# Patient Record
Sex: FEMALE | Race: BLACK OR AFRICAN AMERICAN | Employment: OTHER | URBAN - NONMETROPOLITAN AREA
[De-identification: names, ages, dates, MRNs, and addresses within clinical notes are randomized per-mention and may not be internally consistent; named-entity substitution may affect disease eponyms.]

---

## 2017-03-07 RX ORDER — MELOXICAM 15 MG/1
15 TABLET ORAL DAILY
Qty: 90 TABLET | Refills: 1 | Status: SHIPPED | OUTPATIENT
Start: 2017-03-07 | End: 2017-08-17 | Stop reason: SDUPTHER

## 2017-04-19 ENCOUNTER — TELEPHONE (OUTPATIENT)
Dept: OBGYN | Age: 67
End: 2017-04-19

## 2017-04-25 ENCOUNTER — OFFICE VISIT (OUTPATIENT)
Dept: OBGYN | Age: 67
End: 2017-04-25
Payer: MEDICARE

## 2017-04-25 VITALS
BODY MASS INDEX: 36.53 KG/M2 | WEIGHT: 241 LBS | HEART RATE: 80 BPM | SYSTOLIC BLOOD PRESSURE: 132 MMHG | HEIGHT: 68 IN | DIASTOLIC BLOOD PRESSURE: 84 MMHG

## 2017-04-25 DIAGNOSIS — B37.31 CANDIDIASIS OF FEMALE GENITALIA: ICD-10-CM

## 2017-04-25 DIAGNOSIS — N95.1 SYMPTOMATIC MENOPAUSAL OR FEMALE CLIMACTERIC STATES: Primary | ICD-10-CM

## 2017-04-25 PROCEDURE — 3014F SCREEN MAMMO DOC REV: CPT | Performed by: OBSTETRICS & GYNECOLOGY

## 2017-04-25 PROCEDURE — G8419 CALC BMI OUT NRM PARAM NOF/U: HCPCS | Performed by: OBSTETRICS & GYNECOLOGY

## 2017-04-25 PROCEDURE — 1090F PRES/ABSN URINE INCON ASSESS: CPT | Performed by: OBSTETRICS & GYNECOLOGY

## 2017-04-25 PROCEDURE — 4040F PNEUMOC VAC/ADMIN/RCVD: CPT | Performed by: OBSTETRICS & GYNECOLOGY

## 2017-04-25 PROCEDURE — 1036F TOBACCO NON-USER: CPT | Performed by: OBSTETRICS & GYNECOLOGY

## 2017-04-25 PROCEDURE — 1123F ACP DISCUSS/DSCN MKR DOCD: CPT | Performed by: OBSTETRICS & GYNECOLOGY

## 2017-04-25 PROCEDURE — G8427 DOCREV CUR MEDS BY ELIG CLIN: HCPCS | Performed by: OBSTETRICS & GYNECOLOGY

## 2017-04-25 PROCEDURE — 99213 OFFICE O/P EST LOW 20 MIN: CPT | Performed by: OBSTETRICS & GYNECOLOGY

## 2017-04-25 PROCEDURE — 3017F COLORECTAL CA SCREEN DOC REV: CPT | Performed by: OBSTETRICS & GYNECOLOGY

## 2017-04-25 PROCEDURE — G8400 PT W/DXA NO RESULTS DOC: HCPCS | Performed by: OBSTETRICS & GYNECOLOGY

## 2017-04-25 RX ORDER — ESTRADIOL 0.04 MG/D
1 PATCH TRANSDERMAL WEEKLY
Qty: 4 PATCH | Refills: 3 | Status: SHIPPED | OUTPATIENT
Start: 2017-04-25 | End: 2017-07-25 | Stop reason: ALTCHOICE

## 2017-04-25 RX ORDER — FLUCONAZOLE 150 MG/1
150 TABLET ORAL ONCE
Qty: 1 TABLET | Refills: 0 | Status: SHIPPED | OUTPATIENT
Start: 2017-04-25 | End: 2017-04-25

## 2017-04-26 RX ORDER — ESTRADIOL 0.5 MG/1
0.5 TABLET ORAL DAILY
Qty: 30 TABLET | Refills: 3 | Status: SHIPPED | OUTPATIENT
Start: 2017-04-26 | End: 2017-06-28 | Stop reason: SDUPTHER

## 2017-06-30 RX ORDER — ESTRADIOL 0.5 MG/1
0.5 TABLET ORAL DAILY
Qty: 30 TABLET | Refills: 3 | Status: SHIPPED | OUTPATIENT
Start: 2017-06-30 | End: 2018-09-24 | Stop reason: ALTCHOICE

## 2017-07-25 ENCOUNTER — HOSPITAL ENCOUNTER (OUTPATIENT)
Age: 67
Setting detail: SPECIMEN
Discharge: HOME | End: 2017-07-25
Payer: MEDICARE

## 2017-07-25 ENCOUNTER — OFFICE VISIT (OUTPATIENT)
Dept: OBGYN | Age: 67
End: 2017-07-25
Payer: MEDICARE

## 2017-07-25 VITALS
SYSTOLIC BLOOD PRESSURE: 124 MMHG | DIASTOLIC BLOOD PRESSURE: 84 MMHG | HEIGHT: 68 IN | HEART RATE: 60 BPM | BODY MASS INDEX: 36.37 KG/M2 | WEIGHT: 240 LBS

## 2017-07-25 DIAGNOSIS — Z12.31 ENCOUNTER FOR SCREENING MAMMOGRAM FOR MALIGNANT NEOPLASM OF BREAST: ICD-10-CM

## 2017-07-25 DIAGNOSIS — Z01.419 WELL WOMAN EXAM WITH ROUTINE GYNECOLOGICAL EXAM: Primary | ICD-10-CM

## 2017-07-25 PROCEDURE — 4040F PNEUMOC VAC/ADMIN/RCVD: CPT | Performed by: OBSTETRICS & GYNECOLOGY

## 2017-07-25 PROCEDURE — 3014F SCREEN MAMMO DOC REV: CPT | Performed by: OBSTETRICS & GYNECOLOGY

## 2017-07-25 PROCEDURE — G0101 CA SCREEN;PELVIC/BREAST EXAM: HCPCS | Performed by: OBSTETRICS & GYNECOLOGY

## 2017-07-25 PROCEDURE — G8400 PT W/DXA NO RESULTS DOC: HCPCS | Performed by: OBSTETRICS & GYNECOLOGY

## 2017-07-25 PROCEDURE — 1123F ACP DISCUSS/DSCN MKR DOCD: CPT | Performed by: OBSTETRICS & GYNECOLOGY

## 2017-07-25 PROCEDURE — G8419 CALC BMI OUT NRM PARAM NOF/U: HCPCS | Performed by: OBSTETRICS & GYNECOLOGY

## 2017-07-25 PROCEDURE — G8427 DOCREV CUR MEDS BY ELIG CLIN: HCPCS | Performed by: OBSTETRICS & GYNECOLOGY

## 2017-07-25 PROCEDURE — 1090F PRES/ABSN URINE INCON ASSESS: CPT | Performed by: OBSTETRICS & GYNECOLOGY

## 2017-07-25 PROCEDURE — 3017F COLORECTAL CA SCREEN DOC REV: CPT | Performed by: OBSTETRICS & GYNECOLOGY

## 2017-07-25 PROCEDURE — 1036F TOBACCO NON-USER: CPT | Performed by: OBSTETRICS & GYNECOLOGY

## 2017-07-25 RX ORDER — ESTRADIOL 1 MG/1
1 TABLET ORAL DAILY
Qty: 90 TABLET | Refills: 3 | Status: SHIPPED | OUTPATIENT
Start: 2017-07-25 | End: 2017-09-28 | Stop reason: DRUGHIGH

## 2017-07-25 ASSESSMENT — ENCOUNTER SYMPTOMS
EYES NEGATIVE: 1
RESPIRATORY NEGATIVE: 1

## 2017-07-26 LAB — CYTOLOGY REPORT: NORMAL

## 2017-08-04 RX ORDER — HYDROCHLOROTHIAZIDE 25 MG/1
TABLET ORAL
Qty: 90 TABLET | Refills: 2 | Status: SHIPPED | OUTPATIENT
Start: 2017-08-04 | End: 2018-03-23 | Stop reason: SDUPTHER

## 2017-08-17 RX ORDER — MELOXICAM 15 MG/1
TABLET ORAL
Qty: 90 TABLET | Refills: 0 | Status: SHIPPED | OUTPATIENT
Start: 2017-08-17 | End: 2017-11-16 | Stop reason: SDUPTHER

## 2017-09-28 ENCOUNTER — OFFICE VISIT (OUTPATIENT)
Dept: FAMILY MEDICINE CLINIC | Age: 67
End: 2017-09-28
Payer: MEDICARE

## 2017-09-28 VITALS
HEIGHT: 68 IN | DIASTOLIC BLOOD PRESSURE: 76 MMHG | WEIGHT: 246 LBS | BODY MASS INDEX: 37.28 KG/M2 | HEART RATE: 72 BPM | SYSTOLIC BLOOD PRESSURE: 128 MMHG

## 2017-09-28 DIAGNOSIS — J45.20 MILD INTERMITTENT ASTHMA WITHOUT COMPLICATION: ICD-10-CM

## 2017-09-28 DIAGNOSIS — Z79.890 POSTMENOPAUSAL HRT (HORMONE REPLACEMENT THERAPY): ICD-10-CM

## 2017-09-28 DIAGNOSIS — E66.09 NON MORBID OBESITY DUE TO EXCESS CALORIES: ICD-10-CM

## 2017-09-28 DIAGNOSIS — G47.9 SLEEP DISTURBANCES: ICD-10-CM

## 2017-09-28 DIAGNOSIS — Z23 NEED FOR PNEUMOCOCCAL VACCINE: Primary | ICD-10-CM

## 2017-09-28 DIAGNOSIS — D12.6 ADENOMATOUS POLYP OF COLON, UNSPECIFIED PART OF COLON: ICD-10-CM

## 2017-09-28 DIAGNOSIS — E78.00 PURE HYPERCHOLESTEROLEMIA: ICD-10-CM

## 2017-09-28 DIAGNOSIS — K21.9 GASTROESOPHAGEAL REFLUX DISEASE WITHOUT ESOPHAGITIS: ICD-10-CM

## 2017-09-28 DIAGNOSIS — M17.11 PRIMARY OSTEOARTHRITIS OF RIGHT KNEE: ICD-10-CM

## 2017-09-28 DIAGNOSIS — I10 ESSENTIAL HYPERTENSION: ICD-10-CM

## 2017-09-28 DIAGNOSIS — M81.0 OSTEOPOROSIS, UNSPECIFIED OSTEOPOROSIS TYPE, UNSPECIFIED PATHOLOGICAL FRACTURE PRESENCE: ICD-10-CM

## 2017-09-28 PROCEDURE — 1090F PRES/ABSN URINE INCON ASSESS: CPT | Performed by: FAMILY MEDICINE

## 2017-09-28 PROCEDURE — G8419 CALC BMI OUT NRM PARAM NOF/U: HCPCS | Performed by: FAMILY MEDICINE

## 2017-09-28 PROCEDURE — 3014F SCREEN MAMMO DOC REV: CPT | Performed by: FAMILY MEDICINE

## 2017-09-28 PROCEDURE — G8400 PT W/DXA NO RESULTS DOC: HCPCS | Performed by: FAMILY MEDICINE

## 2017-09-28 PROCEDURE — 1123F ACP DISCUSS/DSCN MKR DOCD: CPT | Performed by: FAMILY MEDICINE

## 2017-09-28 PROCEDURE — 4005F PHARM THX FOR OP RXD: CPT | Performed by: FAMILY MEDICINE

## 2017-09-28 PROCEDURE — 3017F COLORECTAL CA SCREEN DOC REV: CPT | Performed by: FAMILY MEDICINE

## 2017-09-28 PROCEDURE — 4040F PNEUMOC VAC/ADMIN/RCVD: CPT | Performed by: FAMILY MEDICINE

## 2017-09-28 PROCEDURE — 99214 OFFICE O/P EST MOD 30 MIN: CPT | Performed by: FAMILY MEDICINE

## 2017-09-28 PROCEDURE — G8427 DOCREV CUR MEDS BY ELIG CLIN: HCPCS | Performed by: FAMILY MEDICINE

## 2017-09-28 PROCEDURE — 1036F TOBACCO NON-USER: CPT | Performed by: FAMILY MEDICINE

## 2017-09-28 ASSESSMENT — PATIENT HEALTH QUESTIONNAIRE - PHQ9
SUM OF ALL RESPONSES TO PHQ QUESTIONS 1-9: 0
SUM OF ALL RESPONSES TO PHQ9 QUESTIONS 1 & 2: 0
2. FEELING DOWN, DEPRESSED OR HOPELESS: 0
1. LITTLE INTEREST OR PLEASURE IN DOING THINGS: 0

## 2017-09-28 ASSESSMENT — ENCOUNTER SYMPTOMS
GASTROINTESTINAL NEGATIVE: 1
RESPIRATORY NEGATIVE: 1
EYES NEGATIVE: 1
ALLERGIC/IMMUNOLOGIC NEGATIVE: 1

## 2017-10-12 ENCOUNTER — HOSPITAL ENCOUNTER (OUTPATIENT)
Dept: LAB | Age: 67
Setting detail: SPECIMEN
Discharge: HOME | End: 2017-10-12
Payer: MEDICARE

## 2017-10-12 ENCOUNTER — HOSPITAL ENCOUNTER (OUTPATIENT)
Dept: BONE DENSITY | Age: 67
Discharge: HOME | End: 2017-10-12
Payer: MEDICARE

## 2017-10-12 DIAGNOSIS — M81.0 OSTEOPOROSIS, UNSPECIFIED OSTEOPOROSIS TYPE, UNSPECIFIED PATHOLOGICAL FRACTURE PRESENCE: ICD-10-CM

## 2017-10-12 DIAGNOSIS — I10 ESSENTIAL HYPERTENSION: ICD-10-CM

## 2017-10-12 DIAGNOSIS — E78.00 PURE HYPERCHOLESTEROLEMIA: ICD-10-CM

## 2017-10-12 LAB
ABSOLUTE EOS #: 0.3 K/UL (ref 0–0.4)
ABSOLUTE LYMPH #: 2.5 K/UL (ref 1–4.8)
ABSOLUTE MONO #: 0.5 K/UL (ref 0.1–1.2)
ANION GAP SERPL CALCULATED.3IONS-SCNC: 10 MMOL/L (ref 9–17)
BASOPHILS # BLD: 1 % (ref 0–1)
BASOPHILS ABSOLUTE: 0 K/UL (ref 0–0.2)
BUN BLDV-MCNC: 12 MG/DL (ref 8–23)
BUN/CREAT BLD: 14 (ref 9–20)
CALCIUM SERPL-MCNC: 8.9 MG/DL (ref 8.6–10.4)
CHLORIDE BLD-SCNC: 102 MMOL/L (ref 98–107)
CHOLESTEROL/HDL RATIO: 3.1
CHOLESTEROL: 192 MG/DL
CO2: 28 MMOL/L (ref 20–31)
CREAT SERPL-MCNC: 0.83 MG/DL (ref 0.5–0.9)
DIFFERENTIAL TYPE: ABNORMAL
EOSINOPHILS RELATIVE PERCENT: 4 % (ref 1–7)
GFR AFRICAN AMERICAN: >60 ML/MIN
GFR NON-AFRICAN AMERICAN: >60 ML/MIN
GFR SERPL CREATININE-BSD FRML MDRD: ABNORMAL ML/MIN/{1.73_M2}
GFR SERPL CREATININE-BSD FRML MDRD: ABNORMAL ML/MIN/{1.73_M2}
GLUCOSE BLD-MCNC: 103 MG/DL (ref 70–99)
HCT VFR BLD CALC: 42 % (ref 36–46)
HDLC SERPL-MCNC: 61 MG/DL
HEMOGLOBIN: 13.3 G/DL (ref 12–16)
LDL CHOLESTEROL: 103 MG/DL (ref 0–130)
LYMPHOCYTES # BLD: 38 % (ref 16–46)
MCH RBC QN AUTO: 25.8 PG (ref 26–34)
MCHC RBC AUTO-ENTMCNC: 31.6 G/DL (ref 31–37)
MCV RBC AUTO: 81.7 FL (ref 80–100)
MONOCYTES # BLD: 8 % (ref 4–11)
PDW BLD-RTO: 14.1 % (ref 11–14.5)
PLATELET # BLD: 322 K/UL (ref 140–450)
PLATELET ESTIMATE: ABNORMAL
PMV BLD AUTO: 7.5 FL (ref 6–12)
POTASSIUM SERPL-SCNC: 3.8 MMOL/L (ref 3.7–5.3)
RBC # BLD: 5.14 M/UL (ref 4–5.2)
RBC # BLD: ABNORMAL 10*6/UL
SEG NEUTROPHILS: 49 % (ref 43–77)
SEGMENTED NEUTROPHILS ABSOLUTE COUNT: 3.2 K/UL (ref 1.8–7.7)
SODIUM BLD-SCNC: 140 MMOL/L (ref 135–144)
TRIGL SERPL-MCNC: 142 MG/DL
VLDLC SERPL CALC-MCNC: NORMAL MG/DL (ref 1–30)
WBC # BLD: 6.5 K/UL (ref 3.5–11)
WBC # BLD: ABNORMAL 10*3/UL

## 2017-10-12 PROCEDURE — 80048 BASIC METABOLIC PNL TOTAL CA: CPT

## 2017-10-12 PROCEDURE — 85025 COMPLETE CBC W/AUTO DIFF WBC: CPT

## 2017-10-12 PROCEDURE — 77080 DXA BONE DENSITY AXIAL: CPT

## 2017-10-12 PROCEDURE — 80061 LIPID PANEL: CPT

## 2017-10-12 PROCEDURE — 36415 COLL VENOUS BLD VENIPUNCTURE: CPT

## 2017-10-25 ENCOUNTER — NURSE ONLY (OUTPATIENT)
Dept: LAB | Age: 67
End: 2017-10-25
Payer: MEDICARE

## 2017-10-25 DIAGNOSIS — Z23 NEED FOR VACCINATION: Primary | ICD-10-CM

## 2017-10-25 PROCEDURE — 90662 IIV NO PRSV INCREASED AG IM: CPT | Performed by: FAMILY MEDICINE

## 2017-10-25 PROCEDURE — G0008 ADMIN INFLUENZA VIRUS VAC: HCPCS | Performed by: FAMILY MEDICINE

## 2017-10-25 PROCEDURE — 90670 PCV13 VACCINE IM: CPT | Performed by: FAMILY MEDICINE

## 2017-10-25 PROCEDURE — G0009 ADMIN PNEUMOCOCCAL VACCINE: HCPCS | Performed by: FAMILY MEDICINE

## 2017-11-16 RX ORDER — MELOXICAM 15 MG/1
TABLET ORAL
Qty: 90 TABLET | Refills: 0 | Status: SHIPPED | OUTPATIENT
Start: 2017-11-16 | End: 2018-02-14 | Stop reason: SDUPTHER

## 2018-02-14 RX ORDER — MELOXICAM 15 MG/1
TABLET ORAL
Qty: 90 TABLET | Refills: 0 | Status: SHIPPED | OUTPATIENT
Start: 2018-02-14 | End: 2018-03-23 | Stop reason: SDUPTHER

## 2018-03-22 ENCOUNTER — HOSPITAL ENCOUNTER (OUTPATIENT)
Dept: LAB | Age: 68
Setting detail: SPECIMEN
Discharge: HOME | End: 2018-03-22
Payer: MEDICARE

## 2018-03-22 DIAGNOSIS — I10 ESSENTIAL HYPERTENSION: ICD-10-CM

## 2018-03-22 LAB
ANION GAP SERPL CALCULATED.3IONS-SCNC: 11 MMOL/L (ref 9–17)
BUN BLDV-MCNC: 12 MG/DL (ref 8–23)
BUN/CREAT BLD: 16 (ref 9–20)
CALCIUM SERPL-MCNC: 9.2 MG/DL (ref 8.6–10.4)
CHLORIDE BLD-SCNC: 100 MMOL/L (ref 98–107)
CO2: 30 MMOL/L (ref 20–31)
CREAT SERPL-MCNC: 0.75 MG/DL (ref 0.5–0.9)
GFR AFRICAN AMERICAN: >60 ML/MIN
GFR NON-AFRICAN AMERICAN: >60 ML/MIN
GFR SERPL CREATININE-BSD FRML MDRD: ABNORMAL ML/MIN/{1.73_M2}
GFR SERPL CREATININE-BSD FRML MDRD: ABNORMAL ML/MIN/{1.73_M2}
GLUCOSE BLD-MCNC: 104 MG/DL (ref 70–99)
POTASSIUM SERPL-SCNC: 3.8 MMOL/L (ref 3.7–5.3)
SODIUM BLD-SCNC: 141 MMOL/L (ref 135–144)

## 2018-03-22 PROCEDURE — 36415 COLL VENOUS BLD VENIPUNCTURE: CPT

## 2018-03-22 PROCEDURE — 80048 BASIC METABOLIC PNL TOTAL CA: CPT

## 2018-03-23 ENCOUNTER — OFFICE VISIT (OUTPATIENT)
Dept: FAMILY MEDICINE CLINIC | Age: 68
End: 2018-03-23
Payer: MEDICARE

## 2018-03-23 VITALS
BODY MASS INDEX: 36.53 KG/M2 | HEART RATE: 72 BPM | DIASTOLIC BLOOD PRESSURE: 76 MMHG | WEIGHT: 241 LBS | HEIGHT: 68 IN | SYSTOLIC BLOOD PRESSURE: 126 MMHG

## 2018-03-23 DIAGNOSIS — I10 ESSENTIAL HYPERTENSION: ICD-10-CM

## 2018-03-23 DIAGNOSIS — D12.4 ADENOMATOUS POLYP OF DESCENDING COLON: Primary | ICD-10-CM

## 2018-03-23 DIAGNOSIS — G47.9 SLEEP DISTURBANCES: ICD-10-CM

## 2018-03-23 DIAGNOSIS — E78.00 PURE HYPERCHOLESTEROLEMIA: ICD-10-CM

## 2018-03-23 DIAGNOSIS — Z79.890 POSTMENOPAUSAL HRT (HORMONE REPLACEMENT THERAPY): ICD-10-CM

## 2018-03-23 DIAGNOSIS — J45.20 MILD INTERMITTENT ASTHMA WITHOUT COMPLICATION: ICD-10-CM

## 2018-03-23 DIAGNOSIS — E66.09 NON MORBID OBESITY DUE TO EXCESS CALORIES: ICD-10-CM

## 2018-03-23 DIAGNOSIS — M17.11 PRIMARY OSTEOARTHRITIS OF RIGHT KNEE: ICD-10-CM

## 2018-03-23 DIAGNOSIS — K21.9 GASTROESOPHAGEAL REFLUX DISEASE WITHOUT ESOPHAGITIS: ICD-10-CM

## 2018-03-23 PROCEDURE — G8399 PT W/DXA RESULTS DOCUMENT: HCPCS | Performed by: FAMILY MEDICINE

## 2018-03-23 PROCEDURE — G8427 DOCREV CUR MEDS BY ELIG CLIN: HCPCS | Performed by: FAMILY MEDICINE

## 2018-03-23 PROCEDURE — 1036F TOBACCO NON-USER: CPT | Performed by: FAMILY MEDICINE

## 2018-03-23 PROCEDURE — G8482 FLU IMMUNIZE ORDER/ADMIN: HCPCS | Performed by: FAMILY MEDICINE

## 2018-03-23 PROCEDURE — 1123F ACP DISCUSS/DSCN MKR DOCD: CPT | Performed by: FAMILY MEDICINE

## 2018-03-23 PROCEDURE — 1090F PRES/ABSN URINE INCON ASSESS: CPT | Performed by: FAMILY MEDICINE

## 2018-03-23 PROCEDURE — G8417 CALC BMI ABV UP PARAM F/U: HCPCS | Performed by: FAMILY MEDICINE

## 2018-03-23 PROCEDURE — 3014F SCREEN MAMMO DOC REV: CPT | Performed by: FAMILY MEDICINE

## 2018-03-23 PROCEDURE — 3017F COLORECTAL CA SCREEN DOC REV: CPT | Performed by: FAMILY MEDICINE

## 2018-03-23 PROCEDURE — 99214 OFFICE O/P EST MOD 30 MIN: CPT | Performed by: FAMILY MEDICINE

## 2018-03-23 PROCEDURE — 4040F PNEUMOC VAC/ADMIN/RCVD: CPT | Performed by: FAMILY MEDICINE

## 2018-03-23 RX ORDER — MELOXICAM 15 MG/1
TABLET ORAL
Qty: 90 TABLET | Refills: 3 | Status: SHIPPED | OUTPATIENT
Start: 2018-03-23 | End: 2019-03-25 | Stop reason: SDUPTHER

## 2018-03-23 RX ORDER — HYDROCHLOROTHIAZIDE 25 MG/1
TABLET ORAL
Qty: 90 TABLET | Refills: 3 | Status: SHIPPED | OUTPATIENT
Start: 2018-03-23 | End: 2019-03-25 | Stop reason: SDUPTHER

## 2018-03-23 ASSESSMENT — ENCOUNTER SYMPTOMS
GASTROINTESTINAL NEGATIVE: 1
EYES NEGATIVE: 1
ALLERGIC/IMMUNOLOGIC NEGATIVE: 1
RESPIRATORY NEGATIVE: 1

## 2018-03-23 NOTE — PATIENT INSTRUCTIONS
Hospital Outpatient Visit on 03/22/2018   Component Date Value Ref Range Status    Glucose 03/22/2018 104* 70 - 99 mg/dL Final    BUN 03/22/2018 12  8 - 23 mg/dL Final    CREATININE 03/22/2018 0.75  0.50 - 0.90 mg/dL Final    Bun/Cre Ratio 03/22/2018 16  9 - 20 Final    Calcium 03/22/2018 9.2  8.6 - 10.4 mg/dL Final    Sodium 03/22/2018 141  135 - 144 mmol/L Final    Potassium 03/22/2018 3.8  3.7 - 5.3 mmol/L Final    Chloride 03/22/2018 100  98 - 107 mmol/L Final    CO2 03/22/2018 30  20 - 31 mmol/L Final    Anion Gap 03/22/2018 11  9 - 17 mmol/L Final    GFR Non- 03/22/2018 >60  >60 mL/min Final    GFR  03/22/2018 >60  >60 mL/min Final    GFR Comment 03/22/2018        Final    Comment: Average GFR for 61-76 years old:   80 mL/min/1.73sq m  Chronic Kidney Disease:   <60 mL/min/1.73sq m  Kidney failure:   <15 mL/min/1.73sq m              GFR is a calculated value that has proven clinically to  be a more effective   measure of kidney function when reported with serum creatinine. Performed at [facility] [address] [address] [phone] [facility] GFR Staging 03/22/2018 NOT REPORTED   Final

## 2018-08-31 ENCOUNTER — OFFICE VISIT (OUTPATIENT)
Dept: PRIMARY CARE CLINIC | Age: 68
End: 2018-08-31
Payer: MEDICARE

## 2018-08-31 ENCOUNTER — HOSPITAL ENCOUNTER (OUTPATIENT)
Age: 68
Setting detail: SPECIMEN
Discharge: HOME | End: 2018-08-31
Payer: MEDICARE

## 2018-08-31 VITALS
BODY MASS INDEX: 37.28 KG/M2 | HEIGHT: 68 IN | SYSTOLIC BLOOD PRESSURE: 140 MMHG | OXYGEN SATURATION: 98 % | HEART RATE: 57 BPM | WEIGHT: 246 LBS | TEMPERATURE: 97.7 F | DIASTOLIC BLOOD PRESSURE: 102 MMHG

## 2018-08-31 DIAGNOSIS — M54.5 ACUTE LEFT-SIDED LOW BACK PAIN, WITH SCIATICA PRESENCE UNSPECIFIED: ICD-10-CM

## 2018-08-31 DIAGNOSIS — R35.0 URINARY FREQUENCY: ICD-10-CM

## 2018-08-31 DIAGNOSIS — R10.9 LEFT FLANK PAIN: Primary | ICD-10-CM

## 2018-08-31 LAB
-: ABNORMAL
AMORPHOUS: ABNORMAL
BACTERIA: ABNORMAL
BILIRUBIN URINE: NEGATIVE
CASTS UA: ABNORMAL /LPF (ref 0–2)
COLOR: ABNORMAL
COMMENT UA: ABNORMAL
CRYSTALS, UA: ABNORMAL /HPF
EPITHELIAL CELLS UA: ABNORMAL /HPF (ref 0–5)
GLUCOSE URINE: NEGATIVE
KETONES, URINE: NEGATIVE
LEUKOCYTE ESTERASE, URINE: NEGATIVE
MUCUS: ABNORMAL
NITRITE, URINE: NEGATIVE
OTHER OBSERVATIONS UA: ABNORMAL
PH UA: 6 (ref 5–6)
PROTEIN UA: NEGATIVE
RBC UA: ABNORMAL /HPF (ref 0–4)
RENAL EPITHELIAL, UA: ABNORMAL /HPF
SPECIFIC GRAVITY UA: 1.02 (ref 1.01–1.02)
TRICHOMONAS: ABNORMAL
TURBIDITY: ABNORMAL
URINE HGB: ABNORMAL
UROBILINOGEN, URINE: NORMAL
WBC UA: ABNORMAL /HPF (ref 0–4)
YEAST: ABNORMAL

## 2018-08-31 PROCEDURE — G8427 DOCREV CUR MEDS BY ELIG CLIN: HCPCS | Performed by: FAMILY MEDICINE

## 2018-08-31 PROCEDURE — 1101F PT FALLS ASSESS-DOCD LE1/YR: CPT | Performed by: FAMILY MEDICINE

## 2018-08-31 PROCEDURE — G8399 PT W/DXA RESULTS DOCUMENT: HCPCS | Performed by: FAMILY MEDICINE

## 2018-08-31 PROCEDURE — 81001 URINALYSIS AUTO W/SCOPE: CPT

## 2018-08-31 PROCEDURE — 1036F TOBACCO NON-USER: CPT | Performed by: FAMILY MEDICINE

## 2018-08-31 PROCEDURE — G8417 CALC BMI ABV UP PARAM F/U: HCPCS | Performed by: FAMILY MEDICINE

## 2018-08-31 PROCEDURE — 4040F PNEUMOC VAC/ADMIN/RCVD: CPT | Performed by: FAMILY MEDICINE

## 2018-08-31 PROCEDURE — 1123F ACP DISCUSS/DSCN MKR DOCD: CPT | Performed by: FAMILY MEDICINE

## 2018-08-31 PROCEDURE — 99214 OFFICE O/P EST MOD 30 MIN: CPT | Performed by: FAMILY MEDICINE

## 2018-08-31 PROCEDURE — 1090F PRES/ABSN URINE INCON ASSESS: CPT | Performed by: FAMILY MEDICINE

## 2018-08-31 PROCEDURE — 3017F COLORECTAL CA SCREEN DOC REV: CPT | Performed by: FAMILY MEDICINE

## 2018-08-31 RX ORDER — PREDNISONE 20 MG/1
TABLET ORAL
Qty: 10 TABLET | Refills: 0 | Status: SHIPPED | OUTPATIENT
Start: 2018-08-31 | End: 2018-09-24 | Stop reason: ALTCHOICE

## 2018-08-31 ASSESSMENT — ENCOUNTER SYMPTOMS: BACK PAIN: 1

## 2018-08-31 NOTE — PROGRESS NOTES
2018     Ivelisse Elena (:  1950) is a 80 y.o. female, here for evaluation of the following medical concerns:    Back Pain   This is a new problem. The current episode started in the past 7 days. The problem occurs constantly. The problem is unchanged. The pain is present in the lumbar spine (left flank region). The quality of the pain is described as aching. The pain does not radiate. The pain is moderate. Exacerbated by: nothing seems to aggravate the pain. Pertinent negatives include no abdominal pain, chest pain, dysuria, fever, headaches, leg pain, numbness, tingling or weakness. (Has had some urinary frequency. Did have a green bowel movement the other day, but otherwise no stool changes) Treatments tried: tylenol and motrin. The treatment provided mild relief. Did review patient's med list, allergies, social history,pmhx and pshx today as noted in the record. Review of Systems   Constitutional: Negative for chills, fatigue and fever. HENT: Negative for congestion, ear pain, postnasal drip, rhinorrhea, sinus pressure, sore throat and trouble swallowing. Eyes: Negative for discharge and redness. Respiratory: Negative for cough, shortness of breath and wheezing. Cardiovascular: Negative for chest pain. Gastrointestinal: Negative for abdominal pain, constipation, diarrhea, nausea and vomiting. Genitourinary: Positive for flank pain. Negative for dysuria. Musculoskeletal: Positive for back pain and myalgias. Negative for arthralgias and neck pain. Skin: Negative for rash and wound. Allergic/Immunologic: Negative for environmental allergies. Neurological: Negative for dizziness, tingling, weakness, light-headedness, numbness and headaches. Hematological: Negative for adenopathy. Psychiatric/Behavioral: Negative. Prior to Visit Medications    Medication Sig Taking?  Authorizing Provider   BIOTIN FORTE PO Take by mouth Yes Historical Provider, MD hydrochlorothiazide (HYDRODIURIL) 25 MG tablet TAKE 1 TABLET DAILY Yes Saint Merritt, MD   meloxicam (MOBIC) 15 MG tablet TAKE 1 TABLET DAILY Yes Saint Merritt, MD   estradiol (ESTRACE) 0.5 MG tablet Take 1 tablet by mouth daily Yes Camille Paul MD   albuterol (PROVENTIL HFA;VENTOLIN HFA) 108 (90 BASE) MCG/ACT inhaler Inhale 2 puffs into the lungs every 6 hours as needed for Wheezing or Shortness of Breath Yes Saint Merritt, MD   omeprazole (PRILOSEC) 20 MG capsule Take 20 mg by mouth daily as needed  Yes Saint Merritt, MD        Social History   Substance Use Topics    Smoking status: Former Smoker     Packs/day: 0.25     Years: 1.00     Types: Cigarettes     Start date: 12/21/1997     Quit date: 12/31/1998    Smokeless tobacco: Never Used      Comment: Smoked for about 1 year    Alcohol use 0.0 oz/week      Comment: Occasionally, once or twice a year        Vitals:    08/31/18 1510   BP: (!) 140/102   Pulse: 57   Temp: 97.7 °F (36.5 °C)   TempSrc: Tympanic   SpO2: 98%   Weight: 246 lb (111.6 kg)   Height: 5' 8\" (1.727 m)     Estimated body mass index is 37.4 kg/m² as calculated from the following:    Height as of this encounter: 5' 8\" (1.727 m). Weight as of this encounter: 246 lb (111.6 kg). Physical Exam   Constitutional: She is oriented to person, place, and time. She appears well-developed and well-nourished. No distress. HENT:   Head: Normocephalic and atraumatic. Eyes: Conjunctivae are normal.   Neck: Normal range of motion. Pulmonary/Chest: Effort normal.   Abdominal: Soft. Bowel sounds are normal. She exhibits no distension and no mass. There is no tenderness. There is no rebound and no guarding. Musculoskeletal: Normal range of motion. She exhibits tenderness. She exhibits no edema or deformity. Reproducible pain with palpation to the left flank region, mostly noted with palpation on right 12. No pain with movement.   Negative [employer]oy[employer] sign   Neurological: She is alert and oriented

## 2018-09-03 ASSESSMENT — ENCOUNTER SYMPTOMS
ABDOMINAL PAIN: 0
DIARRHEA: 0
CONSTIPATION: 0
NAUSEA: 0
SHORTNESS OF BREATH: 0
RHINORRHEA: 0
SINUS PRESSURE: 0
SORE THROAT: 0
VOMITING: 0
TROUBLE SWALLOWING: 0
EYE DISCHARGE: 0
WHEEZING: 0
COUGH: 0
EYE REDNESS: 0

## 2018-09-06 ENCOUNTER — HOSPITAL ENCOUNTER (OUTPATIENT)
Age: 68
Setting detail: SPECIMEN
Discharge: HOME | End: 2018-09-06
Payer: MEDICARE

## 2018-09-06 ENCOUNTER — HOSPITAL ENCOUNTER (OUTPATIENT)
Dept: CT IMAGING | Age: 68
Discharge: HOME | End: 2018-09-08
Payer: MEDICARE

## 2018-09-06 ENCOUNTER — OFFICE VISIT (OUTPATIENT)
Dept: PRIMARY CARE CLINIC | Age: 68
End: 2018-09-06
Payer: MEDICARE

## 2018-09-06 VITALS
TEMPERATURE: 98.5 F | BODY MASS INDEX: 37.71 KG/M2 | WEIGHT: 248 LBS | DIASTOLIC BLOOD PRESSURE: 80 MMHG | HEART RATE: 72 BPM | SYSTOLIC BLOOD PRESSURE: 144 MMHG

## 2018-09-06 DIAGNOSIS — R10.9 LEFT FLANK PAIN: ICD-10-CM

## 2018-09-06 DIAGNOSIS — R10.9 LEFT FLANK PAIN: Primary | ICD-10-CM

## 2018-09-06 LAB
-: ABNORMAL
AMORPHOUS: ABNORMAL
BACTERIA: ABNORMAL
BILIRUBIN URINE: NEGATIVE
CASTS UA: ABNORMAL /LPF (ref 0–2)
COLOR: ABNORMAL
COMMENT UA: ABNORMAL
CRYSTALS, UA: ABNORMAL /HPF
EPITHELIAL CELLS UA: ABNORMAL /HPF (ref 0–5)
GLUCOSE URINE: NEGATIVE
KETONES, URINE: NEGATIVE
LEUKOCYTE ESTERASE, URINE: NEGATIVE
MUCUS: ABNORMAL
NITRITE, URINE: NEGATIVE
OTHER OBSERVATIONS UA: ABNORMAL
PH UA: 7.5 (ref 5–6)
PROTEIN UA: NEGATIVE
RBC UA: ABNORMAL /HPF (ref 0–4)
RENAL EPITHELIAL, UA: ABNORMAL /HPF
SPECIFIC GRAVITY UA: 1.01 (ref 1.01–1.02)
TRICHOMONAS: ABNORMAL
TURBIDITY: ABNORMAL
URINE HGB: ABNORMAL
UROBILINOGEN, URINE: NORMAL
WBC UA: ABNORMAL /HPF (ref 0–4)
YEAST: ABNORMAL

## 2018-09-06 PROCEDURE — G8399 PT W/DXA RESULTS DOCUMENT: HCPCS | Performed by: FAMILY MEDICINE

## 2018-09-06 PROCEDURE — 1101F PT FALLS ASSESS-DOCD LE1/YR: CPT | Performed by: FAMILY MEDICINE

## 2018-09-06 PROCEDURE — 1123F ACP DISCUSS/DSCN MKR DOCD: CPT | Performed by: FAMILY MEDICINE

## 2018-09-06 PROCEDURE — 74176 CT ABD & PELVIS W/O CONTRAST: CPT

## 2018-09-06 PROCEDURE — 4040F PNEUMOC VAC/ADMIN/RCVD: CPT | Performed by: FAMILY MEDICINE

## 2018-09-06 PROCEDURE — 81001 URINALYSIS AUTO W/SCOPE: CPT

## 2018-09-06 PROCEDURE — 1090F PRES/ABSN URINE INCON ASSESS: CPT | Performed by: FAMILY MEDICINE

## 2018-09-06 PROCEDURE — G8427 DOCREV CUR MEDS BY ELIG CLIN: HCPCS | Performed by: FAMILY MEDICINE

## 2018-09-06 PROCEDURE — G8417 CALC BMI ABV UP PARAM F/U: HCPCS | Performed by: FAMILY MEDICINE

## 2018-09-06 PROCEDURE — 1036F TOBACCO NON-USER: CPT | Performed by: FAMILY MEDICINE

## 2018-09-06 PROCEDURE — 3017F COLORECTAL CA SCREEN DOC REV: CPT | Performed by: FAMILY MEDICINE

## 2018-09-06 PROCEDURE — 99214 OFFICE O/P EST MOD 30 MIN: CPT | Performed by: FAMILY MEDICINE

## 2018-09-06 RX ORDER — TRAMADOL HYDROCHLORIDE 50 MG/1
50 TABLET ORAL EVERY 6 HOURS PRN
Qty: 28 TABLET | Refills: 0 | Status: SHIPPED | OUTPATIENT
Start: 2018-09-06 | End: 2018-09-13

## 2018-09-06 RX ORDER — CYCLOBENZAPRINE HCL 5 MG
5 TABLET ORAL 3 TIMES DAILY PRN
Qty: 30 TABLET | Refills: 0 | Status: SHIPPED | OUTPATIENT
Start: 2018-09-06 | End: 2018-09-16

## 2018-09-06 ASSESSMENT — ENCOUNTER SYMPTOMS
CHEST TIGHTNESS: 0
COUGH: 0
SHORTNESS OF BREATH: 0
VOMITING: 0
NAUSEA: 1
RESPIRATORY NEGATIVE: 1
EYES NEGATIVE: 1
CONSTIPATION: 0
RECTAL PAIN: 0
ALLERGIC/IMMUNOLOGIC NEGATIVE: 1
ABDOMINAL PAIN: 0
DIARRHEA: 0

## 2018-09-06 ASSESSMENT — PATIENT HEALTH QUESTIONNAIRE - PHQ9
SUM OF ALL RESPONSES TO PHQ9 QUESTIONS 1 & 2: 0
SUM OF ALL RESPONSES TO PHQ QUESTIONS 1-9: 0
SUM OF ALL RESPONSES TO PHQ QUESTIONS 1-9: 0
2. FEELING DOWN, DEPRESSED OR HOPELESS: 0
1. LITTLE INTEREST OR PLEASURE IN DOING THINGS: 0

## 2018-09-06 NOTE — PROGRESS NOTES
polyps.  COLONOSCOPY  09/18/2007    Multiple polyps of the colon, rectal polyps, sigmoid polyps, and splenic flexure polyps.  COLONOSCOPY  07/31/2006    Pedunculated thin polyp near the transverse colon, small polyp at the dentate line versus a hemorrhoid.  COLONOSCOPY  2013    COLONOSCOPY  11/2014    Dr. Garibay Sessions. adenom. x 2, 5yr follow up   [address], TOTAL ABDOMINAL      KNEE ARTHROSCOPY Left 05/21/1999    Torn degenerate posterior third medial meniscus.  MOUTH SURGERY Right 5/1/15    OTHER SURGICAL HISTORY Left 09/11/2012    Left knee injection for left knee pain.  OTHER SURGICAL HISTORY Right 09/12/2006    Bursa injection for greater trochanteric bursitis.  SALPINGO-OOPHORECTOMY Left     TONSILLECTOMY AND ADENOIDECTOMY      TOTAL KNEE ARTHROPLASTY Left 2013       Review of Systems   Constitutional: Negative. Negative for fever. HENT: Negative. Eyes: Negative. Respiratory: Negative. Negative for cough, chest tightness and shortness of breath. Cardiovascular: Negative. Gastrointestinal: Positive for nausea. Negative for abdominal pain, constipation, diarrhea, rectal pain and vomiting. Endocrine: Negative. Genitourinary: Positive for flank pain and urgency. Negative for frequency and hematuria. Skin: Negative. Allergic/Immunologic: Negative. Neurological: Negative. Negative for headaches. Hematological: Negative. Psychiatric/Behavioral: Negative. Prior to Visit Medications    Medication Sig Taking?  Authorizing Provider   predniSONE (DELTASONE) 20 MG tablet 1 bid for 3 days, 1 qd for 4 days Yes Brandee Baez DO   BIOTIN FORTE PO Take by mouth Yes Historical Provider, MD   hydrochlorothiazide (HYDRODIURIL) 25 MG tablet TAKE 1 TABLET DAILY Yes America Wright MD   meloxicam (MOBIC) 15 MG tablet TAKE 1 TABLET DAILY Yes America Wright MD   estradiol (ESTRACE) 0.5 MG tablet Take 1 tablet by mouth daily Yes Weston Drake MD   albuterol (PROVENTIL HFA;VENTOLIN HFA) 108 (90 BASE) MCG/ACT inhaler Inhale 2 puffs into the lungs every 6 hours as needed for Wheezing or Shortness of Breath Yes Delrae Bamberger, MD   omeprazole (PRILOSEC) 20 MG capsule Take 20 mg by mouth daily as needed  Yes Delrae Bamberger, MD        Social History   Substance Use Topics    Smoking status: Former Smoker     Packs/day: 0.25     Years: 1.00     Types: Cigarettes     Start date: 12/21/1997     Quit date: 12/31/1998    Smokeless tobacco: Never Used      Comment: Smoked for about 1 year    Alcohol use 0.0 oz/week      Comment: Occasionally, once or twice a year        Vitals:    09/06/18 1322 09/06/18 1326   BP: (!) 142/80 (!) 144/80   Site: Left Arm Left Arm   Position: Sitting Sitting   Cuff Size: Large Adult Large Adult   Pulse: 72 72   Temp: 98.5 °F (36.9 °C)    TempSrc: Tympanic    Weight: 248 lb (112.5 kg)      Estimated body mass index is 37.71 kg/m² as calculated from the following:    Height as of 8/31/18: 5' 8\" (1.727 m). Weight as of this encounter: 248 lb (112.5 kg). Physical Exam   Constitutional: She is oriented to person, place, and time. She appears well-developed and well-nourished. No distress. HENT:   Head: Normocephalic and atraumatic. Right Ear: External ear normal.   Left Ear: External ear normal.   Mouth/Throat: Oropharynx is clear and moist. No oropharyngeal exudate. Eyes: Conjunctivae and EOM are normal. No scleral icterus. Neck: Neck supple. No thyromegaly present. Cardiovascular: Normal rate, regular rhythm, normal heart sounds and intact distal pulses. No murmur heard. Pulmonary/Chest: Effort normal and breath sounds normal. No respiratory distress. She has no wheezes. Abdominal: Soft. Bowel sounds are normal. She exhibits no distension. There is no tenderness. There is no rebound. Musculoskeletal: She exhibits no edema. Lumbar back: She exhibits decreased range of motion and tenderness. Amorphous, UA 09/06/2018 NOT REPORTED  NONE Final    Other Observations UA 09/06/2018 NOT REPORTED  NREQ Final    Yeast, UA 09/06/2018 NOT REPORTED  NONE Final     Impression   1. No acute abnormality seen in the abdomen or pelvis   2. Normal appearing gallbladder   3. No obstructive uropathy   4. Colonic diverticulosis without evidence for diverticulitis   5. No evidence for appendicitis   6. Small fat containing umbilical hernia      At this time, we are back to a more musculoskeletal source. Fairly focal in lower flank on the left. Pain more severe in the last 2 days. Cont. Ibuprofen   Flexeril 5mg tid prn   Ultram q 6 hrs prn moderate to severe pain. Consider PT evaluate and treat. An electronic signature was used to authenticate this note.     --Tushar Bearden MD on 9/6/2018 at 1:42 PM

## 2018-09-20 ENCOUNTER — HOSPITAL ENCOUNTER (OUTPATIENT)
Dept: LAB | Age: 68
Setting detail: SPECIMEN
Discharge: HOME | End: 2018-09-20
Payer: MEDICARE

## 2018-09-20 DIAGNOSIS — E78.00 PURE HYPERCHOLESTEROLEMIA: ICD-10-CM

## 2018-09-20 DIAGNOSIS — I10 ESSENTIAL HYPERTENSION: ICD-10-CM

## 2018-09-20 LAB
ABSOLUTE EOS #: 0.3 K/UL (ref 0–0.4)
ABSOLUTE IMMATURE GRANULOCYTE: NORMAL K/UL (ref 0–0.3)
ABSOLUTE LYMPH #: 2.2 K/UL (ref 1–4.8)
ABSOLUTE MONO #: 0.6 K/UL (ref 0.1–1.2)
ANION GAP SERPL CALCULATED.3IONS-SCNC: 12 MMOL/L (ref 9–17)
BASOPHILS # BLD: 1 % (ref 0–1)
BASOPHILS ABSOLUTE: 0 K/UL (ref 0–0.2)
BUN BLDV-MCNC: 11 MG/DL (ref 8–23)
BUN/CREAT BLD: 13 (ref 9–20)
CALCIUM SERPL-MCNC: 9 MG/DL (ref 8.6–10.4)
CHLORIDE BLD-SCNC: 101 MMOL/L (ref 98–107)
CHOLESTEROL/HDL RATIO: 4.9
CHOLESTEROL: 226 MG/DL
CO2: 26 MMOL/L (ref 20–31)
CREAT SERPL-MCNC: 0.85 MG/DL (ref 0.5–0.9)
DIFFERENTIAL TYPE: NORMAL
EOSINOPHILS RELATIVE PERCENT: 6 % (ref 1–7)
GFR AFRICAN AMERICAN: >60 ML/MIN
GFR NON-AFRICAN AMERICAN: >60 ML/MIN
GFR SERPL CREATININE-BSD FRML MDRD: ABNORMAL ML/MIN/{1.73_M2}
GFR SERPL CREATININE-BSD FRML MDRD: ABNORMAL ML/MIN/{1.73_M2}
GLUCOSE BLD-MCNC: 112 MG/DL (ref 70–99)
HCT VFR BLD CALC: 40.9 % (ref 36–46)
HDLC SERPL-MCNC: 46 MG/DL
HEMOGLOBIN: 13.6 G/DL (ref 12–16)
IMMATURE GRANULOCYTES: NORMAL %
LDL CHOLESTEROL: 147 MG/DL (ref 0–130)
LYMPHOCYTES # BLD: 37 % (ref 16–46)
MCH RBC QN AUTO: 27.6 PG (ref 26–34)
MCHC RBC AUTO-ENTMCNC: 33.4 G/DL (ref 31–37)
MCV RBC AUTO: 82.8 FL (ref 80–100)
MONOCYTES # BLD: 10 % (ref 4–11)
NRBC AUTOMATED: NORMAL PER 100 WBC
PDW BLD-RTO: 13.2 % (ref 11–14.5)
PLATELET # BLD: 332 K/UL (ref 140–450)
PLATELET ESTIMATE: NORMAL
PMV BLD AUTO: 7.6 FL (ref 6–12)
POTASSIUM SERPL-SCNC: 3.9 MMOL/L (ref 3.7–5.3)
RBC # BLD: 4.94 M/UL (ref 4–5.2)
RBC # BLD: NORMAL 10*6/UL
SEG NEUTROPHILS: 46 % (ref 43–77)
SEGMENTED NEUTROPHILS ABSOLUTE COUNT: 2.8 K/UL (ref 1.8–7.7)
SODIUM BLD-SCNC: 139 MMOL/L (ref 135–144)
TRIGL SERPL-MCNC: 163 MG/DL
VLDLC SERPL CALC-MCNC: ABNORMAL MG/DL (ref 1–30)
WBC # BLD: 5.9 K/UL (ref 3.5–11)
WBC # BLD: NORMAL 10*3/UL

## 2018-09-20 PROCEDURE — 80048 BASIC METABOLIC PNL TOTAL CA: CPT

## 2018-09-20 PROCEDURE — 80061 LIPID PANEL: CPT

## 2018-09-20 PROCEDURE — 36415 COLL VENOUS BLD VENIPUNCTURE: CPT

## 2018-09-20 PROCEDURE — 85025 COMPLETE CBC W/AUTO DIFF WBC: CPT

## 2018-09-24 ENCOUNTER — OFFICE VISIT (OUTPATIENT)
Dept: FAMILY MEDICINE CLINIC | Age: 68
End: 2018-09-24
Payer: MEDICARE

## 2018-09-24 VITALS
DIASTOLIC BLOOD PRESSURE: 72 MMHG | HEIGHT: 68 IN | WEIGHT: 247 LBS | BODY MASS INDEX: 37.44 KG/M2 | HEART RATE: 68 BPM | SYSTOLIC BLOOD PRESSURE: 130 MMHG

## 2018-09-24 DIAGNOSIS — M17.11 PRIMARY OSTEOARTHRITIS OF RIGHT KNEE: ICD-10-CM

## 2018-09-24 DIAGNOSIS — Z12.31 BREAST CANCER SCREENING BY MAMMOGRAM: ICD-10-CM

## 2018-09-24 DIAGNOSIS — G47.9 SLEEP DISTURBANCES: ICD-10-CM

## 2018-09-24 DIAGNOSIS — I10 ESSENTIAL HYPERTENSION: ICD-10-CM

## 2018-09-24 DIAGNOSIS — J45.20 MILD INTERMITTENT ASTHMA WITHOUT COMPLICATION: ICD-10-CM

## 2018-09-24 DIAGNOSIS — D12.4 ADENOMATOUS POLYP OF DESCENDING COLON: Primary | ICD-10-CM

## 2018-09-24 DIAGNOSIS — Z79.890 POSTMENOPAUSAL HRT (HORMONE REPLACEMENT THERAPY): ICD-10-CM

## 2018-09-24 DIAGNOSIS — E66.09 NON MORBID OBESITY DUE TO EXCESS CALORIES: ICD-10-CM

## 2018-09-24 DIAGNOSIS — E78.00 PURE HYPERCHOLESTEROLEMIA: ICD-10-CM

## 2018-09-24 DIAGNOSIS — K21.9 GASTROESOPHAGEAL REFLUX DISEASE WITHOUT ESOPHAGITIS: ICD-10-CM

## 2018-09-24 PROCEDURE — 99214 OFFICE O/P EST MOD 30 MIN: CPT | Performed by: FAMILY MEDICINE

## 2018-09-24 PROCEDURE — 4040F PNEUMOC VAC/ADMIN/RCVD: CPT | Performed by: FAMILY MEDICINE

## 2018-09-24 PROCEDURE — 1090F PRES/ABSN URINE INCON ASSESS: CPT | Performed by: FAMILY MEDICINE

## 2018-09-24 PROCEDURE — G8417 CALC BMI ABV UP PARAM F/U: HCPCS | Performed by: FAMILY MEDICINE

## 2018-09-24 PROCEDURE — 1123F ACP DISCUSS/DSCN MKR DOCD: CPT | Performed by: FAMILY MEDICINE

## 2018-09-24 PROCEDURE — G8427 DOCREV CUR MEDS BY ELIG CLIN: HCPCS | Performed by: FAMILY MEDICINE

## 2018-09-24 PROCEDURE — G8399 PT W/DXA RESULTS DOCUMENT: HCPCS | Performed by: FAMILY MEDICINE

## 2018-09-24 PROCEDURE — 1036F TOBACCO NON-USER: CPT | Performed by: FAMILY MEDICINE

## 2018-09-24 PROCEDURE — 3017F COLORECTAL CA SCREEN DOC REV: CPT | Performed by: FAMILY MEDICINE

## 2018-09-24 PROCEDURE — 1101F PT FALLS ASSESS-DOCD LE1/YR: CPT | Performed by: FAMILY MEDICINE

## 2018-09-24 RX ORDER — ESTRADIOL 1 MG/1
1 TABLET ORAL DAILY
Qty: 90 TABLET | Refills: 3 | Status: SHIPPED | OUTPATIENT
Start: 2018-09-24 | End: 2019-09-25 | Stop reason: ALTCHOICE

## 2018-09-24 ASSESSMENT — ENCOUNTER SYMPTOMS
RESPIRATORY NEGATIVE: 1
EYES NEGATIVE: 1
ALLERGIC/IMMUNOLOGIC NEGATIVE: 1
GASTROINTESTINAL NEGATIVE: 1

## 2018-09-24 ASSESSMENT — PATIENT HEALTH QUESTIONNAIRE - PHQ9
2. FEELING DOWN, DEPRESSED OR HOPELESS: 0
SUM OF ALL RESPONSES TO PHQ9 QUESTIONS 1 & 2: 0
SUM OF ALL RESPONSES TO PHQ QUESTIONS 1-9: 0
SUM OF ALL RESPONSES TO PHQ QUESTIONS 1-9: 0
1. LITTLE INTEREST OR PLEASURE IN DOING THINGS: 0

## 2018-09-24 NOTE — PROGRESS NOTES
normal.   Mouth/Throat: Oropharynx is clear and moist. No oropharyngeal exudate. Eyes: Conjunctivae and EOM are normal. No scleral icterus. Neck: Neck supple. No thyromegaly present. Cardiovascular: Normal rate, regular rhythm, normal heart sounds and intact distal pulses. No murmur heard. Pulmonary/Chest: Effort normal and breath sounds normal. No respiratory distress. She has no wheezes. Abdominal: Soft. Bowel sounds are normal. She exhibits no distension. There is no tenderness. There is no rebound. Musculoskeletal: Normal range of motion. She exhibits no edema or tenderness. Neurological: She is alert and oriented to person, place, and time. Skin: Skin is warm and dry. No rash noted. No erythema. Psychiatric: She has a normal mood and affect.  Her behavior is normal. Judgment and thought content normal.     /72 (Site: Right Upper Arm, Position: Sitting, Cuff Size: Large Adult)   Pulse 68   Ht 5' 8\" (1.727 m)   Wt 247 lb (112 kg)   BMI 37.56 kg/m²      Results for orders placed or performed during the hospital encounter of 09/20/18   Lipid Panel   Result Value Ref Range    Cholesterol 226 (H) <200 mg/dL    HDL 46 >40 mg/dL    LDL Cholesterol 147 (H) 0 - 130 mg/dL    Chol/HDL Ratio 4.9 <5    Triglycerides 163 (H) <150 mg/dL    VLDL NOT REPORTED 1 - 30 mg/dL   Basic Metabolic Panel   Result Value Ref Range    Glucose 112 (H) 70 - 99 mg/dL    BUN 11 8 - 23 mg/dL    CREATININE 0.85 0.50 - 0.90 mg/dL    Bun/Cre Ratio 13 9 - 20    Calcium 9.0 8.6 - 10.4 mg/dL    Sodium 139 135 - 144 mmol/L    Potassium 3.9 3.7 - 5.3 mmol/L    Chloride 101 98 - 107 mmol/L    CO2 26 20 - 31 mmol/L    Anion Gap 12 9 - 17 mmol/L    GFR Non-African American >60 >60 mL/min    GFR African American >60 >60 mL/min    GFR Comment          GFR Staging NOT REPORTED    CBC Auto Differential   Result Value Ref Range    WBC 5.9 3.5 - 11.0 k/uL    RBC 4.94 4.0 - 5.2 m/uL    Hemoglobin 13.6 12.0 - 16.0 g/dL    Hematocrit

## 2018-09-24 NOTE — PATIENT INSTRUCTIONS
[email or web address]            GFR Staging 09/20/2018 NOT REPORTED   Final    WBC 09/20/2018 5.9  3.5 - 11.0 k/uL Final    RBC 09/20/2018 4.94  4.0 - 5.2 m/uL Final    Hemoglobin 09/20/2018 13.6  12.0 - 16.0 g/dL Final    Hematocrit 09/20/2018 40.9  36 - 46 % Final    MCV 09/20/2018 82.8  80 - 100 fL Final    MCH 09/20/2018 27.6  26 - 34 pg Final    MCHC 09/20/2018 33.4  31 - 37 g/dL Final    RDW 09/20/2018 13.2  11.0 - 14.5 % Final    Platelets 56/13/7462 332  140 - 450 k/uL Final    MPV 09/20/2018 7.6  6.0 - 12.0 fL Final    NRBC Automated 09/20/2018 NOT REPORTED  per 100 WBC Final    Differential Type 09/20/2018 NOT REPORTED   Final    Immature Granulocytes 09/20/2018 NOT REPORTED  0 % Final    Absolute Immature Granulocyte 09/20/2018 NOT REPORTED  0.00 - 0.30 k/uL Final    WBC Morphology 09/20/2018 NOT REPORTED   Final    RBC Morphology 09/20/2018 NOT REPORTED   Final    Platelet Estimate 16/94/9908 NOT REPORTED   Final    Seg Neutrophils 09/20/2018 46  43 - 77 % Final    Lymphocytes 09/20/2018 37  16 - 46 % Final    Monocytes 09/20/2018 10  4 - 11 % Final    Eosinophils % 09/20/2018 6  1 - 7 % Final    Basophils 09/20/2018 1  0 - 1 % Final    Segs Absolute 09/20/2018 2.80  1.8 - 7.7 k/uL Final    Absolute Lymph # 09/20/2018 2.20  1.0 - 4.8 k/uL Final    Absolute Mono # 09/20/2018 0.60  0.1 - 1.2 k/uL Final    Absolute Eos # 09/20/2018 0.30  0.0 - 0.4 k/uL Final    Basophils # 09/20/2018 0.00  0.0 - 0.2 k/uL Final

## 2018-10-18 ENCOUNTER — HOSPITAL ENCOUNTER (OUTPATIENT)
Dept: MAMMOGRAPHY | Age: 68
Discharge: HOME | End: 2018-10-20
Payer: MEDICARE

## 2018-10-18 DIAGNOSIS — Z12.31 BREAST CANCER SCREENING BY MAMMOGRAM: ICD-10-CM

## 2018-10-18 PROCEDURE — 77063 BREAST TOMOSYNTHESIS BI: CPT

## 2018-10-29 ENCOUNTER — NURSE ONLY (OUTPATIENT)
Dept: LAB | Age: 68
End: 2018-10-29
Payer: MEDICARE

## 2018-10-29 DIAGNOSIS — Z23 NEED FOR VACCINATION: Primary | ICD-10-CM

## 2018-10-29 PROCEDURE — G0009 ADMIN PNEUMOCOCCAL VACCINE: HCPCS | Performed by: FAMILY MEDICINE

## 2018-10-29 PROCEDURE — G0008 ADMIN INFLUENZA VIRUS VAC: HCPCS | Performed by: FAMILY MEDICINE

## 2018-10-29 PROCEDURE — 90662 IIV NO PRSV INCREASED AG IM: CPT | Performed by: FAMILY MEDICINE

## 2018-10-29 PROCEDURE — 90732 PPSV23 VACC 2 YRS+ SUBQ/IM: CPT | Performed by: FAMILY MEDICINE

## 2019-02-19 ENCOUNTER — OFFICE VISIT (OUTPATIENT)
Dept: PRIMARY CARE CLINIC | Age: 69
End: 2019-02-19
Payer: MEDICARE

## 2019-02-19 VITALS
DIASTOLIC BLOOD PRESSURE: 84 MMHG | WEIGHT: 255 LBS | BODY MASS INDEX: 38.65 KG/M2 | HEART RATE: 68 BPM | OXYGEN SATURATION: 97 % | HEIGHT: 68 IN | SYSTOLIC BLOOD PRESSURE: 136 MMHG | TEMPERATURE: 98.1 F

## 2019-02-19 DIAGNOSIS — B34.9 VIRAL ILLNESS: ICD-10-CM

## 2019-02-19 DIAGNOSIS — J45.21 MILD INTERMITTENT ASTHMA WITH EXACERBATION: Primary | ICD-10-CM

## 2019-02-19 DIAGNOSIS — J02.9 SORE THROAT: ICD-10-CM

## 2019-02-19 LAB — S PYO AG THROAT QL: NORMAL

## 2019-02-19 PROCEDURE — G8399 PT W/DXA RESULTS DOCUMENT: HCPCS | Performed by: NURSE PRACTITIONER

## 2019-02-19 PROCEDURE — 94640 AIRWAY INHALATION TREATMENT: CPT | Performed by: NURSE PRACTITIONER

## 2019-02-19 PROCEDURE — 87880 STREP A ASSAY W/OPTIC: CPT | Performed by: NURSE PRACTITIONER

## 2019-02-19 PROCEDURE — 3017F COLORECTAL CA SCREEN DOC REV: CPT | Performed by: NURSE PRACTITIONER

## 2019-02-19 PROCEDURE — 1123F ACP DISCUSS/DSCN MKR DOCD: CPT | Performed by: NURSE PRACTITIONER

## 2019-02-19 PROCEDURE — 1036F TOBACCO NON-USER: CPT | Performed by: NURSE PRACTITIONER

## 2019-02-19 PROCEDURE — 99213 OFFICE O/P EST LOW 20 MIN: CPT | Performed by: NURSE PRACTITIONER

## 2019-02-19 PROCEDURE — 1090F PRES/ABSN URINE INCON ASSESS: CPT | Performed by: NURSE PRACTITIONER

## 2019-02-19 PROCEDURE — G8417 CALC BMI ABV UP PARAM F/U: HCPCS | Performed by: NURSE PRACTITIONER

## 2019-02-19 PROCEDURE — 1101F PT FALLS ASSESS-DOCD LE1/YR: CPT | Performed by: NURSE PRACTITIONER

## 2019-02-19 PROCEDURE — 4040F PNEUMOC VAC/ADMIN/RCVD: CPT | Performed by: NURSE PRACTITIONER

## 2019-02-19 PROCEDURE — G8427 DOCREV CUR MEDS BY ELIG CLIN: HCPCS | Performed by: NURSE PRACTITIONER

## 2019-02-19 PROCEDURE — G8482 FLU IMMUNIZE ORDER/ADMIN: HCPCS | Performed by: NURSE PRACTITIONER

## 2019-02-19 RX ORDER — ALBUTEROL SULFATE 90 UG/1
2 AEROSOL, METERED RESPIRATORY (INHALATION) EVERY 6 HOURS PRN
Qty: 1 INHALER | Refills: 0 | Status: SHIPPED | OUTPATIENT
Start: 2019-02-19 | End: 2021-10-08

## 2019-02-19 RX ORDER — IPRATROPIUM BROMIDE AND ALBUTEROL SULFATE 2.5; .5 MG/3ML; MG/3ML
1 SOLUTION RESPIRATORY (INHALATION) ONCE
Status: COMPLETED | OUTPATIENT
Start: 2019-02-19 | End: 2019-02-19

## 2019-02-19 RX ORDER — PREDNISONE 20 MG/1
20 TABLET ORAL 2 TIMES DAILY
Qty: 10 TABLET | Refills: 0 | Status: SHIPPED | OUTPATIENT
Start: 2019-02-19 | End: 2019-02-24

## 2019-02-19 RX ADMIN — IPRATROPIUM BROMIDE AND ALBUTEROL SULFATE 1 AMPULE: 2.5; .5 SOLUTION RESPIRATORY (INHALATION) at 17:23

## 2019-02-19 ASSESSMENT — ENCOUNTER SYMPTOMS
SORE THROAT: 1
CHEST TIGHTNESS: 1
COUGH: 1
SHORTNESS OF BREATH: 0
ABDOMINAL PAIN: 0
WHEEZING: 0
NAUSEA: 0
VOMITING: 0

## 2019-02-19 ASSESSMENT — PATIENT HEALTH QUESTIONNAIRE - PHQ9
SUM OF ALL RESPONSES TO PHQ9 QUESTIONS 1 & 2: 0
2. FEELING DOWN, DEPRESSED OR HOPELESS: 0
1. LITTLE INTEREST OR PLEASURE IN DOING THINGS: 0
SUM OF ALL RESPONSES TO PHQ QUESTIONS 1-9: 0
SUM OF ALL RESPONSES TO PHQ QUESTIONS 1-9: 0

## 2019-02-27 ENCOUNTER — HOSPITAL ENCOUNTER (EMERGENCY)
Age: 69
Discharge: HOME | End: 2019-02-27
Attending: SPECIALIST
Payer: MEDICARE

## 2019-02-27 ENCOUNTER — APPOINTMENT (OUTPATIENT)
Dept: GENERAL RADIOLOGY | Age: 69
End: 2019-02-27
Payer: MEDICARE

## 2019-02-27 VITALS
DIASTOLIC BLOOD PRESSURE: 95 MMHG | HEIGHT: 68 IN | OXYGEN SATURATION: 95 % | HEART RATE: 55 BPM | SYSTOLIC BLOOD PRESSURE: 183 MMHG | TEMPERATURE: 98 F | WEIGHT: 245 LBS | BODY MASS INDEX: 37.13 KG/M2 | RESPIRATION RATE: 16 BRPM

## 2019-02-27 DIAGNOSIS — R10.13 ABDOMINAL PAIN, EPIGASTRIC: Primary | ICD-10-CM

## 2019-02-27 DIAGNOSIS — J40 BRONCHITIS: ICD-10-CM

## 2019-02-27 DIAGNOSIS — R19.7 DIARRHEA, UNSPECIFIED TYPE: ICD-10-CM

## 2019-02-27 DIAGNOSIS — R11.2 NON-INTRACTABLE VOMITING WITH NAUSEA, UNSPECIFIED VOMITING TYPE: ICD-10-CM

## 2019-02-27 LAB
ABSOLUTE EOS #: 0.3 K/UL (ref 0–0.4)
ABSOLUTE IMMATURE GRANULOCYTE: ABNORMAL K/UL (ref 0–0.3)
ABSOLUTE LYMPH #: 2.8 K/UL (ref 1–4.8)
ABSOLUTE MONO #: 0.8 K/UL (ref 0.1–1.2)
ALBUMIN SERPL-MCNC: 3.5 G/DL (ref 3.5–5.2)
ALBUMIN/GLOBULIN RATIO: 0.9 (ref 1–2.5)
ALP BLD-CCNC: 87 U/L (ref 35–104)
ALT SERPL-CCNC: 10 U/L (ref 5–33)
ANION GAP SERPL CALCULATED.3IONS-SCNC: 13 MMOL/L (ref 9–17)
AST SERPL-CCNC: 12 U/L
BASOPHILS # BLD: 1 % (ref 0–1)
BASOPHILS ABSOLUTE: 0.1 K/UL (ref 0–0.2)
BILIRUB SERPL-MCNC: 0.33 MG/DL (ref 0.3–1.2)
BILIRUBIN DIRECT: 0.08 MG/DL
BILIRUBIN, INDIRECT: 0.25 MG/DL (ref 0–1)
BUN BLDV-MCNC: 16 MG/DL (ref 8–23)
BUN/CREAT BLD: 20 (ref 9–20)
CALCIUM SERPL-MCNC: 8.8 MG/DL (ref 8.6–10.4)
CHLORIDE BLD-SCNC: 99 MMOL/L (ref 98–107)
CO2: 28 MMOL/L (ref 20–31)
CREAT SERPL-MCNC: 0.79 MG/DL (ref 0.5–0.9)
DIFFERENTIAL TYPE: ABNORMAL
DIRECT EXAM: NORMAL
EKG ATRIAL RATE: 63 BPM
EKG P AXIS: 2 DEGREES
EKG P-R INTERVAL: 138 MS
EKG Q-T INTERVAL: 428 MS
EKG QRS DURATION: 88 MS
EKG QTC CALCULATION (BAZETT): 437 MS
EKG R AXIS: 6 DEGREES
EKG T AXIS: 18 DEGREES
EKG VENTRICULAR RATE: 63 BPM
EOSINOPHILS RELATIVE PERCENT: 4 % (ref 1–7)
GFR AFRICAN AMERICAN: >60 ML/MIN
GFR NON-AFRICAN AMERICAN: >60 ML/MIN
GFR SERPL CREATININE-BSD FRML MDRD: ABNORMAL ML/MIN/{1.73_M2}
GFR SERPL CREATININE-BSD FRML MDRD: ABNORMAL ML/MIN/{1.73_M2}
GLOBULIN: 4.1 G/DL (ref 1.5–3.8)
GLUCOSE BLD-MCNC: 106 MG/DL (ref 70–99)
HCT VFR BLD CALC: 45.2 % (ref 36–46)
HEMOGLOBIN: 14.5 G/DL (ref 12–16)
IMMATURE GRANULOCYTES: ABNORMAL %
LIPASE: 16 U/L (ref 13–60)
LYMPHOCYTES # BLD: 34 % (ref 16–46)
Lab: NORMAL
MCH RBC QN AUTO: 26.2 PG (ref 26–34)
MCHC RBC AUTO-ENTMCNC: 32 G/DL (ref 31–37)
MCV RBC AUTO: 81.9 FL (ref 80–100)
MONOCYTES # BLD: 10 % (ref 4–11)
NRBC AUTOMATED: ABNORMAL PER 100 WBC
PDW BLD-RTO: 13.6 % (ref 11–14.5)
PLATELET # BLD: 359 K/UL (ref 140–450)
PLATELET ESTIMATE: ABNORMAL
PMV BLD AUTO: 7.4 FL (ref 6–12)
POTASSIUM SERPL-SCNC: 4.4 MMOL/L (ref 3.7–5.3)
RBC # BLD: 5.52 M/UL (ref 4–5.2)
RBC # BLD: ABNORMAL 10*6/UL
SEG NEUTROPHILS: 51 % (ref 43–77)
SEGMENTED NEUTROPHILS ABSOLUTE COUNT: 4.1 K/UL (ref 1.8–7.7)
SODIUM BLD-SCNC: 140 MMOL/L (ref 135–144)
SPECIMEN DESCRIPTION: NORMAL
TOTAL PROTEIN: 7.6 G/DL (ref 6.4–8.3)
TROPONIN INTERP: NORMAL
TROPONIN T: NORMAL NG/ML
TROPONIN, HIGH SENSITIVITY: 6 NG/L (ref 0–14)
WBC # BLD: 8.1 K/UL (ref 3.5–11)
WBC # BLD: ABNORMAL 10*3/UL

## 2019-02-27 PROCEDURE — 80076 HEPATIC FUNCTION PANEL: CPT

## 2019-02-27 PROCEDURE — 84484 ASSAY OF TROPONIN QUANT: CPT

## 2019-02-27 PROCEDURE — 71046 X-RAY EXAM CHEST 2 VIEWS: CPT

## 2019-02-27 PROCEDURE — 6360000002 HC RX W HCPCS: Performed by: SPECIALIST

## 2019-02-27 PROCEDURE — 83690 ASSAY OF LIPASE: CPT

## 2019-02-27 PROCEDURE — 96374 THER/PROPH/DIAG INJ IV PUSH: CPT

## 2019-02-27 PROCEDURE — 85025 COMPLETE CBC W/AUTO DIFF WBC: CPT

## 2019-02-27 PROCEDURE — 87804 INFLUENZA ASSAY W/OPTIC: CPT

## 2019-02-27 PROCEDURE — 80048 BASIC METABOLIC PNL TOTAL CA: CPT

## 2019-02-27 PROCEDURE — 99284 EMERGENCY DEPT VISIT MOD MDM: CPT

## 2019-02-27 PROCEDURE — 93005 ELECTROCARDIOGRAM TRACING: CPT

## 2019-02-27 RX ORDER — ONDANSETRON 2 MG/ML
4 INJECTION INTRAMUSCULAR; INTRAVENOUS ONCE
Status: COMPLETED | OUTPATIENT
Start: 2019-02-27 | End: 2019-02-27

## 2019-02-27 RX ORDER — ONDANSETRON 4 MG/1
4 TABLET, ORALLY DISINTEGRATING ORAL EVERY 8 HOURS PRN
Qty: 12 TABLET | Refills: 0 | Status: SHIPPED | OUTPATIENT
Start: 2019-02-27 | End: 2021-03-10

## 2019-02-27 RX ORDER — AZITHROMYCIN 250 MG/1
TABLET, FILM COATED ORAL
Qty: 1 PACKET | Refills: 0 | Status: SHIPPED | OUTPATIENT
Start: 2019-02-27 | End: 2019-03-03

## 2019-02-27 RX ADMIN — ONDANSETRON 4 MG: 2 INJECTION INTRAMUSCULAR; INTRAVENOUS at 09:26

## 2019-02-27 ASSESSMENT — PAIN DESCRIPTION - PAIN TYPE: TYPE: ACUTE PAIN

## 2019-02-27 ASSESSMENT — ENCOUNTER SYMPTOMS
COUGH: 1
BLOOD IN STOOL: 0
ABDOMINAL PAIN: 1
VOMITING: 1
DIARRHEA: 1
SORE THROAT: 1
NAUSEA: 1

## 2019-02-27 ASSESSMENT — PAIN DESCRIPTION - DESCRIPTORS: DESCRIPTORS: BURNING

## 2019-02-27 ASSESSMENT — PAIN DESCRIPTION - LOCATION: LOCATION: ABDOMEN

## 2019-02-27 ASSESSMENT — PAIN DESCRIPTION - ORIENTATION: ORIENTATION: UPPER

## 2019-02-27 ASSESSMENT — PAIN SCALES - GENERAL
PAINLEVEL_OUTOF10: 4
PAINLEVEL_OUTOF10: 6

## 2019-03-18 ENCOUNTER — HOSPITAL ENCOUNTER (OUTPATIENT)
Dept: LAB | Age: 69
Discharge: HOME | End: 2019-03-18
Payer: MEDICARE

## 2019-03-18 DIAGNOSIS — E78.00 PURE HYPERCHOLESTEROLEMIA: ICD-10-CM

## 2019-03-18 DIAGNOSIS — I10 ESSENTIAL HYPERTENSION: ICD-10-CM

## 2019-03-18 LAB
ABSOLUTE EOS #: 0.3 K/UL (ref 0–0.4)
ABSOLUTE IMMATURE GRANULOCYTE: NORMAL K/UL (ref 0–0.3)
ABSOLUTE LYMPH #: 2.4 K/UL (ref 1–4.8)
ABSOLUTE MONO #: 0.6 K/UL (ref 0.1–1.2)
ANION GAP SERPL CALCULATED.3IONS-SCNC: 9 MMOL/L (ref 9–17)
BASOPHILS # BLD: 1 % (ref 0–1)
BASOPHILS ABSOLUTE: 0 K/UL (ref 0–0.2)
BUN BLDV-MCNC: 14 MG/DL (ref 8–23)
BUN/CREAT BLD: 16 (ref 9–20)
CALCIUM SERPL-MCNC: 9 MG/DL (ref 8.6–10.4)
CHLORIDE BLD-SCNC: 104 MMOL/L (ref 98–107)
CHOLESTEROL/HDL RATIO: 3.5
CHOLESTEROL: 201 MG/DL
CO2: 30 MMOL/L (ref 20–31)
CREAT SERPL-MCNC: 0.9 MG/DL (ref 0.5–0.9)
DIFFERENTIAL TYPE: NORMAL
EOSINOPHILS RELATIVE PERCENT: 4 % (ref 1–7)
GFR AFRICAN AMERICAN: >60 ML/MIN
GFR NON-AFRICAN AMERICAN: >60 ML/MIN
GFR SERPL CREATININE-BSD FRML MDRD: ABNORMAL ML/MIN/{1.73_M2}
GFR SERPL CREATININE-BSD FRML MDRD: ABNORMAL ML/MIN/{1.73_M2}
GLUCOSE BLD-MCNC: 109 MG/DL (ref 70–99)
HCT VFR BLD CALC: 40.3 % (ref 36–46)
HDLC SERPL-MCNC: 57 MG/DL
HEMOGLOBIN: 13 G/DL (ref 12–16)
IMMATURE GRANULOCYTES: NORMAL %
LDL CHOLESTEROL: 119 MG/DL (ref 0–130)
LYMPHOCYTES # BLD: 38 % (ref 16–46)
MCH RBC QN AUTO: 26.7 PG (ref 26–34)
MCHC RBC AUTO-ENTMCNC: 32.2 G/DL (ref 31–37)
MCV RBC AUTO: 83.1 FL (ref 80–100)
MONOCYTES # BLD: 9 % (ref 4–11)
NRBC AUTOMATED: NORMAL PER 100 WBC
PDW BLD-RTO: 13.6 % (ref 11–14.5)
PLATELET # BLD: 400 K/UL (ref 140–450)
PLATELET ESTIMATE: NORMAL
PMV BLD AUTO: 7.3 FL (ref 6–12)
POTASSIUM SERPL-SCNC: 3.9 MMOL/L (ref 3.7–5.3)
RBC # BLD: 4.85 M/UL (ref 4–5.2)
RBC # BLD: NORMAL 10*6/UL
SEG NEUTROPHILS: 48 % (ref 43–77)
SEGMENTED NEUTROPHILS ABSOLUTE COUNT: 3.1 K/UL (ref 1.8–7.7)
SODIUM BLD-SCNC: 143 MMOL/L (ref 135–144)
TRIGL SERPL-MCNC: 124 MG/DL
VLDLC SERPL CALC-MCNC: ABNORMAL MG/DL (ref 1–30)
WBC # BLD: 6.4 K/UL (ref 3.5–11)
WBC # BLD: NORMAL 10*3/UL

## 2019-03-18 PROCEDURE — 85025 COMPLETE CBC W/AUTO DIFF WBC: CPT

## 2019-03-18 PROCEDURE — 80061 LIPID PANEL: CPT

## 2019-03-18 PROCEDURE — 36415 COLL VENOUS BLD VENIPUNCTURE: CPT

## 2019-03-18 PROCEDURE — 80048 BASIC METABOLIC PNL TOTAL CA: CPT

## 2019-03-25 ENCOUNTER — OFFICE VISIT (OUTPATIENT)
Dept: FAMILY MEDICINE CLINIC | Age: 69
End: 2019-03-25
Payer: MEDICARE

## 2019-03-25 ENCOUNTER — HOSPITAL ENCOUNTER (OUTPATIENT)
Dept: GENERAL RADIOLOGY | Age: 69
Discharge: HOME | End: 2019-03-27
Payer: MEDICARE

## 2019-03-25 VITALS
DIASTOLIC BLOOD PRESSURE: 72 MMHG | BODY MASS INDEX: 38.19 KG/M2 | WEIGHT: 252 LBS | HEIGHT: 68 IN | SYSTOLIC BLOOD PRESSURE: 128 MMHG | HEART RATE: 72 BPM

## 2019-03-25 DIAGNOSIS — I10 ESSENTIAL HYPERTENSION: ICD-10-CM

## 2019-03-25 DIAGNOSIS — K21.9 GASTROESOPHAGEAL REFLUX DISEASE WITHOUT ESOPHAGITIS: ICD-10-CM

## 2019-03-25 DIAGNOSIS — M25.541 ARTHRALGIA OF METACARPOPHALANGEAL JOINT, RIGHT: ICD-10-CM

## 2019-03-25 DIAGNOSIS — E66.09 NON MORBID OBESITY DUE TO EXCESS CALORIES: ICD-10-CM

## 2019-03-25 DIAGNOSIS — J45.20 MILD INTERMITTENT ASTHMA WITHOUT COMPLICATION: ICD-10-CM

## 2019-03-25 DIAGNOSIS — M17.11 PRIMARY OSTEOARTHRITIS OF RIGHT KNEE: ICD-10-CM

## 2019-03-25 DIAGNOSIS — M16.12 PRIMARY OSTEOARTHRITIS OF LEFT HIP: ICD-10-CM

## 2019-03-25 DIAGNOSIS — E78.00 PURE HYPERCHOLESTEROLEMIA: ICD-10-CM

## 2019-03-25 DIAGNOSIS — D12.4 ADENOMATOUS POLYP OF DESCENDING COLON: Primary | ICD-10-CM

## 2019-03-25 DIAGNOSIS — G47.9 SLEEP DISTURBANCES: ICD-10-CM

## 2019-03-25 DIAGNOSIS — Z79.890 POSTMENOPAUSAL HRT (HORMONE REPLACEMENT THERAPY): ICD-10-CM

## 2019-03-25 DIAGNOSIS — M70.62 TROCHANTERIC BURSITIS, LEFT HIP: ICD-10-CM

## 2019-03-25 PROCEDURE — 1036F TOBACCO NON-USER: CPT | Performed by: FAMILY MEDICINE

## 2019-03-25 PROCEDURE — 1123F ACP DISCUSS/DSCN MKR DOCD: CPT | Performed by: FAMILY MEDICINE

## 2019-03-25 PROCEDURE — G8417 CALC BMI ABV UP PARAM F/U: HCPCS | Performed by: FAMILY MEDICINE

## 2019-03-25 PROCEDURE — 3017F COLORECTAL CA SCREEN DOC REV: CPT | Performed by: FAMILY MEDICINE

## 2019-03-25 PROCEDURE — G8399 PT W/DXA RESULTS DOCUMENT: HCPCS | Performed by: FAMILY MEDICINE

## 2019-03-25 PROCEDURE — 73502 X-RAY EXAM HIP UNI 2-3 VIEWS: CPT

## 2019-03-25 PROCEDURE — 99214 OFFICE O/P EST MOD 30 MIN: CPT | Performed by: FAMILY MEDICINE

## 2019-03-25 PROCEDURE — 4040F PNEUMOC VAC/ADMIN/RCVD: CPT | Performed by: FAMILY MEDICINE

## 2019-03-25 PROCEDURE — G8427 DOCREV CUR MEDS BY ELIG CLIN: HCPCS | Performed by: FAMILY MEDICINE

## 2019-03-25 PROCEDURE — 1101F PT FALLS ASSESS-DOCD LE1/YR: CPT | Performed by: FAMILY MEDICINE

## 2019-03-25 PROCEDURE — G8482 FLU IMMUNIZE ORDER/ADMIN: HCPCS | Performed by: FAMILY MEDICINE

## 2019-03-25 PROCEDURE — 1090F PRES/ABSN URINE INCON ASSESS: CPT | Performed by: FAMILY MEDICINE

## 2019-03-25 RX ORDER — HYDROCHLOROTHIAZIDE 25 MG/1
TABLET ORAL
Qty: 90 TABLET | Refills: 3 | Status: SHIPPED | OUTPATIENT
Start: 2019-03-25 | End: 2020-03-19

## 2019-03-25 RX ORDER — MELOXICAM 15 MG/1
TABLET ORAL
Qty: 90 TABLET | Refills: 3 | Status: SHIPPED | OUTPATIENT
Start: 2019-03-25 | End: 2020-03-19

## 2019-03-25 ASSESSMENT — ENCOUNTER SYMPTOMS
EYES NEGATIVE: 1
RESPIRATORY NEGATIVE: 1
ALLERGIC/IMMUNOLOGIC NEGATIVE: 1
GASTROINTESTINAL NEGATIVE: 1

## 2019-03-25 ASSESSMENT — PATIENT HEALTH QUESTIONNAIRE - PHQ9
2. FEELING DOWN, DEPRESSED OR HOPELESS: 0
SUM OF ALL RESPONSES TO PHQ QUESTIONS 1-9: 0
SUM OF ALL RESPONSES TO PHQ QUESTIONS 1-9: 0
SUM OF ALL RESPONSES TO PHQ9 QUESTIONS 1 & 2: 0
1. LITTLE INTEREST OR PLEASURE IN DOING THINGS: 0

## 2019-04-01 ENCOUNTER — OFFICE VISIT (OUTPATIENT)
Dept: FAMILY MEDICINE CLINIC | Age: 69
End: 2019-04-01
Payer: MEDICARE

## 2019-04-01 VITALS
BODY MASS INDEX: 38.19 KG/M2 | SYSTOLIC BLOOD PRESSURE: 126 MMHG | HEIGHT: 68 IN | DIASTOLIC BLOOD PRESSURE: 72 MMHG | HEART RATE: 68 BPM | WEIGHT: 251.99 LBS

## 2019-04-01 DIAGNOSIS — M16.12 PRIMARY OSTEOARTHRITIS OF LEFT HIP: ICD-10-CM

## 2019-04-01 DIAGNOSIS — G47.8 UNREFRESHED BY SLEEP: ICD-10-CM

## 2019-04-01 DIAGNOSIS — G47.9 SLEEP DISTURBANCES: ICD-10-CM

## 2019-04-01 DIAGNOSIS — M70.62 TROCHANTERIC BURSITIS, LEFT HIP: Primary | ICD-10-CM

## 2019-04-01 PROCEDURE — 1036F TOBACCO NON-USER: CPT | Performed by: FAMILY MEDICINE

## 2019-04-01 PROCEDURE — 4040F PNEUMOC VAC/ADMIN/RCVD: CPT | Performed by: FAMILY MEDICINE

## 2019-04-01 PROCEDURE — 1090F PRES/ABSN URINE INCON ASSESS: CPT | Performed by: FAMILY MEDICINE

## 2019-04-01 PROCEDURE — 20610 DRAIN/INJ JOINT/BURSA W/O US: CPT | Performed by: FAMILY MEDICINE

## 2019-04-01 PROCEDURE — 99214 OFFICE O/P EST MOD 30 MIN: CPT | Performed by: FAMILY MEDICINE

## 2019-04-01 PROCEDURE — 3017F COLORECTAL CA SCREEN DOC REV: CPT | Performed by: FAMILY MEDICINE

## 2019-04-01 PROCEDURE — G8427 DOCREV CUR MEDS BY ELIG CLIN: HCPCS | Performed by: FAMILY MEDICINE

## 2019-04-01 PROCEDURE — 1123F ACP DISCUSS/DSCN MKR DOCD: CPT | Performed by: FAMILY MEDICINE

## 2019-04-01 PROCEDURE — G8417 CALC BMI ABV UP PARAM F/U: HCPCS | Performed by: FAMILY MEDICINE

## 2019-04-01 PROCEDURE — G8399 PT W/DXA RESULTS DOCUMENT: HCPCS | Performed by: FAMILY MEDICINE

## 2019-04-01 ASSESSMENT — ENCOUNTER SYMPTOMS
ALLERGIC/IMMUNOLOGIC NEGATIVE: 1
RESPIRATORY NEGATIVE: 1
GASTROINTESTINAL NEGATIVE: 1
EYES NEGATIVE: 1

## 2019-04-01 ASSESSMENT — PATIENT HEALTH QUESTIONNAIRE - PHQ9
1. LITTLE INTEREST OR PLEASURE IN DOING THINGS: 0
SUM OF ALL RESPONSES TO PHQ9 QUESTIONS 1 & 2: 0
2. FEELING DOWN, DEPRESSED OR HOPELESS: 0
SUM OF ALL RESPONSES TO PHQ QUESTIONS 1-9: 0
SUM OF ALL RESPONSES TO PHQ QUESTIONS 1-9: 0

## 2019-04-01 NOTE — PROGRESS NOTES
2019     Debbi Molina (:  1950) is a 80 y.o. female, here for evaluation of the following medical concerns:    HPI   Acute follow up on left hip and groin area pain discussed at her 3/25/19 follow up visit. She has had both knees replaced and is doing well with her knees. Some more acute left groin area discomfort. Mild with walking, worse transitioning from sitting to standing. Some lateral hip pain, painful laying on her left side, with prior trochanteric bursitis treated on the right side. Pain ongoing in the last 3-4 weeks. Some consideration for trochanteric bursitis along with hip joint or back source for her groin pain. She had pain with passive internal rotation on exam.    1. No acute osseous abnormality involving the pelvis or left hip. 2. Mild left hip osteoarthritis.        She also reports progressing issues with sleep. She gets to sleep quickly but often awakens after 4 hours. She feels tired during the day and can nod off to sleep easily when sitting and quiet. Discussed dave . Review of Systems   Constitutional: Negative. HENT: Negative. Eyes: Negative. Respiratory: Negative. Cardiovascular: Negative. Gastrointestinal: Negative. Endocrine: Negative. Genitourinary: Negative. Musculoskeletal: Positive for arthralgias (left hip, acute mcp 2-3 right hand ). Skin: Negative. Allergic/Immunologic: Negative. Neurological: Negative. Hematological: Negative. Psychiatric/Behavioral: Negative. Prior to Visit Medications    Medication Sig Taking?  Authorizing Provider   hydrochlorothiazide (HYDRODIURIL) 25 MG tablet TAKE 1 TABLET DAILY  Jolee Goldberg, MD   meloxicam (MOBIC) 15 MG tablet TAKE 1 TABLET DAILY  Jolee Goldberg, MD   ondansetron (ZOFRAN ODT) 4 MG disintegrating tablet Take 1 tablet by mouth every 8 hours as needed for Nausea  Kev Arthur MD   albuterol sulfate  (90 Base) MCG/ACT inhaler Inhale 2 puffs into the lungs every 6 hours as needed for Wheezing or Shortness of Breath  MAYCOL Morrison - CNP   estradiol (ESTRACE) 1 MG tablet Take 1 tablet by mouth daily  Ry Cook MD        Social History     Tobacco Use    Smoking status: Former Smoker     Packs/day: 0.25     Years: 1.00     Pack years: 0.25     Types: Cigarettes     Start date: 1997     Last attempt to quit: 1998     Years since quittin.2    Smokeless tobacco: Never Used    Tobacco comment: Smoked for about 1 year   Substance Use Topics    Alcohol use: Yes     Alcohol/week: 0.0 oz     Comment: Occasionally, once or twice a year        Vitals:    19 1107   BP: 126/72   Site: Right Upper Arm   Position: Sitting   Cuff Size: Large Adult   Pulse: 68   Weight: 251 lb 15.8 oz (114.3 kg)   Height: 5' 7.99\" (1.727 m)     Estimated body mass index is 38.32 kg/m² as calculated from the following:    Height as of this encounter: 5' 7.99\" (1.727 m). Weight as of this encounter: 251 lb 15.8 oz (114.3 kg). Physical Exam   Constitutional: She is oriented to person, place, and time. She appears well-developed and well-nourished. No distress. HENT:   Head: Normocephalic and atraumatic. Right Ear: External ear normal.   Left Ear: External ear normal.   Mouth/Throat: Oropharynx is clear and moist. No oropharyngeal exudate. Eyes: Conjunctivae and EOM are normal. No scleral icterus. Neck: Neck supple. No thyromegaly present. Cardiovascular: Normal rate, regular rhythm, normal heart sounds and intact distal pulses. No murmur heard. Pulmonary/Chest: Effort normal and breath sounds normal. No respiratory distress. She has no wheezes. Abdominal: Soft. Bowel sounds are normal. She exhibits no distension. There is no tenderness. There is no rebound. Musculoskeletal: She exhibits no edema. Left hip: She exhibits decreased range of motion, tenderness and bony tenderness (lateral /trochanteric).  She exhibits normal strength, no crepitus and no deformity. Right hand: She exhibits swelling. Hands:  Neurological: She is alert and oriented to person, place, and time. Skin: Skin is warm and dry. No rash noted. No erythema. Psychiatric: She has a normal mood and affect. Her behavior is normal. Judgment and thought content normal.     /72 (Site: Right Upper Arm, Position: Sitting, Cuff Size: Large Adult)   Pulse 68   Ht 5' 7.99\" (1.727 m)   Wt 251 lb 15.8 oz (114.3 kg)   BMI 38.32 kg/m²     ASSESSMENT/PLAN:  Encounter Diagnoses   Name Primary?  Trochanteric bursitis, left hip Yes    Primary osteoarthritis of left hip      I believe she has a couple of sources of pain at present around her left hip. Planning injection trial today to decrease the trochanteric bursitis symptoms. I suspect the groin area pain is more joint related. Currently ambulating well. Procedure: after consent and sterile prep. With alcohol pad,5 cc xylocaine followed by 2cc kenalog injected into the left trochanteric bursae area in a fenestrated pattern. Pt. Tolerated well. Recheck prn. Concerns for dave. interrupted sleeping, un refreshed sleep. Loud snoring per spouse. Would consider sleep apnea screening. An electronic signature was used to authenticate this note.     --Swetha Fay MD on 4/1/2019 at 11:32 AM

## 2019-04-11 ENCOUNTER — TELEPHONE (OUTPATIENT)
Dept: FAMILY MEDICINE CLINIC | Age: 69
End: 2019-04-11

## 2019-04-11 NOTE — TELEPHONE ENCOUNTER
Pt calling stating that she is returning call from a few days ago about her sleep study. Pt thinks it was Verna Lopez that called.

## 2019-08-29 ENCOUNTER — HOSPITAL ENCOUNTER (OUTPATIENT)
Age: 69
Setting detail: SPECIMEN
Discharge: HOME | End: 2019-08-29
Payer: MEDICARE

## 2019-08-29 ENCOUNTER — OFFICE VISIT (OUTPATIENT)
Dept: OBGYN | Age: 69
End: 2019-08-29
Payer: MEDICARE

## 2019-08-29 VITALS
HEIGHT: 68 IN | SYSTOLIC BLOOD PRESSURE: 142 MMHG | HEART RATE: 64 BPM | RESPIRATION RATE: 20 BRPM | DIASTOLIC BLOOD PRESSURE: 90 MMHG | WEIGHT: 254 LBS | BODY MASS INDEX: 38.49 KG/M2

## 2019-08-29 DIAGNOSIS — M25.561 RIGHT KNEE PAIN, UNSPECIFIED CHRONICITY: Primary | ICD-10-CM

## 2019-08-29 DIAGNOSIS — Z12.4 PAP SMEAR FOR CERVICAL CANCER SCREENING: ICD-10-CM

## 2019-08-29 DIAGNOSIS — N95.1 MENOPAUSAL AND FEMALE CLIMACTERIC STATES: Primary | ICD-10-CM

## 2019-08-29 DIAGNOSIS — Z13.820 SCREENING FOR OSTEOPOROSIS: ICD-10-CM

## 2019-08-29 PROCEDURE — 99213 OFFICE O/P EST LOW 20 MIN: CPT | Performed by: OBSTETRICS & GYNECOLOGY

## 2019-08-29 PROCEDURE — G0145 SCR C/V CYTO,THINLAYER,RESCR: HCPCS

## 2019-08-29 NOTE — PROGRESS NOTES
Subjective:      Patient ID: Preethi Faye  is a 77 y.o. y.o. Female. P2  Had CHARISMA for fibroids in . Started ERT roughly 10 yr later and continues to take. On Estrace 1 mg daily but still has hot flashes and fatigue      HPI  Chief Complaint   Patient presents with    Gynecologic Exam     having hot flashes not seen a gyn in a few years     Family History   Problem Relation Age of Onset    Stomach Cancer Mother     Cervical Cancer Mother     Stroke Father     Cancer Father         Throat.  Breast Cancer Father 79        father cancer right breast    Cancer Brother         Lymphoma.  Stroke Maternal Grandmother     Heart Attack Maternal Grandfather         Myocardial infarction.  Colon Cancer Brother     Migraines Son     Asthma Daughter      Past Medical History:   Diagnosis Date    Asthma     Chalazion of right lower eyelid     Colon polyps     Degenerative joint disease of knee, left     Dysmenorrhea     GERD (gastroesophageal reflux disease)     Greater trochanteric bursitis of right hip     Hot flashes     Hyperlipidemia     Hypertension     Obesity     Scaphoid fracture of wrist     Right.  Sleep disturbances     Tobacco abuse     Remote and limited.  Uterine fibroid      Past Surgical History:   Procedure Laterality Date    BREAST REDUCTION SURGERY Bilateral     CARDIAC CATHETERIZATION  06/15/2007    No significant coronary artery disease, preserved LV systolic function.   SECTION      COLONOSCOPY  09/10/2008    Sigmoid diverticulosis, history of polyps.  COLONOSCOPY  2007    Multiple polyps of the colon, rectal polyps, sigmoid polyps, and splenic flexure polyps.  COLONOSCOPY  2006    Pedunculated thin polyp near the transverse colon, small polyp at the dentate line versus a hemorrhoid.  COLONOSCOPY      COLONOSCOPY  2014    Dr. Vira Puckett. adenom.  x 2, 5yr follow up   Professor Bette Ordaz [address] HYSTERECTOMY, TOTAL ABDOMINAL      KNEE ARTHROSCOPY Left 05/21/1999    Torn degenerate posterior third medial meniscus.  MOUTH SURGERY Right 5/1/15    OTHER SURGICAL HISTORY Left 09/11/2012    Left knee injection for left knee pain.  OTHER SURGICAL HISTORY Right 09/12/2006    Bursa injection for greater trochanteric bursitis.  SALPINGO-OOPHORECTOMY Left     TONSILLECTOMY AND ADENOIDECTOMY      TOTAL KNEE ARTHROPLASTY Left 2013      reports that she quit smoking about 20 years ago. Her smoking use included cigarettes. She started smoking about 21 years ago. She has a 0.25 pack-year smoking history. She has never used smokeless tobacco. She reports that she drinks alcohol. She reports that she does not use drugs. Allergies   Allergen Reactions    Minocin [Minocycline] Nausea Only and Other (See Comments)     Dizzy    Oxycodone Nausea Only       Current Outpatient Medications:     estrogens, conjugated, (PREMARIN) 0.625 MG tablet, Take 1 tablet by mouth daily, Disp: 21 tablet, Rfl: 3    hydrochlorothiazide (HYDRODIURIL) 25 MG tablet, TAKE 1 TABLET DAILY, Disp: 90 tablet, Rfl: 3    meloxicam (MOBIC) 15 MG tablet, TAKE 1 TABLET DAILY, Disp: 90 tablet, Rfl: 3    ondansetron (ZOFRAN ODT) 4 MG disintegrating tablet, Take 1 tablet by mouth every 8 hours as needed for Nausea, Disp: 12 tablet, Rfl: 0    albuterol sulfate  (90 Base) MCG/ACT inhaler, Inhale 2 puffs into the lungs every 6 hours as needed for Wheezing or Shortness of Breath, Disp: 1 Inhaler, Rfl: 0    estradiol (ESTRACE) 1 MG tablet, Take 1 tablet by mouth daily, Disp: 90 tablet, Rfl: 3      Review of Systems   Musculoskeletal:        Has had bilateral knee replacements   All other systems reviewed and are negative.     Breast ROS: negative    Objective:   BP (!) 142/90 (Site: Right Upper Arm, Position: Sitting, Cuff Size: Large Adult)   Pulse 64   Resp 20   Ht 5' 7.99\" (1.727 m)   Wt 254 lb (115.2 kg)   BMI 38.63 kg/m²

## 2019-09-03 ENCOUNTER — HOSPITAL ENCOUNTER (OUTPATIENT)
Dept: BONE DENSITY | Age: 69
Discharge: HOME | End: 2019-09-05
Payer: MEDICARE

## 2019-09-03 DIAGNOSIS — Z13.820 SCREENING FOR OSTEOPOROSIS: ICD-10-CM

## 2019-09-03 DIAGNOSIS — N95.1 MENOPAUSAL AND FEMALE CLIMACTERIC STATES: ICD-10-CM

## 2019-09-05 ENCOUNTER — OFFICE VISIT (OUTPATIENT)
Dept: ORTHOPEDIC SURGERY | Age: 69
End: 2019-09-05
Payer: MEDICARE

## 2019-09-05 VITALS
HEART RATE: 64 BPM | BODY MASS INDEX: 38.49 KG/M2 | DIASTOLIC BLOOD PRESSURE: 91 MMHG | WEIGHT: 254 LBS | HEIGHT: 68 IN | SYSTOLIC BLOOD PRESSURE: 176 MMHG

## 2019-09-05 DIAGNOSIS — Z96.651 S/P TOTAL KNEE ARTHROPLASTY, RIGHT: ICD-10-CM

## 2019-09-05 DIAGNOSIS — M70.50 PES ANSERINE BURSITIS: Primary | ICD-10-CM

## 2019-09-05 PROCEDURE — 20611 DRAIN/INJ JOINT/BURSA W/US: CPT | Performed by: PHYSICIAN ASSISTANT

## 2019-09-05 PROCEDURE — 99213 OFFICE O/P EST LOW 20 MIN: CPT | Performed by: PHYSICIAN ASSISTANT

## 2019-09-05 RX ORDER — METHYLPREDNISOLONE ACETATE 40 MG/ML
40 INJECTION, SUSPENSION INTRA-ARTICULAR; INTRALESIONAL; INTRAMUSCULAR; SOFT TISSUE ONCE
Status: COMPLETED | OUTPATIENT
Start: 2019-09-05 | End: 2019-09-05

## 2019-09-05 RX ORDER — LIDOCAINE HYDROCHLORIDE 10 MG/ML
4 INJECTION, SOLUTION INFILTRATION; PERINEURAL ONCE
Status: COMPLETED | OUTPATIENT
Start: 2019-09-05 | End: 2019-09-05

## 2019-09-05 RX ADMIN — METHYLPREDNISOLONE ACETATE 40 MG: 40 INJECTION, SUSPENSION INTRA-ARTICULAR; INTRALESIONAL; INTRAMUSCULAR; SOFT TISSUE at 14:54

## 2019-09-05 RX ADMIN — LIDOCAINE HYDROCHLORIDE 4 ML: 10 INJECTION, SOLUTION INFILTRATION; PERINEURAL at 14:55

## 2019-09-05 ASSESSMENT — ENCOUNTER SYMPTOMS
CONSTIPATION: 0
ABDOMINAL PAIN: 0
CHEST TIGHTNESS: 0
ABDOMINAL DISTENTION: 0
DIARRHEA: 0
VOMITING: 0
SHORTNESS OF BREATH: 0
COUGH: 0
APNEA: 0
COLOR CHANGE: 0
NAUSEA: 0

## 2019-09-09 LAB — CYTOLOGY REPORT: NORMAL

## 2019-09-25 ENCOUNTER — OFFICE VISIT (OUTPATIENT)
Dept: FAMILY MEDICINE CLINIC | Age: 69
End: 2019-09-25
Payer: MEDICARE

## 2019-09-25 ENCOUNTER — HOSPITAL ENCOUNTER (OUTPATIENT)
Dept: LAB | Age: 69
Discharge: HOME | End: 2019-09-25
Payer: MEDICARE

## 2019-09-25 VITALS
HEART RATE: 72 BPM | WEIGHT: 252 LBS | BODY MASS INDEX: 38.19 KG/M2 | DIASTOLIC BLOOD PRESSURE: 84 MMHG | HEIGHT: 68 IN | SYSTOLIC BLOOD PRESSURE: 136 MMHG

## 2019-09-25 DIAGNOSIS — M17.11 PRIMARY OSTEOARTHRITIS OF RIGHT KNEE: ICD-10-CM

## 2019-09-25 DIAGNOSIS — Z12.31 VISIT FOR SCREENING MAMMOGRAM: Primary | ICD-10-CM

## 2019-09-25 DIAGNOSIS — R10.9 FLANK PAIN: ICD-10-CM

## 2019-09-25 DIAGNOSIS — Z79.890 POSTMENOPAUSAL HRT (HORMONE REPLACEMENT THERAPY): ICD-10-CM

## 2019-09-25 DIAGNOSIS — I10 ESSENTIAL HYPERTENSION: ICD-10-CM

## 2019-09-25 DIAGNOSIS — D12.4 ADENOMATOUS POLYP OF DESCENDING COLON: Primary | ICD-10-CM

## 2019-09-25 DIAGNOSIS — E78.00 PURE HYPERCHOLESTEROLEMIA: ICD-10-CM

## 2019-09-25 DIAGNOSIS — E66.09 NON MORBID OBESITY DUE TO EXCESS CALORIES: ICD-10-CM

## 2019-09-25 DIAGNOSIS — M25.541 ARTHRALGIA OF METACARPOPHALANGEAL JOINT, RIGHT: ICD-10-CM

## 2019-09-25 DIAGNOSIS — G47.9 SLEEP DISTURBANCES: ICD-10-CM

## 2019-09-25 DIAGNOSIS — K21.9 GASTROESOPHAGEAL REFLUX DISEASE WITHOUT ESOPHAGITIS: ICD-10-CM

## 2019-09-25 DIAGNOSIS — J45.20 MILD INTERMITTENT ASTHMA WITHOUT COMPLICATION: ICD-10-CM

## 2019-09-25 DIAGNOSIS — M70.62 TROCHANTERIC BURSITIS, LEFT HIP: ICD-10-CM

## 2019-09-25 LAB
-: ABNORMAL
AMORPHOUS: ABNORMAL
BACTERIA: ABNORMAL
BILIRUBIN URINE: NEGATIVE
CASTS UA: ABNORMAL /LPF (ref 0–2)
COLOR: ABNORMAL
COMMENT UA: ABNORMAL
CRYSTALS, UA: ABNORMAL /HPF
EPITHELIAL CELLS UA: ABNORMAL /HPF (ref 0–5)
GLUCOSE URINE: NEGATIVE
KETONES, URINE: NEGATIVE
LEUKOCYTE ESTERASE, URINE: NEGATIVE
MUCUS: ABNORMAL
NITRITE, URINE: NEGATIVE
OTHER OBSERVATIONS UA: ABNORMAL
PH UA: 5.5 (ref 5–6)
PROTEIN UA: NEGATIVE
RBC UA: ABNORMAL /HPF (ref 0–4)
RENAL EPITHELIAL, UA: ABNORMAL /HPF
SPECIFIC GRAVITY UA: 1.02 (ref 1.01–1.02)
TRICHOMONAS: ABNORMAL
TURBIDITY: ABNORMAL
URINE HGB: ABNORMAL
UROBILINOGEN, URINE: NORMAL
WBC UA: ABNORMAL /HPF (ref 0–4)
YEAST: ABNORMAL

## 2019-09-25 PROCEDURE — 4040F PNEUMOC VAC/ADMIN/RCVD: CPT | Performed by: FAMILY MEDICINE

## 2019-09-25 PROCEDURE — 1036F TOBACCO NON-USER: CPT | Performed by: FAMILY MEDICINE

## 2019-09-25 PROCEDURE — 1090F PRES/ABSN URINE INCON ASSESS: CPT | Performed by: FAMILY MEDICINE

## 2019-09-25 PROCEDURE — 81001 URINALYSIS AUTO W/SCOPE: CPT

## 2019-09-25 PROCEDURE — G8427 DOCREV CUR MEDS BY ELIG CLIN: HCPCS | Performed by: FAMILY MEDICINE

## 2019-09-25 PROCEDURE — G8417 CALC BMI ABV UP PARAM F/U: HCPCS | Performed by: FAMILY MEDICINE

## 2019-09-25 PROCEDURE — 3017F COLORECTAL CA SCREEN DOC REV: CPT | Performed by: FAMILY MEDICINE

## 2019-09-25 PROCEDURE — G8399 PT W/DXA RESULTS DOCUMENT: HCPCS | Performed by: FAMILY MEDICINE

## 2019-09-25 PROCEDURE — 1123F ACP DISCUSS/DSCN MKR DOCD: CPT | Performed by: FAMILY MEDICINE

## 2019-09-25 PROCEDURE — 99214 OFFICE O/P EST MOD 30 MIN: CPT | Performed by: FAMILY MEDICINE

## 2019-09-25 RX ORDER — TRAMADOL HYDROCHLORIDE 50 MG/1
50 TABLET ORAL
COMMUNITY
Start: 2019-09-22 | End: 2021-03-10

## 2019-09-25 ASSESSMENT — ENCOUNTER SYMPTOMS
ALLERGIC/IMMUNOLOGIC NEGATIVE: 1
EYES NEGATIVE: 1
RESPIRATORY NEGATIVE: 1
BACK PAIN: 1
GASTROINTESTINAL NEGATIVE: 1

## 2019-09-26 ENCOUNTER — HOSPITAL ENCOUNTER (OUTPATIENT)
Dept: CT IMAGING | Age: 69
Discharge: HOME | End: 2019-09-28
Payer: MEDICARE

## 2019-09-26 DIAGNOSIS — E78.00 PURE HYPERCHOLESTEROLEMIA: ICD-10-CM

## 2019-09-26 DIAGNOSIS — I10 ESSENTIAL HYPERTENSION: Primary | ICD-10-CM

## 2019-09-26 DIAGNOSIS — R31.9 HEMATURIA, UNSPECIFIED TYPE: Primary | ICD-10-CM

## 2019-09-26 DIAGNOSIS — R10.9 FLANK PAIN, ACUTE: Primary | ICD-10-CM

## 2019-09-26 DIAGNOSIS — R10.9 FLANK PAIN, ACUTE: ICD-10-CM

## 2019-09-26 PROCEDURE — 74176 CT ABD & PELVIS W/O CONTRAST: CPT

## 2019-09-30 ENCOUNTER — HOSPITAL ENCOUNTER (OUTPATIENT)
Dept: PHYSICAL THERAPY | Age: 69
Setting detail: THERAPIES SERIES
Discharge: HOME | End: 2019-09-30
Payer: MEDICARE

## 2019-09-30 ENCOUNTER — HOSPITAL ENCOUNTER (OUTPATIENT)
Dept: LAB | Age: 69
Discharge: HOME | End: 2019-09-30
Payer: MEDICARE

## 2019-09-30 DIAGNOSIS — R31.9 HEMATURIA, UNSPECIFIED TYPE: ICD-10-CM

## 2019-09-30 LAB
-: ABNORMAL
AMORPHOUS: ABNORMAL
BACTERIA: ABNORMAL
BILIRUBIN URINE: NEGATIVE
CASTS UA: ABNORMAL /LPF (ref 0–2)
CASTS UA: ABNORMAL /LPF (ref 0–2)
COLOR: ABNORMAL
COMMENT UA: ABNORMAL
CRYSTALS, UA: ABNORMAL /HPF
EPITHELIAL CELLS UA: ABNORMAL /HPF (ref 0–5)
GLUCOSE URINE: NEGATIVE
KETONES, URINE: NEGATIVE
LEUKOCYTE ESTERASE, URINE: NEGATIVE
MUCUS: ABNORMAL
NITRITE, URINE: NEGATIVE
OTHER OBSERVATIONS UA: ABNORMAL
PH UA: 6 (ref 5–6)
PROTEIN UA: NEGATIVE
RBC UA: ABNORMAL /HPF (ref 0–4)
RENAL EPITHELIAL, UA: ABNORMAL /HPF
SPECIFIC GRAVITY UA: 1.02 (ref 1.01–1.02)
TRICHOMONAS: ABNORMAL
TURBIDITY: ABNORMAL
URINE HGB: ABNORMAL
UROBILINOGEN, URINE: NORMAL
WBC UA: ABNORMAL /HPF (ref 0–4)
YEAST: ABNORMAL

## 2019-09-30 PROCEDURE — 81001 URINALYSIS AUTO W/SCOPE: CPT

## 2019-09-30 PROCEDURE — 36415 COLL VENOUS BLD VENIPUNCTURE: CPT

## 2019-09-30 PROCEDURE — 97110 THERAPEUTIC EXERCISES: CPT | Performed by: PHYSICAL THERAPIST

## 2019-09-30 PROCEDURE — 97161 PT EVAL LOW COMPLEX 20 MIN: CPT | Performed by: PHYSICAL THERAPIST

## 2019-09-30 ASSESSMENT — PAIN SCALES - GENERAL: PAINLEVEL_OUTOF10: 6

## 2019-09-30 ASSESSMENT — PAIN DESCRIPTION - FREQUENCY: FREQUENCY: CONTINUOUS

## 2019-09-30 ASSESSMENT — PAIN DESCRIPTION - PAIN TYPE: TYPE: ACUTE PAIN

## 2019-09-30 ASSESSMENT — PAIN DESCRIPTION - DESCRIPTORS: DESCRIPTORS: ACHING;SHARP

## 2019-09-30 ASSESSMENT — PAIN - FUNCTIONAL ASSESSMENT: PAIN_FUNCTIONAL_ASSESSMENT: PREVENTS OR INTERFERES SOME ACTIVE ACTIVITIES AND ADLS

## 2019-09-30 ASSESSMENT — PAIN DESCRIPTION - LOCATION: LOCATION: BACK;BUTTOCKS;HIP

## 2019-09-30 ASSESSMENT — PAIN DESCRIPTION - ORIENTATION: ORIENTATION: LEFT

## 2019-09-30 ASSESSMENT — PAIN DESCRIPTION - PROGRESSION: CLINICAL_PROGRESSION: GRADUALLY WORSENING

## 2019-10-01 ENCOUNTER — HOSPITAL ENCOUNTER (OUTPATIENT)
Dept: LAB | Age: 69
Discharge: HOME | End: 2019-10-01
Payer: MEDICARE

## 2019-10-01 DIAGNOSIS — E78.00 PURE HYPERCHOLESTEROLEMIA: ICD-10-CM

## 2019-10-01 DIAGNOSIS — I10 ESSENTIAL HYPERTENSION: ICD-10-CM

## 2019-10-01 LAB
ABSOLUTE EOS #: 0.3 K/UL (ref 0–0.4)
ABSOLUTE IMMATURE GRANULOCYTE: NORMAL K/UL (ref 0–0.3)
ABSOLUTE LYMPH #: 2.3 K/UL (ref 1–4.8)
ABSOLUTE MONO #: 0.5 K/UL (ref 0.1–1.2)
ALBUMIN SERPL-MCNC: 4.1 G/DL (ref 3.5–5.2)
ALBUMIN/GLOBULIN RATIO: 1 (ref 1–2.5)
ALP BLD-CCNC: 93 U/L (ref 35–104)
ALT SERPL-CCNC: 16 U/L (ref 5–33)
ANION GAP SERPL CALCULATED.3IONS-SCNC: 10 MMOL/L (ref 9–17)
AST SERPL-CCNC: 16 U/L
BASOPHILS # BLD: 1 % (ref 0–1)
BASOPHILS ABSOLUTE: 0 K/UL (ref 0–0.2)
BILIRUB SERPL-MCNC: 0.28 MG/DL (ref 0.3–1.2)
BUN BLDV-MCNC: 12 MG/DL (ref 8–23)
BUN/CREAT BLD: 14 (ref 9–20)
CALCIUM SERPL-MCNC: 9.2 MG/DL (ref 8.6–10.4)
CHLORIDE BLD-SCNC: 103 MMOL/L (ref 98–107)
CHOLESTEROL, FASTING: 189 MG/DL
CHOLESTEROL/HDL RATIO: 3.2
CO2: 27 MMOL/L (ref 20–31)
CREAT SERPL-MCNC: 0.83 MG/DL (ref 0.5–0.9)
DIFFERENTIAL TYPE: NORMAL
EOSINOPHILS RELATIVE PERCENT: 4 % (ref 1–7)
GFR AFRICAN AMERICAN: >60 ML/MIN
GFR NON-AFRICAN AMERICAN: >60 ML/MIN
GFR SERPL CREATININE-BSD FRML MDRD: ABNORMAL ML/MIN/{1.73_M2}
GFR SERPL CREATININE-BSD FRML MDRD: ABNORMAL ML/MIN/{1.73_M2}
GLUCOSE BLD-MCNC: 106 MG/DL (ref 70–99)
HCT VFR BLD CALC: 41.6 % (ref 36–46)
HDLC SERPL-MCNC: 60 MG/DL
HEMOGLOBIN: 13.6 G/DL (ref 12–16)
IMMATURE GRANULOCYTES: NORMAL %
LDL CHOLESTEROL: 106 MG/DL (ref 0–130)
LYMPHOCYTES # BLD: 33 % (ref 16–46)
MCH RBC QN AUTO: 27.1 PG (ref 26–34)
MCHC RBC AUTO-ENTMCNC: 32.8 G/DL (ref 31–37)
MCV RBC AUTO: 82.6 FL (ref 80–100)
MONOCYTES # BLD: 8 % (ref 4–11)
NRBC AUTOMATED: NORMAL PER 100 WBC
PDW BLD-RTO: 13.5 % (ref 11–14.5)
PLATELET # BLD: 397 K/UL (ref 140–450)
PLATELET ESTIMATE: NORMAL
PMV BLD AUTO: 7 FL (ref 6–12)
POTASSIUM SERPL-SCNC: 4 MMOL/L (ref 3.7–5.3)
RBC # BLD: 5.03 M/UL (ref 4–5.2)
RBC # BLD: NORMAL 10*6/UL
RHEUMATOID FACTOR: <10 IU/ML
SEG NEUTROPHILS: 54 % (ref 43–77)
SEGMENTED NEUTROPHILS ABSOLUTE COUNT: 3.8 K/UL (ref 1.8–7.7)
SODIUM BLD-SCNC: 140 MMOL/L (ref 135–144)
TOTAL PROTEIN: 8.1 G/DL (ref 6.4–8.3)
TRIGLYCERIDE, FASTING: 114 MG/DL
VLDLC SERPL CALC-MCNC: NORMAL MG/DL (ref 1–30)
WBC # BLD: 7 K/UL (ref 3.5–11)
WBC # BLD: NORMAL 10*3/UL

## 2019-10-01 PROCEDURE — 85025 COMPLETE CBC W/AUTO DIFF WBC: CPT

## 2019-10-01 PROCEDURE — 80061 LIPID PANEL: CPT

## 2019-10-01 PROCEDURE — 86431 RHEUMATOID FACTOR QUANT: CPT

## 2019-10-01 PROCEDURE — 80053 COMPREHEN METABOLIC PANEL: CPT

## 2019-10-01 PROCEDURE — 36415 COLL VENOUS BLD VENIPUNCTURE: CPT

## 2019-10-03 ENCOUNTER — HOSPITAL ENCOUNTER (OUTPATIENT)
Dept: PHYSICAL THERAPY | Age: 69
Setting detail: THERAPIES SERIES
Discharge: HOME | End: 2019-10-03
Payer: MEDICARE

## 2019-10-03 PROCEDURE — 97110 THERAPEUTIC EXERCISES: CPT | Performed by: PHYSICAL THERAPIST

## 2019-10-08 ENCOUNTER — HOSPITAL ENCOUNTER (OUTPATIENT)
Dept: PHYSICAL THERAPY | Age: 69
Setting detail: THERAPIES SERIES
Discharge: HOME | End: 2019-10-08
Payer: MEDICARE

## 2019-10-10 ENCOUNTER — HOSPITAL ENCOUNTER (OUTPATIENT)
Dept: PHYSICAL THERAPY | Age: 69
Setting detail: THERAPIES SERIES
Discharge: HOME | End: 2019-10-10
Payer: MEDICARE

## 2019-10-10 PROCEDURE — 97110 THERAPEUTIC EXERCISES: CPT

## 2019-10-14 ENCOUNTER — OFFICE VISIT (OUTPATIENT)
Dept: ORTHOPEDIC SURGERY | Age: 69
End: 2019-10-14
Payer: MEDICARE

## 2019-10-14 VITALS
SYSTOLIC BLOOD PRESSURE: 153 MMHG | WEIGHT: 245 LBS | HEIGHT: 68 IN | HEART RATE: 64 BPM | BODY MASS INDEX: 37.13 KG/M2 | DIASTOLIC BLOOD PRESSURE: 81 MMHG

## 2019-10-14 DIAGNOSIS — M70.50 PES ANSERINE BURSITIS: Primary | ICD-10-CM

## 2019-10-14 DIAGNOSIS — Z96.651 S/P TOTAL KNEE ARTHROPLASTY, RIGHT: ICD-10-CM

## 2019-10-14 PROCEDURE — 1090F PRES/ABSN URINE INCON ASSESS: CPT | Performed by: PHYSICIAN ASSISTANT

## 2019-10-14 PROCEDURE — G8417 CALC BMI ABV UP PARAM F/U: HCPCS | Performed by: PHYSICIAN ASSISTANT

## 2019-10-14 PROCEDURE — 1123F ACP DISCUSS/DSCN MKR DOCD: CPT | Performed by: PHYSICIAN ASSISTANT

## 2019-10-14 PROCEDURE — G8427 DOCREV CUR MEDS BY ELIG CLIN: HCPCS | Performed by: PHYSICIAN ASSISTANT

## 2019-10-14 PROCEDURE — G8399 PT W/DXA RESULTS DOCUMENT: HCPCS | Performed by: PHYSICIAN ASSISTANT

## 2019-10-14 PROCEDURE — 4040F PNEUMOC VAC/ADMIN/RCVD: CPT | Performed by: PHYSICIAN ASSISTANT

## 2019-10-14 PROCEDURE — G8482 FLU IMMUNIZE ORDER/ADMIN: HCPCS | Performed by: PHYSICIAN ASSISTANT

## 2019-10-14 PROCEDURE — 99212 OFFICE O/P EST SF 10 MIN: CPT | Performed by: PHYSICIAN ASSISTANT

## 2019-10-14 PROCEDURE — 3017F COLORECTAL CA SCREEN DOC REV: CPT | Performed by: PHYSICIAN ASSISTANT

## 2019-10-14 PROCEDURE — 1036F TOBACCO NON-USER: CPT | Performed by: PHYSICIAN ASSISTANT

## 2019-10-14 ASSESSMENT — ENCOUNTER SYMPTOMS
COUGH: 0
NAUSEA: 0
DIARRHEA: 0
COLOR CHANGE: 0
APNEA: 0
VOMITING: 0
CONSTIPATION: 0
ABDOMINAL DISTENTION: 0
SHORTNESS OF BREATH: 0
RESPIRATORY NEGATIVE: 1
ABDOMINAL PAIN: 0
CHEST TIGHTNESS: 0

## 2019-10-16 ENCOUNTER — OFFICE VISIT (OUTPATIENT)
Dept: FAMILY MEDICINE CLINIC | Age: 69
End: 2019-10-16
Payer: MEDICARE

## 2019-10-16 ENCOUNTER — HOSPITAL ENCOUNTER (OUTPATIENT)
Dept: PHYSICAL THERAPY | Age: 69
Setting detail: THERAPIES SERIES
Discharge: HOME | End: 2019-10-16
Payer: MEDICARE

## 2019-10-16 VITALS
RESPIRATION RATE: 16 BRPM | WEIGHT: 253 LBS | HEART RATE: 65 BPM | OXYGEN SATURATION: 97 % | DIASTOLIC BLOOD PRESSURE: 80 MMHG | BODY MASS INDEX: 38.34 KG/M2 | HEIGHT: 68 IN | SYSTOLIC BLOOD PRESSURE: 122 MMHG

## 2019-10-16 DIAGNOSIS — Z00.00 ROUTINE GENERAL MEDICAL EXAMINATION AT A HEALTH CARE FACILITY: Primary | ICD-10-CM

## 2019-10-16 PROCEDURE — 1123F ACP DISCUSS/DSCN MKR DOCD: CPT | Performed by: FAMILY MEDICINE

## 2019-10-16 PROCEDURE — 4040F PNEUMOC VAC/ADMIN/RCVD: CPT | Performed by: FAMILY MEDICINE

## 2019-10-16 PROCEDURE — 97110 THERAPEUTIC EXERCISES: CPT | Performed by: PHYSICAL THERAPIST

## 2019-10-16 PROCEDURE — G8482 FLU IMMUNIZE ORDER/ADMIN: HCPCS | Performed by: FAMILY MEDICINE

## 2019-10-16 PROCEDURE — 3017F COLORECTAL CA SCREEN DOC REV: CPT | Performed by: FAMILY MEDICINE

## 2019-10-16 PROCEDURE — G0438 PPPS, INITIAL VISIT: HCPCS | Performed by: FAMILY MEDICINE

## 2019-10-16 ASSESSMENT — PATIENT HEALTH QUESTIONNAIRE - PHQ9
SUM OF ALL RESPONSES TO PHQ QUESTIONS 1-9: 0
SUM OF ALL RESPONSES TO PHQ QUESTIONS 1-9: 0

## 2019-10-16 ASSESSMENT — LIFESTYLE VARIABLES: HOW OFTEN DO YOU HAVE A DRINK CONTAINING ALCOHOL: 0

## 2019-10-18 ENCOUNTER — HOSPITAL ENCOUNTER (OUTPATIENT)
Dept: PHYSICAL THERAPY | Age: 69
Setting detail: THERAPIES SERIES
Discharge: HOME | End: 2019-10-18
Payer: MEDICARE

## 2019-10-18 PROCEDURE — 97110 THERAPEUTIC EXERCISES: CPT

## 2019-10-19 ENCOUNTER — HOSPITAL ENCOUNTER (OUTPATIENT)
Dept: MAMMOGRAPHY | Age: 69
Discharge: HOME | End: 2019-10-21
Payer: MEDICARE

## 2019-10-19 DIAGNOSIS — Z12.31 VISIT FOR SCREENING MAMMOGRAM: ICD-10-CM

## 2019-10-19 PROCEDURE — 77063 BREAST TOMOSYNTHESIS BI: CPT

## 2019-10-21 ENCOUNTER — HOSPITAL ENCOUNTER (OUTPATIENT)
Dept: PHYSICAL THERAPY | Age: 69
Setting detail: THERAPIES SERIES
Discharge: HOME | End: 2019-10-21
Payer: MEDICARE

## 2019-10-21 PROCEDURE — 97110 THERAPEUTIC EXERCISES: CPT

## 2019-10-23 ENCOUNTER — HOSPITAL ENCOUNTER (OUTPATIENT)
Dept: PHYSICAL THERAPY | Age: 69
Setting detail: THERAPIES SERIES
Discharge: HOME | End: 2019-10-23
Payer: MEDICARE

## 2019-10-23 PROCEDURE — 97110 THERAPEUTIC EXERCISES: CPT | Performed by: PHYSICAL THERAPIST

## 2019-11-18 ENCOUNTER — NURSE ONLY (OUTPATIENT)
Dept: SURGERY | Age: 69
End: 2019-11-18

## 2019-11-18 VITALS
SYSTOLIC BLOOD PRESSURE: 130 MMHG | HEART RATE: 68 BPM | DIASTOLIC BLOOD PRESSURE: 88 MMHG | BODY MASS INDEX: 38.34 KG/M2 | HEIGHT: 68 IN | WEIGHT: 253 LBS

## 2019-11-18 DIAGNOSIS — Z86.010 HISTORY OF COLON POLYPS: Primary | ICD-10-CM

## 2019-11-21 ENCOUNTER — HOSPITAL ENCOUNTER (OUTPATIENT)
Dept: BONE DENSITY | Age: 69
Discharge: HOME | End: 2019-11-23
Payer: MEDICARE

## 2019-11-21 PROCEDURE — 77085 DXA BONE DENSITY AXL VRT FX: CPT

## 2019-12-02 RX ORDER — ESTRADIOL 1 MG/1
TABLET ORAL
Qty: 90 TABLET | Refills: 3 | Status: SHIPPED | OUTPATIENT
Start: 2019-12-02 | End: 2021-03-10

## 2020-01-01 NOTE — OP NOTE
COLONOSCOPY PROCEDURE NOTE:      Pre op diagnosis:    1. Hx of colon polyps, last CS   2. Strong family hx colon cancer, brother had colon cancer at 45, . Operative Procedure:    1. Colonoscopy with cold bx    Surgeon:    Dr. Kelley Houser     Anesthesia:    IV sedation per CRNA    EBL:  minimal    Procedure:    Patient was taken to the endoscopy suite and placed in a left lateral decubitus position. They were given IV sedation as mentioned above. A digital rectal exam was performed. There were no masses and anal tone was normal.  The colonoscope was inserted into the rectum and carefully manipulated up through the sigmoid colon, transverse colon, right colon and into the cecum. The cecum was identified by the ileal cecal valve and transabdominal illumination. Then the scope was slowly withdrawn. The scope was retroflexed in the rectum and the dentate line was examined. The scope was removed. The patient tolerated the procedure well. The following findings were noted. Final Diagnosis:    1.  5 mm polyps at 15cm, 16 cm and 20 cm, all removed with cold bx  2. Area of blue dye around 50 cm was normal    Plan:    1. Await bx, possibly repeat CS in 3-5 years , based on pathology results    ADDENDUM:  Endo Review :  2/10/2020    Pathology:      -- Diagnosis --   1. COLON POLYP BIOPSY AT 15 CM:  HYPERPLASTIC POLYP. 2.  COLON POLYP BIOPSY AT 16 CM:  HYPERPLASTIC POLYP. 3.  COLON POLYP BIOPSY AT 20 CM:  HYPERPLASTIC POLYP. Plan:    1.   All hyperplastic, would repeat in 5 years

## 2020-01-02 ENCOUNTER — HOSPITAL ENCOUNTER (OUTPATIENT)
Age: 70
Setting detail: OUTPATIENT SURGERY
Discharge: HOME | End: 2020-01-02
Attending: SURGERY | Admitting: SURGERY
Payer: MEDICARE

## 2020-01-02 ENCOUNTER — ANESTHESIA EVENT (OUTPATIENT)
Dept: OPERATING ROOM | Age: 70
End: 2020-01-02
Payer: MEDICARE

## 2020-01-02 ENCOUNTER — ANESTHESIA (OUTPATIENT)
Dept: OPERATING ROOM | Age: 70
End: 2020-01-02
Payer: MEDICARE

## 2020-01-02 VITALS
HEIGHT: 68 IN | RESPIRATION RATE: 16 BRPM | WEIGHT: 246 LBS | OXYGEN SATURATION: 98 % | HEART RATE: 60 BPM | TEMPERATURE: 97.3 F | SYSTOLIC BLOOD PRESSURE: 149 MMHG | BODY MASS INDEX: 37.28 KG/M2 | DIASTOLIC BLOOD PRESSURE: 90 MMHG

## 2020-01-02 VITALS — OXYGEN SATURATION: 96 % | SYSTOLIC BLOOD PRESSURE: 160 MMHG | DIASTOLIC BLOOD PRESSURE: 85 MMHG

## 2020-01-02 PROCEDURE — 3700000000 HC ANESTHESIA ATTENDED CARE: Performed by: SURGERY

## 2020-01-02 PROCEDURE — 2500000003 HC RX 250 WO HCPCS: Performed by: NURSE ANESTHETIST, CERTIFIED REGISTERED

## 2020-01-02 PROCEDURE — 6360000002 HC RX W HCPCS: Performed by: NURSE ANESTHETIST, CERTIFIED REGISTERED

## 2020-01-02 PROCEDURE — 88305 TISSUE EXAM BY PATHOLOGIST: CPT

## 2020-01-02 PROCEDURE — 7100000010 HC PHASE II RECOVERY - FIRST 15 MIN: Performed by: SURGERY

## 2020-01-02 PROCEDURE — 3700000001 HC ADD 15 MINUTES (ANESTHESIA): Performed by: SURGERY

## 2020-01-02 PROCEDURE — 2709999900 HC NON-CHARGEABLE SUPPLY: Performed by: SURGERY

## 2020-01-02 PROCEDURE — 2580000003 HC RX 258: Performed by: SURGERY

## 2020-01-02 PROCEDURE — 45380 COLONOSCOPY AND BIOPSY: CPT | Performed by: SURGERY

## 2020-01-02 PROCEDURE — 7100000011 HC PHASE II RECOVERY - ADDTL 15 MIN: Performed by: SURGERY

## 2020-01-02 PROCEDURE — 3609027000 HC COLONOSCOPY: Performed by: SURGERY

## 2020-01-02 RX ORDER — LIDOCAINE HYDROCHLORIDE 40 MG/ML
INJECTION, SOLUTION RETROBULBAR; TOPICAL PRN
Status: DISCONTINUED | OUTPATIENT
Start: 2020-01-02 | End: 2020-01-02 | Stop reason: SDUPTHER

## 2020-01-02 RX ORDER — PROPOFOL 10 MG/ML
INJECTION, EMULSION INTRAVENOUS PRN
Status: DISCONTINUED | OUTPATIENT
Start: 2020-01-02 | End: 2020-01-02 | Stop reason: SDUPTHER

## 2020-01-02 RX ORDER — SODIUM CHLORIDE, SODIUM LACTATE, POTASSIUM CHLORIDE, CALCIUM CHLORIDE 600; 310; 30; 20 MG/100ML; MG/100ML; MG/100ML; MG/100ML
INJECTION, SOLUTION INTRAVENOUS CONTINUOUS
Status: DISCONTINUED | OUTPATIENT
Start: 2020-01-02 | End: 2020-01-02 | Stop reason: HOSPADM

## 2020-01-02 RX ADMIN — PROPOFOL 50 MG: 10 INJECTION, EMULSION INTRAVENOUS at 10:12

## 2020-01-02 RX ADMIN — PROPOFOL 70 MG: 10 INJECTION, EMULSION INTRAVENOUS at 10:05

## 2020-01-02 RX ADMIN — SODIUM CHLORIDE, POTASSIUM CHLORIDE, SODIUM LACTATE AND CALCIUM CHLORIDE: 600; 310; 30; 20 INJECTION, SOLUTION INTRAVENOUS at 09:45

## 2020-01-02 RX ADMIN — LIDOCAINE HYDROCHLORIDE 60 MG: 40 INJECTION, SOLUTION RETROBULBAR; TOPICAL at 10:04

## 2020-01-02 RX ADMIN — PROPOFOL 50 MG: 10 INJECTION, EMULSION INTRAVENOUS at 10:19

## 2020-01-02 RX ADMIN — PROPOFOL 50 MG: 10 INJECTION, EMULSION INTRAVENOUS at 10:16

## 2020-01-02 RX ADMIN — PROPOFOL 70 MG: 10 INJECTION, EMULSION INTRAVENOUS at 10:08

## 2020-01-02 ASSESSMENT — PAIN - FUNCTIONAL ASSESSMENT: PAIN_FUNCTIONAL_ASSESSMENT: 0-10

## 2020-01-02 ASSESSMENT — PAIN SCALES - GENERAL
PAINLEVEL_OUTOF10: 0

## 2020-01-02 NOTE — ANESTHESIA PRE PROCEDURE
Hypertension I10    Hyperlipidemia E78.5    GERD (gastroesophageal reflux disease) K21.9    Obesity E66.9    Sleep disturbances G47.9    Degenerative joint disease of knee, left M17.12    Asthma J45.909       Past Medical History:        Diagnosis Date    Asthma     Chalazion of right lower eyelid     Colon polyps     Degenerative joint disease of knee, left     Dysmenorrhea     GERD (gastroesophageal reflux disease)     Greater trochanteric bursitis of right hip     Hot flashes     Hyperlipidemia     Hypertension     Obesity     Scaphoid fracture of wrist     Right.  Sleep disturbances     Tobacco abuse     Remote and limited.  Uterine fibroid        Past Surgical History:        Procedure Laterality Date    BREAST REDUCTION SURGERY Bilateral     CARDIAC CATHETERIZATION  06/15/2007    No significant coronary artery disease, preserved LV systolic function.   SECTION      COLONOSCOPY  09/10/2008    Sigmoid diverticulosis, history of polyps.  COLONOSCOPY  2007    Multiple polyps of the colon, rectal polyps, sigmoid polyps, and splenic flexure polyps.  COLONOSCOPY  2006    Pedunculated thin polyp near the transverse colon, small polyp at the dentate line versus a hemorrhoid.  COLONOSCOPY      COLONOSCOPY  2014    Dr. Marya Brown. adenom. x 2, 5yr follow up   [address], TOTAL ABDOMINAL      KNEE ARTHROSCOPY Left 1999    Torn degenerate posterior third medial meniscus.  MOUTH SURGERY Right 5/1/15    OTHER SURGICAL HISTORY Left 2012    Left knee injection for left knee pain.  OTHER SURGICAL HISTORY Right 2006    Bursa injection for greater trochanteric bursitis.     SALPINGO-OOPHORECTOMY Left     TONSILLECTOMY AND ADENOIDECTOMY      TOTAL KNEE ARTHROPLASTY Left        Social History:    Social History     Tobacco Use    Smoking status: Former Smoker     Packs/day: 0.25

## 2020-01-03 LAB — SURGICAL PATHOLOGY REPORT: NORMAL

## 2020-02-10 ENCOUNTER — TELEPHONE (OUTPATIENT)
Dept: SURGERY | Age: 70
End: 2020-02-10

## 2020-02-10 NOTE — LETTER
[address]  Phone: [phone]  Fax: [phone]    Arjun Darnell MD      2/10/2020    Dear An Rivera,      Dr. Ulysses Mcburney reviewed the results of your recent colonoscopy. He removed 3 polyps, all were benign (no cancer). His recommendations are to be scoped again in 5 years, due to your history of polyps and family history of colon cancer. If you have any questions or concerns regarding your test results or our recommendations, please call the office at [phone].       Sincerely,           [facility]

## 2020-02-10 NOTE — TELEPHONE ENCOUNTER
Letter mailed to patient with colonoscopy results and Dr. Queenie Villar recommendations. Results entered in chart history, health maintenance, recall list, and forwarded to PCP.

## 2020-03-19 ENCOUNTER — HOSPITAL ENCOUNTER (OUTPATIENT)
Dept: LAB | Age: 70
Discharge: HOME | End: 2020-03-19
Payer: MEDICARE

## 2020-03-19 LAB
ABSOLUTE EOS #: 0.31 K/UL (ref 0–0.44)
ABSOLUTE IMMATURE GRANULOCYTE: <0.03 K/UL (ref 0–0.3)
ABSOLUTE LYMPH #: 2.59 K/UL (ref 1.1–3.7)
ABSOLUTE MONO #: 0.63 K/UL (ref 0.1–1.2)
ALBUMIN SERPL-MCNC: 4.1 G/DL (ref 3.5–5.2)
ALBUMIN/GLOBULIN RATIO: 1.1 (ref 1–2.5)
ALP BLD-CCNC: 106 U/L (ref 35–104)
ALT SERPL-CCNC: 18 U/L (ref 5–33)
ANION GAP SERPL CALCULATED.3IONS-SCNC: 14 MMOL/L (ref 9–17)
AST SERPL-CCNC: 19 U/L
BASOPHILS # BLD: 1 % (ref 0–2)
BASOPHILS ABSOLUTE: 0.04 K/UL (ref 0–0.2)
BILIRUB SERPL-MCNC: 0.33 MG/DL (ref 0.3–1.2)
BUN BLDV-MCNC: 16 MG/DL (ref 8–23)
BUN/CREAT BLD: 18 (ref 9–20)
CALCIUM SERPL-MCNC: 9.2 MG/DL (ref 8.6–10.4)
CHLORIDE BLD-SCNC: 103 MMOL/L (ref 98–107)
CHOLESTEROL/HDL RATIO: 3.4
CHOLESTEROL: 193 MG/DL
CO2: 27 MMOL/L (ref 20–31)
CREAT SERPL-MCNC: 0.88 MG/DL (ref 0.5–0.9)
DIFFERENTIAL TYPE: ABNORMAL
EOSINOPHILS RELATIVE PERCENT: 5 % (ref 1–4)
GFR AFRICAN AMERICAN: >60 ML/MIN
GFR NON-AFRICAN AMERICAN: >60 ML/MIN
GFR SERPL CREATININE-BSD FRML MDRD: ABNORMAL ML/MIN/{1.73_M2}
GFR SERPL CREATININE-BSD FRML MDRD: ABNORMAL ML/MIN/{1.73_M2}
GLUCOSE BLD-MCNC: 110 MG/DL (ref 70–99)
HCT VFR BLD CALC: 41.2 % (ref 36.3–47.1)
HDLC SERPL-MCNC: 56 MG/DL
HEMOGLOBIN: 13.1 G/DL (ref 11.9–15.1)
IMMATURE GRANULOCYTES: 0 %
LDL CHOLESTEROL: 118 MG/DL (ref 0–130)
LYMPHOCYTES # BLD: 38 % (ref 24–43)
MCH RBC QN AUTO: 26.5 PG (ref 25.2–33.5)
MCHC RBC AUTO-ENTMCNC: 31.8 G/DL (ref 25.2–33.5)
MCV RBC AUTO: 83.2 FL (ref 82.6–102.9)
MONOCYTES # BLD: 9 % (ref 3–12)
NRBC AUTOMATED: 0 PER 100 WBC
PDW BLD-RTO: 13.6 % (ref 11.8–14.4)
PLATELET # BLD: 355 K/UL (ref 138–453)
PLATELET ESTIMATE: ABNORMAL
PMV BLD AUTO: 9.2 FL (ref 8.1–13.5)
POTASSIUM SERPL-SCNC: 3.8 MMOL/L (ref 3.7–5.3)
RBC # BLD: 4.95 M/UL (ref 3.95–5.11)
RBC # BLD: ABNORMAL 10*6/UL
SEG NEUTROPHILS: 48 % (ref 36–65)
SEGMENTED NEUTROPHILS ABSOLUTE COUNT: 3.27 K/UL (ref 1.5–8.1)
SODIUM BLD-SCNC: 144 MMOL/L (ref 135–144)
TOTAL PROTEIN: 8 G/DL (ref 6.4–8.3)
TRIGL SERPL-MCNC: 95 MG/DL
VLDLC SERPL CALC-MCNC: NORMAL MG/DL (ref 1–30)
WBC # BLD: 6.9 K/UL (ref 3.5–11.3)
WBC # BLD: ABNORMAL 10*3/UL

## 2020-03-19 PROCEDURE — 36415 COLL VENOUS BLD VENIPUNCTURE: CPT

## 2020-03-19 PROCEDURE — 80053 COMPREHEN METABOLIC PANEL: CPT

## 2020-03-19 PROCEDURE — 80061 LIPID PANEL: CPT

## 2020-03-19 PROCEDURE — 85025 COMPLETE CBC W/AUTO DIFF WBC: CPT

## 2020-03-19 RX ORDER — MELOXICAM 15 MG/1
TABLET ORAL
Qty: 90 TABLET | Refills: 3 | Status: SHIPPED | OUTPATIENT
Start: 2020-03-19 | End: 2020-03-26 | Stop reason: SDUPTHER

## 2020-03-19 RX ORDER — HYDROCHLOROTHIAZIDE 25 MG/1
TABLET ORAL
Qty: 90 TABLET | Refills: 3 | Status: SHIPPED | OUTPATIENT
Start: 2020-03-19 | End: 2020-03-26 | Stop reason: SDUPTHER

## 2020-03-19 NOTE — TELEPHONE ENCOUNTER
Allen Peng called requesting a refill of the below medication which has been pended for you:     Requested Prescriptions     Pending Prescriptions Disp Refills    hydroCHLOROthiazide (HYDRODIURIL) 25 MG tablet [Pharmacy Med Name: HYDROCHLOROTHIAZIDE TAB 25MG] 90 tablet 3     Sig: TAKE 1 TABLET DAILY    meloxicam (MOBIC) 15 MG tablet [Pharmacy Med Name: MELOXICAM TABS 15MG] 90 tablet 3     Sig: TAKE 1 TABLET DAILY       Last Appointment Date: 10/16/2019  Next Appointment Date: 3/26/2020    Allergies   Allergen Reactions    Minocin [Minocycline] Nausea Only and Other (See Comments)     Dizzy    Oxycodone Nausea Only

## 2020-03-25 PROBLEM — K21.9 GERD (GASTROESOPHAGEAL REFLUX DISEASE): Status: RESOLVED | Noted: 2020-03-25 | Resolved: 2020-03-24

## 2020-03-26 ENCOUNTER — OFFICE VISIT (OUTPATIENT)
Dept: FAMILY MEDICINE CLINIC | Age: 70
End: 2020-03-26
Payer: MEDICARE

## 2020-03-26 VITALS
HEART RATE: 67 BPM | DIASTOLIC BLOOD PRESSURE: 80 MMHG | TEMPERATURE: 98 F | HEIGHT: 68 IN | WEIGHT: 255 LBS | BODY MASS INDEX: 38.65 KG/M2 | SYSTOLIC BLOOD PRESSURE: 130 MMHG | OXYGEN SATURATION: 97 %

## 2020-03-26 PROCEDURE — 1123F ACP DISCUSS/DSCN MKR DOCD: CPT | Performed by: FAMILY MEDICINE

## 2020-03-26 PROCEDURE — 3017F COLORECTAL CA SCREEN DOC REV: CPT | Performed by: FAMILY MEDICINE

## 2020-03-26 PROCEDURE — 4040F PNEUMOC VAC/ADMIN/RCVD: CPT | Performed by: FAMILY MEDICINE

## 2020-03-26 PROCEDURE — 1036F TOBACCO NON-USER: CPT | Performed by: FAMILY MEDICINE

## 2020-03-26 PROCEDURE — G8399 PT W/DXA RESULTS DOCUMENT: HCPCS | Performed by: FAMILY MEDICINE

## 2020-03-26 PROCEDURE — 99213 OFFICE O/P EST LOW 20 MIN: CPT

## 2020-03-26 PROCEDURE — 1090F PRES/ABSN URINE INCON ASSESS: CPT | Performed by: FAMILY MEDICINE

## 2020-03-26 PROCEDURE — G8427 DOCREV CUR MEDS BY ELIG CLIN: HCPCS | Performed by: FAMILY MEDICINE

## 2020-03-26 PROCEDURE — G8482 FLU IMMUNIZE ORDER/ADMIN: HCPCS | Performed by: FAMILY MEDICINE

## 2020-03-26 PROCEDURE — G8417 CALC BMI ABV UP PARAM F/U: HCPCS | Performed by: FAMILY MEDICINE

## 2020-03-26 PROCEDURE — 99214 OFFICE O/P EST MOD 30 MIN: CPT | Performed by: FAMILY MEDICINE

## 2020-03-26 RX ORDER — HYDROCHLOROTHIAZIDE 25 MG/1
TABLET ORAL
Qty: 90 TABLET | Refills: 3 | Status: SHIPPED | OUTPATIENT
Start: 2020-03-26 | End: 2021-03-15

## 2020-03-26 RX ORDER — MELOXICAM 15 MG/1
TABLET ORAL
Qty: 90 TABLET | Refills: 3 | Status: SHIPPED | OUTPATIENT
Start: 2020-03-26 | End: 2021-06-14

## 2020-03-26 ASSESSMENT — ENCOUNTER SYMPTOMS
RESPIRATORY NEGATIVE: 1
ALLERGIC/IMMUNOLOGIC NEGATIVE: 1
BACK PAIN: 1
EYES NEGATIVE: 1
GASTROINTESTINAL NEGATIVE: 1

## 2020-03-26 ASSESSMENT — PATIENT HEALTH QUESTIONNAIRE - PHQ9
SUM OF ALL RESPONSES TO PHQ QUESTIONS 1-9: 0
SUM OF ALL RESPONSES TO PHQ QUESTIONS 1-9: 0
2. FEELING DOWN, DEPRESSED OR HOPELESS: 0
SUM OF ALL RESPONSES TO PHQ9 QUESTIONS 1 & 2: 0
1. LITTLE INTEREST OR PLEASURE IN DOING THINGS: 0

## 2020-03-26 NOTE — PROGRESS NOTES
Subjective:      Patient ID: Ariadna Whyte is a 77 y.o. female. Hypertension     Back Pain     Groin Pain   Associated symptoms include back pain. Other   Pertinent negatives include no arthralgias (nothing particularly problematic at present in joints. ). Gastroesophageal Reflux        Routine  follow up on chronic medical conditions colon polyps, htn, hyperlipidemia, gerd, obesity, insomnia, oa , refills, and review of updated labs. She had right and left tka per Dr. Felipe Donis at [facility].  She is a Jehovah-witness and wanted to pursue the [address] they have at [facility], where she had her prior knee replacement done. Both knees doing well at present. She is back to work after retiring from [employer]. 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 She lost wt. Down to 235, but gained wt. Back. Up and down by report. Currently back up 20 lbs. Knee pain is better. Not exercising as much. bp under fair control. Compliant with medications. Sleep doing well, up to 5-6 hours/night. No gerd symptoms to report. Hot flashes periodically, ongoing. No asthma to report. Left trochanteric bursitis improved/resolved after injection and remains quiescent. Groin pain also resolved. Her lower back pain has resolved with therapy for the most part. She has stretches and exercises to use for exacerbations, which seems to be working consistently at present. No asthma concerns. No injuries or illnesses to report.          Past Medical History:   Diagnosis Date    Asthma     Chalazion of right lower eyelid 2014    Colon polyps     Degenerative joint disease of knee, left     Dysmenorrhea     GERD (gastroesophageal reflux disease)     Greater trochanteric bursitis of right hip     Hot flashes     Hyperlipidemia     Hypertension     Obesity     Scaphoid fracture of wrist 0.625 MG tablet Take 1 tablet by mouth daily (Patient not taking: Reported on 11/18/2019) 21 tablet 3    ondansetron (ZOFRAN ODT) 4 MG disintegrating tablet Take 1 tablet by mouth every 8 hours as needed for Nausea (Patient not taking: Reported on 11/18/2019) 12 tablet 0     No current facility-administered medications for this visit. Allergies   Allergen Reactions    Minocin [Minocycline] Nausea Only and Other (See Comments)     Dizzy    Oxycodone Nausea Only       Review of Systems   Constitutional: Negative. HENT: Negative. Eyes: Negative. Respiratory: Negative. Cardiovascular: Negative. Gastrointestinal: Negative. Endocrine: Negative. Genitourinary: Negative. Musculoskeletal: Positive for back pain. Negative for arthralgias (nothing particularly problematic at present in joints. ). Skin: Negative. Allergic/Immunologic: Negative. Neurological: Negative. Hematological: Negative. Psychiatric/Behavioral: Negative. Objective:   Physical Exam  Constitutional:       General: She is not in acute distress. Appearance: She is well-developed. HENT:      Head: Normocephalic and atraumatic. Right Ear: External ear normal.      Left Ear: External ear normal.      Mouth/Throat:      Pharynx: No oropharyngeal exudate. Eyes:      General: No scleral icterus. Conjunctiva/sclera: Conjunctivae normal.   Neck:      Musculoskeletal: Neck supple. Thyroid: No thyromegaly. Cardiovascular:      Rate and Rhythm: Normal rate and regular rhythm. Heart sounds: Normal heart sounds. No murmur. Pulmonary:      Effort: Pulmonary effort is normal. No respiratory distress. Breath sounds: Normal breath sounds. No wheezing. Abdominal:      General: Bowel sounds are normal. There is no distension. Palpations: Abdomen is soft. Tenderness: There is no abdominal tenderness. There is no rebound. Musculoskeletal: Normal range of motion.       Lumbar

## 2020-08-12 ENCOUNTER — OFFICE VISIT (OUTPATIENT)
Dept: PRIMARY CARE CLINIC | Age: 70
End: 2020-08-12
Payer: MEDICARE

## 2020-08-12 ENCOUNTER — HOSPITAL ENCOUNTER (OUTPATIENT)
Age: 70
Setting detail: SPECIMEN
Discharge: HOME | End: 2020-08-12
Payer: MEDICARE

## 2020-08-12 VITALS
TEMPERATURE: 98 F | BODY MASS INDEX: 37.89 KG/M2 | OXYGEN SATURATION: 97 % | HEIGHT: 68 IN | HEART RATE: 88 BPM | SYSTOLIC BLOOD PRESSURE: 128 MMHG | WEIGHT: 250 LBS | RESPIRATION RATE: 16 BRPM | DIASTOLIC BLOOD PRESSURE: 78 MMHG

## 2020-08-12 PROCEDURE — 1090F PRES/ABSN URINE INCON ASSESS: CPT | Performed by: PHYSICIAN ASSISTANT

## 2020-08-12 PROCEDURE — 3017F COLORECTAL CA SCREEN DOC REV: CPT | Performed by: PHYSICIAN ASSISTANT

## 2020-08-12 PROCEDURE — G8417 CALC BMI ABV UP PARAM F/U: HCPCS | Performed by: PHYSICIAN ASSISTANT

## 2020-08-12 PROCEDURE — 1036F TOBACCO NON-USER: CPT | Performed by: PHYSICIAN ASSISTANT

## 2020-08-12 PROCEDURE — G8399 PT W/DXA RESULTS DOCUMENT: HCPCS | Performed by: PHYSICIAN ASSISTANT

## 2020-08-12 PROCEDURE — 1123F ACP DISCUSS/DSCN MKR DOCD: CPT | Performed by: PHYSICIAN ASSISTANT

## 2020-08-12 PROCEDURE — 99212 OFFICE O/P EST SF 10 MIN: CPT

## 2020-08-12 PROCEDURE — 4040F PNEUMOC VAC/ADMIN/RCVD: CPT | Performed by: PHYSICIAN ASSISTANT

## 2020-08-12 PROCEDURE — U0003 INFECTIOUS AGENT DETECTION BY NUCLEIC ACID (DNA OR RNA); SEVERE ACUTE RESPIRATORY SYNDROME CORONAVIRUS 2 (SARS-COV-2) (CORONAVIRUS DISEASE [COVID-19]), AMPLIFIED PROBE TECHNIQUE, MAKING USE OF HIGH THROUGHPUT TECHNOLOGIES AS DESCRIBED BY CMS-2020-01-R: HCPCS

## 2020-08-12 PROCEDURE — G8427 DOCREV CUR MEDS BY ELIG CLIN: HCPCS | Performed by: PHYSICIAN ASSISTANT

## 2020-08-12 PROCEDURE — 99213 OFFICE O/P EST LOW 20 MIN: CPT | Performed by: PHYSICIAN ASSISTANT

## 2020-08-12 RX ORDER — SUCRALFATE 1 G/1
1 TABLET ORAL 4 TIMES DAILY
Qty: 120 TABLET | Refills: 3 | Status: SHIPPED | OUTPATIENT
Start: 2020-08-12 | End: 2021-03-10

## 2020-08-12 ASSESSMENT — ENCOUNTER SYMPTOMS
NAUSEA: 1
VOMITING: 0
BELCHING: 0
SHORTNESS OF BREATH: 0
DIARRHEA: 0
COUGH: 1
ABDOMINAL PAIN: 1

## 2020-08-12 NOTE — PROGRESS NOTES
Subjective:      Patient ID: Neela Cortez is a 77 y.o. female. Abdominal Pain   This is a new problem. The current episode started 1 to 4 weeks ago (x 1 month). The pain is located in the epigastric region. The abdominal pain does not radiate. Associated symptoms include nausea. Pertinent negatives include no belching, diarrhea, fever or vomiting. Treatments tried: Omeprazole. URI    This is a new problem. Associated symptoms include abdominal pain, coughing, nausea and sneezing (x 5 days). Pertinent negatives include no congestion, diarrhea or vomiting. Review of Systems   Constitutional: Negative for fever. HENT: Positive for postnasal drip and sneezing (x 5 days). Negative for congestion. Loss of taste   Respiratory: Positive for cough. Negative for shortness of breath. Gastrointestinal: Positive for abdominal pain and nausea. Negative for diarrhea and vomiting. Recent Travel Screening and Travel History documentation:     Travel Screening     Question   Response    In the last month, have you been in contact with someone who was confirmed or suspected to have Coronavirus / COVID-19? No / Unsure    Do you have any of the following symptoms? Cough; Loss of taste; Severe headache; Abdominal pain    Have you traveled internationally in the last month? No      Travel History   Travel since 07/12/20     No documented travel since 07/12/20           Objective:   Physical Exam  HENT:      Head: Normocephalic. Right Ear: Tympanic membrane normal.      Left Ear: Tympanic membrane normal.      Nose: Rhinorrhea present. Eyes:      Pupils: Pupils are equal, round, and reactive to light. Neck:      Musculoskeletal: Neck supple. Cardiovascular:      Rate and Rhythm: Normal rate. Pulmonary:      Effort: Pulmonary effort is normal.   Abdominal:      General: Abdomen is flat. Tenderness: There is abdominal tenderness (epigastrium). Skin:     General: Skin is warm.    Neurological: General: No focal deficit present. Mental Status: She is alert and oriented to person, place, and time. Psychiatric:         Mood and Affect: Mood normal.         Assessment:      1. Loss of taste    2. Person under investigation for COVID-19    3. Epigastric pain          Plan:      COVID testing obtained. Supportive care. Patient advised to self-quarantine until test results. Push fluids. BRATTY diet. Carafate ac tid and QHS. Schedule gallbladder ultrasound after COVID results. Follow-up with PCP.         SONJA Tadeo

## 2020-08-14 LAB — SARS-COV-2, NAA: DETECTED

## 2020-08-15 ENCOUNTER — TELEPHONE (OUTPATIENT)
Dept: PRIMARY CARE CLINIC | Age: 70
End: 2020-08-15

## 2020-09-14 ENCOUNTER — HOSPITAL ENCOUNTER (OUTPATIENT)
Dept: ULTRASOUND IMAGING | Age: 70
Discharge: HOME | End: 2020-09-16
Payer: MEDICARE

## 2020-09-14 PROCEDURE — 76705 ECHO EXAM OF ABDOMEN: CPT

## 2020-09-21 ENCOUNTER — HOSPITAL ENCOUNTER (OUTPATIENT)
Dept: LAB | Age: 70
Discharge: HOME | End: 2020-09-21
Payer: MEDICARE

## 2020-09-21 LAB
ABSOLUTE EOS #: 0.26 K/UL (ref 0–0.44)
ABSOLUTE IMMATURE GRANULOCYTE: <0.03 K/UL (ref 0–0.3)
ABSOLUTE LYMPH #: 3.25 K/UL (ref 1.1–3.7)
ABSOLUTE MONO #: 0.59 K/UL (ref 0.1–1.2)
ALBUMIN SERPL-MCNC: 4 G/DL (ref 3.5–5.2)
ALBUMIN/GLOBULIN RATIO: 1 (ref 1–2.5)
ALP BLD-CCNC: 95 U/L (ref 35–104)
ALT SERPL-CCNC: 15 U/L (ref 5–33)
ANION GAP SERPL CALCULATED.3IONS-SCNC: 10 MMOL/L (ref 9–17)
AST SERPL-CCNC: 14 U/L
BASOPHILS # BLD: 1 % (ref 0–2)
BASOPHILS ABSOLUTE: 0.04 K/UL (ref 0–0.2)
BILIRUB SERPL-MCNC: 0.48 MG/DL (ref 0.3–1.2)
BUN BLDV-MCNC: 14 MG/DL (ref 8–23)
BUN/CREAT BLD: 13 (ref 9–20)
CALCIUM SERPL-MCNC: 9.4 MG/DL (ref 8.6–10.4)
CHLORIDE BLD-SCNC: 104 MMOL/L (ref 98–107)
CHOLESTEROL/HDL RATIO: 4.1
CHOLESTEROL: 204 MG/DL
CO2: 27 MMOL/L (ref 20–31)
CREAT SERPL-MCNC: 1.04 MG/DL (ref 0.5–0.9)
DIFFERENTIAL TYPE: NORMAL
EOSINOPHILS RELATIVE PERCENT: 4 % (ref 1–4)
GFR AFRICAN AMERICAN: >60 ML/MIN
GFR NON-AFRICAN AMERICAN: 53 ML/MIN
GFR SERPL CREATININE-BSD FRML MDRD: ABNORMAL ML/MIN/{1.73_M2}
GFR SERPL CREATININE-BSD FRML MDRD: ABNORMAL ML/MIN/{1.73_M2}
GLUCOSE BLD-MCNC: 117 MG/DL (ref 70–99)
HCT VFR BLD CALC: 40.9 % (ref 36.3–47.1)
HDLC SERPL-MCNC: 50 MG/DL
HEMOGLOBIN: 13 G/DL (ref 11.9–15.1)
IMMATURE GRANULOCYTES: 0 %
LDL CHOLESTEROL: 127 MG/DL (ref 0–130)
LYMPHOCYTES # BLD: 43 % (ref 24–43)
MCH RBC QN AUTO: 26.4 PG (ref 25.2–33.5)
MCHC RBC AUTO-ENTMCNC: 31.8 G/DL (ref 25.2–33.5)
MCV RBC AUTO: 83 FL (ref 82.6–102.9)
MONOCYTES # BLD: 8 % (ref 3–12)
NRBC AUTOMATED: 0 PER 100 WBC
PDW BLD-RTO: 14.4 % (ref 11.8–14.4)
PLATELET # BLD: 355 K/UL (ref 138–453)
PLATELET ESTIMATE: NORMAL
PMV BLD AUTO: 8.7 FL (ref 8.1–13.5)
POTASSIUM SERPL-SCNC: 4 MMOL/L (ref 3.7–5.3)
RBC # BLD: 4.93 M/UL (ref 3.95–5.11)
RBC # BLD: NORMAL 10*6/UL
SEG NEUTROPHILS: 44 % (ref 36–65)
SEGMENTED NEUTROPHILS ABSOLUTE COUNT: 3.33 K/UL (ref 1.5–8.1)
SODIUM BLD-SCNC: 141 MMOL/L (ref 135–144)
TOTAL PROTEIN: 8.1 G/DL (ref 6.4–8.3)
TRIGL SERPL-MCNC: 135 MG/DL
VLDLC SERPL CALC-MCNC: ABNORMAL MG/DL (ref 1–30)
WBC # BLD: 7.5 K/UL (ref 3.5–11.3)
WBC # BLD: NORMAL 10*3/UL

## 2020-09-21 PROCEDURE — 80053 COMPREHEN METABOLIC PANEL: CPT

## 2020-09-21 PROCEDURE — 80061 LIPID PANEL: CPT

## 2020-09-21 PROCEDURE — 36415 COLL VENOUS BLD VENIPUNCTURE: CPT

## 2020-09-21 PROCEDURE — 85025 COMPLETE CBC W/AUTO DIFF WBC: CPT

## 2020-09-28 ENCOUNTER — OFFICE VISIT (OUTPATIENT)
Dept: FAMILY MEDICINE CLINIC | Age: 70
End: 2020-09-28
Payer: MEDICARE

## 2020-09-28 VITALS
OXYGEN SATURATION: 97 % | BODY MASS INDEX: 37.74 KG/M2 | DIASTOLIC BLOOD PRESSURE: 76 MMHG | TEMPERATURE: 97.2 F | SYSTOLIC BLOOD PRESSURE: 112 MMHG | HEART RATE: 62 BPM | HEIGHT: 68 IN | WEIGHT: 249 LBS

## 2020-09-28 PROCEDURE — 90694 VACC AIIV4 NO PRSRV 0.5ML IM: CPT | Performed by: FAMILY MEDICINE

## 2020-09-28 PROCEDURE — 3017F COLORECTAL CA SCREEN DOC REV: CPT | Performed by: FAMILY MEDICINE

## 2020-09-28 PROCEDURE — 1036F TOBACCO NON-USER: CPT | Performed by: FAMILY MEDICINE

## 2020-09-28 PROCEDURE — 99212 OFFICE O/P EST SF 10 MIN: CPT

## 2020-09-28 PROCEDURE — G8399 PT W/DXA RESULTS DOCUMENT: HCPCS | Performed by: FAMILY MEDICINE

## 2020-09-28 PROCEDURE — 4040F PNEUMOC VAC/ADMIN/RCVD: CPT | Performed by: FAMILY MEDICINE

## 2020-09-28 PROCEDURE — 1123F ACP DISCUSS/DSCN MKR DOCD: CPT | Performed by: FAMILY MEDICINE

## 2020-09-28 PROCEDURE — G0008 ADMIN INFLUENZA VIRUS VAC: HCPCS | Performed by: FAMILY MEDICINE

## 2020-09-28 PROCEDURE — 1090F PRES/ABSN URINE INCON ASSESS: CPT | Performed by: FAMILY MEDICINE

## 2020-09-28 PROCEDURE — 99214 OFFICE O/P EST MOD 30 MIN: CPT | Performed by: FAMILY MEDICINE

## 2020-09-28 PROCEDURE — G8427 DOCREV CUR MEDS BY ELIG CLIN: HCPCS | Performed by: FAMILY MEDICINE

## 2020-09-28 PROCEDURE — G8417 CALC BMI ABV UP PARAM F/U: HCPCS | Performed by: FAMILY MEDICINE

## 2020-09-28 ASSESSMENT — ENCOUNTER SYMPTOMS
RESPIRATORY NEGATIVE: 1
EYES NEGATIVE: 1
GASTROINTESTINAL NEGATIVE: 1
BACK PAIN: 0
ALLERGIC/IMMUNOLOGIC NEGATIVE: 1

## 2020-09-28 NOTE — PROGRESS NOTES
Have you had an allergic reaction to the flu (influenza) shot? no  Are you allergic to eggs or any component of the flu vaccine? no  Do you have a history of Guillain-[place] Syndrome (GBS), which is paralysis after receiving the flu vaccine? no  Are you feeling well today? Yes  Flu vaccine given as ordered. Patient tolerated it well. No questions re: VIS information.

## 2020-09-28 NOTE — PROGRESS NOTES
Subjective:      Patient ID: Bianca Lorenzo is a 72 y.o. female. Hypertension     Other   Pertinent negatives include no arthralgias (nothing particularly problematic at present in joints. rare groin/hip discomfort. ). Gastroesophageal Reflux     Back Pain     Groin Pain   Pertinent negatives include no back pain. Hyperlipidemia        Routine  follow up on chronic medical conditions colon polyps, htn, hyperlipidemia, gerd, obesity, insomnia, oa , refills, and review of updated labs. She had covid positive test .  She reports heartburn symptoms. She came out to urgent care and had some loss of taste and smell on review of symptoms. Some sneezing at the time. [facility] testing come back positive. No fever, no diarrhea, no breathing issues. Low grade fever a couple of days. Extreme fatigue. Symptoms resolved. She had right and left tka per Dr. Rashida Malone at [facility].  She is a Jehovah-witness and wanted to pursue the [address] they have at [facility], where she had her prior knee replacement done. Both knees doing well at present. She is back to work after retiring from [employer]. 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 She lost wt. Down to 235, but gained wt. Back. Up and down by report. Knee pain is better. Not exercising as much. bp under fair control. Compliant with medications. Sleep doing well, up to 5-6 hours/night. No gerd symptoms to report. Hot flashes periodically, ongoing. No asthma to report. Left trochanteric bursitis improved/resolved after injection and remains quiescent. Groin pain also resolved. Her lower back pain has resolved with therapy for the most part. She has stretches and exercises to use for exacerbations, which seems to be working consistently at present. No asthma concerns.      No injuries or illnesses to report outside of interval covid 19 infection. Past Medical History:   Diagnosis Date    Asthma     Chalazion of right lower eyelid     Colon polyps     Degenerative joint disease of knee, left     Dysmenorrhea     GERD (gastroesophageal reflux disease)     Greater trochanteric bursitis of right hip     Hot flashes     Hyperlipidemia     Hypertension     Obesity     Scaphoid fracture of wrist     Right.  Sleep disturbances     Tobacco abuse     Remote and limited.  Uterine fibroid      Past Surgical History:   Procedure Laterality Date    BREAST REDUCTION SURGERY Bilateral     CARDIAC CATHETERIZATION  06/15/2007    No significant coronary artery disease, preserved LV systolic function.   SECTION      COLONOSCOPY  09/10/2008    Sigmoid diverticulosis, history of polyps.  COLONOSCOPY  2007    Multiple polyps of the colon, rectal polyps, sigmoid polyps, and splenic flexure polyps.  COLONOSCOPY  2006    Pedunculated thin polyp near the transverse colon, small polyp at the dentate line versus a hemorrhoid.  COLONOSCOPY      COLONOSCOPY  2014    Dr. Georges Habermann. adenom. x 2, 5yr follow up   [facility] COLONOSCOPY BIOPSY/STOMA N/A 2020    hyperplastic polyp X 3, Dr. Francia Wright, TOTAL ABDOMINAL      KNEE ARTHROSCOPY Left 1999    Torn degenerate posterior third medial meniscus.  MOUTH SURGERY Right 5/1/15    OTHER SURGICAL HISTORY Left 2012    Left knee injection for left knee pain.  OTHER SURGICAL HISTORY Right 2006    Bursa injection for greater trochanteric bursitis.     SALPINGO-OOPHORECTOMY Left     TONSILLECTOMY AND ADENOIDECTOMY      TOTAL KNEE ARTHROPLASTY Left      Current Outpatient Medications   Medication Sig Dispense Refill    NONFORMULARY 1 tablet daily Flash ease      OMEPRAZOLE PO Take by mouth      hydroCHLOROthiazide (HYDRODIURIL) 25 MG tablet TAKE 1 TABLET DAILY 90 tablet 3    meloxicam (MOBIC) 15 MG tablet TAKE 1 TABLET DAILY 90 tablet 3    albuterol sulfate  (90 Base) MCG/ACT inhaler Inhale 2 puffs into the lungs every 6 hours as needed for Wheezing or Shortness of Breath 1 Inhaler 0    BLACK COHOSH EXTRACT PO Take 1 capsule by mouth daily      sucralfate (CARAFATE) 1 GM tablet Take 1 tablet by mouth 4 times daily (Patient not taking: Reported on 9/28/2020) 120 tablet 3    estradiol (ESTRACE) 1 MG tablet TAKE 1 TABLET BY MOUTH ONCE DAILY (Patient not taking: Reported on 3/26/2020) 90 tablet 3    traMADol (ULTRAM) 50 MG tablet Take 50 mg by mouth.  estrogens, conjugated, (PREMARIN) 0.625 MG tablet Take 1 tablet by mouth daily (Patient not taking: Reported on 11/18/2019) 21 tablet 3    ondansetron (ZOFRAN ODT) 4 MG disintegrating tablet Take 1 tablet by mouth every 8 hours as needed for Nausea (Patient not taking: Reported on 11/18/2019) 12 tablet 0     No current facility-administered medications for this visit. Allergies   Allergen Reactions    Minocin [Minocycline] Nausea Only and Other (See Comments)     Dizzy    Oxycodone Nausea Only       Review of Systems   Constitutional: Negative. HENT: Negative. Eyes: Negative. Respiratory: Negative. Cardiovascular: Negative. Gastrointestinal: Negative. Endocrine: Negative. Genitourinary: Negative. Musculoskeletal: Negative for arthralgias (nothing particularly problematic at present in joints. rare groin/hip discomfort. ) and back pain. Skin: Negative. Allergic/Immunologic: Negative. Neurological: Negative. Hematological: Negative. Psychiatric/Behavioral: Negative. Objective:   Physical Exam  Constitutional:       General: She is not in acute distress. Appearance: She is well-developed. HENT:      Head: Normocephalic and atraumatic.       Right Ear: External ear normal.      Left Ear: External ear normal.      Mouth/Throat: Pharynx: No oropharyngeal exudate. Eyes:      General: No scleral icterus. Conjunctiva/sclera: Conjunctivae normal.   Neck:      Musculoskeletal: Neck supple. Thyroid: No thyromegaly. Cardiovascular:      Rate and Rhythm: Normal rate and regular rhythm. Heart sounds: Normal heart sounds. No murmur. Pulmonary:      Effort: Pulmonary effort is normal. No respiratory distress. Breath sounds: Normal breath sounds. No wheezing. Abdominal:      General: Bowel sounds are normal. There is no distension. Palpations: Abdomen is soft. Tenderness: There is no abdominal tenderness. There is no rebound. Musculoskeletal: Normal range of motion. Lumbar back: She exhibits normal range of motion and no tenderness. Skin:     General: Skin is warm and dry. Findings: No erythema or rash. Neurological:      Mental Status: She is alert and oriented to person, place, and time. Psychiatric:         Behavior: Behavior normal.         Thought Content:  Thought content normal.         Judgment: Judgment normal.       /76 (Site: Right Upper Arm, Position: Sitting, Cuff Size: Large Adult)   Pulse 62   Temp 97.2 °F (36.2 °C) (Tympanic)   Ht 5' 8\" (1.727 m)   Wt 249 lb (112.9 kg)   LMP  (LMP Unknown)   SpO2 97%   BMI 37.86 kg/m²      Hospital Outpatient Visit on 09/21/2020   Component Date Value Ref Range Status    WBC 09/21/2020 7.5  3.5 - 11.3 k/uL Final    RBC 09/21/2020 4.93  3.95 - 5.11 m/uL Final    Hemoglobin 09/21/2020 13.0  11.9 - 15.1 g/dL Final    Hematocrit 09/21/2020 40.9  36.3 - 47.1 % Final    MCV 09/21/2020 83.0  82.6 - 102.9 fL Final    MCH 09/21/2020 26.4  25.2 - 33.5 pg Final    MCHC 09/21/2020 31.8  25.2 - 33.5 g/dL Final    RDW 09/21/2020 14.4  11.8 - 14.4 % Final    Platelets 68/80/2573 355  138 - 453 k/uL Final    MPV 09/21/2020 8.7  8.1 - 13.5 fL Final    NRBC Automated 09/21/2020 0.0  0.0 per 100 WBC Final    Differential Type 09/21/2020 NOT REPORTED   Final    Seg Neutrophils 09/21/2020 44  36 - 65 % Final    Lymphocytes 09/21/2020 43  24 - 43 % Final    Monocytes 09/21/2020 8  3 - 12 % Final    Eosinophils % 09/21/2020 4  1 - 4 % Final    Basophils 09/21/2020 1  0 - 2 % Final    Immature Granulocytes 09/21/2020 0  0 % Final    Segs Absolute 09/21/2020 3.33  1.50 - 8.10 k/uL Final    Absolute Lymph # 09/21/2020 3.25  1.10 - 3.70 k/uL Final    Absolute Mono # 09/21/2020 0.59  0.10 - 1.20 k/uL Final    Absolute Eos # 09/21/2020 0.26  0.00 - 0.44 k/uL Final    Basophils Absolute 09/21/2020 0.04  0.00 - 0.20 k/uL Final    Absolute Immature Granulocyte 09/21/2020 <0.03  0.00 - 0.30 k/uL Final    WBC Morphology 09/21/2020 NOT REPORTED   Final    RBC Morphology 09/21/2020 NOT REPORTED   Final    Platelet Estimate 44/30/5923 NOT REPORTED   Final    Glucose 09/21/2020 117* 70 - 99 mg/dL Final    BUN 09/21/2020 14  8 - 23 mg/dL Final    CREATININE 09/21/2020 1.04* 0.50 - 0.90 mg/dL Final    Bun/Cre Ratio 09/21/2020 13  9 - 20 Final    Calcium 09/21/2020 9.4  8.6 - 10.4 mg/dL Final    Sodium 09/21/2020 141  135 - 144 mmol/L Final    Potassium 09/21/2020 4.0  3.7 - 5.3 mmol/L Final    Chloride 09/21/2020 104  98 - 107 mmol/L Final    CO2 09/21/2020 27  20 - 31 mmol/L Final    Anion Gap 09/21/2020 10  9 - 17 mmol/L Final    Alkaline Phosphatase 09/21/2020 95  35 - 104 U/L Final    ALT 09/21/2020 15  5 - 33 U/L Final    AST 09/21/2020 14  <32 U/L Final    Total Bilirubin 09/21/2020 0.48  0.3 - 1.2 mg/dL Final    Total Protein 09/21/2020 8.1  6.4 - 8.3 g/dL Final    Alb 09/21/2020 4.0  3.5 - 5.2 g/dL Final    Albumin/Globulin Ratio 09/21/2020 1.0  1.0 - 2.5 Final    GFR Non- 09/21/2020 53* >60 mL/min Final    GFR  09/21/2020 >60  >60 mL/min Final    GFR Comment 09/21/2020        Final    GFR Staging 09/21/2020 NOT REPORTED   Final    Cholesterol 09/21/2020 204* <200 mg/dL Final    HDL 09/21/2020 50 >40 mg/dL Final    LDL Cholesterol 09/21/2020 127  0 - 130 mg/dL Final    Chol/HDL Ratio 09/21/2020 4.1  <5 Final    Triglycerides 09/21/2020 135  <150 mg/dL Final    VLDL 09/21/2020 NOT REPORTED  1 - 30 mg/dL Final         Assessment:      Encounter Diagnoses   Name Primary?  Encounter for screening mammogram for breast cancer Yes    Need for influenza vaccination     Adenomatous polyp of descending colon     Postmenopausal HRT (hormone replacement therapy)     Essential hypertension     Pure hypercholesterolemia     Gastroesophageal reflux disease without esophagitis     Non morbid obesity due to excess calories     Sleep disturbances     Primary osteoarthritis of right knee     Mild intermittent asthma without complication     Trochanteric bursitis, left hip     Impaired fasting glucose          Plan:       Due for updated screening mammogram    Plan annual flu vaccine. Colon polyps: last scope 11/12/14: polyps of descending colon and rectum, sigmoid diverticulosis. Adenomatous x 2. 5year follow up rec.d-->11/19     HRT: estrace tablets through gynecology. . (s/p hysterectomy). Recent updated visit with pap and pelvic completed. Normal. Some hot flashes at times. No other concerns.       Htn: fair control. Rec. Cont. hctz and push for wt. Loss.       Hyperlipidemia: no active treatment at present. Ldl at 118. She declines intervention with medical.    She wants to concentrate more on diet.       Gerd: quiescent mostly on omeprazole at present. Working better than past meds. Using prn /14 day intervals     Obesity: bmi at 38.78 at present,  stable . Discussed wt. Loss planning . She rides bike. Has been successful in the past but fails to keep the wt. Off.      Chronic sleep disturbances with daytime fatigue and somnolence. Sleeping 6 +hrs/night at present. Satisfied with sleep at present.      DJD s/p bilateral knee replacements.   Doing well , no significant pain any more after most recent replacement. No other joint concerns at present. Asthma; rare attacks, usually environmental triggers. Just prn albuterol use at present. No recent issues. Trochanteric bursitis left hip. Resolved, quiescent since injection in April. Observation at present. ifbs: 106--110. Discussed wt. Loss and low carb. Diet. Up to 117 this visit. covid infection this year. Positive test.   also had positive test after hers. Mild symptoms without hospitalization.

## 2020-10-27 ENCOUNTER — HOSPITAL ENCOUNTER (OUTPATIENT)
Dept: GENERAL RADIOLOGY | Age: 70
Discharge: HOME | End: 2020-10-29
Payer: MEDICARE

## 2020-10-27 ENCOUNTER — OFFICE VISIT (OUTPATIENT)
Dept: ORTHOPEDIC SURGERY | Age: 70
End: 2020-10-27
Payer: MEDICARE

## 2020-10-27 VITALS
HEIGHT: 68 IN | HEART RATE: 66 BPM | DIASTOLIC BLOOD PRESSURE: 88 MMHG | BODY MASS INDEX: 37.74 KG/M2 | SYSTOLIC BLOOD PRESSURE: 148 MMHG | WEIGHT: 249 LBS

## 2020-10-27 PROCEDURE — 99214 OFFICE O/P EST MOD 30 MIN: CPT | Performed by: PHYSICIAN ASSISTANT

## 2020-10-27 PROCEDURE — 1123F ACP DISCUSS/DSCN MKR DOCD: CPT | Performed by: PHYSICIAN ASSISTANT

## 2020-10-27 PROCEDURE — G8427 DOCREV CUR MEDS BY ELIG CLIN: HCPCS | Performed by: PHYSICIAN ASSISTANT

## 2020-10-27 PROCEDURE — 3017F COLORECTAL CA SCREEN DOC REV: CPT | Performed by: PHYSICIAN ASSISTANT

## 2020-10-27 PROCEDURE — 72100 X-RAY EXAM L-S SPINE 2/3 VWS: CPT

## 2020-10-27 PROCEDURE — 73521 X-RAY EXAM HIPS BI 2 VIEWS: CPT

## 2020-10-27 PROCEDURE — 4040F PNEUMOC VAC/ADMIN/RCVD: CPT | Performed by: PHYSICIAN ASSISTANT

## 2020-10-27 PROCEDURE — 99212 OFFICE O/P EST SF 10 MIN: CPT | Performed by: PHYSICIAN ASSISTANT

## 2020-10-27 PROCEDURE — G8484 FLU IMMUNIZE NO ADMIN: HCPCS | Performed by: PHYSICIAN ASSISTANT

## 2020-10-27 PROCEDURE — G8399 PT W/DXA RESULTS DOCUMENT: HCPCS | Performed by: PHYSICIAN ASSISTANT

## 2020-10-27 PROCEDURE — 1090F PRES/ABSN URINE INCON ASSESS: CPT | Performed by: PHYSICIAN ASSISTANT

## 2020-10-27 PROCEDURE — G8417 CALC BMI ABV UP PARAM F/U: HCPCS | Performed by: PHYSICIAN ASSISTANT

## 2020-10-27 PROCEDURE — 1036F TOBACCO NON-USER: CPT | Performed by: PHYSICIAN ASSISTANT

## 2020-10-27 NOTE — PROGRESS NOTES
Orthopaedic Progress Note      CHIEF COMPLAINT: Low back pain, left hip pain    HISTORY OF PRESENT ILLNESS:       Ms. Liza Vega  is a 72 y.o. female  who presents with a long history of low back pain as well as left hip pain. She denies any specific injury or trauma. She has a history of arthritis that runs throughout her family. He self reports the pain in her left hip radiates into her left groin is worse when she gets in and out of car relieved with rest.  Bothers her at the end of the day at times to 5 out of 10 on a pain scale. She denies any numbness or burning down her left leg down into her left foot. She also complains today of low back pain radiating from the lower portion of her back into her right buttocks along the lateral side of her right hip. Is worse with activity worse with weightbearing relieved with rest.  She denies any injury or trauma given to her low back. States that she been in physical therapy for chronic low back pain in the past.  She denies any numbness or tingling in either foot at this time. Denies a loss or change in bowel or bladder habitus. She is here today for evaluation of both these problems      Past Medical History:    Past Medical History:   Diagnosis Date    Asthma     Chalazion of right lower eyelid 2014    Colon polyps     Degenerative joint disease of knee, left     Dysmenorrhea     GERD (gastroesophageal reflux disease)     Greater trochanteric bursitis of right hip     Hot flashes     Hyperlipidemia     Hypertension     Impaired fasting glucose     Obesity     Scaphoid fracture of wrist     Right.  Sleep disturbances     Tobacco abuse     Remote and limited.  Uterine fibroid        Past Surgical History:    Past Surgical History:   Procedure Laterality Date    BREAST REDUCTION SURGERY Bilateral     CARDIAC CATHETERIZATION  06/15/2007    No significant coronary artery disease, preserved LV systolic function.     [address] [place] [address]  COLONOSCOPY  09/10/2008    Sigmoid diverticulosis, history of polyps.  COLONOSCOPY  09/18/2007    Multiple polyps of the colon, rectal polyps, sigmoid polyps, and splenic flexure polyps.  COLONOSCOPY  07/31/2006    Pedunculated thin polyp near the transverse colon, small polyp at the dentate line versus a hemorrhoid.  COLONOSCOPY  2013    COLONOSCOPY  11/2014    Dr. Inessa Morton. adenom. x 2, 5yr follow up   [facility] COLONOSCOPY BIOPSY/STOMA N/A 1/2/2020    hyperplastic polyp X 3, Dr. Bolanos Gip, TOTAL ABDOMINAL      KNEE ARTHROSCOPY Left 05/21/1999    Torn degenerate posterior third medial meniscus.  MOUTH SURGERY Right 5/1/15    OTHER SURGICAL HISTORY Left 09/11/2012    Left knee injection for left knee pain.  OTHER SURGICAL HISTORY Right 09/12/2006    Bursa injection for greater trochanteric bursitis.  SALPINGO-OOPHORECTOMY Left     TONSILLECTOMY AND ADENOIDECTOMY      TOTAL KNEE ARTHROPLASTY Left 2013         Current  Medications:  Current Outpatient Medications   Medication Sig Dispense Refill    NONFORMULARY 1 tablet daily Flash ease      BLACK COHOSH EXTRACT PO Take 1 capsule by mouth daily      sucralfate (CARAFATE) 1 GM tablet Take 1 tablet by mouth 4 times daily 120 tablet 3    OMEPRAZOLE PO Take by mouth      hydroCHLOROthiazide (HYDRODIURIL) 25 MG tablet TAKE 1 TABLET DAILY 90 tablet 3    meloxicam (MOBIC) 15 MG tablet TAKE 1 TABLET DAILY 90 tablet 3    estradiol (ESTRACE) 1 MG tablet TAKE 1 TABLET BY MOUTH ONCE DAILY 90 tablet 3    traMADol (ULTRAM) 50 MG tablet Take 50 mg by mouth.       ondansetron (ZOFRAN ODT) 4 MG disintegrating tablet Take 1 tablet by mouth every 8 hours as needed for Nausea 12 tablet 0    albuterol sulfate  (90 Base) MCG/ACT inhaler Inhale 2 puffs into the lungs every 6 hours as needed for Wheezing or Shortness of Breath 1 Inhaler 0    estrogens, conjugated, (PREMARIN) 0.625 MG tablet Take 1 neurovascular motor intact both feet. DATA:  CBC:   Lab Results   Component Value Date    WBC 7.5 09/21/2020    HGB 13.0 09/21/2020     09/21/2020     BMP:    Lab Results   Component Value Date     09/21/2020    K 4.0 09/21/2020     09/21/2020    CO2 27 09/21/2020    BUN 14 09/21/2020    CREATININE 1.04 09/21/2020    CALCIUM 9.4 09/21/2020    GLUCOSE 117 09/21/2020     PT/INR:    Lab Results   Component Value Date    PROTIME 10.2 08/28/2013    INR 1.0 08/28/2013     Troponin:    Lab Results   Component Value Date    TROPONINI <0.04 08/29/2013     No results for input(s): LIPASE, AMYLASE in the last 72 hours. No results for input(s): AST, ALT, BILIDIR, BILITOT, ALKPHOS in the last 72 hours. Uric Acid:  No components found for: URIC  Urine Culture:  No components found for: CURINE    Radiology:   No results found. X-rays personally reviewed by me of 2 views lumbar spine to reveal advanced degenerative disc disease at the L4-5 L5-S1 level. AP the pelvis reveals an intact pelvic ring with advanced degenerative changes of both hips. 2 views of bilateral hips reveal advanced degenerative changes of both hips. Diagnosis Orders   1. Low back pain, unspecified back pain laterality, unspecified chronicity, unspecified whether sciatica present  XR LUMBAR SPINE (2-3 VIEWS)   2. Primary osteoarthritis of left hip           PLAN:  At this point think she is suffering from 2 particular problems 1 of which is left hip osteoarthritis the other of which is likely spinal stenosis. My recommendations today are to get her an intra-articular corticosteroid injection into the left hip joint by the radiologist.  We also get her set up with an MRI of her lumbar spine for further evaluation for nerve root entrapment. No orders of the defined types were placed in this encounter.        Procedure:        Electronically signed by Kae Thompson PA-C on 10/27/2020 at 10:03 AM

## 2020-10-29 ENCOUNTER — HOSPITAL ENCOUNTER (OUTPATIENT)
Dept: GENERAL RADIOLOGY | Age: 70
Discharge: HOME | End: 2020-10-31
Payer: MEDICARE

## 2020-10-29 PROCEDURE — 6360000002 HC RX W HCPCS

## 2020-10-29 PROCEDURE — 77002 NEEDLE LOCALIZATION BY XRAY: CPT

## 2020-10-29 PROCEDURE — 6360000004 HC RX CONTRAST MEDICATION: Performed by: PHYSICIAN ASSISTANT

## 2020-10-29 PROCEDURE — 20610 DRAIN/INJ JOINT/BURSA W/O US: CPT

## 2020-10-29 PROCEDURE — 2500000003 HC RX 250 WO HCPCS

## 2020-10-29 RX ADMIN — IOHEXOL 2 ML: 240 INJECTION, SOLUTION INTRATHECAL; INTRAVASCULAR; INTRAVENOUS; ORAL at 14:27

## 2020-11-03 ENCOUNTER — HOSPITAL ENCOUNTER (OUTPATIENT)
Dept: MAMMOGRAPHY | Age: 70
Discharge: HOME | End: 2020-11-05
Payer: MEDICARE

## 2020-11-03 PROCEDURE — 77063 BREAST TOMOSYNTHESIS BI: CPT

## 2020-11-10 ENCOUNTER — HOSPITAL ENCOUNTER (OUTPATIENT)
Dept: MRI IMAGING | Age: 70
Discharge: HOME | End: 2020-11-12
Payer: MEDICARE

## 2020-11-10 PROCEDURE — 72148 MRI LUMBAR SPINE W/O DYE: CPT

## 2020-11-30 ENCOUNTER — INITIAL CONSULT (OUTPATIENT)
Dept: SURGERY | Age: 70
End: 2020-11-30
Payer: MEDICARE

## 2020-11-30 VITALS
RESPIRATION RATE: 18 BRPM | SYSTOLIC BLOOD PRESSURE: 138 MMHG | TEMPERATURE: 97.8 F | DIASTOLIC BLOOD PRESSURE: 72 MMHG | HEART RATE: 80 BPM | HEIGHT: 68 IN | BODY MASS INDEX: 36.01 KG/M2 | WEIGHT: 237.6 LBS

## 2020-11-30 PROCEDURE — 1036F TOBACCO NON-USER: CPT | Performed by: SURGERY

## 2020-11-30 PROCEDURE — 99203 OFFICE O/P NEW LOW 30 MIN: CPT | Performed by: SURGERY

## 2020-11-30 PROCEDURE — G8427 DOCREV CUR MEDS BY ELIG CLIN: HCPCS | Performed by: SURGERY

## 2020-11-30 PROCEDURE — 1090F PRES/ABSN URINE INCON ASSESS: CPT | Performed by: SURGERY

## 2020-11-30 PROCEDURE — 4040F PNEUMOC VAC/ADMIN/RCVD: CPT | Performed by: SURGERY

## 2020-11-30 PROCEDURE — 99204 OFFICE O/P NEW MOD 45 MIN: CPT

## 2020-11-30 PROCEDURE — G8417 CALC BMI ABV UP PARAM F/U: HCPCS | Performed by: SURGERY

## 2020-11-30 PROCEDURE — 3017F COLORECTAL CA SCREEN DOC REV: CPT | Performed by: SURGERY

## 2020-11-30 PROCEDURE — 1123F ACP DISCUSS/DSCN MKR DOCD: CPT | Performed by: SURGERY

## 2020-11-30 PROCEDURE — G8399 PT W/DXA RESULTS DOCUMENT: HCPCS | Performed by: SURGERY

## 2020-11-30 PROCEDURE — G8484 FLU IMMUNIZE NO ADMIN: HCPCS | Performed by: SURGERY

## 2020-11-30 NOTE — PATIENT INSTRUCTIONS
Please hold your mobic 5 days prior to seeing Dr. MATHEWS [facility] on 12/11/2020 at 1:40 PM.

## 2020-11-30 NOTE — PROGRESS NOTES
[place] [address] Surgery   History & Physical  Dion Obando DO  Pt Name: Baldemar Ramos  MRN: [id number]  YOB: 1950  Date of evaluation: 2020  Primary Care Physician: Bradley Lizarraga MD    Chief Complaint:   Chief Complaint   Patient presents with    Mass     lump on left posterior shoulder for over a yr. Has increase in size and notes redness         SUBJECTIVE:    History of Present Illness: This is a 72 y.o.  female who presents for evaluation for left shoulder lump present for ~1yr, slowly growing in size, more tender as it lies underneath her bra strap. No history of infection or drainage. No other complaints. Past Medical History   has a past medical history of Asthma, Chalazion of right lower eyelid, Colon polyps, Degenerative joint disease of knee, left, Dysmenorrhea, GERD (gastroesophageal reflux disease), Greater trochanteric bursitis of right hip, Hot flashes, Hyperlipidemia, Hypertension, Impaired fasting glucose, Obesity, Scaphoid fracture of wrist, Sleep disturbances, Tobacco abuse, and Uterine fibroid. Past Surgical History   has a past surgical history that includes  section; Hysterectomy, total abdominal; Knee arthroscopy (Left, 1999); Tonsillectomy and adenoidectomy; Breast reduction surgery (Bilateral); Dilation and curettage of uterus; Salpingo-oophorectomy (Left); other surgical history (Left, 2012); other surgical history (Right, 2006); Colonoscopy (09/10/2008); Colonoscopy (2007); Colonoscopy (2006); Cardiac catheterization (06/15/2007); Colonoscopy (); Total knee arthroplasty (Left, ); Colonoscopy (2014); Mouth surgery (Right, 5/1/15); and Colonoscopy Biopsy/Stoma (N/A, 2020).     Family History  family history includes Asthma in her daughter; Breast Cancer (age of onset: 79) in her father; Cancer in her brother and father; Cervical Cancer in her mother; Colon Cancer in her brother; Heart Attack in her maternal grandfather; Migraines in her son; Jeanayth Friar in her mother; Stroke in her father and maternal grandmother. Social History  Tobacco use:  reports that she quit smoking about 21 years ago. Her smoking use included cigarettes. She started smoking about 22 years ago. She has a 0.25 pack-year smoking history. She has never used smokeless tobacco.  Alcohol use:  reports current alcohol use. Drug use:  reports no history of drug use. Medications  Current Medications:   Current Outpatient Medications   Medication Sig Dispense Refill    NONFORMULARY 1 tablet daily Flash ease      BLACK COHOSH EXTRACT PO Take 1 capsule by mouth daily      sucralfate (CARAFATE) 1 GM tablet Take 1 tablet by mouth 4 times daily 120 tablet 3    OMEPRAZOLE PO Take by mouth      hydroCHLOROthiazide (HYDRODIURIL) 25 MG tablet TAKE 1 TABLET DAILY 90 tablet 3    meloxicam (MOBIC) 15 MG tablet TAKE 1 TABLET DAILY 90 tablet 3    estradiol (ESTRACE) 1 MG tablet TAKE 1 TABLET BY MOUTH ONCE DAILY 90 tablet 3    traMADol (ULTRAM) 50 MG tablet Take 50 mg by mouth.  ondansetron (ZOFRAN ODT) 4 MG disintegrating tablet Take 1 tablet by mouth every 8 hours as needed for Nausea 12 tablet 0    albuterol sulfate  (90 Base) MCG/ACT inhaler Inhale 2 puffs into the lungs every 6 hours as needed for Wheezing or Shortness of Breath 1 Inhaler 0    estrogens, conjugated, (PREMARIN) 0.625 MG tablet Take 1 tablet by mouth daily (Patient not taking: Reported on 11/18/2019) 21 tablet 3     No current facility-administered medications for this visit. Home Medications:   Prior to Admission medications    Medication Sig Start Date End Date Taking?  Authorizing Provider   NONFORMULARY 1 tablet daily Flash ease   Yes Historical Provider, MD [place] [facility]H EXTRACT PO Take 1 capsule by mouth daily   Yes Historical Provider, MD   sucralfate (CARAFATE) 1 GM tablet Take 1 tablet by mouth 4 times daily 8/12/20  Yes Fede Foster OMEPRAZOLE PO Take by mouth   Yes Historical Provider, MD   hydroCHLOROthiazide (HYDRODIURIL) 25 MG tablet TAKE 1 TABLET DAILY 3/26/20  Yes Florentin Mccartney MD   meloxicam (MOBIC) 15 MG tablet TAKE 1 TABLET DAILY 3/26/20  Yes Florentin Mccartney MD   estradiol (ESTRACE) 1 MG tablet TAKE 1 TABLET BY MOUTH ONCE DAILY 12/2/19  Yes Florentin Mccartney MD   traMADol (ULTRAM) 50 MG tablet Take 50 mg by mouth. 9/22/19  Yes Historical Provider, MD   ondansetron (ZOFRAN ODT) 4 MG disintegrating tablet Take 1 tablet by mouth every 8 hours as needed for Nausea 2/27/19  Yes Ana Lama MD   albuterol sulfate  (90 Base) MCG/ACT inhaler Inhale 2 puffs into the lungs every 6 hours as needed for Wheezing or Shortness of Breath 2/19/19  Yes MAYCOL Haji - CNP   estrogens, conjugated, (PREMARIN) 0.625 MG tablet Take 1 tablet by mouth daily  Patient not taking: Reported on 11/18/2019 8/29/19 11/27/19  Dalia Barreto MD       Allergies  Minocin [minocycline] and Oxycodone      Review of Systems:  General: Denies any fever, chills. Eyes: Denies any changes in vision, diplopia or eye pain  Ears, Nose, Mouth: Denies changes in hearing/tinnitus or drainage from ears, no rhinorrhea or bloody nose, no difficulty chewing  Throat: no difficulty swallowing, no throat pain  Respiratory: Denies any shortness of breath or cough. Cardiac: Denies any chest pain, palpitations, claudication or edema. Gastrointestinal: Denies any melena, hematochezia, hematemesis or pyrosis. Genitourinary: Denies any frequency, urgency, hesitancy or incontinence. Musculoskeletal: Denies worsening muscle weakness or recent trauma  Skin: Denies rashes or lesions  Psychiatric: Denies any recent changes in mood or affect  Hematologic: Denies bruising or bleeding easily.     PHYSICAL EXAMINATION  Vitals:   Vitals:    11/30/20 1415   BP: 138/72   Pulse: 80   Resp: 18   Temp: 97.8 °F (36.6 °C)       General Appearance:  awake, alert, no acute distress, well developed, well nourished   Skin:  3m subcutaneous mass with what appears to be a central pore consistent with sebaceous cyst, mild resolving ecchymosis, no evidence of bleeding/infection  Head/face:  NCAT, face symmetrical  Eyes:  PERRL, no evidence of conjunctivitis or ptosis bilaterally  Ears:  External ears and canals grossly normal, no evidence of otorrhea. Nose/Sinuses:  Nares normal. Septum midline. Mucosa normal. No external drainage noted. Mouth/Neck:  Mucosa moist.  No external oral lesions. Trachea midline. No visible masses. Lungs:  Normal chest expansion, unlabored breathing without accessory muscle use. No audible rales, rhonchi, or wheezing. Cardiovascular: S1S2. No evidence of JVD. No evidence of pulsatile masses in abdomen  Abdomen:  Soft, non-tender, no organomegaly, no masses. Musculoskeletal: No evidence of bony/muscular deformities, trauma, atrophy of either left/right upper/lower extremity. No evidence of digital clubbing or cyanosis. Neurologic:  CN 2-12 grossly intact without obvious deficits. Grossly normal sensation in all extremities. Psychiatric: appropriate judgement and insight, appropriate recall of recent and remote memory, no evidence of depression/anxiety/agitation    DIAGNOSES:   Diagnosis Orders   1.  Sebaceous cyst           PLAN:  · Plan removal next week, hold mobic for 5d prior to procedure      Medical Decision Making: low complexity     Electronically signed by Ifrah Marcos DO on 11/30/2020 at 3:17 PM

## 2020-12-11 ENCOUNTER — OFFICE VISIT (OUTPATIENT)
Dept: ORTHOPEDIC SURGERY | Age: 70
End: 2020-12-11
Payer: MEDICARE

## 2020-12-11 ENCOUNTER — PROCEDURE VISIT (OUTPATIENT)
Dept: SURGERY | Age: 70
End: 2020-12-11
Payer: MEDICARE

## 2020-12-11 VITALS
DIASTOLIC BLOOD PRESSURE: 80 MMHG | HEART RATE: 71 BPM | HEIGHT: 68 IN | BODY MASS INDEX: 35.92 KG/M2 | TEMPERATURE: 96.3 F | RESPIRATION RATE: 16 BRPM | WEIGHT: 237 LBS | SYSTOLIC BLOOD PRESSURE: 136 MMHG

## 2020-12-11 VITALS
BODY MASS INDEX: 35.92 KG/M2 | HEIGHT: 68 IN | WEIGHT: 237 LBS | SYSTOLIC BLOOD PRESSURE: 131 MMHG | DIASTOLIC BLOOD PRESSURE: 80 MMHG | HEART RATE: 73 BPM

## 2020-12-11 PROBLEM — M16.11 PRIMARY OSTEOARTHRITIS OF RIGHT HIP: Status: ACTIVE | Noted: 2020-12-11

## 2020-12-11 PROBLEM — M47.816 LUMBAR SPONDYLOSIS: Status: ACTIVE | Noted: 2020-12-11

## 2020-12-11 PROBLEM — M54.50 LOW BACK PAIN: Status: ACTIVE | Noted: 2020-12-11

## 2020-12-11 PROBLEM — M16.0 BILATERAL HIP JOINT ARTHRITIS: Status: ACTIVE | Noted: 2020-12-11

## 2020-12-11 PROCEDURE — 4040F PNEUMOC VAC/ADMIN/RCVD: CPT | Performed by: ORTHOPAEDIC SURGERY

## 2020-12-11 PROCEDURE — 99214 OFFICE O/P EST MOD 30 MIN: CPT

## 2020-12-11 PROCEDURE — 3017F COLORECTAL CA SCREEN DOC REV: CPT | Performed by: ORTHOPAEDIC SURGERY

## 2020-12-11 PROCEDURE — 23071 EXC SHOULDER LES SC 3 CM/>: CPT | Performed by: SURGERY

## 2020-12-11 PROCEDURE — G8484 FLU IMMUNIZE NO ADMIN: HCPCS | Performed by: ORTHOPAEDIC SURGERY

## 2020-12-11 PROCEDURE — 1123F ACP DISCUSS/DSCN MKR DOCD: CPT | Performed by: ORTHOPAEDIC SURGERY

## 2020-12-11 PROCEDURE — 1090F PRES/ABSN URINE INCON ASSESS: CPT | Performed by: ORTHOPAEDIC SURGERY

## 2020-12-11 PROCEDURE — G8399 PT W/DXA RESULTS DOCUMENT: HCPCS | Performed by: ORTHOPAEDIC SURGERY

## 2020-12-11 PROCEDURE — 99214 OFFICE O/P EST MOD 30 MIN: CPT | Performed by: ORTHOPAEDIC SURGERY

## 2020-12-11 PROCEDURE — G8427 DOCREV CUR MEDS BY ELIG CLIN: HCPCS | Performed by: ORTHOPAEDIC SURGERY

## 2020-12-11 PROCEDURE — G8417 CALC BMI ABV UP PARAM F/U: HCPCS | Performed by: ORTHOPAEDIC SURGERY

## 2020-12-11 PROCEDURE — 1036F TOBACCO NON-USER: CPT | Performed by: ORTHOPAEDIC SURGERY

## 2020-12-11 PROCEDURE — 99203 OFFICE O/P NEW LOW 30 MIN: CPT | Performed by: ORTHOPAEDIC SURGERY

## 2020-12-11 RX ORDER — CEPHALEXIN 500 MG/1
500 CAPSULE ORAL 2 TIMES DAILY
Qty: 10 CAPSULE | Refills: 0 | Status: SHIPPED | OUTPATIENT
Start: 2020-12-11 | End: 2020-12-16

## 2020-12-11 NOTE — PROGRESS NOTES
[facility] General Surgery Clinic  Progress Note    PATIENT NAME: Guanaco Knox     CLINIC VISIT DATE: 12/11/2020    SUBJECTIVE:  Guanaco Knox is a 72 y.o. female who presents to the clinic today for cyst removal      OBJECTIVE:    /80   Pulse 71   Temp 96.3 °F (35.7 °C) (Tympanic)   Resp 16   Ht 5' 8\" (1.727 m)   Wt 237 lb (107.5 kg)   LMP  (LMP Unknown)   BMI 36.04 kg/m²     General Appearance:  awake, alert, no acute distress, well developed, well nourished   Skin:  Small opening of the skin secondary to spontaneous rupture of cyst contents, resolving erythema present. Lungs:  Normal chest expansion, unlabored breathing without accessory muscle use. No audible rales, rhonchi, or wheezing. Cardiovascular: S1S2. No evidence of JVD. No evidence of pulsatile masses in abdomen  Abdomen:  Soft, non-tender, no organomegaly, no masses. Musculoskeletal: No evidence of bony/muscular deformities, trauma, atrophy of either left/right upper/lower extremity. No evidence of digital clubbing or cyanosis. PROCEDURE NOTE    DATE OF PROCEDURE: 12/11/20    SURGEON: Dr. Anjum Rodney DIAGNOSIS : epidermoid cyst    POSTOPERATIVE DIAGNOSIS: Same     PROCEDURE: excision epidermoid cyst L shoulder    ANESTHESIA: Local anesthesia with 1% lidocaine 7cc    ESTIMATED BLOOD LOSS:  Less than 1cc    COMPLICATIONS: None. SPECIMENS:  none    OPERATIVE DETAIL:  The patient was placed on procedure table in seated position. Timeout was performed verifying correct patient, position, equipment and procedure to be performed. Written consent obtained for cyst excision. Area cleansed with alcohol and locally anesthetized with 1% lidocaine. Elliptical incision made with #15 scalpel around the skin defect, cyst contents evacuated, the cyst wall is very attenuated, some of the cyst wall is able to be removed but the rest is bluntly debrided with hemostats and cauterized with hyfrecator. Cyst size is ~3cm in diameter. Hemostasis achieved and maintained with direct manual compression and hyfrecator. Wound closed with interrupted 3-0 nylon suture x2 and covered with bacitracin and bandage. All sponge needle and instrument counts correct at the end of the case. Pt tolerated procedure well without issue. ASSESSMENT:  1. Epidermoid cyst        PLAN:  1. Wash at least once daily with soap and water and cover with bacitracin and bandage  2. F/U in one week to monitor progress  3.  Keflex 5d course    Electronically signed by Geovanna Richards DO on 12/11/2020 at 2:09 PM

## 2020-12-11 NOTE — PROGRESS NOTES
SURGICAL HISTORY Left 2012    Left knee injection for left knee pain.  OTHER SURGICAL HISTORY Right 2006    Bursa injection for greater trochanteric bursitis.  SALPINGO-OOPHORECTOMY Left     TONSILLECTOMY AND ADENOIDECTOMY      TOTAL KNEE ARTHROPLASTY Left 2013     Family History   Problem Relation Age of Onset    Stomach Cancer Mother     Cervical Cancer Mother     Stroke Father     Cancer Father         Throat.  Breast Cancer Father 79        father cancer right breast    Cancer Brother         Lymphoma.  Stroke Maternal Grandmother     Heart Attack Maternal Grandfather         Myocardial infarction.  Colon Cancer Brother     Migraines Son     Asthma Daughter      Social History     Occupational History    Not on file   Tobacco Use    Smoking status: Former Smoker     Packs/day: 0.25     Years: 1.00     Pack years: 0.25     Types: Cigarettes     Start date: 1997     Last attempt to quit: 1998     Years since quittin.9    Smokeless tobacco: Never Used    Tobacco comment: Smoked for about 1 year   Substance and Sexual Activity    Alcohol use: Yes     Alcohol/week: 0.0 standard drinks     Comment: Occasionally, once or twice a year    Drug use: No    Sexual activity: Yes     Partners: Male     Birth control/protection: Post-menopausal     Comment:  24 years        Review of Systems   All other systems reviewed and are negative. Physical Exam  Vitals signs and nursing note reviewed. Constitutional:       Appearance: She is well-developed. HENT:      Head: Normocephalic and atraumatic. Nose: Nose normal.   Eyes:      Conjunctiva/sclera: Conjunctivae normal.   Neck:      Musculoskeletal: Normal range of motion and neck supple. Pulmonary:      Effort: Pulmonary effort is normal. No respiratory distress. Musculoskeletal:      Comments: Normal gait     Skin:     General: Skin is warm and dry.    Neurological:      Mental Status: She is alert and oriented to person, place, and time. Sensory: No sensory deficit. Psychiatric:         Behavior: Behavior normal.         Thought Content: Thought content normal.     Examination right hip aggravation right buttock pain with abduction 30 degrees    MRI lumbar spine and x-rays lumbar spine reviewed patient has moderate age-appropriate lumbar spondylosis without high-grade stenosis    X-rays hip AP pelvis moderate bilateral hip arthritis        Assessment:          1. Low back pain, unspecified back pain laterality, unspecified chronicity, unspecified whether sciatica present    2. Primary osteoarthritis of right hip    3. Bilateral hip joint arthritis    4. Lumbar spondylosis        History consistent with bilateral buttock pain left groin pain    Left buttock and groin pain were resolved with diagnostic/therapeutic left hip injection    Ongoing right buttock pain      Plan:     Diagnostic/therapeutic right hip injection    No orders of the defined types were placed in this encounter. Renetta Go MD    Please note that this chart was generated using voicerecognition Dragon dictation software. Although every effort was made to ensurethe accuracy of this automated transcription, some errors in transcription may haveoccurred.

## 2020-12-15 ENCOUNTER — HOSPITAL ENCOUNTER (OUTPATIENT)
Dept: GENERAL RADIOLOGY | Age: 70
Discharge: HOME | End: 2020-12-17
Payer: MEDICARE

## 2020-12-15 PROCEDURE — 2500000003 HC RX 250 WO HCPCS

## 2020-12-15 PROCEDURE — 20610 DRAIN/INJ JOINT/BURSA W/O US: CPT

## 2020-12-15 PROCEDURE — 6360000004 HC RX CONTRAST MEDICATION: Performed by: ORTHOPAEDIC SURGERY

## 2020-12-15 PROCEDURE — 6360000002 HC RX W HCPCS

## 2020-12-15 PROCEDURE — 77002 NEEDLE LOCALIZATION BY XRAY: CPT

## 2020-12-15 RX ADMIN — IOHEXOL 3 ML: 240 INJECTION, SOLUTION INTRATHECAL; INTRAVASCULAR; INTRAVENOUS; ORAL at 13:30

## 2020-12-18 ENCOUNTER — OFFICE VISIT (OUTPATIENT)
Dept: SURGERY | Age: 70
End: 2020-12-18
Payer: MEDICARE

## 2020-12-18 VITALS
OXYGEN SATURATION: 98 % | BODY MASS INDEX: 37.04 KG/M2 | HEART RATE: 56 BPM | WEIGHT: 244.4 LBS | RESPIRATION RATE: 16 BRPM | SYSTOLIC BLOOD PRESSURE: 130 MMHG | DIASTOLIC BLOOD PRESSURE: 80 MMHG | TEMPERATURE: 98.1 F | HEIGHT: 68 IN

## 2020-12-18 PROCEDURE — 99214 OFFICE O/P EST MOD 30 MIN: CPT

## 2020-12-18 PROCEDURE — 99024 POSTOP FOLLOW-UP VISIT: CPT | Performed by: SURGERY

## 2020-12-18 NOTE — PROGRESS NOTES
[facility] General Surgery [facility]  Progress Note    PATIENT NAME: Aristeo Collins     CLINIC VISIT DATE: 12/18/2020    SUBJECTIVE:  Aristeo Collins is a 72 y.o. female who presents to the clinic today for evaluation post removal of epidermoid cyst from the left shoulder, she is doing well. Denies any pain or new issues. OBJECTIVE:    /80 (Site: Right Upper Arm, Position: Sitting, Cuff Size: Medium Adult)   Pulse 56   Temp 98.1 °F (36.7 °C) (Tympanic)   Resp 16   Ht 5' 8\" (1.727 m)   Wt 244 lb 6.4 oz (110.9 kg)   LMP  (LMP Unknown)   SpO2 98%   BMI 37.16 kg/m²     General Appearance:  awake, alert, no acute distress, well developed, well nourished   Skin:  Incision well healed with sutures intact. No evidence of bleeding or infection. Lungs:  Normal chest expansion, unlabored breathing without accessory muscle use. No audible rales, rhonchi, or wheezing. Cardiovascular: S1S2. No evidence of JVD. No evidence of pulsatile masses in abdomen  Abdomen:  Soft, non-tender, no organomegaly, no masses. Musculoskeletal: No evidence of bony/muscular deformities, trauma, atrophy of either left/right upper/lower extremity. No evidence of digital clubbing or cyanosis. ASSESSMENT:  1. Epidermoid cyst          PLAN:  1. Sutures removed in clinic today.  Continue local hygiene with soap and water, f/u prn    Electronically signed by Geovanna Richards DO on 12/18/2020 at 4:50 PM

## 2021-01-22 ENCOUNTER — OFFICE VISIT (OUTPATIENT)
Dept: ORTHOPEDIC SURGERY | Age: 71
End: 2021-01-22
Payer: MEDICARE

## 2021-01-22 VITALS — HEIGHT: 68 IN | OXYGEN SATURATION: 98 % | HEART RATE: 77 BPM | BODY MASS INDEX: 36.98 KG/M2 | WEIGHT: 244 LBS

## 2021-01-22 DIAGNOSIS — M54.50 LOW BACK PAIN, UNSPECIFIED BACK PAIN LATERALITY, UNSPECIFIED CHRONICITY, UNSPECIFIED WHETHER SCIATICA PRESENT: Primary | ICD-10-CM

## 2021-01-22 DIAGNOSIS — M25.552 BILATERAL HIP PAIN: ICD-10-CM

## 2021-01-22 DIAGNOSIS — M47.816 LUMBAR SPONDYLOSIS: ICD-10-CM

## 2021-01-22 DIAGNOSIS — M25.561 CHRONIC PAIN OF RIGHT KNEE: ICD-10-CM

## 2021-01-22 DIAGNOSIS — M76.899 KNEE TENDINITIS: ICD-10-CM

## 2021-01-22 DIAGNOSIS — M16.0 BILATERAL HIP JOINT ARTHRITIS: ICD-10-CM

## 2021-01-22 DIAGNOSIS — G89.29 CHRONIC PAIN OF RIGHT KNEE: ICD-10-CM

## 2021-01-22 DIAGNOSIS — M25.551 BILATERAL HIP PAIN: ICD-10-CM

## 2021-01-22 PROCEDURE — 1090F PRES/ABSN URINE INCON ASSESS: CPT | Performed by: ORTHOPAEDIC SURGERY

## 2021-01-22 PROCEDURE — 1036F TOBACCO NON-USER: CPT | Performed by: ORTHOPAEDIC SURGERY

## 2021-01-22 PROCEDURE — G8417 CALC BMI ABV UP PARAM F/U: HCPCS | Performed by: ORTHOPAEDIC SURGERY

## 2021-01-22 PROCEDURE — 1123F ACP DISCUSS/DSCN MKR DOCD: CPT | Performed by: ORTHOPAEDIC SURGERY

## 2021-01-22 PROCEDURE — 3017F COLORECTAL CA SCREEN DOC REV: CPT | Performed by: ORTHOPAEDIC SURGERY

## 2021-01-22 PROCEDURE — G8399 PT W/DXA RESULTS DOCUMENT: HCPCS | Performed by: ORTHOPAEDIC SURGERY

## 2021-01-22 PROCEDURE — 99213 OFFICE O/P EST LOW 20 MIN: CPT | Performed by: ORTHOPAEDIC SURGERY

## 2021-01-22 PROCEDURE — 20610 DRAIN/INJ JOINT/BURSA W/O US: CPT | Performed by: ORTHOPAEDIC SURGERY

## 2021-01-22 PROCEDURE — 4040F PNEUMOC VAC/ADMIN/RCVD: CPT | Performed by: ORTHOPAEDIC SURGERY

## 2021-01-22 PROCEDURE — G8484 FLU IMMUNIZE NO ADMIN: HCPCS | Performed by: ORTHOPAEDIC SURGERY

## 2021-01-22 PROCEDURE — 99214 OFFICE O/P EST MOD 30 MIN: CPT | Performed by: ORTHOPAEDIC SURGERY

## 2021-01-22 PROCEDURE — G8427 DOCREV CUR MEDS BY ELIG CLIN: HCPCS | Performed by: ORTHOPAEDIC SURGERY

## 2021-01-22 RX ORDER — BETAMETHASONE SODIUM PHOSPHATE AND BETAMETHASONE ACETATE 3; 3 MG/ML; MG/ML
12 INJECTION, SUSPENSION INTRA-ARTICULAR; INTRALESIONAL; INTRAMUSCULAR; SOFT TISSUE ONCE
Status: COMPLETED | OUTPATIENT
Start: 2021-01-22 | End: 2021-01-22

## 2021-01-22 RX ORDER — BUPIVACAINE HYDROCHLORIDE 5 MG/ML
2 INJECTION, SOLUTION PERINEURAL ONCE
Status: COMPLETED | OUTPATIENT
Start: 2021-01-22 | End: 2021-01-22

## 2021-01-22 RX ADMIN — BUPIVACAINE HYDROCHLORIDE 10 MG: 5 INJECTION, SOLUTION PERINEURAL at 10:36

## 2021-01-22 RX ADMIN — BETAMETHASONE SODIUM PHOSPHATE AND BETAMETHASONE ACETATE 12 MG: 3; 3 INJECTION, SUSPENSION INTRA-ARTICULAR; INTRALESIONAL; INTRAMUSCULAR at 10:35

## 2021-01-22 NOTE — PROGRESS NOTES
Patient ID: Hoda Blackwood is a 72 y.o. female. Chief Complaint   Patient presents with    Hip Pain    Back Pain        HPI     Patient presents for bilateral hip joint arthritis bilateral hip injections. Right hip most recently    Patient's pain is gone from a 7 or 8 to a 3 or 4    At this time the patient has a new complaint patient with history of bilateral total knees right knee is giving her little bit of pain along the insertion of the hamstrings on the medial side inferiorly    Past Medical History:   Diagnosis Date    Asthma     Chalazion of right lower eyelid     Colon polyps     Degenerative joint disease of knee, left     Dysmenorrhea     GERD (gastroesophageal reflux disease)     Greater trochanteric bursitis of right hip     Hot flashes     Hyperlipidemia     Hypertension     Impaired fasting glucose     Obesity     Scaphoid fracture of wrist     Right.  Sleep disturbances     Tobacco abuse     Remote and limited.  Uterine fibroid      Past Surgical History:   Procedure Laterality Date    BREAST REDUCTION SURGERY Bilateral     CARDIAC CATHETERIZATION  06/15/2007    No significant coronary artery disease, preserved LV systolic function.   SECTION      COLONOSCOPY  09/10/2008    Sigmoid diverticulosis, history of polyps.  COLONOSCOPY  2007    Multiple polyps of the colon, rectal polyps, sigmoid polyps, and splenic flexure polyps.  COLONOSCOPY  2006    Pedunculated thin polyp near the transverse colon, small polyp at the dentate line versus a hemorrhoid.  COLONOSCOPY  2013    COLONOSCOPY  2014    Dr. Kathleen Lindquist. adenom. x 2, 5yr follow up   [address] [place] [address] COLONOSCOPY BIOPSY/STOMA N/A 2020    hyperplastic polyp X 3, Dr. Darrian Ellis, TOTAL ABDOMINAL      KNEE ARTHROSCOPY Left 1999    Torn degenerate posterior third medial meniscus.     MOUTH SURGERY Right 5/1/15  OTHER SURGICAL HISTORY Left 2012    Left knee injection for left knee pain.  OTHER SURGICAL HISTORY Right 2006    Bursa injection for greater trochanteric bursitis.  SALPINGO-OOPHORECTOMY Left     TONSILLECTOMY AND ADENOIDECTOMY      TOTAL KNEE ARTHROPLASTY Left 2013     Family History   Problem Relation Age of Onset    Stomach Cancer Mother     Cervical Cancer Mother     Stroke Father     Cancer Father         Throat.  Breast Cancer Father 79        father cancer right breast    Cancer Brother         Lymphoma.  Stroke Maternal Grandmother     Heart Attack Maternal Grandfather         Myocardial infarction.  Colon Cancer Brother     Migraines Son     Asthma Daughter      Social History     Occupational History    Not on file   Tobacco Use    Smoking status: Former Smoker     Packs/day: 0.25     Years: 1.00     Pack years: 0.25     Types: Cigarettes     Start date: 1997     Quit date: 1998     Years since quittin.0    Smokeless tobacco: Never Used    Tobacco comment: Smoked for about 1 year   Substance and Sexual Activity    Alcohol use: Yes     Alcohol/week: 0.0 standard drinks     Comment: Occasionally, once or twice a year    Drug use: No    Sexual activity: Yes     Partners: Male     Birth control/protection: Post-menopausal     Comment:  24 years        Review of Systems         Physical Exam  Vitals signs and nursing note reviewed. Constitutional:       Appearance: She is well-developed. HENT:      Head: Normocephalic and atraumatic. Nose: Nose normal.   Eyes:      Conjunctiva/sclera: Conjunctivae normal.   Neck:      Musculoskeletal: Normal range of motion and neck supple. Pulmonary:      Effort: Pulmonary effort is normal. No respiratory distress. Musculoskeletal:      Comments: Normal gait     Skin:     General: Skin is warm and dry. Neurological:      Mental Status: She is alert and oriented to person, place, and time. Sensory: No sensory deficit. Psychiatric:         Behavior: Behavior normal.         Thought Content: Thought content normal.       Examination bilateral knees reveal exceptional range of motion and stability right knee demonstrates significant tenderness along the medial hamstrings insertion  Assessment:          1. Low back pain, unspecified back pain laterality, unspecified chronicity, unspecified whether sciatica present    2. Bilateral hip pain    3. Bilateral hip joint arthritis    4. Lumbar spondylosis    5. Chronic pain of left knee    6. Knee tendinitis    Right knee with pain at the insertion of the hamstrings medially well inferior to the joint    Plan:       Inject right knee hamstrings insertion medially    An informed verbal consent for the procedure was obtained and risks including, but not limited to: allergy to medications, injection, bleeding, stiffness of joint, recurrence of symptoms, loss of function, swelling, drainage, irrigation, need for surgery and pseudo-septic inflammation, were explained to the patient. Also, discussed was the potential for further injections, irrigation and debridement and surgery. Alternate means of treatment have also been discussed with the patient.     Administrations This Visit     betamethasone acetate-betamethasone sodium phosphate (CELESTONE) injection 12 mg     Admin Date  01/22/2021  10:35 Action  Given Dose  12 mg Route  Intra-articular Site   Administered By  Jennifer Valdez LPN    Ordering Provider: Richad Scheuermann, MD    NDC: 1704-5794-51    Lot#: [id number]    : [employer]    Patient Supplied?: No          bupivacaine (MARCAINE) 0.5 % injection 10 mg     Admin Date  01/22/2021  10:36 Action  Given Dose  10 mg Route  Intra-articular Site   Administered By  Jennifer Valdez LPN    Ordering Provider: Richad Scheuermann, MD    NDC: 4881-0531-05    Lot#: [id number]    : Lei Hutton    Patient Supplied?: No No orders of the defined types were placed in this encounter. Beny De Anda MD    Please note that this chart was generated using voicerecognition Dragon dictation software. Although every effort was made to ensurethe accuracy of this automated transcription, some errors in transcription may haveoccurred.

## 2021-03-10 ENCOUNTER — OFFICE VISIT (OUTPATIENT)
Dept: OBGYN | Age: 71
End: 2021-03-10
Payer: MEDICARE

## 2021-03-10 ENCOUNTER — HOSPITAL ENCOUNTER (OUTPATIENT)
Age: 71
Setting detail: SPECIMEN
Discharge: HOME | End: 2021-03-10
Payer: MEDICARE

## 2021-03-10 VITALS
BODY MASS INDEX: 37.13 KG/M2 | WEIGHT: 245 LBS | SYSTOLIC BLOOD PRESSURE: 170 MMHG | HEIGHT: 68 IN | HEART RATE: 80 BPM | DIASTOLIC BLOOD PRESSURE: 90 MMHG

## 2021-03-10 DIAGNOSIS — Z90.711: ICD-10-CM

## 2021-03-10 DIAGNOSIS — Z01.419 WELL WOMAN EXAM WITH ROUTINE GYNECOLOGICAL EXAM: Primary | ICD-10-CM

## 2021-03-10 DIAGNOSIS — Z01.419 WELL WOMAN EXAM WITH ROUTINE GYNECOLOGICAL EXAM: ICD-10-CM

## 2021-03-10 PROCEDURE — G0101 CA SCREEN;PELVIC/BREAST EXAM: HCPCS | Performed by: OBSTETRICS & GYNECOLOGY

## 2021-03-10 PROCEDURE — G0463 HOSPITAL OUTPT CLINIC VISIT: HCPCS

## 2021-03-10 PROCEDURE — G0145 SCR C/V CYTO,THINLAYER,RESCR: HCPCS

## 2021-03-10 PROCEDURE — 99397 PER PM REEVAL EST PAT 65+ YR: CPT

## 2021-03-10 PROCEDURE — G8484 FLU IMMUNIZE NO ADMIN: HCPCS | Performed by: OBSTETRICS & GYNECOLOGY

## 2021-03-10 ASSESSMENT — PATIENT HEALTH QUESTIONNAIRE - PHQ9
1. LITTLE INTEREST OR PLEASURE IN DOING THINGS: 0
SUM OF ALL RESPONSES TO PHQ QUESTIONS 1-9: 0
2. FEELING DOWN, DEPRESSED OR HOPELESS: 0
SUM OF ALL RESPONSES TO PHQ QUESTIONS 1-9: 0

## 2021-03-10 NOTE — PROGRESS NOTES
History and Physical  [address], [address] [place] [address], Emeterio Christine 82. [phone]   Fax [phone]  Cristina Burden  3/10/2021              72 y.o. Chief Complaint   Patient presents with    Gynecologic Exam    Menopause     severe hot flashes       No LMP recorded (lmp unknown). Patient is postmenopausal.             Primary Care Physician: Gage James MD    The patient was seen and examined. She has no chief complaint today and is here for her annual exam.  Her bowels are regular. There are no voiding complaints. She denies any bloating. She denies vaginal discharge and was counseled on STD's and the need for barrier contraception. HPI : Cristina Burden is a 72 y.o. female     Pt presents to office for annual exam. Pt without c/c. Pt states she had a supracervical hysterectomy with a USO many years ago. Pt wishes to have pap smears. Pt states her mother had cervical cancer.   ________________________________________________________________________  OB History    Para Term  AB Living   2 2 2 0 0 2   SAB TAB Ectopic Molar Multiple Live Births   0 0 0 0 0 2      # Outcome Date GA Lbr Larry/2nd Weight Sex Delivery Anes PTL Lv   2 Term 73   7 lb 10 oz (3.459 kg) M CS-LTranv   RENE      Name: Nannette Dash   1 Term 72   7 lb 8 oz (3.402 kg) F Vag-Spont   RENE      Name: Lisa Ellis     Past Medical History:   Diagnosis Date    Asthma     Chalazion of right lower eyelid 2014    Colon polyps     Degenerative joint disease of knee, left     Dysmenorrhea     GERD (gastroesophageal reflux disease)     Greater trochanteric bursitis of right hip     Hot flashes     Hyperlipidemia     Hypertension     Impaired fasting glucose     Obesity     Scaphoid fracture of wrist     Right.  Sleep disturbances     Tobacco abuse     Remote and limited.     Uterine fibroid                                                                    Past Surgical History:   Procedure Laterality Date    BREAST REDUCTION SURGERY Bilateral     CARDIAC CATHETERIZATION  06/15/2007    No significant coronary artery disease, preserved LV systolic function.   SECTION      COLONOSCOPY  09/10/2008    Sigmoid diverticulosis, history of polyps.  COLONOSCOPY  2007    Multiple polyps of the colon, rectal polyps, sigmoid polyps, and splenic flexure polyps.  COLONOSCOPY  2006    Pedunculated thin polyp near the transverse colon, small polyp at the dentate line versus a hemorrhoid.  COLONOSCOPY      COLONOSCOPY  2014    Dr. Enedina Garcia. adenom. x 2, 5yr follow up   [address] [place] [address] COLONOSCOPY BIOPSY/STOMA N/A 2020    hyperplastic polyp X 3, Dr. Aliyah Bolton, TOTAL ABDOMINAL      KNEE ARTHROSCOPY Left 1999    Torn degenerate posterior third medial meniscus.  MOUTH SURGERY Right 5/1/15    OTHER SURGICAL HISTORY Left 2012    Left knee injection for left knee pain.  OTHER SURGICAL HISTORY Right 2006    Bursa injection for greater trochanteric bursitis.  SALPINGO-OOPHORECTOMY Left     TONSILLECTOMY AND ADENOIDECTOMY      TOTAL KNEE ARTHROPLASTY Left      Family History   Problem Relation Age of Onset    Stomach Cancer Mother     Cervical Cancer Mother     Stroke Father     Cancer Father         Throat.  Breast Cancer Father 79        father cancer right breast    Cancer Brother         Lymphoma.  Stroke Maternal Grandmother     Heart Attack Maternal Grandfather         Myocardial infarction.     Colon Cancer Brother     Migraines Son     Asthma Daughter     Lung Cancer Sister      Social History     Socioeconomic History    Marital status:      Spouse name: Not on file    Number of children: Not on file    Years of education: Not on file    Highest education level: Not on file   Occupational History    Not on file   Social Needs    Financial resource strain: Not on file    Food insecurity     Worry: Not on file     Inability: Not on file    Transportation needs     Medical: Not on file     Non-medical: Not on file   Tobacco Use    Smoking status: Former Smoker     Packs/day: 0.25     Years: 1.00     Pack years: 0.25     Types: Cigarettes     Start date: 1997     Quit date: 1998     Years since quittin.2    Smokeless tobacco: Never Used    Tobacco comment: Smoked for about 1 year   Substance and Sexual Activity    Alcohol use:  Yes     Alcohol/week: 0.0 standard drinks     Comment: Occasionally, once or twice a year    Drug use: No    Sexual activity: Yes     Partners: Male     Birth control/protection: Post-menopausal     Comment:  24 years   Lifestyle    Physical activity     Days per week: Not on file     Minutes per session: Not on file    Stress: Not on file   Relationships    Social connections     Talks on phone: Not on file     Gets together: Not on file     Attends [employer] service: Not on file     Active member of club or organization: Not on file     Attends meetings of clubs or organizations: Not on file     Relationship status: Not on file    Intimate partner violence     Fear of current or ex partner: Not on file     Emotionally abused: Not on file     Physically abused: Not on file     Forced sexual activity: Not on file   Other Topics Concern    Not on file   Social History Narrative    Not on file       MEDICATIONS:  Current Outpatient Medications   Medication Sig Dispense Refill    NONFORMULARY 1 tablet daily Flash ease      BLACK COHOSH EXTRACT PO Take 1 capsule by mouth daily      OMEPRAZOLE PO Take by mouth      hydroCHLOROthiazide (HYDRODIURIL) 25 MG tablet TAKE 1 TABLET DAILY 90 tablet 3    meloxicam (MOBIC) 15 MG tablet TAKE 1 TABLET DAILY 90 tablet 3    albuterol sulfate  (90 Base) MCG/ACT inhaler Inhale 2 puffs into the lungs every 6 hours as needed for Wheezing or Shortness of Breath 1 Inhaler 0    estrogens, conjugated, (PREMARIN) 0.625 MG tablet Take 1 tablet by mouth daily (Patient not taking: Reported on 11/18/2019) 21 tablet 3     No current facility-administered medications for this visit. ALLERGIES:  Allergies as of 03/10/2021 - Review Complete 03/10/2021   Allergen Reaction Noted    Minocin [minocycline] Nausea Only and Other (See Comments) 02/17/2014    Oxycodone Nausea Only 04/12/2016       Symptoms of decreased mood absent    **If either question is answered in a  positive fashion then complete the PHQ9 Scoring Evaluation and make the appropriate referral**      Immunization status: up to date and documented. Gynecologic History:  Menarche: 16 yo  Menopause at 39 yo     No LMP recorded (lmp unknown). Patient is postmenopausal.    Sexually Active: Yes    STD History: No     Permanent Sterilization: Yes hysterectomy at 37 d/t fibroids   Reversible Birth Control: No        Hormone Replacement Exposure: No      Genetic Qualified Family History of Breast, Ovarian , Colon or Uterine Cancer: No     If YES see scanned worksheet. Preventative Health Testing:    Health Maintenance:  Health Maintenance Due   Topic Date Due    A1C test (Diabetic or Prediabetic)  Never done    COVID-19 Vaccine (1 of 2) Never done    DTaP/Tdap/Td vaccine (1 - Tdap) Never done    Annual Wellness Visit (AWV)  Never done       Date of Last Pap Smear: 8/29/2019  Abnormal Pap Smear History: none  Colposcopy History:   Date of Last Mammogram: 2021  Date of Last Colonoscopy:   Date of Last Bone Density:      ________________________________________________________________________        REVIEW OF SYSTEMS:       A minimum of an eleven point review of systems was completed. Review Of Systems (11 point):  Constitutional: No fever, chills or malaise;  No weight change or fatigue  Head and Eyes: No vision, Headache, Dizziness or trauma in last 12 months  ENT ROS: No hearing, Tinnitis, sinus or taste problems  Hematological and Lymphatic ROS:No Lymphoma, Von Willebrand's, Hemophillia or Bleeding History  Psych ROS: No Depression, Homicidal thoughts,suicidal thoughts, or anxiety  Breast ROS: No prior breast abnormalities or lumps  Respiratory ROS: No SOB, Pneumoniae,Cough, or Pulmonary Embolism History  Cardiovascular ROS: No Chest Pain with Exertion, Palpitations, Syncope, Edema, Arrhythmia  Gastrointestinal ROS: No Indigestion, Heartburn, Nausea, vomiting, Diarrhea, Constipation,or Bowel Changes; No Bloody Stools or melena  Genito-Urinary ROS: No Dysuria, Hematuria or Nocturia. No Urinary Incontinence or Vaginal Discharge  Musculoskeletal ROS: No Arthralgia, Arthritis,Gout,Osteoporosis or Rheumatism  Neurological ROS: No CVA, Migraines, Epilepsy, Seizure Hx, or Limb Weakness  Dermatological ROS: No Rash, Itching, Hives, Mole Changes or Cancer                                                                                                                                                                                                                                  PHYSICAL Exam:     Constitutional:  Vitals:    03/10/21 1132 03/10/21 1154   BP: (!) 184/98 (!) 170/90   Site: Right Upper Arm    Position: Sitting    Cuff Size: Large Adult    Pulse: 80    Weight: 245 lb (111.1 kg)    Height: 5' 8\" (1.727 m)        Chaperone for Intimate Exam   Chaperone was offered and accepted as part of the rooming process.  Chaperone: [facility]          General Appearance: This  is a well Developed, well Nourished, well groomed female. Her BMI was reviewed. Nutritional decision making was discussed. Skin:  There was a Normal Inspection of the skin without rashes or lesions. There were no rashes. (Papular, Maculopapular, Hives, Pustular, Macular)     There were no lesions (Ulcers, Erythema, Abn. Appearing Nevi)            Lymphatic:  No Lymph Nodes were Palpable in the neck , axilla or groin.    # Of Lymph Nodes; Location ; Character [Normal]  [Shotty] [Tender] [Enlarged]     Neck and EENT:  The neck was supple. There were no masses   The thyroid was not enlarged and had no masses. Perrla, EOMI B/L, TMI B/L No Abnormalities. Throat inspected-No exudates or Masses, Nares Patent No Masses        Respiratory: The lungs were auscultated and found to be clear. There were no rales, rhonchi or wheezes. There was a good respiratory effort. Cardiovascular: The heart was in a regular rate and rhythm. . No S3 or S4. There was no murmur appreciated. Location, grade, and radiation are not applicable. Extremities: The patients extremities were without calf tenderness, edema, or varicosities. There was full range of motion in all four extremities. Pulses in all four extremities were appreciated and are 2/4. Abdomen: The abdomen was soft and non-tender. There were good bowel sounds in all quadrants and there was no guarding, rebound or rigidity. On evaluation there was no evidence of hepatosplenomegaly and there was no costal vertebral yan tenderness bilaterally. No hernias were appreciated. Abdominal Scars: c/w prior surgeries    Psych: The patient had a normal Orientation to: Time, Place, Person, and Situation  There is no Mood / Affect changes    Breast:  (Chest)  normal appearance, no masses or tenderness  Self breast exams were reviewed in detail. Literature was given. Pelvic Exam:  Vulva and vagina appear normal. Cervix flush with vagina. No masses identified. No discharge or bleeding noted    Rectal Exam:  exam declined by patient          Musculosk:  Normal Gait and station was noted. Digits were evaluated without abnormal findings. Range of motion, stability and strength were evaluated and found to be appropriate for the patients age. OMM Structural Component:  The patient did not complain of a Chief complaint requiring OMM.   Chief Complaint:none    Structural Exam: No Interest ASSESSMENT:      72 y.o. Annual   Diagnosis Orders   1. Well woman exam with routine gynecological exam  PAP SMEAR   2. Hx of abdominal supracervical subtotal hysterectomy            Chief Complaint   Patient presents with    Gynecologic Exam    Menopause     severe hot flashes          Past Medical History:   Diagnosis Date    Asthma     Chalazion of right lower eyelid 2014    Colon polyps     Degenerative joint disease of knee, left     Dysmenorrhea     GERD (gastroesophageal reflux disease)     Greater trochanteric bursitis of right hip     Hot flashes     Hyperlipidemia     Hypertension     Impaired fasting glucose     Obesity     Scaphoid fracture of wrist     Right.  Sleep disturbances     Tobacco abuse     Remote and limited.  Uterine fibroid          Patient Active Problem List    Diagnosis Date Noted    Low back pain 12/11/2020    Primary osteoarthritis of right hip 12/11/2020    Bilateral hip joint arthritis 12/11/2020    Lumbar spondylosis 12/11/2020    Impaired fasting glucose     Asthma     Hypertension     Hyperlipidemia     Obesity     Sleep disturbances     Degenerative joint disease of knee, left     Tenosynovitis of foot and ankle R/L 05/06/2015    Plantar fascial fibromatosis R/L 05/06/2015    Foot pain 05/06/2015    Colon polyp 09/17/2013    Postmenopausal HRT (hormone replacement therapy) 09/03/2013    Reflux 09/03/2013          Hereditary Breast, Ovarian, Colon and Uterine Cancer screening Done. Tobacco & Secondary smoke risks reviewed; instructed on cessation and avoidance      Counseling Completed:  Preventative Health Recommendations and Follow up. The patient was informed of the recommended preventative health recommendations. 1. Annuals every year; Cytology collections per prevailing guidelines. 2. Mammograms begin every year at 37 yo if no abnormalities are found and no family history. 3. Bone density studies every 2-3 years.  Begin at 71 yo. If no fracture history or osteoporosis family history. (significant). 4. Colonoscopy begin at 38 yo. Repeat every ten years if negative and no family history. 5. Calcium of 4896-8724 mg/day in split dosing  6. Vitamin D 400-800 IU/day  7. All other preventative health recommendations will be managed by the patients Primary care physician. PLAN:  Return in about 1 year (around 3/10/2022) for Annual Exam.  Repeat Annual every 1 year  Cervical Cytology Evaluation begins at 24years old. If Negative Cytology, Follow-up screening per current guidelines. Mammograms every 1 year. If 35 yo and last mammogram was negative. Calcium and Vitamin D dosing reviewed. Colonoscopy screening reviewed as well as onset for bone density testing. Birth control and barrier recommendations discussed. STD counseling and prevention reviewed. Gardisil counseling completed for all patients 10-35 yo. Routine health maintenance per patients PCP. Orders Placed This Encounter   Procedures    PAP SMEAR     Patient History:    No LMP recorded (lmp unknown). Patient is postmenopausal.  OBGYN Status: Postmenopausal  Past Surgical History:  No date: BREAST REDUCTION SURGERY; Bilateral  06/15/2007: CARDIAC CATHETERIZATION      Comment:  No significant coronary artery disease, preserved LV                systolic function. No date:  SECTION  09/10/2008: COLONOSCOPY      Comment:  Sigmoid diverticulosis, history of polyps.  2007: COLONOSCOPY      Comment:  Multiple polyps of the colon, rectal polyps, sigmoid                polyps, and splenic flexure polyps.  2006: COLONOSCOPY      Comment:  Pedunculated thin polyp near the transverse colon, small               polyp at the dentate line versus a hemorrhoid. 2013: COLONOSCOPY  2014: COLONOSCOPY      Comment:  Dr. Jose Clifton. adenom.  x 2, 5yr follow up  No date: DILATION AND CURETTAGE OF UTERUS  2020:  COLONOSCOPY BIOPSY/STOMA; N/A      Comment: hyperplastic polyp X 3, Dr. Plummer   No date: HYSTERECTOMY, TOTAL ABDOMINAL  1999: KNEE ARTHROSCOPY; Left      Comment: Torn degenerate posterior third medial meniscus. 5/1/15: MOUTH SURGERY; Right  2012: OTHER SURGICAL HISTORY; Left      Comment:  Left knee injection for left knee pain. 2006: OTHER SURGICAL HISTORY; Right      Comment:  Bursa injection for greater trochanteric bursitis. No date: SALPINGO-OOPHORECTOMY; Left  No date: TONSILLECTOMY AND ADENOIDECTOMY  2013: TOTAL KNEE ARTHROPLASTY; Left  Medications/Contraceptives Affecting Cytology     Estrogens Disp Start End     estrogens, conjugated, (PREMARIN) 0.625 MG tablet    21 tablet 2019    Sig: Take 1 tablet by mouth daily    Patient not taking: Reported on 2019       Route: Oral        Social History    Tobacco Use      Smoking status: Former Smoker        Packs/day: 0.25        Years: 1.00        Pack years: .25        Types: Cigarettes        Start date: 1997        Quit date: 1998        Years since quittin.2      Smokeless tobacco: Never Used      Tobacco comment: Smoked for about 1 year       Standing Status:   Future     Standing Expiration Date:   3/10/2022     Order Specific Question:   Collection Type     Answer: Thin Prep     Order Specific Question:   Prior Abnormal Pap Test     Answer:   No     Order Specific Question:   Screening or Diagnostic     Answer:   Screening     Order Specific Question:   HPV Requested? Answer:   Yes - If Abnormal Reflex HPV     Order Specific Question:   High Risk Patient     Answer:   N/A           The patient, Lilliana Sheridan is a 72 y.o. female, was seen with a total time spent of 45 minutes for the visit on this date of service by the E/M provider. The time component had both face to face and non face to face time spent in determining the total time component.   Counseling and education regarding her diagnosis listed below and her options regarding those diagnoses were also included in determining her time component. Diagnosis Orders   1. Well woman exam with routine gynecological exam  PAP SMEAR   2. Hx of abdominal supracervical subtotal hysterectomy          The patient had her preventative health maintenance recommendations and follow-up reviewed with her at the completion of her visit.

## 2021-03-12 ENCOUNTER — OFFICE VISIT (OUTPATIENT)
Dept: ORTHOPEDIC SURGERY | Age: 71
End: 2021-03-12
Payer: MEDICARE

## 2021-03-12 VITALS
HEART RATE: 68 BPM | HEIGHT: 68 IN | BODY MASS INDEX: 36.98 KG/M2 | SYSTOLIC BLOOD PRESSURE: 166 MMHG | DIASTOLIC BLOOD PRESSURE: 87 MMHG | WEIGHT: 244 LBS

## 2021-03-12 DIAGNOSIS — M25.552 BILATERAL HIP PAIN: ICD-10-CM

## 2021-03-12 DIAGNOSIS — M25.551 BILATERAL HIP PAIN: ICD-10-CM

## 2021-03-12 DIAGNOSIS — M25.551 HIP PAIN, RIGHT: ICD-10-CM

## 2021-03-12 DIAGNOSIS — M54.50 LOW BACK PAIN, UNSPECIFIED BACK PAIN LATERALITY, UNSPECIFIED CHRONICITY, UNSPECIFIED WHETHER SCIATICA PRESENT: Primary | ICD-10-CM

## 2021-03-12 DIAGNOSIS — M70.61 TROCHANTERIC BURSITIS, RIGHT HIP: ICD-10-CM

## 2021-03-12 PROCEDURE — 1123F ACP DISCUSS/DSCN MKR DOCD: CPT | Performed by: ORTHOPAEDIC SURGERY

## 2021-03-12 PROCEDURE — 3017F COLORECTAL CA SCREEN DOC REV: CPT | Performed by: ORTHOPAEDIC SURGERY

## 2021-03-12 PROCEDURE — 1036F TOBACCO NON-USER: CPT | Performed by: ORTHOPAEDIC SURGERY

## 2021-03-12 PROCEDURE — G8427 DOCREV CUR MEDS BY ELIG CLIN: HCPCS | Performed by: ORTHOPAEDIC SURGERY

## 2021-03-12 PROCEDURE — G8417 CALC BMI ABV UP PARAM F/U: HCPCS | Performed by: ORTHOPAEDIC SURGERY

## 2021-03-12 PROCEDURE — G8484 FLU IMMUNIZE NO ADMIN: HCPCS | Performed by: ORTHOPAEDIC SURGERY

## 2021-03-12 PROCEDURE — 20610 DRAIN/INJ JOINT/BURSA W/O US: CPT | Performed by: ORTHOPAEDIC SURGERY

## 2021-03-12 PROCEDURE — 99214 OFFICE O/P EST MOD 30 MIN: CPT | Performed by: ORTHOPAEDIC SURGERY

## 2021-03-12 PROCEDURE — 4040F PNEUMOC VAC/ADMIN/RCVD: CPT | Performed by: ORTHOPAEDIC SURGERY

## 2021-03-12 PROCEDURE — 99213 OFFICE O/P EST LOW 20 MIN: CPT | Performed by: ORTHOPAEDIC SURGERY

## 2021-03-12 PROCEDURE — 1090F PRES/ABSN URINE INCON ASSESS: CPT | Performed by: ORTHOPAEDIC SURGERY

## 2021-03-12 PROCEDURE — G8399 PT W/DXA RESULTS DOCUMENT: HCPCS | Performed by: ORTHOPAEDIC SURGERY

## 2021-03-12 RX ORDER — BUPIVACAINE HYDROCHLORIDE 5 MG/ML
2 INJECTION, SOLUTION PERINEURAL ONCE
Status: COMPLETED | OUTPATIENT
Start: 2021-03-12 | End: 2021-03-12

## 2021-03-12 RX ORDER — BETAMETHASONE SODIUM PHOSPHATE AND BETAMETHASONE ACETATE 3; 3 MG/ML; MG/ML
12 INJECTION, SUSPENSION INTRA-ARTICULAR; INTRALESIONAL; INTRAMUSCULAR; SOFT TISSUE ONCE
Status: COMPLETED | OUTPATIENT
Start: 2021-03-12 | End: 2021-03-12

## 2021-03-12 RX ORDER — LIDOCAINE HYDROCHLORIDE 10 MG/ML
2 INJECTION, SOLUTION INFILTRATION; PERINEURAL ONCE
Status: COMPLETED | OUTPATIENT
Start: 2021-03-12 | End: 2021-03-12

## 2021-03-12 RX ADMIN — LIDOCAINE HYDROCHLORIDE 2 ML: 10 INJECTION, SOLUTION INFILTRATION; PERINEURAL at 10:23

## 2021-03-12 RX ADMIN — BETAMETHASONE SODIUM PHOSPHATE AND BETAMETHASONE ACETATE 12 MG: 3; 3 INJECTION, SUSPENSION INTRA-ARTICULAR; INTRALESIONAL; INTRAMUSCULAR at 10:23

## 2021-03-12 RX ADMIN — BUPIVACAINE HYDROCHLORIDE 10 MG: 5 INJECTION, SOLUTION PERINEURAL at 10:23

## 2021-03-12 NOTE — PROGRESS NOTES
Use    Smoking status: Former Smoker     Packs/day: 0.25     Years: 1.00     Pack years: 0.25     Types: Cigarettes     Start date: 1997     Quit date: 1998     Years since quittin.2    Smokeless tobacco: Never Used    Tobacco comment: Smoked for about 1 year   Substance and Sexual Activity    Alcohol use: Yes     Alcohol/week: 0.0 standard drinks     Comment: Occasionally, once or twice a year    Drug use: No    Sexual activity: Yes     Partners: Male     Birth control/protection: Post-menopausal     Comment:  24 years   Lifestyle    Physical activity     Days per week: Not on file     Minutes per session: Not on file    Stress: Not on file   Relationships    Social connections     Talks on phone: Not on file     Gets together: Not on file     Attends [employer] service: Not on file     Active member of club or organization: Not on file     Attends meetings of clubs or organizations: Not on file     Relationship status: Not on file    Intimate partner violence     Fear of current or ex partner: Not on file     Emotionally abused: Not on file     Physically abused: Not on file     Forced sexual activity: Not on file   Other Topics Concern    Not on file   Social History Narrative    Not on file     Past Medical History:   Diagnosis Date    Asthma     Chalazion of right lower eyelid     Colon polyps     Degenerative joint disease of knee, left     Dysmenorrhea     GERD (gastroesophageal reflux disease)     Greater trochanteric bursitis of right hip     Hot flashes     Hyperlipidemia     Hypertension     Impaired fasting glucose     Obesity     Scaphoid fracture of wrist     Right.  Sleep disturbances     Tobacco abuse     Remote and limited.     Uterine fibroid      Past Surgical History:   Procedure Laterality Date    BREAST REDUCTION SURGERY Bilateral     CARDIAC CATHETERIZATION  06/15/2007    No significant coronary artery disease, preserved LV systolic function.   SECTION      COLONOSCOPY  09/10/2008    Sigmoid diverticulosis, history of polyps.  COLONOSCOPY  2007    Multiple polyps of the colon, rectal polyps, sigmoid polyps, and splenic flexure polyps.  COLONOSCOPY  2006    Pedunculated thin polyp near the transverse colon, small polyp at the dentate line versus a hemorrhoid.  COLONOSCOPY      COLONOSCOPY  2014    Dr. Jessica Lindquist. adenom. x 2, 5yr follow up   [facility] COLONOSCOPY BIOPSY/STOMA N/A 2020    hyperplastic polyp X 3, Dr. Miles Richards, TOTAL ABDOMINAL      KNEE ARTHROSCOPY Left 1999    Torn degenerate posterior third medial meniscus.  MOUTH SURGERY Right 5/1/15    OTHER SURGICAL HISTORY Left 2012    Left knee injection for left knee pain.  OTHER SURGICAL HISTORY Right 2006    Bursa injection for greater trochanteric bursitis.  SALPINGO-OOPHORECTOMY Left     TONSILLECTOMY AND ADENOIDECTOMY      TOTAL KNEE ARTHROPLASTY Left      Family History   Problem Relation Age of Onset    Stomach Cancer Mother     Cervical Cancer Mother     Stroke Father     Cancer Father         Throat.  Breast Cancer Father 79        father cancer right breast    Cancer Brother         Lymphoma.  Stroke Maternal Grandmother     Heart Attack Maternal Grandfather         Myocardial infarction.  Colon Cancer Brother     Migraines Son     Asthma Daughter     Lung Cancer Sister         Physical Exam:  Vitals signs and nursing note reviewed. Constitutional:       Appearance: She is well-developed. HENT:      Head: Normocephalic and atraumatic. Nose: Nose normal.   Eyes:      Conjunctiva/sclera: Conjunctivae normal.   Neck:      Musculoskeletal: Normal range of motion and neck supple. Pulmonary:      Effort: Pulmonary effort is normal. No respiratory distress.    Musculoskeletal:      Comments: Normal gait     Skin:     General: Skin is warm and dry. Neurological:      Mental Status: She is alert and oriented to person, place, and time. Sensory: No sensory deficit. Psychiatric:         Behavior: Behavior normal.         Thought Content: Thought content normal.    Tender over bilateral greater trochanters  Right hip much greater than left hip peritrochanteric tenderness    No pain with range of motion right hip at this time    Assessment and Plan:  1. Low back pain, unspecified back pain laterality, unspecified chronicity, unspecified whether sciatica present    2. Bilateral hip pain    3. Hip pain, right    4. Trochanteric bursitis, right hip        R trochanteric bursa injected    Follow up 3 weeks     An informed verbal consent for the procedure was obtained and risks including, but not limited to: allergy to medications, injection, bleeding, stiffness of joint, recurrence of symptoms, loss of function, swelling, drainage, irrigation, need for surgery and pseudo-septic inflammation, were explained to the patient. Also, discussed was the potential for further injections, irrigation and debridement and surgery. Alternate means of treatment have also been discussed with the patient.     Administrations This Visit     betamethasone acetate-betamethasone sodium phosphate (CELESTONE) injection 12 mg     Admin Date  03/12/2021  10:23 Action  Given Dose  12 mg Route  Intra-articular Site   Administered By  Rufus Hassan LPN    Ordering Provider: Shira Stevens MD    NDC: 9475-5606-59    Lot#: [id number]    : [facility]    Patient Supplied?: No          bupivacaine (MARCAINE) 0.5 % injection 10 mg     Admin Date  03/12/2021  10:23 Action  Given Dose  10 mg Route  Intra-articular Site   Administered By  Rufus Hassan LPN    Ordering Provider: Shira Stevens MD    NDC: 6159-4988-88    Lot#: [id number]    : Radha Calderon    Patient Supplied?: No          lidocaine 1 % injection 2 mL     Admin Date  03/12/2021  10:23 Action  Given Dose  2 mL Route  Intra-articular Site   Administered By  Argelia Chun LPN    Ordering Provider: Sourav Pinedo MD    [facility]: [id number]    Lot#: [id number]    : [id number] [facility]    Patient Supplied?: No                  Provider Attestation:  Didi Miguelito Becker, personally performed the services described in this documentation. All medical record entries made by the scribe were at my direction and in my presence. I have reviewed the chart and discharge instructions and agree that the records reflect my personal performance and is accurate and complete. Lenny Verdugo MD 3/12/21     Scribe Attestation:  By signing my name below, Bradleymona Shaikh, attest that this documentation has been prepared under the direction and in the presence of Dr. Dunia Salguero. Electronically signed: Dave Null, 3/12/21     Please note that this chart was generated using voice recognition Dragon dictation software. Although every effort was made to ensure the accuracy of this automated transcription, some errors in transcription may have occurred.

## 2021-03-15 RX ORDER — HYDROCHLOROTHIAZIDE 25 MG/1
TABLET ORAL
Qty: 90 TABLET | Refills: 3 | Status: SHIPPED | OUTPATIENT
Start: 2021-03-15 | End: 2022-02-14

## 2021-03-18 DIAGNOSIS — Z01.419 WELL WOMAN EXAM WITH ROUTINE GYNECOLOGICAL EXAM: Primary | ICD-10-CM

## 2021-03-18 DIAGNOSIS — Z90.711: ICD-10-CM

## 2021-03-18 LAB — CYTOLOGY REPORT: NORMAL

## 2021-03-23 ENCOUNTER — HOSPITAL ENCOUNTER (OUTPATIENT)
Dept: LAB | Age: 71
Discharge: HOME | End: 2021-03-23
Payer: MEDICARE

## 2021-03-23 DIAGNOSIS — E78.00 PURE HYPERCHOLESTEROLEMIA: ICD-10-CM

## 2021-03-23 DIAGNOSIS — I10 ESSENTIAL HYPERTENSION: ICD-10-CM

## 2021-03-23 DIAGNOSIS — R73.01 IMPAIRED FASTING GLUCOSE: ICD-10-CM

## 2021-03-23 LAB
ABSOLUTE EOS #: 0.18 K/UL (ref 0–0.44)
ABSOLUTE IMMATURE GRANULOCYTE: <0.03 K/UL (ref 0–0.3)
ABSOLUTE LYMPH #: 3.07 K/UL (ref 1.1–3.7)
ABSOLUTE MONO #: 0.63 K/UL (ref 0.1–1.2)
ALBUMIN SERPL-MCNC: 4 G/DL (ref 3.5–5.2)
ALBUMIN/GLOBULIN RATIO: 1 (ref 1–2.5)
ALP BLD-CCNC: 105 U/L (ref 35–104)
ALT SERPL-CCNC: 16 U/L (ref 5–33)
ANION GAP SERPL CALCULATED.3IONS-SCNC: 8 MMOL/L (ref 9–17)
AST SERPL-CCNC: 16 U/L
BASOPHILS # BLD: 1 % (ref 0–2)
BASOPHILS ABSOLUTE: 0.04 K/UL (ref 0–0.2)
BILIRUB SERPL-MCNC: 0.35 MG/DL (ref 0.3–1.2)
BUN BLDV-MCNC: 24 MG/DL (ref 8–23)
BUN/CREAT BLD: 23 (ref 9–20)
CALCIUM SERPL-MCNC: 9.8 MG/DL (ref 8.6–10.4)
CHLORIDE BLD-SCNC: 105 MMOL/L (ref 98–107)
CHOLESTEROL/HDL RATIO: 3.8
CHOLESTEROL: 202 MG/DL
CO2: 30 MMOL/L (ref 20–31)
CREAT SERPL-MCNC: 1.04 MG/DL (ref 0.5–0.9)
DIFFERENTIAL TYPE: NORMAL
EOSINOPHILS RELATIVE PERCENT: 2 % (ref 1–4)
ESTIMATED AVERAGE GLUCOSE: 128 MG/DL
GFR AFRICAN AMERICAN: >60 ML/MIN
GFR NON-AFRICAN AMERICAN: 53 ML/MIN
GFR SERPL CREATININE-BSD FRML MDRD: ABNORMAL ML/MIN/{1.73_M2}
GFR SERPL CREATININE-BSD FRML MDRD: ABNORMAL ML/MIN/{1.73_M2}
GLUCOSE BLD-MCNC: 106 MG/DL (ref 70–99)
HBA1C MFR BLD: 6.1 % (ref 4–6)
HCT VFR BLD CALC: 41.4 % (ref 36.3–47.1)
HDLC SERPL-MCNC: 53 MG/DL
HEMOGLOBIN: 12.8 G/DL (ref 11.9–15.1)
IMMATURE GRANULOCYTES: 0 %
LDL CHOLESTEROL: 128 MG/DL (ref 0–130)
LYMPHOCYTES # BLD: 41 % (ref 24–43)
MCH RBC QN AUTO: 26.2 PG (ref 25.2–33.5)
MCHC RBC AUTO-ENTMCNC: 30.9 G/DL (ref 25.2–33.5)
MCV RBC AUTO: 84.7 FL (ref 82.6–102.9)
MONOCYTES # BLD: 9 % (ref 3–12)
NRBC AUTOMATED: 0 PER 100 WBC
PDW BLD-RTO: 13.3 % (ref 11.8–14.4)
PLATELET # BLD: 342 K/UL (ref 138–453)
PLATELET ESTIMATE: NORMAL
PMV BLD AUTO: 9.1 FL (ref 8.1–13.5)
POTASSIUM SERPL-SCNC: 4.1 MMOL/L (ref 3.7–5.3)
RBC # BLD: 4.89 M/UL (ref 3.95–5.11)
RBC # BLD: NORMAL 10*6/UL
SEG NEUTROPHILS: 47 % (ref 36–65)
SEGMENTED NEUTROPHILS ABSOLUTE COUNT: 3.51 K/UL (ref 1.5–8.1)
SODIUM BLD-SCNC: 143 MMOL/L (ref 135–144)
TOTAL PROTEIN: 8 G/DL (ref 6.4–8.3)
TRIGL SERPL-MCNC: 105 MG/DL
VLDLC SERPL CALC-MCNC: ABNORMAL MG/DL (ref 1–30)
WBC # BLD: 7.4 K/UL (ref 3.5–11.3)
WBC # BLD: NORMAL 10*3/UL

## 2021-03-23 PROCEDURE — 83036 HEMOGLOBIN GLYCOSYLATED A1C: CPT

## 2021-03-23 PROCEDURE — 80053 COMPREHEN METABOLIC PANEL: CPT

## 2021-03-23 PROCEDURE — 85025 COMPLETE CBC W/AUTO DIFF WBC: CPT

## 2021-03-23 PROCEDURE — 80061 LIPID PANEL: CPT

## 2021-03-23 PROCEDURE — 36415 COLL VENOUS BLD VENIPUNCTURE: CPT

## 2021-03-30 ENCOUNTER — OFFICE VISIT (OUTPATIENT)
Dept: FAMILY MEDICINE CLINIC | Age: 71
End: 2021-03-30
Payer: MEDICARE

## 2021-03-30 VITALS
DIASTOLIC BLOOD PRESSURE: 82 MMHG | RESPIRATION RATE: 18 BRPM | SYSTOLIC BLOOD PRESSURE: 132 MMHG | WEIGHT: 246 LBS | HEIGHT: 68 IN | HEART RATE: 84 BPM | BODY MASS INDEX: 37.28 KG/M2

## 2021-03-30 DIAGNOSIS — G47.9 SLEEP DISTURBANCES: ICD-10-CM

## 2021-03-30 DIAGNOSIS — J45.20 MILD INTERMITTENT ASTHMA WITHOUT COMPLICATION: ICD-10-CM

## 2021-03-30 DIAGNOSIS — I10 ESSENTIAL HYPERTENSION: Primary | ICD-10-CM

## 2021-03-30 DIAGNOSIS — M70.62 TROCHANTERIC BURSITIS, LEFT HIP: ICD-10-CM

## 2021-03-30 DIAGNOSIS — K21.9 GASTROESOPHAGEAL REFLUX DISEASE WITHOUT ESOPHAGITIS: ICD-10-CM

## 2021-03-30 DIAGNOSIS — F41.9 ANXIETY: ICD-10-CM

## 2021-03-30 DIAGNOSIS — E78.00 PURE HYPERCHOLESTEROLEMIA: ICD-10-CM

## 2021-03-30 DIAGNOSIS — E66.09 NON MORBID OBESITY DUE TO EXCESS CALORIES: ICD-10-CM

## 2021-03-30 DIAGNOSIS — J30.1 SEASONAL ALLERGIC RHINITIS DUE TO POLLEN: ICD-10-CM

## 2021-03-30 DIAGNOSIS — R73.01 IMPAIRED FASTING GLUCOSE: ICD-10-CM

## 2021-03-30 DIAGNOSIS — M17.11 PRIMARY OSTEOARTHRITIS OF RIGHT KNEE: ICD-10-CM

## 2021-03-30 PROCEDURE — G8399 PT W/DXA RESULTS DOCUMENT: HCPCS | Performed by: FAMILY MEDICINE

## 2021-03-30 PROCEDURE — G8484 FLU IMMUNIZE NO ADMIN: HCPCS | Performed by: FAMILY MEDICINE

## 2021-03-30 PROCEDURE — 1090F PRES/ABSN URINE INCON ASSESS: CPT | Performed by: FAMILY MEDICINE

## 2021-03-30 PROCEDURE — 99214 OFFICE O/P EST MOD 30 MIN: CPT | Performed by: FAMILY MEDICINE

## 2021-03-30 PROCEDURE — 4040F PNEUMOC VAC/ADMIN/RCVD: CPT | Performed by: FAMILY MEDICINE

## 2021-03-30 PROCEDURE — 99213 OFFICE O/P EST LOW 20 MIN: CPT | Performed by: FAMILY MEDICINE

## 2021-03-30 PROCEDURE — 1123F ACP DISCUSS/DSCN MKR DOCD: CPT | Performed by: FAMILY MEDICINE

## 2021-03-30 PROCEDURE — G8417 CALC BMI ABV UP PARAM F/U: HCPCS | Performed by: FAMILY MEDICINE

## 2021-03-30 PROCEDURE — 3017F COLORECTAL CA SCREEN DOC REV: CPT | Performed by: FAMILY MEDICINE

## 2021-03-30 PROCEDURE — 1036F TOBACCO NON-USER: CPT | Performed by: FAMILY MEDICINE

## 2021-03-30 PROCEDURE — G8427 DOCREV CUR MEDS BY ELIG CLIN: HCPCS | Performed by: FAMILY MEDICINE

## 2021-03-30 ASSESSMENT — ENCOUNTER SYMPTOMS
WHEEZING: 0
EYE ITCHING: 1
SHORTNESS OF BREATH: 0
GASTROINTESTINAL NEGATIVE: 1
RHINORRHEA: 1
RESPIRATORY NEGATIVE: 1

## 2021-03-30 ASSESSMENT — PATIENT HEALTH QUESTIONNAIRE - PHQ9
1. LITTLE INTEREST OR PLEASURE IN DOING THINGS: 0
2. FEELING DOWN, DEPRESSED OR HOPELESS: 0
SUM OF ALL RESPONSES TO PHQ QUESTIONS 1-9: 0
SUM OF ALL RESPONSES TO PHQ QUESTIONS 1-9: 0

## 2021-03-30 NOTE — PROGRESS NOTES
Subjective:      Patient ID: Andria Justin is a 72 y.o. female. HPI Acute visit for follow up on bp. Had follow up with another provider and bp higher. She is following at home. Better today. 156/93 yesterday. 886 systolic today. 140-160 range most of the time. Recent allergy symptoms and related headaches. No medication at present. Some stressor at present , worries about husbands health declining. Past Medical History:   Diagnosis Date    Asthma     Chalazion of right lower eyelid     Colon polyps     Degenerative joint disease of knee, left     Dysmenorrhea     GERD (gastroesophageal reflux disease)     Greater trochanteric bursitis of right hip     Hot flashes     Hyperlipidemia     Hypertension     Impaired fasting glucose     Obesity     Scaphoid fracture of wrist     Right.  Sleep disturbances     Tobacco abuse     Remote and limited.  Uterine fibroid      Past Surgical History:   Procedure Laterality Date    BREAST REDUCTION SURGERY Bilateral     CARDIAC CATHETERIZATION  06/15/2007    No significant coronary artery disease, preserved LV systolic function.   SECTION      COLONOSCOPY  09/10/2008    Sigmoid diverticulosis, history of polyps.  COLONOSCOPY  2007    Multiple polyps of the colon, rectal polyps, sigmoid polyps, and splenic flexure polyps.  COLONOSCOPY  2006    Pedunculated thin polyp near the transverse colon, small polyp at the dentate line versus a hemorrhoid.  COLONOSCOPY  2013    COLONOSCOPY  2014    Dr. Thom Beckford. adenom. x 2, 5yr follow up   [facility] COLONOSCOPY BIOPSY/STOMA N/A 2020    hyperplastic polyp X 3, Dr. Edward Lin, TOTAL ABDOMINAL      KNEE ARTHROSCOPY Left 1999    Torn degenerate posterior third medial meniscus.  MOUTH SURGERY Right 5/1/15    OTHER SURGICAL HISTORY Left 2012    Left knee injection for left knee pain.     OTHER Final    Monocytes 03/23/2021 9  3 - 12 % Final    Eosinophils % 03/23/2021 2  1 - 4 % Final    Basophils 03/23/2021 1  0 - 2 % Final    Immature Granulocytes 03/23/2021 0  0 % Final    Segs Absolute 03/23/2021 3.51  1.50 - 8.10 k/uL Final    Absolute Lymph # 03/23/2021 3.07  1.10 - 3.70 k/uL Final    Absolute Mono # 03/23/2021 0.63  0.10 - 1.20 k/uL Final    Absolute Eos # 03/23/2021 0.18  0.00 - 0.44 k/uL Final    Basophils Absolute 03/23/2021 0.04  0.00 - 0.20 k/uL Final    Absolute Immature Granulocyte 03/23/2021 <0.03  0.00 - 0.30 k/uL Final    WBC Morphology 03/23/2021 NOT REPORTED   Final    RBC Morphology 03/23/2021 NOT REPORTED   Final    Platelet Estimate 82/15/0330 NOT REPORTED   Final    Glucose 03/23/2021 106* 70 - 99 mg/dL Final    BUN 03/23/2021 24* 8 - 23 mg/dL Final    CREATININE 03/23/2021 1.04* 0.50 - 0.90 mg/dL Final    Bun/Cre Ratio 03/23/2021 23* 9 - 20 Final    Calcium 03/23/2021 9.8  8.6 - 10.4 mg/dL Final    Sodium 03/23/2021 143  135 - 144 mmol/L Final    Potassium 03/23/2021 4.1  3.7 - 5.3 mmol/L Final    Chloride 03/23/2021 105  98 - 107 mmol/L Final    CO2 03/23/2021 30  20 - 31 mmol/L Final    Anion Gap 03/23/2021 8* 9 - 17 mmol/L Final    Alkaline Phosphatase 03/23/2021 105* 35 - 104 U/L Final    ALT 03/23/2021 16  5 - 33 U/L Final    AST 03/23/2021 16  <32 U/L Final    Total Bilirubin 03/23/2021 0.35  0.3 - 1.2 mg/dL Final    Total Protein 03/23/2021 8.0  6.4 - 8.3 g/dL Final    Albumin 03/23/2021 4.0  3.5 - 5.2 g/dL Final    Albumin/Globulin Ratio 03/23/2021 1.0  1.0 - 2.5 Final    GFR Non- 03/23/2021 53* >60 mL/min Final    GFR  03/23/2021 >60  >60 mL/min Final    GFR Comment 03/23/2021        Final    GFR Staging 03/23/2021 NOT REPORTED   Final    Cholesterol 03/23/2021 202* <200 mg/dL Final    HDL 03/23/2021 53  >40 mg/dL Final    LDL Cholesterol 03/23/2021 128  0 - 130 mg/dL Final    Chol/HDL Ratio 03/23/2021 3.8  <5 Final    Triglycerides 03/23/2021 105  <150 mg/dL Final    VLDL 03/23/2021 NOT REPORTED  1 - 30 mg/dL Final   Hospital Outpatient Visit on 03/10/2021   Component Date Value Ref Range Status    Cytology Report 03/10/2021    Final                    Value:INTERPRETATION    Cervical material, (ThinPrep vial, Imaging-assisted review):  Specimen Adequacy:       Satisfactory for evaluation.       -Endocervical/transformation zone component is absent. Descriptive Diagnosis:       Negative for intraepithelial lesion or malignancy. Cytotechnologist:   Pooja Fonseca. ALPESH Ramirez(ASCP)  **Electronically Signed Out**  singh/3/18/2021            Source:  1: Cervical material, (ThinPrep vial, Imaging-assisted review)    Clinical History  Postmenopausal  Estrogen  Hysterectomy: total  Oophorectomy: left  Z01.419 Routine gyn exam without abnormal findings  High risk HPV DNA testing is requested if the diagnosis is abnormal  Patient Address: [address] [place] [address] , [facility], [address] [place] [address] CYTOLOGY REPORT    Patient Name: Humberto Vargas Rec: [id number]  Path Number: [id number]  [facility] [address] [facility], [address]  [phone]  Fax: [phone]         Assessment:       Encounter Diagnoses   Name Primary?  Essential hypertension Yes    Seasonal allergic rhinitis due to pollen     Anxiety     Pure hypercholesterolemia     Gastroesophageal reflux disease without esophagitis     Non morbid obesity due to excess calories     Sleep disturbances     Mild intermittent asthma without complication     Primary osteoarthritis of right knee     Trochanteric bursitis, left hip     Impaired fasting glucose            Plan:      Htn: running a little higher at home. At least one elevation at doctor visit. She is unsure of the accuracy of her machine.   Plan continued home bp monitoring on hctz and bring in bp machine to check accuracy,. Allergies:  rec start of claritin daily, +/- benadryl at bedtime. Add flonase prn    Anxiety.  seems to be declining in health. She declines intervention. She prefers prayer. Hyperlipidemia: no active treatment at present. Ldl at 128. She declines intervention with medical.    She wants to concentrate more on diet.       Gerd: quiescent mostly on omeprazole at present. Working better than past meds. Using prn /14 day intervals     Obesity: bmi at 38.78 at present,  stable . Discussed wt. Loss planning . She rides bike. Has been successful in the past but fails to keep the wt. Off.      Chronic sleep disturbances with daytime fatigue and somnolence. Sleeping 6 +hrs/night at present. Satisfied with sleep at present.      DJD s/p bilateral knee replacements. Doing well , no significant pain any more after most recent replacement. No other joint concerns at present.       Asthma; rare attacks, usually environmental triggers. Just prn albuterol use at present. No recent issues.         Trochanteric bursitis left hip. Resolved, quiescent since injection in April. Observation at present.        ifbs: 106--110. Discussed wt. Loss and low carb. Diet. 106 at present.     Lina Barlow MD

## 2021-04-09 ENCOUNTER — OFFICE VISIT (OUTPATIENT)
Dept: ORTHOPEDIC SURGERY | Age: 71
End: 2021-04-09
Payer: MEDICARE

## 2021-04-09 VITALS
DIASTOLIC BLOOD PRESSURE: 88 MMHG | BODY MASS INDEX: 37.28 KG/M2 | WEIGHT: 246 LBS | HEIGHT: 68 IN | HEART RATE: 87 BPM | SYSTOLIC BLOOD PRESSURE: 131 MMHG

## 2021-04-09 DIAGNOSIS — M54.50 LOW BACK PAIN, UNSPECIFIED BACK PAIN LATERALITY, UNSPECIFIED CHRONICITY, UNSPECIFIED WHETHER SCIATICA PRESENT: Primary | ICD-10-CM

## 2021-04-09 DIAGNOSIS — M25.551 BILATERAL HIP PAIN: ICD-10-CM

## 2021-04-09 DIAGNOSIS — M25.552 BILATERAL HIP PAIN: ICD-10-CM

## 2021-04-09 PROCEDURE — G8399 PT W/DXA RESULTS DOCUMENT: HCPCS | Performed by: ORTHOPAEDIC SURGERY

## 2021-04-09 PROCEDURE — 1036F TOBACCO NON-USER: CPT | Performed by: ORTHOPAEDIC SURGERY

## 2021-04-09 PROCEDURE — 3017F COLORECTAL CA SCREEN DOC REV: CPT | Performed by: ORTHOPAEDIC SURGERY

## 2021-04-09 PROCEDURE — 1123F ACP DISCUSS/DSCN MKR DOCD: CPT | Performed by: ORTHOPAEDIC SURGERY

## 2021-04-09 PROCEDURE — G8427 DOCREV CUR MEDS BY ELIG CLIN: HCPCS | Performed by: ORTHOPAEDIC SURGERY

## 2021-04-09 PROCEDURE — G8417 CALC BMI ABV UP PARAM F/U: HCPCS | Performed by: ORTHOPAEDIC SURGERY

## 2021-04-09 PROCEDURE — 99213 OFFICE O/P EST LOW 20 MIN: CPT | Performed by: ORTHOPAEDIC SURGERY

## 2021-04-09 PROCEDURE — 4040F PNEUMOC VAC/ADMIN/RCVD: CPT | Performed by: ORTHOPAEDIC SURGERY

## 2021-04-09 PROCEDURE — 1090F PRES/ABSN URINE INCON ASSESS: CPT | Performed by: ORTHOPAEDIC SURGERY

## 2021-04-09 NOTE — PROGRESS NOTES
1998     Years since quittin.2    Smokeless tobacco: Never Used    Tobacco comment: Smoked for about 1 year   Substance and Sexual Activity    Alcohol use: Yes     Alcohol/week: 0.0 standard drinks     Comment: Occasionally, once or twice a year    Drug use: No    Sexual activity: Yes     Partners: Male     Birth control/protection: Post-menopausal     Comment:  24 years   Lifestyle    Physical activity     Days per week: Not on file     Minutes per session: Not on file    Stress: Not on file   Relationships    Social connections     Talks on phone: Not on file     Gets together: Not on file     Attends Sikhism service: Not on file     Active member of club or organization: Not on file     Attends meetings of clubs or organizations: Not on file     Relationship status: Not on file    Intimate partner violence     Fear of current or ex partner: Not on file     Emotionally abused: Not on file     Physically abused: Not on file     Forced sexual activity: Not on file   Other Topics Concern    Not on file   Social History Narrative    Not on file     Past Medical History:   Diagnosis Date    Asthma     Chalazion of right lower eyelid 2014    Colon polyps     Degenerative joint disease of knee, left     Dysmenorrhea     GERD (gastroesophageal reflux disease)     Greater trochanteric bursitis of right hip     Hot flashes     Hyperlipidemia     Hypertension     Impaired fasting glucose     Obesity     Scaphoid fracture of wrist     Right.  Sleep disturbances     Tobacco abuse     Remote and limited.  Uterine fibroid      Past Surgical History:   Procedure Laterality Date    BREAST REDUCTION SURGERY Bilateral     CARDIAC CATHETERIZATION  06/15/2007    No significant coronary artery disease, preserved LV systolic function.   SECTION      COLONOSCOPY  09/10/2008    Sigmoid diverticulosis, history of polyps.     COLONOSCOPY  2007    Multiple polyps of the Behavior: Behavior normal.         Thought Content: Thought content normal.        Diagnostic imaging:        Assessment and Plan:  1. Low back pain, unspecified back pain laterality, unspecified chronicity, unspecified whether sciatica present        Follow up prn    Provider Attestation:  Oleg Becker, personally performed the services described in this documentation. All medical record entries made by the scribe were at my direction and in my presence. I have reviewed the chart and discharge instructions and agree that the records reflect my personal performance and is accurate and complete. Raul Gomez MD 4/9/21     Scribe Attestation:  By signing my name below, Meghna Hemphill, attest that this documentation has been prepared under the direction and in the presence of Dr. Viry Tena. Electronically signed: Dave Valentin, 4/9/21     Please note that this chart was generated using voice recognition Dragon dictation software. Although every effort was made to ensure the accuracy of this automated transcription, some errors in transcription may have occurred.

## 2021-06-14 RX ORDER — MELOXICAM 15 MG/1
TABLET ORAL
Qty: 90 TABLET | Refills: 3 | Status: SHIPPED | OUTPATIENT
Start: 2021-06-14 | End: 2022-05-17

## 2021-09-14 ENCOUNTER — TELEPHONE (OUTPATIENT)
Dept: FAMILY MEDICINE CLINIC | Age: 71
End: 2021-09-14

## 2021-09-14 DIAGNOSIS — R73.01 IMPAIRED FASTING GLUCOSE: Primary | ICD-10-CM

## 2021-09-14 DIAGNOSIS — I10 ESSENTIAL HYPERTENSION: ICD-10-CM

## 2021-09-14 NOTE — TELEPHONE ENCOUNTER
Patient called stating she has an appointment on 9/30/21 and she is wondering if she should have labs drawn prior to visit.   Please advise

## 2021-09-23 ENCOUNTER — HOSPITAL ENCOUNTER (OUTPATIENT)
Dept: LAB | Age: 71
Discharge: HOME | End: 2021-09-23
Payer: MEDICARE

## 2021-09-23 DIAGNOSIS — R73.01 IMPAIRED FASTING GLUCOSE: ICD-10-CM

## 2021-09-23 DIAGNOSIS — I10 ESSENTIAL HYPERTENSION: ICD-10-CM

## 2021-09-23 LAB
ABSOLUTE EOS #: 0.23 K/UL (ref 0–0.44)
ABSOLUTE IMMATURE GRANULOCYTE: <0.03 K/UL (ref 0–0.3)
ABSOLUTE LYMPH #: 2.79 K/UL (ref 1.1–3.7)
ABSOLUTE MONO #: 0.59 K/UL (ref 0.1–1.2)
ALBUMIN SERPL-MCNC: 4.1 G/DL (ref 3.5–5.2)
ALBUMIN/GLOBULIN RATIO: 1 (ref 1–2.5)
ALP BLD-CCNC: 118 U/L (ref 35–104)
ALT SERPL-CCNC: 18 U/L (ref 5–33)
ANION GAP SERPL CALCULATED.3IONS-SCNC: 9 MMOL/L (ref 9–17)
AST SERPL-CCNC: 17 U/L
BASOPHILS # BLD: 1 % (ref 0–2)
BASOPHILS ABSOLUTE: 0.05 K/UL (ref 0–0.2)
BILIRUB SERPL-MCNC: 0.37 MG/DL (ref 0.3–1.2)
BUN BLDV-MCNC: 15 MG/DL (ref 8–23)
BUN/CREAT BLD: 18 (ref 9–20)
CALCIUM SERPL-MCNC: 9.5 MG/DL (ref 8.6–10.4)
CHLORIDE BLD-SCNC: 103 MMOL/L (ref 98–107)
CHOLESTEROL/HDL RATIO: 4
CHOLESTEROL: 218 MG/DL
CO2: 29 MMOL/L (ref 20–31)
CREAT SERPL-MCNC: 0.84 MG/DL (ref 0.5–0.9)
DIFFERENTIAL TYPE: NORMAL
EOSINOPHILS RELATIVE PERCENT: 4 % (ref 1–4)
ESTIMATED AVERAGE GLUCOSE: 126 MG/DL
GFR AFRICAN AMERICAN: >60 ML/MIN
GFR NON-AFRICAN AMERICAN: >60 ML/MIN
GFR SERPL CREATININE-BSD FRML MDRD: ABNORMAL ML/MIN/{1.73_M2}
GFR SERPL CREATININE-BSD FRML MDRD: ABNORMAL ML/MIN/{1.73_M2}
GLUCOSE BLD-MCNC: 107 MG/DL (ref 70–99)
HBA1C MFR BLD: 6 % (ref 4–6)
HCT VFR BLD CALC: 42.4 % (ref 36.3–47.1)
HDLC SERPL-MCNC: 54 MG/DL
HEMOGLOBIN: 13.3 G/DL (ref 11.9–15.1)
IMMATURE GRANULOCYTES: 0 %
LDL CHOLESTEROL: 130 MG/DL (ref 0–130)
LYMPHOCYTES # BLD: 42 % (ref 24–43)
MCH RBC QN AUTO: 26 PG (ref 25.2–33.5)
MCHC RBC AUTO-ENTMCNC: 31.4 G/DL (ref 25.2–33.5)
MCV RBC AUTO: 83 FL (ref 82.6–102.9)
MONOCYTES # BLD: 9 % (ref 3–12)
NRBC AUTOMATED: 0 PER 100 WBC
PDW BLD-RTO: 13.3 % (ref 11.8–14.4)
PLATELET # BLD: 341 K/UL (ref 138–453)
PLATELET ESTIMATE: NORMAL
PMV BLD AUTO: 9.2 FL (ref 8.1–13.5)
POTASSIUM SERPL-SCNC: 4.5 MMOL/L (ref 3.7–5.3)
RBC # BLD: 5.11 M/UL (ref 3.95–5.11)
RBC # BLD: NORMAL 10*6/UL
SEG NEUTROPHILS: 44 % (ref 36–65)
SEGMENTED NEUTROPHILS ABSOLUTE COUNT: 2.99 K/UL (ref 1.5–8.1)
SODIUM BLD-SCNC: 141 MMOL/L (ref 135–144)
TOTAL PROTEIN: 8.2 G/DL (ref 6.4–8.3)
TRIGL SERPL-MCNC: 168 MG/DL
VLDLC SERPL CALC-MCNC: ABNORMAL MG/DL (ref 1–30)
WBC # BLD: 6.7 K/UL (ref 3.5–11.3)
WBC # BLD: NORMAL 10*3/UL

## 2021-09-23 PROCEDURE — 85025 COMPLETE CBC W/AUTO DIFF WBC: CPT

## 2021-09-23 PROCEDURE — 80061 LIPID PANEL: CPT

## 2021-09-23 PROCEDURE — 80053 COMPREHEN METABOLIC PANEL: CPT

## 2021-09-23 PROCEDURE — 83036 HEMOGLOBIN GLYCOSYLATED A1C: CPT

## 2021-09-23 PROCEDURE — 36415 COLL VENOUS BLD VENIPUNCTURE: CPT

## 2021-09-29 DIAGNOSIS — M25.551 BILATERAL HIP PAIN: Primary | ICD-10-CM

## 2021-09-29 DIAGNOSIS — M25.552 BILATERAL HIP PAIN: Primary | ICD-10-CM

## 2021-09-30 ENCOUNTER — OFFICE VISIT (OUTPATIENT)
Dept: FAMILY MEDICINE CLINIC | Age: 71
End: 2021-09-30
Payer: MEDICARE

## 2021-09-30 VITALS
TEMPERATURE: 96.2 F | OXYGEN SATURATION: 98 % | WEIGHT: 246 LBS | HEART RATE: 63 BPM | HEIGHT: 68 IN | BODY MASS INDEX: 37.28 KG/M2 | RESPIRATION RATE: 12 BRPM | SYSTOLIC BLOOD PRESSURE: 150 MMHG | DIASTOLIC BLOOD PRESSURE: 82 MMHG

## 2021-09-30 DIAGNOSIS — M70.62 TROCHANTERIC BURSITIS, LEFT HIP: ICD-10-CM

## 2021-09-30 DIAGNOSIS — I10 ESSENTIAL HYPERTENSION: ICD-10-CM

## 2021-09-30 DIAGNOSIS — K21.9 GASTROESOPHAGEAL REFLUX DISEASE WITHOUT ESOPHAGITIS: ICD-10-CM

## 2021-09-30 DIAGNOSIS — Z23 NEED FOR INFLUENZA VACCINATION: Primary | ICD-10-CM

## 2021-09-30 DIAGNOSIS — J45.20 MILD INTERMITTENT ASTHMA WITHOUT COMPLICATION: ICD-10-CM

## 2021-09-30 DIAGNOSIS — G47.9 SLEEP DISTURBANCES: ICD-10-CM

## 2021-09-30 DIAGNOSIS — E66.09 CLASS 2 OBESITY DUE TO EXCESS CALORIES WITHOUT SERIOUS COMORBIDITY WITH BODY MASS INDEX (BMI) OF 37.0 TO 37.9 IN ADULT: ICD-10-CM

## 2021-09-30 DIAGNOSIS — K63.5 HYPERPLASTIC COLONIC POLYP, UNSPECIFIED PART OF COLON: ICD-10-CM

## 2021-09-30 DIAGNOSIS — E78.00 PURE HYPERCHOLESTEROLEMIA: ICD-10-CM

## 2021-09-30 DIAGNOSIS — M17.12 PRIMARY OSTEOARTHRITIS OF LEFT KNEE: ICD-10-CM

## 2021-09-30 DIAGNOSIS — Z79.890 POSTMENOPAUSAL HRT (HORMONE REPLACEMENT THERAPY): ICD-10-CM

## 2021-09-30 DIAGNOSIS — R73.01 IMPAIRED FASTING GLUCOSE: ICD-10-CM

## 2021-09-30 PROCEDURE — 3017F COLORECTAL CA SCREEN DOC REV: CPT | Performed by: FAMILY MEDICINE

## 2021-09-30 PROCEDURE — G0008 ADMIN INFLUENZA VIRUS VAC: HCPCS | Performed by: FAMILY MEDICINE

## 2021-09-30 PROCEDURE — 1036F TOBACCO NON-USER: CPT | Performed by: FAMILY MEDICINE

## 2021-09-30 PROCEDURE — 1123F ACP DISCUSS/DSCN MKR DOCD: CPT | Performed by: FAMILY MEDICINE

## 2021-09-30 PROCEDURE — G8399 PT W/DXA RESULTS DOCUMENT: HCPCS | Performed by: FAMILY MEDICINE

## 2021-09-30 PROCEDURE — 99214 OFFICE O/P EST MOD 30 MIN: CPT | Performed by: FAMILY MEDICINE

## 2021-09-30 PROCEDURE — 99212 OFFICE O/P EST SF 10 MIN: CPT | Performed by: FAMILY MEDICINE

## 2021-09-30 PROCEDURE — G8417 CALC BMI ABV UP PARAM F/U: HCPCS | Performed by: FAMILY MEDICINE

## 2021-09-30 PROCEDURE — PBSHW INFLUENZA, QUADV, ADJUVANTED, 65 YRS +, IM, PF, PREFILL SYR, 0.5ML (FLUAD): Performed by: FAMILY MEDICINE

## 2021-09-30 PROCEDURE — G8427 DOCREV CUR MEDS BY ELIG CLIN: HCPCS | Performed by: FAMILY MEDICINE

## 2021-09-30 PROCEDURE — 1090F PRES/ABSN URINE INCON ASSESS: CPT | Performed by: FAMILY MEDICINE

## 2021-09-30 PROCEDURE — 4040F PNEUMOC VAC/ADMIN/RCVD: CPT | Performed by: FAMILY MEDICINE

## 2021-09-30 RX ORDER — METOPROLOL SUCCINATE 50 MG/1
50 TABLET, EXTENDED RELEASE ORAL DAILY
Qty: 30 TABLET | Refills: 11 | Status: SHIPPED | OUTPATIENT
Start: 2021-09-30 | End: 2021-12-03

## 2021-09-30 SDOH — ECONOMIC STABILITY: FOOD INSECURITY: WITHIN THE PAST 12 MONTHS, YOU WORRIED THAT YOUR FOOD WOULD RUN OUT BEFORE YOU GOT MONEY TO BUY MORE.: NEVER TRUE

## 2021-09-30 SDOH — ECONOMIC STABILITY: FOOD INSECURITY: WITHIN THE PAST 12 MONTHS, THE FOOD YOU BOUGHT JUST DIDN'T LAST AND YOU DIDN'T HAVE MONEY TO GET MORE.: NEVER TRUE

## 2021-09-30 ASSESSMENT — ENCOUNTER SYMPTOMS
RESPIRATORY NEGATIVE: 1
BACK PAIN: 0
ALLERGIC/IMMUNOLOGIC NEGATIVE: 1
GASTROINTESTINAL NEGATIVE: 1
EYES NEGATIVE: 1

## 2021-09-30 ASSESSMENT — SOCIAL DETERMINANTS OF HEALTH (SDOH): HOW HARD IS IT FOR YOU TO PAY FOR THE VERY BASICS LIKE FOOD, HOUSING, MEDICAL CARE, AND HEATING?: NOT HARD AT ALL

## 2021-09-30 NOTE — PROGRESS NOTES
Subjective:      Patient ID: Conor Souza is a 80 y.o. female. Hypertension    Gastroesophageal Reflux    Hyperlipidemia    Other  Pertinent negatives include no arthralgias (nothing particularly problematic at present in joints. rare groin/hip discomfort. ). Back Pain    Groin Pain  Pertinent negatives include no back pain. Routine  follow up on chronic medical conditions colon polyps, htn, hyperlipidemia, gerd, obesity, insomnia, oa , refills, and review of updated labs. She had covid positive test .  She had a fairly mild course with no sequlae. She had right and left tka per Dr. Kenji Whiteside at [facility].  She is a Jehovah-witness and wanted to pursue the [address] they have at [facility], where she had her prior knee replacement done. Both knees doing well at present. She is back to work after retiring from [employer]. 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 She lost wt. Down to 235, but gained wt. Back. Up and down by report. bp running a little high recently. She feels she has a lot on her place. Lost her spouse to pancreatic cancer may 2021. Lots of responsibilities with renters and property. Compliant with medications. Sleep doing well, up to 5-6 hours/night. No gerd symptoms to report. Hot flashes periodically, ongoing. No asthma to report. Left trochanteric bursitis improved/resolved after injection and remains quiescent. Groin pain also resolved. Her lower back pain has resolved with therapy for the most part. She has stretches and exercises to use for exacerbations, which seems to be working consistently at present. No asthma concerns. No injuries or illnesses to report outside of interval covid 19 infection. She denies depression. A lot of stress. Has 15 yr old grandson that lives with her and helps out a lot.       Past Medical History: ease (Patient not taking: Reported on 9/30/2021)      BLACK COHOSH EXTRACT PO Take 1 capsule by mouth daily (Patient not taking: Reported on 9/30/2021)      OMEPRAZOLE PO Take by mouth Patient taking as needed (Patient not taking: Reported on 9/30/2021)      estrogens, conjugated, (PREMARIN) 0.625 MG tablet Take 1 tablet by mouth daily (Patient not taking: Reported on 9/30/2021) 21 tablet 3    albuterol sulfate  (90 Base) MCG/ACT inhaler Inhale 2 puffs into the lungs every 6 hours as needed for Wheezing or Shortness of Breath (Patient not taking: Reported on 9/30/2021) 1 Inhaler 0     No current facility-administered medications for this visit. Allergies   Allergen Reactions    Minocin [Minocycline] Nausea Only and Other (See Comments)     Dizzy    Oxycodone Nausea Only       Review of Systems   Constitutional: Negative. HENT: Negative. Eyes: Negative. Respiratory: Negative. Cardiovascular: Negative. Gastrointestinal: Negative. Endocrine: Negative. Genitourinary: Negative. Musculoskeletal: Negative for arthralgias (nothing particularly problematic at present in joints. rare groin/hip discomfort. ) and back pain. Skin: Negative. Allergic/Immunologic: Negative. Neurological: Negative. Hematological: Negative. Psychiatric/Behavioral: Negative. Objective:   Physical Exam  Constitutional:       General: She is not in acute distress. Appearance: She is well-developed. HENT:      Head: Normocephalic and atraumatic. Right Ear: External ear normal.      Left Ear: External ear normal.      Mouth/Throat:      Pharynx: No oropharyngeal exudate. Eyes:      General: No scleral icterus. Conjunctiva/sclera: Conjunctivae normal.   Neck:      Thyroid: No thyromegaly. Cardiovascular:      Rate and Rhythm: Normal rate and regular rhythm. Heart sounds: Normal heart sounds. No murmur heard.      Pulmonary:      Effort: Pulmonary effort is normal. No respiratory distress. Breath sounds: Normal breath sounds. No wheezing. Abdominal:      General: Bowel sounds are normal. There is no distension. Palpations: Abdomen is soft. Tenderness: There is no abdominal tenderness. There is no rebound. Musculoskeletal:         General: Normal range of motion. Cervical back: Neck supple. Lumbar back: No tenderness. Normal range of motion. Skin:     General: Skin is warm and dry. Findings: No erythema or rash. Neurological:      Mental Status: She is alert and oriented to person, place, and time. Psychiatric:         Behavior: Behavior normal.         Thought Content:  Thought content normal.         Judgment: Judgment normal.       BP (!) 150/82 (Site: Left Upper Arm, Position: Sitting, Cuff Size: Large Adult)   Pulse 63   Temp 96.2 °F (35.7 °C) (Temporal)   Resp 12   Ht 5' 8.25\" (1.734 m)   Wt 246 lb (111.6 kg)   LMP  (LMP Unknown)   SpO2 98%   BMI 37.13 kg/m²      Hospital Outpatient Visit on 09/23/2021   Component Date Value Ref Range Status    Hemoglobin A1C 09/23/2021 6.0  4.0 - 6.0 % Final    Estimated Avg Glucose 09/23/2021 126  mg/dL Final    WBC 09/23/2021 6.7  3.5 - 11.3 k/uL Final    RBC 09/23/2021 5.11  3.95 - 5.11 m/uL Final    Hemoglobin 09/23/2021 13.3  11.9 - 15.1 g/dL Final    Hematocrit 09/23/2021 42.4  36.3 - 47.1 % Final    MCV 09/23/2021 83.0  82.6 - 102.9 fL Final    MCH 09/23/2021 26.0  25.2 - 33.5 pg Final    MCHC 09/23/2021 31.4  25.2 - 33.5 g/dL Final    RDW 09/23/2021 13.3  11.8 - 14.4 % Final    Platelets 25/77/3738 341  138 - 453 k/uL Final    MPV 09/23/2021 9.2  8.1 - 13.5 fL Final    NRBC Automated 09/23/2021 0.0  0.0 per 100 WBC Final    Differential Type 09/23/2021 NOT REPORTED   Final    Seg Neutrophils 09/23/2021 44  36 - 65 % Final    Lymphocytes 09/23/2021 42  24 - 43 % Final    Monocytes 09/23/2021 9  3 - 12 % Final    Eosinophils % 09/23/2021 4  1 - 4 % Final    Basophils 09/23/2021 1  0 - 2 % Final    Immature Granulocytes 09/23/2021 0  0 % Final    Segs Absolute 09/23/2021 2.99  1.50 - 8.10 k/uL Final    Absolute Lymph # 09/23/2021 2.79  1.10 - 3.70 k/uL Final    Absolute Mono # 09/23/2021 0.59  0.10 - 1.20 k/uL Final    Absolute Eos # 09/23/2021 0.23  0.00 - 0.44 k/uL Final    Basophils Absolute 09/23/2021 0.05  0.00 - 0.20 k/uL Final    Absolute Immature Granulocyte 09/23/2021 <0.03  0.00 - 0.30 k/uL Final    WBC Morphology 09/23/2021 NOT REPORTED   Final    RBC Morphology 09/23/2021 NOT REPORTED   Final    Platelet Estimate 77/78/3177 NOT REPORTED   Final    Glucose 09/23/2021 107* 70 - 99 mg/dL Final    BUN 09/23/2021 15  8 - 23 mg/dL Final    CREATININE 09/23/2021 0.84  0.50 - 0.90 mg/dL Final    Bun/Cre Ratio 09/23/2021 18  9 - 20 Final    Calcium 09/23/2021 9.5  8.6 - 10.4 mg/dL Final    Sodium 09/23/2021 141  135 - 144 mmol/L Final    Potassium 09/23/2021 4.5  3.7 - 5.3 mmol/L Final    Chloride 09/23/2021 103  98 - 107 mmol/L Final    CO2 09/23/2021 29  20 - 31 mmol/L Final    Anion Gap 09/23/2021 9  9 - 17 mmol/L Final    Alkaline Phosphatase 09/23/2021 118* 35 - 104 U/L Final    ALT 09/23/2021 18  5 - 33 U/L Final    AST 09/23/2021 17  <32 U/L Final    Total Bilirubin 09/23/2021 0.37  0.3 - 1.2 mg/dL Final    Total Protein 09/23/2021 8.2  6.4 - 8.3 g/dL Final    Albumin 09/23/2021 4.1  3.5 - 5.2 g/dL Final    Albumin/Globulin Ratio 09/23/2021 1.0  1.0 - 2.5 Final    GFR Non- 09/23/2021 >60  >60 mL/min Final    GFR  09/23/2021 >60  >60 mL/min Final    GFR Comment 09/23/2021        Final    GFR Staging 09/23/2021 NOT REPORTED   Final    Cholesterol 09/23/2021 218* <200 mg/dL Final    HDL 09/23/2021 54  >40 mg/dL Final    LDL Cholesterol 09/23/2021 130  0 - 130 mg/dL Final    Chol/HDL Ratio 09/23/2021 4.0  <5 Final    Triglycerides 09/23/2021 168* <150 mg/dL Final    VLDL 09/23/2021 NOT REPORTED* 1 - 30 mg/dL Final         Assessment:      Encounter Diagnoses   Name Primary?  Need for influenza vaccination Yes    Hyperplastic colonic polyp, unspecified part of colon     Postmenopausal HRT (hormone replacement therapy)     Essential hypertension     Pure hypercholesterolemia     Gastroesophageal reflux disease without esophagitis     Class 2 obesity due to excess calories without serious comorbidity with body mass index (BMI) of 37.0 to 37.9 in adult     Sleep disturbances     Primary osteoarthritis of left knee     Mild intermittent asthma without complication     Trochanteric bursitis, left hip     Impaired fasting glucose            Plan:            flu vaccine given today at visit. Colon polyps: last scope 11/12/14: polyps of descending colon and rectum, sigmoid diverticulosis. Adenomatous x 2. Repeat 1/2/2020, 3 polyps. 5 yr follow up.       HRT: estrace tablets through gynecology. . (s/p hysterectomy). pap and pelvic completed. Normal. Some hot flashes at times. No other concerns.       Htn:   Elevations being seen over the interval.  Lot more stress. Starting toprol 50mg/day.       Hyperlipidemia: no active treatment at present. Ldl at 118-130. She declines intervention with medical.    She wants to concentrate more on diet.       Gerd: quiescent mostly on omeprazole at present. Working better than past meds. Using prn /14 day intervals     Obesity: bmi at 38.13 at present,  stable . Discussed wt. Loss planning . She rides bike. Has been successful in the past but fails to keep the wt. Off.      Chronic sleep disturbances with daytime fatigue and somnolence. Sleeping 6 +hrs/night at present. Satisfied with sleep at present.      DJD s/p bilateral knee replacements. Doing well , no significant pain any more after most recent replacement. Some right hip discomfort in the groin and lateral hip. Asthma; rare attacks, usually environmental triggers.   Just prn albuterol use at present. No recent issues. Trochanteric bursitis left hip. Resolved, quiescent since injection in April. Observation at present. Right hip currently with lateral hip pain, cant sleep on that side. Offered inj. prn     ifbs: 106--110. Discussed wt. Loss and low carb. Diet. Up to 117 this visit. a1c 6%. Asking her to commit to wt. Loss again.

## 2021-10-08 ENCOUNTER — HOSPITAL ENCOUNTER (OUTPATIENT)
Dept: GENERAL RADIOLOGY | Age: 71
Discharge: HOME | End: 2021-10-10
Payer: MEDICARE

## 2021-10-08 ENCOUNTER — OFFICE VISIT (OUTPATIENT)
Dept: ORTHOPEDIC SURGERY | Age: 71
End: 2021-10-08
Payer: MEDICARE

## 2021-10-08 VITALS
SYSTOLIC BLOOD PRESSURE: 181 MMHG | BODY MASS INDEX: 37.28 KG/M2 | DIASTOLIC BLOOD PRESSURE: 100 MMHG | HEIGHT: 68 IN | WEIGHT: 246 LBS | HEART RATE: 64 BPM

## 2021-10-08 DIAGNOSIS — M25.551 HIP PAIN, RIGHT: ICD-10-CM

## 2021-10-08 DIAGNOSIS — M16.12 PRIMARY OSTEOARTHRITIS OF LEFT HIP: ICD-10-CM

## 2021-10-08 DIAGNOSIS — M16.0 BILATERAL HIP JOINT ARTHRITIS: ICD-10-CM

## 2021-10-08 DIAGNOSIS — M25.552 BILATERAL HIP PAIN: ICD-10-CM

## 2021-10-08 DIAGNOSIS — M25.551 BILATERAL HIP PAIN: Primary | ICD-10-CM

## 2021-10-08 DIAGNOSIS — M70.61 TROCHANTERIC BURSITIS, RIGHT HIP: ICD-10-CM

## 2021-10-08 DIAGNOSIS — M16.11 PRIMARY OSTEOARTHRITIS OF RIGHT HIP: ICD-10-CM

## 2021-10-08 DIAGNOSIS — M25.551 BILATERAL HIP PAIN: ICD-10-CM

## 2021-10-08 DIAGNOSIS — M25.552 BILATERAL HIP PAIN: Primary | ICD-10-CM

## 2021-10-08 PROCEDURE — G8417 CALC BMI ABV UP PARAM F/U: HCPCS | Performed by: ORTHOPAEDIC SURGERY

## 2021-10-08 PROCEDURE — 1090F PRES/ABSN URINE INCON ASSESS: CPT | Performed by: ORTHOPAEDIC SURGERY

## 2021-10-08 PROCEDURE — G8399 PT W/DXA RESULTS DOCUMENT: HCPCS | Performed by: ORTHOPAEDIC SURGERY

## 2021-10-08 PROCEDURE — G8484 FLU IMMUNIZE NO ADMIN: HCPCS | Performed by: ORTHOPAEDIC SURGERY

## 2021-10-08 PROCEDURE — G8427 DOCREV CUR MEDS BY ELIG CLIN: HCPCS | Performed by: ORTHOPAEDIC SURGERY

## 2021-10-08 PROCEDURE — 3017F COLORECTAL CA SCREEN DOC REV: CPT | Performed by: ORTHOPAEDIC SURGERY

## 2021-10-08 PROCEDURE — 1123F ACP DISCUSS/DSCN MKR DOCD: CPT | Performed by: ORTHOPAEDIC SURGERY

## 2021-10-08 PROCEDURE — 4040F PNEUMOC VAC/ADMIN/RCVD: CPT | Performed by: ORTHOPAEDIC SURGERY

## 2021-10-08 PROCEDURE — 99213 OFFICE O/P EST LOW 20 MIN: CPT | Performed by: ORTHOPAEDIC SURGERY

## 2021-10-08 PROCEDURE — 1036F TOBACCO NON-USER: CPT | Performed by: ORTHOPAEDIC SURGERY

## 2021-10-08 PROCEDURE — 73521 X-RAY EXAM HIPS BI 2 VIEWS: CPT

## 2021-10-08 NOTE — PROGRESS NOTES
Patient ID: Frantz Chavira is a 80 y.o. female    Chief Compliant:  No chief complaint on file. Diagnostic imaging:        Assessment and Plan:  1. Bilateral hip pain    2. Bilateral hip joint arthritis    3. Trochanteric bursitis, right hip    4. Hip pain, right    5. Primary osteoarthritis of right hip    6. Primary osteoarthritis of left hip      Refer to IR for right hip joint injection    Follow up 4 weeks    HPI:  This is a 80 y.o. female who presents to the clinic today for bilateral hip pain. Patient admits right groin and lateral hip pain, previously had good relief with injeciton    She notes difficulty getting into and out of cars      Review of Systems   All other systems reviewed and are negative.       Past History:    Current Outpatient Medications:     metoprolol succinate (TOPROL XL) 50 MG extended release tablet, Take 1 tablet by mouth daily, Disp: 30 tablet, Rfl: 11    meloxicam (MOBIC) 15 MG tablet, TAKE 1 TABLET DAILY, Disp: 90 tablet, Rfl: 3    hydroCHLOROthiazide (HYDRODIURIL) 25 MG tablet, TAKE 1 TABLET DAILY, Disp: 90 tablet, Rfl: 3    NONFORMULARY, 1 tablet daily Flash ease (Patient not taking: Reported on 9/30/2021), Disp: , Rfl:     BLACK COHOSH EXTRACT PO, Take 1 capsule by mouth daily (Patient not taking: Reported on 9/30/2021), Disp: , Rfl:     OMEPRAZOLE PO, Take by mouth Patient taking as needed (Patient not taking: Reported on 9/30/2021), Disp: , Rfl:     estrogens, conjugated, (PREMARIN) 0.625 MG tablet, Take 1 tablet by mouth daily (Patient not taking: Reported on 9/30/2021), Disp: 21 tablet, Rfl: 3    albuterol sulfate  (90 Base) MCG/ACT inhaler, Inhale 2 puffs into the lungs every 6 hours as needed for Wheezing or Shortness of Breath (Patient not taking: Reported on 9/30/2021), Disp: 1 Inhaler, Rfl: 0  Allergies   Allergen Reactions    Minocin [Minocycline] Nausea Only and Other (See Comments)     Dizzy    Oxycodone Nausea Only     Social History Socioeconomic History    Marital status:      Spouse name: Not on file    Number of children: Not on file    Years of education: Not on file    Highest education level: Not on file   Occupational History    Not on file   Tobacco Use    Smoking status: Former Smoker     Packs/day: 0.25     Years: 1.00     Pack years: 0.25     Types: Cigarettes     Start date: 1997     Quit date: 1998     Years since quittin.7    Smokeless tobacco: Never Used    Tobacco comment: Smoked for about 1 year   Vaping Use    Vaping Use: Never used   Substance and Sexual Activity    Alcohol use: Yes     Alcohol/week: 0.0 standard drinks     Comment: Occasionally, once or twice a year    Drug use: No    Sexual activity: Yes     Partners: Male     Birth control/protection: Post-menopausal     Comment:  24 years   Other Topics Concern    Not on file   Social History Narrative    Not on file     Social Determinants of Health     Financial Resource Strain: Low Risk     Difficulty of Paying Living Expenses: Not hard at all   Food Insecurity: No Food Insecurity    Worried About [address] in the Last Year: Never true    [address] [employer] in the Last Year: Never true   Transportation Needs:     Lack of Transportation (Medical):      Lack of Transportation (Non-Medical):    Physical Activity:     Days of Exercise per Week:     Minutes of Exercise per Session:    Stress:     Feeling of Stress :    Social Connections:     Frequency of Communication with Friends and Family:     Frequency of Social Gatherings with Friends and Family:     Attends [employer] Services:     Active Member of Clubs or Organizations:     Attends Club or Organization Meetings:     Marital Status:    Intimate Partner Violence:     Fear of Current or Ex-Partner:     Emotionally Abused:     Physically Abused:     Sexually Abused:      Past Medical History:   Diagnosis Date    Asthma     Chalazion of right lower eyelid 2014    Colon polyps     Degenerative joint disease of knee, left     Dysmenorrhea     GERD (gastroesophageal reflux disease)     Greater trochanteric bursitis of right hip     Hot flashes     Hyperlipidemia     Hypertension     Impaired fasting glucose     Obesity     Scaphoid fracture of wrist     Right.  Sleep disturbances     Tobacco abuse     Remote and limited.  Uterine fibroid      Past Surgical History:   Procedure Laterality Date    BREAST REDUCTION SURGERY Bilateral     CARDIAC CATHETERIZATION  06/15/2007    No significant coronary artery disease, preserved LV systolic function.   SECTION      COLONOSCOPY  09/10/2008    Sigmoid diverticulosis, history of polyps.  COLONOSCOPY  2007    Multiple polyps of the colon, rectal polyps, sigmoid polyps, and splenic flexure polyps.  COLONOSCOPY  2006    Pedunculated thin polyp near the transverse colon, small polyp at the dentate line versus a hemorrhoid.  COLONOSCOPY      COLONOSCOPY  2014    Dr. Romelia Mayo. adenom. x 2, 5yr follow up   [address] [place] [address] COLONOSCOPY BIOPSY/STOMA N/A 2020    hyperplastic polyp X 3, Dr. Kelly Espana, TOTAL ABDOMINAL      KNEE ARTHROSCOPY Left 1999    Torn degenerate posterior third medial meniscus.  MOUTH SURGERY Right 5/1/15    OTHER SURGICAL HISTORY Left 2012    Left knee injection for left knee pain.  OTHER SURGICAL HISTORY Right 2006    Bursa injection for greater trochanteric bursitis.  SALPINGO-OOPHORECTOMY Left     TONSILLECTOMY AND ADENOIDECTOMY      TOTAL KNEE ARTHROPLASTY Left      Family History   Problem Relation Age of Onset    Stomach Cancer Mother     Cervical Cancer Mother     Stroke Father     Cancer Father         Throat.  Breast Cancer Father 79        father cancer right breast    Cancer Brother         Lymphoma.     Stroke Maternal Grandmother     Heart Attack Maternal Grandfather         Myocardial infarction.  Colon Cancer Brother     Migraines Son     Asthma Daughter     Lung Cancer Sister         Physical Exam:  Vitals signs and nursing note reviewed. Constitutional:       Appearance: well-developed. HENT:      Head: Normocephalic and atraumatic. Nose: Nose normal.   Eyes:      Conjunctiva/sclera: Conjunctivae normal.   Neck:      Musculoskeletal: Normal range of motion and neck supple. Pulmonary:      Effort: Pulmonary effort is normal. No respiratory distress. Musculoskeletal:      Comments: Normal gait     Skin:     General: Skin is warm and dry. Neurological:      Mental Status: Alert and oriented to person, place, and time. Sensory: No sensory deficit. Psychiatric:         Behavior: Behavior normal.         Thought Content: Thought content normal.        Provider Attestation:  Alise Becker, personally performed the services described in this documentation. All medical record entries made by the scribe were at my direction and in my presence. I have reviewed the chart and discharge instructions and agree that the records reflect my personal performance and is accurate and complete. Briseida Lyons MD 10/8/21     Scribe Attestation:  By signing my name below, Masha Mckinney, attest that this documentation has been prepared under the direction and in the presence of Dr. Randa Argueta. Electronically signed: Dave Johnson, 10/8/21     Please note that this chart was generated using voice recognition Dragon dictation software. Although every effort was made to ensure the accuracy of this automated transcription, some errors in transcription may have occurred.

## 2021-10-12 ENCOUNTER — HOSPITAL ENCOUNTER (OUTPATIENT)
Dept: GENERAL RADIOLOGY | Age: 71
Discharge: HOME | End: 2021-10-14
Payer: MEDICARE

## 2021-10-12 DIAGNOSIS — M16.11 PRIMARY OSTEOARTHRITIS OF RIGHT HIP: ICD-10-CM

## 2021-10-12 DIAGNOSIS — M25.551 HIP PAIN, RIGHT: ICD-10-CM

## 2021-10-12 PROCEDURE — 6360000004 HC RX CONTRAST MEDICATION: Performed by: ORTHOPAEDIC SURGERY

## 2021-10-12 PROCEDURE — 2500000003 HC RX 250 WO HCPCS

## 2021-10-12 PROCEDURE — 6360000002 HC RX W HCPCS

## 2021-10-12 PROCEDURE — 20610 DRAIN/INJ JOINT/BURSA W/O US: CPT

## 2021-10-12 PROCEDURE — 77002 NEEDLE LOCALIZATION BY XRAY: CPT

## 2021-10-12 RX ADMIN — IOHEXOL 3 ML: 240 INJECTION, SOLUTION INTRATHECAL; INTRAVASCULAR; INTRAVENOUS; ORAL at 10:22

## 2021-10-22 DIAGNOSIS — Z12.31 ENCOUNTER FOR SCREENING MAMMOGRAM FOR BREAST CANCER: Primary | ICD-10-CM

## 2021-11-04 ENCOUNTER — HOSPITAL ENCOUNTER (OUTPATIENT)
Dept: MAMMOGRAPHY | Age: 71
Discharge: HOME | End: 2021-11-06
Payer: MEDICARE

## 2021-11-04 DIAGNOSIS — Z12.31 ENCOUNTER FOR SCREENING MAMMOGRAM FOR BREAST CANCER: ICD-10-CM

## 2021-11-04 PROCEDURE — 77063 BREAST TOMOSYNTHESIS BI: CPT

## 2021-12-03 ENCOUNTER — OFFICE VISIT (OUTPATIENT)
Dept: ORTHOPEDIC SURGERY | Age: 71
End: 2021-12-03
Payer: MEDICARE

## 2021-12-03 VITALS
BODY MASS INDEX: 37.28 KG/M2 | HEART RATE: 60 BPM | WEIGHT: 246 LBS | HEIGHT: 68 IN | SYSTOLIC BLOOD PRESSURE: 150 MMHG | DIASTOLIC BLOOD PRESSURE: 80 MMHG | OXYGEN SATURATION: 90 %

## 2021-12-03 DIAGNOSIS — M25.551 HIP PAIN, RIGHT: Primary | ICD-10-CM

## 2021-12-03 DIAGNOSIS — M16.11 PRIMARY OSTEOARTHRITIS OF RIGHT HIP: ICD-10-CM

## 2021-12-03 PROCEDURE — 1123F ACP DISCUSS/DSCN MKR DOCD: CPT | Performed by: ORTHOPAEDIC SURGERY

## 2021-12-03 PROCEDURE — 1090F PRES/ABSN URINE INCON ASSESS: CPT | Performed by: ORTHOPAEDIC SURGERY

## 2021-12-03 PROCEDURE — 4040F PNEUMOC VAC/ADMIN/RCVD: CPT | Performed by: ORTHOPAEDIC SURGERY

## 2021-12-03 PROCEDURE — G8427 DOCREV CUR MEDS BY ELIG CLIN: HCPCS | Performed by: ORTHOPAEDIC SURGERY

## 2021-12-03 PROCEDURE — 99213 OFFICE O/P EST LOW 20 MIN: CPT | Performed by: ORTHOPAEDIC SURGERY

## 2021-12-03 PROCEDURE — G8399 PT W/DXA RESULTS DOCUMENT: HCPCS | Performed by: ORTHOPAEDIC SURGERY

## 2021-12-03 PROCEDURE — 1036F TOBACCO NON-USER: CPT | Performed by: ORTHOPAEDIC SURGERY

## 2021-12-03 PROCEDURE — G8417 CALC BMI ABV UP PARAM F/U: HCPCS | Performed by: ORTHOPAEDIC SURGERY

## 2021-12-03 PROCEDURE — 3017F COLORECTAL CA SCREEN DOC REV: CPT | Performed by: ORTHOPAEDIC SURGERY

## 2021-12-03 PROCEDURE — 99214 OFFICE O/P EST MOD 30 MIN: CPT | Performed by: ORTHOPAEDIC SURGERY

## 2021-12-03 PROCEDURE — G8484 FLU IMMUNIZE NO ADMIN: HCPCS | Performed by: ORTHOPAEDIC SURGERY

## 2021-12-03 NOTE — PROGRESS NOTES
Patient ID: Jesus Marroquin is a 80 y.o. female    Chief Compliant:  Chief Complaint   Patient presents with    Hip Pain     re ck right hip        Diagnostic imaging:        Assessment and Plan:  1. Hip pain, right    2. Primary osteoarthritis of right hip        Patient is becoming a candidate for right total hip arthroplasty at this time the patient would like to delay try physical therapy and another injection    PT    Refer to IR for right hip joint injection     Follow up in 6-8 weeks    HPI:  This is a 80 y.o. female who presents to the clinic today for right hip pain. Patient admits continued right worse than left groin and lateral hip pain. She has had good relief with injections in the past, but she only experienced 1 month of relief with her most recent injection. Review of Systems   All other systems reviewed and are negative. Past History:    Current Outpatient Medications:     meloxicam (MOBIC) 15 MG tablet, TAKE 1 TABLET DAILY, Disp: 90 tablet, Rfl: 3    hydroCHLOROthiazide (HYDRODIURIL) 25 MG tablet, TAKE 1 TABLET DAILY, Disp: 90 tablet, Rfl: 3  Allergies   Allergen Reactions    Minocin [Minocycline] Nausea Only and Other (See Comments)     Dizzy    Oxycodone Nausea Only     Social History     Socioeconomic History    Marital status:      Spouse name: Not on file    Number of children: Not on file    Years of education: Not on file    Highest education level: Not on file   Occupational History    Not on file   Tobacco Use    Smoking status: Former Smoker     Packs/day: 0.25     Years: 1.00     Pack years: 0.25     Types: Cigarettes     Start date: 1997     Quit date: 1998     Years since quittin.9    Smokeless tobacco: Never Used    Tobacco comment: Smoked for about 1 year   Vaping Use    Vaping Use: Never used   Substance and Sexual Activity    Alcohol use:  Yes     Alcohol/week: 0.0 standard drinks     Comment: Occasionally, once or twice a year    Drug use: No    Sexual activity: Yes     Partners: Male     Birth control/protection: Post-menopausal     Comment:  24 years   Other Topics Concern    Not on file   Social History Narrative    Not on file     Social Determinants of Health     Financial Resource Strain: Low Risk     Difficulty of Paying Living Expenses: Not hard at all   Food Insecurity: No Food Insecurity    Worried About Running Out of Food in the Last Year: Never true    [address] in the Last Year: Never true   Transportation Needs:     Lack of Transportation (Medical): Not on file    Lack of Transportation (Non-Medical): Not on file   Physical Activity:     Days of Exercise per Week: Not on file    Minutes of Exercise per Session: Not on file   Stress:     Feeling of Stress : Not on file   Social Connections:     Frequency of Communication with Friends and Family: Not on file    Frequency of Social Gatherings with Friends and Family: Not on file    Attends Buddhism Services: Not on file    Active Member of [address] [place] [address] or Organizations: Not on file    Attends Club or Organization Meetings: Not on file    Marital Status: Not on file   Intimate Partner Violence:     Fear of Current or Ex-Partner: Not on file    Emotionally Abused: Not on file    Physically Abused: Not on file    Sexually Abused: Not on file   Housing Stability:     Unable to Pay for Housing in the Last Year: Not on file    Number of [place] in the Last Year: Not on file    Unstable Housing in the Last Year: Not on file     Past Medical History:   Diagnosis Date    Asthma     Chalazion of right lower eyelid 2014    Colon polyps     Degenerative joint disease of knee, left     Dysmenorrhea     GERD (gastroesophageal reflux disease)     Greater trochanteric bursitis of right hip     Hot flashes     Hyperlipidemia     Hypertension     Impaired fasting glucose     Obesity     Scaphoid fracture of wrist     Right.     Sleep disturbances     Tobacco abuse     Remote and limited.  Uterine fibroid      Past Surgical History:   Procedure Laterality Date    BREAST REDUCTION SURGERY Bilateral     CARDIAC CATHETERIZATION  06/15/2007    No significant coronary artery disease, preserved LV systolic function.   SECTION      COLONOSCOPY  09/10/2008    Sigmoid diverticulosis, history of polyps.  COLONOSCOPY  2007    Multiple polyps of the colon, rectal polyps, sigmoid polyps, and splenic flexure polyps.  COLONOSCOPY  2006    Pedunculated thin polyp near the transverse colon, small polyp at the dentate line versus a hemorrhoid.  COLONOSCOPY      COLONOSCOPY  2014    Dr. Emperatriz Ford. adenom. x 2, 5yr follow up   [address] [facility] COLONOSCOPY BIOPSY/STOMA N/A 2020    hyperplastic polyp X 3, Dr. Breann Alvarez, TOTAL ABDOMINAL      KNEE ARTHROSCOPY Left 1999    Torn degenerate posterior third medial meniscus.  MOUTH SURGERY Right 5/1/15    OTHER SURGICAL HISTORY Left 2012    Left knee injection for left knee pain.  OTHER SURGICAL HISTORY Right 2006    Bursa injection for greater trochanteric bursitis.  SALPINGO-OOPHORECTOMY Left     TONSILLECTOMY AND ADENOIDECTOMY      TOTAL KNEE ARTHROPLASTY Left      Family History   Problem Relation Age of Onset    Stomach Cancer Mother     Cervical Cancer Mother     Stroke Father     Cancer Father         Throat.  Breast Cancer Father 79        father cancer right breast    Cancer Brother         Lymphoma.  Stroke Maternal Grandmother     Heart Attack Maternal Grandfather         Myocardial infarction.  Colon Cancer Brother     Migraines Son     Asthma Daughter     Lung Cancer Sister     No Known Problems Sister     No Known Problems Sister     No Known Problems Sister     No Known Problems Maternal Aunt         Physical Exam:  Vitals signs and nursing note reviewed.    Constitutional: Appearance: well-developed. HENT:      Head: Normocephalic and atraumatic. Nose: Nose normal.   Eyes:      Conjunctiva/sclera: Conjunctivae normal.   Neck:      Musculoskeletal: Normal range of motion and neck supple. Pulmonary:      Effort: Pulmonary effort is normal. No respiratory distress. Musculoskeletal:      Comments: Normal gait     Skin:     General: Skin is warm and dry. Neurological:      Mental Status: Alert and oriented to person, place, and time. Sensory: No sensory deficit. Psychiatric:         Behavior: Behavior normal.         Thought Content: Thought content normal.        Provider Attestation:  Hailee Becker personally performed the services described in this documentation. All medical record entries made by the scribe were at my direction and in my presence. I have reviewed the chart and discharge instructions and agree that the records reflect my personal performance and is accurate and complete. Uche Holloway MD 12/3/21     Scribe Attestation:  By signing my name below, Liliana Gutierrez, attest that this documentation has been prepared under the direction and in the presence of Dr. Arianna Pinedo. Electronically signed: Dave Leavitt, 12/3/21     Please note that this chart was generated using voice recognition Dragon dictation software. Although every effort was made to ensure the accuracy of this automated transcription, some errors in transcription may have occurred.

## 2021-12-08 ENCOUNTER — HOSPITAL ENCOUNTER (OUTPATIENT)
Dept: PHYSICAL THERAPY | Age: 71
Setting detail: THERAPIES SERIES
Discharge: HOME | End: 2021-12-08
Payer: MEDICARE

## 2021-12-08 PROCEDURE — 97161 PT EVAL LOW COMPLEX 20 MIN: CPT | Performed by: PHYSICAL THERAPIST

## 2021-12-08 ASSESSMENT — PAIN DESCRIPTION - LOCATION: LOCATION: HIP

## 2021-12-08 ASSESSMENT — PAIN DESCRIPTION - ORIENTATION: ORIENTATION: RIGHT

## 2021-12-08 ASSESSMENT — PAIN DESCRIPTION - PROGRESSION: CLINICAL_PROGRESSION: GRADUALLY WORSENING

## 2021-12-08 ASSESSMENT — PAIN DESCRIPTION - DESCRIPTORS: DESCRIPTORS: DULL

## 2021-12-08 ASSESSMENT — PAIN DESCRIPTION - FREQUENCY: FREQUENCY: CONTINUOUS

## 2021-12-08 ASSESSMENT — PAIN DESCRIPTION - PAIN TYPE: TYPE: CHRONIC PAIN

## 2021-12-08 ASSESSMENT — PAIN SCALES - GENERAL: PAINLEVEL_OUTOF10: 9

## 2021-12-08 ASSESSMENT — PAIN DESCRIPTION - ONSET: ONSET: GRADUAL

## 2021-12-08 NOTE — PLAN OF CARE
[facility]    [x] [place]  Phone: [phone]  Fax: [phone]      [] [place]  Phone: [phone]  Fax: [phone]        To: Referring Practitioner: Bianca Villasenor      Patient: Debbie Cameron  : 1950   MRN: [id number]  Evaluation Date: 2021      Diagnosis Information:  · Diagnosis: M16.11 OA R hip  M25.551 R hip pain   · Treatment Diagnosis: M16.11OA R hip, M25.551 R hip pain     Physical Therapy Certification  Dear Nick Latif,  The following patient has been evaluated for physical therapy services and for therapy to continue, Medicare requires monthly physician review of the treatment plan. Please review the attached evaluation and/or summary of the patient's plan of care, and verify that you agree therapy should continue by signing the attached document and sending it back to our office. Plan of Care/Treatment to date:  [x] Therapeutic Exercise    [x] Modalities:  [x] Therapeutic Activity     [x] Ultrasound  [] Electrical Stimulation  [x] Gait Training      [] Cervical Traction [] Lumbar Traction  [x] Neuromuscular Re-education    [x] Cold/hotpack [] Iontophoresis   [x] Instruction in HEP     Other:  [x] Manual Therapy      []             [] Aquatic Therapy      []           ? Goals:  Short term goals  Time Frame for Short term goals: 1 week  Short term goal 1: Initiate HEP    Long term goals  Time Frame for Long term goals : 4 weeks  Long term goal 1: Pt will report a decrease in c/o pain by 50%  Long term goal 2: Pt will demo R hip and knee 5/5 for ease with standing, walking, gait  Long term goal 3: Pt will demo painfree functional ROM R hip for ease with ADLs  Long term goal 4: Pt will tolerate SLS 10 sec R LE    Frequency/Duration: 2021 to 2021  # Days per week: [] 1 day # Weeks: [] 1 week [] 5 weeks     [x] 2 days?    [] 2 weeks [] 6 weeks     [] 3 days   [] 3 weeks [] 7 weeks     [] 4 days   [x] 4

## 2021-12-08 NOTE — FLOWSHEET NOTE
Physical Therapy Daily Treatment Note    Date:  2021    Patient Name:  Rajan Howell    :    MRN: [id number]  Restrictions/Precautions:     Medical/Treatment Diagnosis Information:   · Diagnosis: M16.11 OA R hip  M25.551 R hip pain  · Treatment Diagnosis: M16.11OA R hip, M25.551 R hip pain  Insurance/Certification information:   Medicare  Physician Information:  Referring Practitioner: Khadar Burgos of care signed (Y/N):  N  Visit# / total visits:   Pain level: 9/10       Time In:10:10   Time Out:10:35    Progress Note: [x]  Yes  []  No  Next due by: Visit #8  2021  Subjective:   See Eval    Objective: See Eval  Observation:   Test measurements:      Exercises:   Exercise/Equipment Resistance/Repetitions Other comments   heelslides     Hip abduction     Bridge     SAQ     SLR               Counter exercises     Nustep      Balance ex                         [] Provided verbal/tactile cueing for activities related to strengthening, flexibility, endurance, ROM. (86418)  [] Provided verbal/tactile cueing for activities related to improving balance, coordination, kinesthetic sense, posture, motor skill, proprioception. (27116)    Therapeutic Activities:     [] Therapeutic activities, direct (one-on-one) patient contact (use of dynamic activities to improve functional performance). (02348)    Gait:   [] Provided training and instruction to the patient for ambulation re-education. (76380)    Self-Care/ADL's  [] Self-care/home management training and compensatory training, meal preparation, safety procedures, and instructions in use of assistive technology devices/adaptive equipment, direct one-on-one contact. (10106)    Home Exercise Program:     [] Reviewed/Progressed HEP activities related to strengthening, flexibility, endurance, ROM. (26462)  [] Reviewed/Progressed HEP activities related to improving balance, coordination, kinesthetic sense, posture, motor skill, proprioception. (47056)    Manual Treatments:    [] Provided manual therapy to mobilize soft tissue/joints for the purpose of modulating pain, promoting relaxation,  increasing ROM, reducing/eliminating soft tissue swelling/inflammation/restriction, improving soft tissue extensibility. (18010)    Service Based Modalities:  Eval 25 min    Timed Code Treatment Minutes:        Total Treatment Minutes:   25 min    Treatment/Activity Tolerance:  [x] Patient tolerated treatment well [] Patient limited by fatique  [] Patient limited by pain  [] Patient limited by other medical complications  [] Other:     Prognosis: [x] Good [] Fair  [] Poor    Patient Requires Follow-up: [x] Yes  [] No      Goals:  Short term goals  Time Frame for Short term goals: 1 week  Short term goal 1: Initiate HEP    Long term goals  Time Frame for Long term goals : 4 weeks  Long term goal 1: Pt will report a decrease in c/o pain by 50%  Long term goal 2: Pt will demo R hip and knee 5/5 for ease with standing, walking, gait  Long term goal 3: Pt will demo painfree functional ROM R hip for ease with ADLs  Long term goal 4: Pt will tolerate SLS 10 sec R LE          Plan:   [] Continue per plan of care [] Alter current plan (see comments)  [x] Plan of care initiated [] Hold pending MD visit [] Discharge  Plan for Next Session:  Initate R hip strengthening/ROM    Electronically signed by:  Matthew Roach PT,

## 2021-12-08 NOTE — PROGRESS NOTES
Physical Therapy  Initial Assessment  Date: 2021  Patient Name: Devante Bates  MRN: [id number]  : 1950     Treatment Diagnosis: M16.11OA R hip, M25.551 R hip pain    Subjective   General  Chart Reviewed: Yes  Patient assessed for rehabilitation services?: Yes  Family / Caregiver Present: No  Referring Practitioner: Randa Argueta  Referral Date : 21  Diagnosis: M16.11 OA R hip  M25.551 R hip pain  PT Visit Information  Onset Date: 21  Subjective  Subjective: Pt reports ongoing R hip pain  with multiple injections to receive another injection tomorrow.  Pt wishes to try conservative measures before THR  Pain Screening  Patient Currently in Pain: Yes  Pain Assessment  Pain Assessment: 0-10  Pain Level: 9  Pain Type: Chronic pain  Pain Location: Hip  Pain Orientation: Right  Pain Radiating Towards: Radiates to groin  Pain Descriptors: Dull  Pain Frequency: Continuous  Pain Onset: Gradual  Clinical Progression: Gradually worsening  Vital Signs  Patient Currently in Pain: Yes    Visio  Social/Functional History  Social/Functional History  Lives With: Alone  Home Layout: One level  Home Access: Level entry  Bathroom Toilet: Handicap height  Bathroom Equipment: Grab bars in shower  Homemaking Responsibilities: Yes    Objective     Observation/Palpation  Observation: Weight shift onto L LE    AROM RLE (degrees)  R Hip Flexion 0-125: 98  R Hip ABduction 0-45: 15  AROM LLE (degrees)  L Hip Flexion 0-125: 90  L Hip ABduction 0-45: 20    Strength RLE  R Hip Flexion: 4/5 (painful)  R Hip ABduction: 4/5 (mild pain)  R Knee Flexion: 4+/5  R Knee Extension: 4+/5  R Ankle Dorsiflexion: 4+/5  Strength LLE  L Hip Flexion: 4+/5  L Hip ABduction: 4+/5  L Knee Flexion: 5/5  L Knee Extension: 4+/5           Bed mobility  Bridging: Independent  Rolling to Left: Independent  Rolling to Right: Independent  Supine to Sit: Independent  Sit to Supine: Independent   LEFI:49/80       Ambulation  Ambulation?: Yes  Ambulating without AD without antalgia this date        Assessment   Conditions Requiring Skilled Therapeutic Intervention  Body structures, Functions, Activity limitations: Increased pain; Decreased balance; Decreased ROM;  Decreased strength  Assessment: Recommend PT for R hip strength , ROM and balance  Treatment Diagnosis: M16.11OA R hip, M25.551 R hip pain  Prognosis: Good  Decision Making: Low Complexity  Activity Tolerance  Activity Tolerance: Patient Tolerated treatment well         Plan   Plan  Times per week: 2  Plan weeks: 4  Specific instructions for Next Treatment: iniate R hip strengthening  Current Treatment Recommendations: Strengthening, ROM, Balance Training, Neuromuscular Re-education, Home Exercise Program                     Goals  Short term goals  Time Frame for Short term goals: 1 week  Short term goal 1: Initiate HEP  Long term goals  Time Frame for Long term goals : 4 weeks  Long term goal 1: Pt will report a decrease in c/o pain by 50%  Long term goal 2: Pt will demo R hip and knee 5/5 for ease with standing, walking, gait  Long term goal 3: Pt will demo painfree functional ROM R hip for ease with ADLs  Long term goal 4: Pt will tolerate SLS 10 sec R LE  Patient Goals   Patient goals : Strengthen R hip       Therapy Time   Individual Concurrent Group Co-treatment   Time In 1010         Time Out 1035         Minutes GERSON Martinez

## 2021-12-09 ENCOUNTER — HOSPITAL ENCOUNTER (OUTPATIENT)
Dept: GENERAL RADIOLOGY | Age: 71
Discharge: HOME | End: 2021-12-11
Payer: MEDICARE

## 2021-12-09 DIAGNOSIS — M16.11 PRIMARY OSTEOARTHRITIS OF RIGHT HIP: ICD-10-CM

## 2021-12-09 DIAGNOSIS — M25.551 HIP PAIN, RIGHT: ICD-10-CM

## 2021-12-09 PROCEDURE — 2709999900 IR FLUORO GUIDED NEEDLE PLACEMENT

## 2021-12-09 PROCEDURE — 6360000002 HC RX W HCPCS

## 2021-12-09 PROCEDURE — 6360000004 HC RX CONTRAST MEDICATION: Performed by: ORTHOPAEDIC SURGERY

## 2021-12-09 PROCEDURE — 20610 DRAIN/INJ JOINT/BURSA W/O US: CPT

## 2021-12-09 PROCEDURE — 2500000003 HC RX 250 WO HCPCS

## 2021-12-09 RX ADMIN — IOHEXOL 3 ML: 240 INJECTION, SOLUTION INTRATHECAL; INTRAVASCULAR; INTRAVENOUS; ORAL at 15:55

## 2021-12-10 ENCOUNTER — HOSPITAL ENCOUNTER (OUTPATIENT)
Dept: PHYSICAL THERAPY | Age: 71
Setting detail: THERAPIES SERIES
Discharge: HOME | End: 2021-12-10
Payer: MEDICARE

## 2021-12-10 PROCEDURE — 97110 THERAPEUTIC EXERCISES: CPT

## 2021-12-10 NOTE — FLOWSHEET NOTE
Physical Therapy Daily Treatment Note    Date:  12/10/2021    Patient Name:  Madi Askew    :  [birth year]  MRN: [id number]  Restrictions/Precautions:     Medical/Treatment Diagnosis Information:   · Diagnosis: M16.11 OA R hip  M25.551 R hip pain  · Treatment Diagnosis: M16.11OA R hip, M25.551 R hip pain  Insurance/Certification information:   Medicare  Physician Information:  Referring Practitioner: Parveen Granados of care signed (Y/N):  N  Visit# / total visits:   Pain level: 3/10 R hip       Time In: 8:45   Time Out: 9:23    Progress Note: []  Yes  [x]  No  Next due by: Visit #8  2021     Subjective:   Patient states R hip feeling pretty good since injection yesterday. Pt states pain mostly in groin. Pt states left hip slightly painful this date. Objective: JERALD performed per flow sheet for improved mobility and strengthening to decrease pain for improved ambulation and daily living activities. Verbal cueing for technique. HEP given with understanding noted    Observation:   Test measurements:  3 seconds SLS stance on R LE    Exercises:   Exercise/Equipment Resistance/Repetitions Other comments   heelslides 10x    Hip abduction 10x    Bridge 10x  Narrow, wide   SAQ 10x    SLR 10x              Counter exercises 10x TR/HR, HS curls, 3 way, marches   Nustep 6' level 3    FTEO, FTEC 2x20\" foam   Mini Squats  5x  3 way    Tandem walking  2 laps    Lateral stepping 2 laps         [] Provided verbal/tactile cueing for activities related to strengthening, flexibility, endurance, ROM. (62281)  [] Provided verbal/tactile cueing for activities related to improving balance, coordination, kinesthetic sense, posture, motor skill, proprioception. (93068)    Therapeutic Activities:     [] Therapeutic activities, direct (one-on-one) patient contact (use of dynamic activities to improve functional performance). (00739)    Gait:   [] Provided training and instruction to the patient for ambulation re-education. (92922)    Self-Care/ADL's  [] Self-care/home management training and compensatory training, meal preparation, safety procedures, and instructions in use of assistive technology devices/adaptive equipment, direct one-on-one contact. (61754)    Home Exercise Program:  Counter Exercises   [] Reviewed/Progressed HEP activities related to strengthening, flexibility, endurance, ROM. (31285)  [] Reviewed/Progressed HEP activities related to improving balance, coordination, kinesthetic sense, posture, motor skill, proprioception.  (98024)    Manual Treatments:    [] Provided manual therapy to mobilize soft tissue/joints for the purpose of modulating pain, promoting relaxation,  increasing ROM, reducing/eliminating soft tissue swelling/inflammation/restriction, improving soft tissue extensibility.  (82956)    Service Based Modalities:      Timed Code Treatment Minutes: 45' therex      Total Treatment Minutes:   38 min    Treatment/Activity Tolerance:  [x] Patient tolerated treatment well [] Patient limited by fatique  [] Patient limited by pain  [] Patient limited by other medical complications  [] Other:     Prognosis: [x] Good [] Fair  [] Poor    Patient Requires Follow-up: [x] Yes  [] No      Goals:       Long term goals  Time Frame for Long term goals : 4 weeks  Long term goal 1: Pt will report a decrease in c/o pain by 50%  Long term goal 2: Pt will demo R hip and knee 5/5 for ease with standing, walking, gait  Long term goal 3: Pt will demo painfree functional ROM R hip for ease with ADLs  Long term goal 4: Pt will tolerate SLS 10 sec R LE (see above)          Plan:   [x] Continue per plan of care [] Alter current plan (see comments)  [] Plan of care initiated [] Hold pending MD visit [] Discharge    Plan for Next Session:  Initate R hip strengthening/ROM    Electronically signed by:  Bladimir Love PTA,

## 2021-12-13 ENCOUNTER — HOSPITAL ENCOUNTER (OUTPATIENT)
Dept: PHYSICAL THERAPY | Age: 71
Setting detail: THERAPIES SERIES
Discharge: HOME | End: 2021-12-13
Payer: MEDICARE

## 2021-12-13 PROCEDURE — 97110 THERAPEUTIC EXERCISES: CPT

## 2021-12-13 NOTE — FLOWSHEET NOTE
Physical Therapy Daily Treatment Note    Date:  2021    Patient Name:  Jose J Ocasio    :    MRN: [id number]  Restrictions/Precautions:     Medical/Treatment Diagnosis Information:   · Diagnosis: M16.11 OA R hip  M25.551 R hip pain  · Treatment Diagnosis: M16.11OA R hip, M25.551 R hip pain  Insurance/Certification information:   Medicare  Physician Information:  Referring Practitioner: Rufina Perez of care signed (Y/N):  N  Visit# / total visits: 3 /8  Pain level: 2/10 L hip       Time In: 10:36   Time Out: 11:14    Progress Note: []  Yes  [x]  No  Next due by: Visit #8  2021     Subjective:   Patient reports L hip achy this date. Patient states pain in PSIS on Left side. Pt stated felt good after last session. Objective: JERALD performed per flow sheet for improved mobility and strengthening to decrease pain for improved ambulation and daily living activities. Verbal cueing for technique. HEP given with understanding noted. Initiated new exercise with good tolerance. Moderate sway with tandem walking.  Pt brought through painfree ROM with slight tightness in right groin    Observation:   Test measurements:  R knee ext: 4+/5    Exercises:   Exercise/Equipment Resistance/Repetitions Other comments   heelslides 15x    Hip abduction 15x    Bridge 10x  Narrow, wide   SAQ 15x    SLR 15x    Hip abd/add 10x Ball, green   clamshells 10x              Counter exercises 15x TR/HR, HS curls, 3 way, marches   Nustep 7' level 3    FTEO, FTEC 2x30\" foam   Mini Squats  10x  3 way    Tandem walking  2 laps    Lateral stepping 2 laps    Step ups 10x 4\" Fwd/lat   Retro walking 1 lap         [] Provided verbal/tactile cueing for activities related to strengthening, flexibility, endurance, ROM. (09624)  [] Provided verbal/tactile cueing for activities related to improving balance, coordination, kinesthetic sense, posture, motor skill, proprioception. (51833)    Therapeutic Activities:     [] Therapeutic Next Session:  Initate R hip strengthening/ROM    Electronically signed by:  Camron Barillas PTA,

## 2021-12-16 ENCOUNTER — HOSPITAL ENCOUNTER (OUTPATIENT)
Dept: PHYSICAL THERAPY | Age: 71
Setting detail: THERAPIES SERIES
Discharge: HOME | End: 2021-12-16
Payer: MEDICARE

## 2021-12-20 ENCOUNTER — HOSPITAL ENCOUNTER (OUTPATIENT)
Dept: PHYSICAL THERAPY | Age: 71
Setting detail: THERAPIES SERIES
Discharge: HOME | End: 2021-12-20
Payer: MEDICARE

## 2021-12-20 ENCOUNTER — NURSE ONLY (OUTPATIENT)
Dept: LAB | Age: 71
End: 2021-12-20

## 2021-12-20 DIAGNOSIS — Z01.30 BLOOD PRESSURE CHECK: Primary | ICD-10-CM

## 2021-12-20 PROCEDURE — 97110 THERAPEUTIC EXERCISES: CPT

## 2021-12-20 NOTE — PROGRESS NOTES
Patient here today for a blood pressure check. Patient reports she is just curious what her blood pressure is today. Patient reports she takes HCTZ 25 mg every morning and also Metoprolol succinate 50 mg occasionally. Patient reports she does not take the Metoprolol daily because it can make her heart rate dip into the 50s which is of concern to her. Patient's vitals obtained and documented. /94  HR 72  Recheck revealed same after 15 minutes. Per Dr. Bertina Bence on notes, patient to take the metoprolol succinate 50mg daily as ordered. Also, patient to start a blood pressure journal with blood pressures and pulse rates. Writer told patient she can return any time for a blood pressure check.

## 2021-12-20 NOTE — FLOWSHEET NOTE
Physical Therapy Daily Treatment Note    Date:  2021    Patient Name:  Sherry Caballero    :    MRN: [id number]  Restrictions/Precautions:     Medical/Treatment Diagnosis Information:   · Diagnosis: M16.11 OA R hip  M25.551 R hip pain  · Treatment Diagnosis: M16.11OA R hip, M25.551 R hip pain  Insurance/Certification information:   Medicare  Physician Information:  Referring Practitioner: Eileen Felix of care signed (Y/N):  N  Visit# / total visits:   Pain level: 2/10 L hip       Time In: 1:15   Time Out: 1:53    Progress Note: []  Yes  [x]  No  Next due by: Visit #8  2021     Subjective:  Pt reports feeling okay this date however not great. Pt reports has been sick and not been doing HEP due to fatigue. Pt reports L hip more achy by SI joint and R hip has been feeling okay. Objective: JERALD performed per flow sheet for improved mobility and strengthening to decrease pain for improved ambulation and daily living activities. Verbal cueing for technique. Moderate sway with tandem walking. Pt brought through painfree ROM with slight tightness in right groin.      Observation:    Test measurements: AROM R hip flexion: 100 degrees    Exercises:   Exercise/Equipment Resistance/Repetitions Other comments   heelslides 15x    Hip abduction 15x    Bridge 15x  Narrow, wide   SAQ 15x    SLR 15x    Hip abd/add 15x Ball, green   clamshells 15x              Counter exercises 15x TR/HR, HS curls, 3 way, marches   Nustep 7' level 3    FTEO, FTEC 2x30\" foam   Mini Squats  10x  3 way    Tandem walking  2 laps    Lateral stepping 2 laps    Step ups 10x 4\" Fwd/lat   Retro walking 1 lap    Lunges 10x         [] Provided verbal/tactile cueing for activities related to strengthening, flexibility, endurance, ROM. (81295)  [] Provided verbal/tactile cueing for activities related to improving balance, coordination, kinesthetic sense, posture, motor skill, proprioception. (63998)    Therapeutic Activities: [] Therapeutic activities, direct (one-on-one) patient contact (use of dynamic activities to improve functional performance). (13499)    Gait:   [] Provided training and instruction to the patient for ambulation re-education. (47124)    Self-Care/ADL's  [] Self-care/home management training and compensatory training, meal preparation, safety procedures, and instructions in use of assistive technology devices/adaptive equipment, direct one-on-one contact. (12647)    Home Exercise Program:  Counter Exercises   [] Reviewed/Progressed HEP activities related to strengthening, flexibility, endurance, ROM. (97522)  [] Reviewed/Progressed HEP activities related to improving balance, coordination, kinesthetic sense, posture, motor skill, proprioception.  (13761)    Manual Treatments:    [] Provided manual therapy to mobilize soft tissue/joints for the purpose of modulating pain, promoting relaxation,  increasing ROM, reducing/eliminating soft tissue swelling/inflammation/restriction, improving soft tissue extensibility.  (96711)    Service Based Modalities:      Timed Code Treatment Minutes: 45' therex      Total Treatment Minutes:   38 min    Treatment/Activity Tolerance:  [x] Patient tolerated treatment well [] Patient limited by fatique  [] Patient limited by pain  [] Patient limited by other medical complications  [] Other:     Prognosis: [x] Good [] Fair  [] Poor    Patient Requires Follow-up: [x] Yes  [] No      Goals:       Long term goals  Time Frame for Long term goals : 4 weeks  Long term goal 1: Pt will report a decrease in c/o pain by 50%  Long term goal 2: Pt will demo R hip and knee 5/5 for ease with standing, walking, gait  Long term goal 3: Pt will demo painfree functional ROM R hip for ease with ADLs  (see above)  Long term goal 4: Pt will tolerate SLS 10 sec R LE         Plan:   [x] Continue per plan of care [] Alter current plan (see comments)  [] Plan of care initiated [] Hold pending MD visit [] Discharge    Plan for Next Session:  Initate R hip strengthening/ROM    Electronically signed by:  Zachariah Pickett PTA,    Patients Plan of Care  Transferred to Jose Manuel Gong PT

## 2021-12-22 ENCOUNTER — HOSPITAL ENCOUNTER (OUTPATIENT)
Dept: PHYSICAL THERAPY | Age: 71
Setting detail: THERAPIES SERIES
Discharge: HOME | End: 2021-12-22
Payer: MEDICARE

## 2021-12-22 PROCEDURE — 97110 THERAPEUTIC EXERCISES: CPT

## 2021-12-22 NOTE — FLOWSHEET NOTE
Physical Therapy Daily Treatment Note    Date:  2021    Patient Name:  Danika Tomas    :    MRN: [id number]  Restrictions/Precautions:     Medical/Treatment Diagnosis Information:   · Diagnosis: M16.11 OA R hip  M25.551 R hip pain  · Treatment Diagnosis: M16.11OA R hip, M25.551 R hip pain  Insurance/Certification information:   Medicare  Physician Information:  Referring Practitioner: Sarkis Still of care signed (Y/N):  Y  Visit# / total visits:   Pain level: 1/10 more tightness than pain        Time In: 9:32   Time Out: 10:11    Progress Note: []  Yes  [x]  No  Next due by: Visit #8  2021     Subjective:  Pt reports feeling okay this date, noting she has more tightness in low back than pain. Pt reports performing HEP once since being sick, noting it went ok. Pt notes feeling 70-75% improvement since beginning therapy. Objective: JERALD performed per flow sheet for improved mobility and strengthening to decrease pain for improved ambulation and daily living activities. Exercises progressed with good tolerance. Verbal cueing for technique. Minimum sway with tandem walking. Pt notes feeling looser at end of treatment and no increase in pain.     Observation:    Test measurements:     Exercises:   Exercise/Equipment Resistance/Repetitions Other comments   heelslides 20x    Hip abduction 20x    Bridge 20x  Narrow, wide   SAQ 20x    SLR 20x    Hip abd/add 20x Ball, green   clamshells 20x              Counter exercises 15x TR/HR, HS curls, 3 way, marches   Nustep 7' level 3    FTEO, FTEC 2x30\" foam   Mini Squats  15x  3 way    Tandem walking  2 laps    Lateral stepping 2 laps    Step ups 15x 4\" Fwd/lat   Retro walking 1 lap    Lunges 15x         [x] Provided verbal/tactile cueing for activities related to strengthening, flexibility, endurance, ROM. (73282)  [] Provided verbal/tactile cueing for activities related to improving balance, coordination, kinesthetic sense, posture, motor skill, of care initiated [] Hold pending MD visit [] Discharge    Plan for Next Session:  Initate R hip strengthening/ROM    Electronically signed by:  Carlito Ng PTA,    Patients Plan of Care  Transferred to Tanner Cat PT [facility] Pregnancy And Lactation Text: This medication is Pregnancy Category X and is absolutely contraindicated during pregnancy. It is unknown if it is excreted in breast milk.

## 2021-12-27 ENCOUNTER — HOSPITAL ENCOUNTER (OUTPATIENT)
Dept: PHYSICAL THERAPY | Age: 71
Setting detail: THERAPIES SERIES
Discharge: HOME | End: 2021-12-27
Payer: MEDICARE

## 2021-12-27 PROCEDURE — 97110 THERAPEUTIC EXERCISES: CPT

## 2021-12-27 NOTE — PROGRESS NOTES
I have reviewed and agree to the content of the note written by the PTA.   Electronically signed by Delmi Nye PT [id number]

## 2021-12-27 NOTE — FLOWSHEET NOTE
Physical Therapy Daily Treatment Note    Date:  2021    Patient Name:  Danika Tomas    :    MRN: [id number]  Restrictions/Precautions:     Medical/Treatment Diagnosis Information:   · Diagnosis: M16.11 OA R hip  M25.551 R hip pain  · Treatment Diagnosis: M16.11OA R hip, M25.551 R hip pain  Insurance/Certification information:   Medicare  Physician Information:  Referring Practitioner: Sarkis Still of care signed (Y/N):  Y  Visit# / total visits:   Pain level: 0/10        Time In: 10:13   Time Out: 10:54    Progress Note: []  Yes  [x]  No  Next due by: Visit #8  2021     Subjective:  Pt reports no pain in hip, stating she only has tightness in her R knee due to the weather. Pt reports her asthma is bothering her this date, noting she did not bring her inhaler. Pt reports compliance with HEP. Pt notes tolerating 5hr round trip drive yesterday without experiencing any pain. Objective: JERALD performed per flow sheet for improved mobility and strengthening to decrease pain for improved ambulation and daily living activities. Exercises progressed with good tolerance. Verbal cueing for technique. Minimum sway with tandem walking. Pt notes feeling looser at end of treatment and no increase in pain.  Discussed possible discharge at end of POC    Observation:    Test measurements: R SLS 19sec w/ mod sway    Exercises:   Exercise/Equipment Resistance/Repetitions Other comments   heelslides 20x    Hip abduction 20x    Bridge 20x  Narrow, wide   SAQ 20x    SLR 20x    Hip abd/add 20x Ball, green   clamshells 20x              Counter exercises 15x TR/HR, HS curls, 3 way, marches   Nustep 7' level 3    FTEO, FTEC 2x30\" foam   Mini Squats  15x  3 way    Tandem walking  3 laps    Lateral stepping 3 laps    Step ups 10x 6\" Fwd/lat   Retro walking 2 lap    Lunges 15x Fwd/lat        [x] Provided verbal/tactile cueing for activities related to strengthening, flexibility, endurance, ROM. (66851)  [] Provided verbal/tactile cueing for activities related to improving balance, coordination, kinesthetic sense, posture, motor skill, proprioception. (92754)    Therapeutic Activities:     [] Therapeutic activities, direct (one-on-one) patient contact (use of dynamic activities to improve functional performance). (83633)    Gait:   [] Provided training and instruction to the patient for ambulation re-education. (39857)    Self-Care/ADL's  [] Self-care/home management training and compensatory training, meal preparation, safety procedures, and instructions in use of assistive technology devices/adaptive equipment, direct one-on-one contact. (80086)    Home Exercise Program:  Counter Exercises   [x] Reviewed/Progressed HEP activities related to strengthening, flexibility, endurance, ROM. (01862)  [] Reviewed/Progressed HEP activities related to improving balance, coordination, kinesthetic sense, posture, motor skill, proprioception.  (02557)    Manual Treatments:    [] Provided manual therapy to mobilize soft tissue/joints for the purpose of modulating pain, promoting relaxation,  increasing ROM, reducing/eliminating soft tissue swelling/inflammation/restriction, improving soft tissue extensibility.  (15084)    Service Based Modalities:      Timed Code Treatment Minutes: 39' therex      Total Treatment Minutes:  41  min    Treatment/Activity Tolerance:  [x] Patient tolerated treatment well [] Patient limited by fatique  [] Patient limited by pain  [] Patient limited by other medical complications  [] Other:     Prognosis: [x] Good [] Fair  [] Poor    Patient Requires Follow-up: [x] Yes  [] No      Goals:       Long term goals  Time Frame for Long term goals : 4 weeks  Long term goal 1: Pt will report a decrease in c/o pain by 50% (see above)  Long term goal 2: Pt will demo R hip and knee 5/5 for ease with standing, walking, gait  Long term goal 3: Pt will demo painfree functional ROM R hip for ease with ADLs  (see

## 2021-12-28 NOTE — PROGRESS NOTES
I have reviewed and agree to the content of the note written by the PTA.   Electronically signed by Mateusz Emmanuel PT [id number]

## 2021-12-29 ENCOUNTER — HOSPITAL ENCOUNTER (OUTPATIENT)
Dept: PHYSICAL THERAPY | Age: 71
Setting detail: THERAPIES SERIES
Discharge: HOME | End: 2021-12-29
Payer: MEDICARE

## 2021-12-29 PROCEDURE — 97110 THERAPEUTIC EXERCISES: CPT

## 2021-12-29 NOTE — FLOWSHEET NOTE
Physical Therapy Daily Treatment Note    Date:  2021    Patient Name:  Suzy Martinez    :  [birth year]  MRN: [id number]  Restrictions/Precautions:     Medical/Treatment Diagnosis Information:   · Diagnosis: M16.11 OA R hip  M25.551 R hip pain  · Treatment Diagnosis: M16.11OA R hip, M25.551 R hip pain  Insurance/Certification information:   Medicare  Physician Information:  Referring Practitioner: Katie Little of care signed (Y/N):  Y   Visit# / total visits:   Pain level: 0/10        Time In: 11:00   Time Out: 11:32    Progress Note: []  Yes  [x]  No  Next due by: Visit #8  2021     Subjective:  Pt reports no pain in hip, stating she only has tightness in her R knee due to the weather. Patient is ready for discharge as she is not having pain and feels like she is doing just fine. Objective: JERALD performed per flow sheet for improved mobility and strengthening to decrease pain for improved ambulation and daily living activities. All measures addressed on this date. Reviewed HEP with good understanding. Verbal instructions provided. Patient fatigued after exercises.      Observation:    Test measurements: R SLS 19sec w/ mod sway    Exercises:   Exercise/Equipment Resistance/Repetitions Other comments   heelslides 20x    Hip abduction 20x    Bridge 20x  Narrow, wide   SAQ 20x    SLR 20x    Hip abd/add 20x Ball, green   clamshells 20x              Counter exercises 15x TR/HR, HS curls, 3 way, marches   Nustep 7' level 3    FTEO, FTEC 2x30\" foam   Mini Squats  15x  3 way    Tandem walking  3 laps    Lateral stepping 3 laps    Step ups 10x 6\" Fwd/lat   Retro walking 2 lap    Lunges 15x Fwd/lat        [x] Provided verbal/tactile cueing for activities related to strengthening, flexibility, endurance, ROM. (03862)  [] Provided verbal/tactile cueing for activities related to improving balance, coordination, kinesthetic sense, posture, motor skill, proprioception. (15335)    Therapeutic Activities: [] Therapeutic activities, direct (one-on-one) patient contact (use of dynamic activities to improve functional performance). (05968)    Gait:   [] Provided training and instruction to the patient for ambulation re-education. (18849)    Self-Care/ADL's  [] Self-care/home management training and compensatory training, meal preparation, safety procedures, and instructions in use of assistive technology devices/adaptive equipment, direct one-on-one contact. (28590)    Home Exercise Program:  Counter Exercises   [x] Reviewed/Progressed HEP activities related to strengthening, flexibility, endurance, ROM. (83351)  [] Reviewed/Progressed HEP activities related to improving balance, coordination, kinesthetic sense, posture, motor skill, proprioception.  (49494)    Manual Treatments:    [] Provided manual therapy to mobilize soft tissue/joints for the purpose of modulating pain, promoting relaxation,  increasing ROM, reducing/eliminating soft tissue swelling/inflammation/restriction, improving soft tissue extensibility.  (83377)    Service Based Modalities:      Timed Code Treatment Minutes: 28' therex      Total Treatment Minutes:  32   min    Treatment/Activity Tolerance:  [x] Patient tolerated treatment well [] Patient limited by fatique  [] Patient limited by pain  [] Patient limited by other medical complications  [] Other:     Prognosis: [x] Good [] Fair  [] Poor    Patient Requires Follow-up: [x] Yes  [] No      Goals:       Long term goals  Time Frame for Long term goals : 4 weeks  Long term goal 1: Pt will report a decrease in c/o pain by 50% (0/10 pain)  Long term goal 2: Pt will demo R hip and knee 5/5 for ease with standing, walking, gait (5/5 hip and knee strength)  Long term goal 3: Pt will demo painfree functional ROM R hip for ease with ADLs  (painfree, possibly due to injections as well)  Long term goal 4: Pt will tolerate SLS 10 sec R LE (see above)        Plan:   [x] Continue per plan of care [] Edgard Reynoso current plan (see comments)  [] Plan of care initiated [] Hold pending MD visit [] Discharge    Plan for Next Session: Discharge.      Electronically signed by:  Gaurav Bocanegra, PTA,    Patients Plan of Care  Transferred to Josselyn Gill PT

## 2022-01-14 ENCOUNTER — OFFICE VISIT (OUTPATIENT)
Dept: ORTHOPEDIC SURGERY | Age: 72
End: 2022-01-14
Payer: MEDICARE

## 2022-01-14 VITALS
DIASTOLIC BLOOD PRESSURE: 84 MMHG | BODY MASS INDEX: 37.28 KG/M2 | SYSTOLIC BLOOD PRESSURE: 160 MMHG | HEART RATE: 61 BPM | HEIGHT: 68 IN | WEIGHT: 246 LBS

## 2022-01-14 DIAGNOSIS — M25.551 HIP PAIN, RIGHT: Primary | ICD-10-CM

## 2022-01-14 DIAGNOSIS — M16.11 PRIMARY OSTEOARTHRITIS OF RIGHT HIP: ICD-10-CM

## 2022-01-14 PROCEDURE — G8399 PT W/DXA RESULTS DOCUMENT: HCPCS | Performed by: ORTHOPAEDIC SURGERY

## 2022-01-14 PROCEDURE — 1090F PRES/ABSN URINE INCON ASSESS: CPT | Performed by: ORTHOPAEDIC SURGERY

## 2022-01-14 PROCEDURE — 1123F ACP DISCUSS/DSCN MKR DOCD: CPT | Performed by: ORTHOPAEDIC SURGERY

## 2022-01-14 PROCEDURE — G8417 CALC BMI ABV UP PARAM F/U: HCPCS | Performed by: ORTHOPAEDIC SURGERY

## 2022-01-14 PROCEDURE — 99213 OFFICE O/P EST LOW 20 MIN: CPT | Performed by: ORTHOPAEDIC SURGERY

## 2022-01-14 PROCEDURE — G8427 DOCREV CUR MEDS BY ELIG CLIN: HCPCS | Performed by: ORTHOPAEDIC SURGERY

## 2022-01-14 PROCEDURE — 99214 OFFICE O/P EST MOD 30 MIN: CPT | Performed by: ORTHOPAEDIC SURGERY

## 2022-01-14 PROCEDURE — 3017F COLORECTAL CA SCREEN DOC REV: CPT | Performed by: ORTHOPAEDIC SURGERY

## 2022-01-14 PROCEDURE — G8484 FLU IMMUNIZE NO ADMIN: HCPCS | Performed by: ORTHOPAEDIC SURGERY

## 2022-01-14 PROCEDURE — 4040F PNEUMOC VAC/ADMIN/RCVD: CPT | Performed by: ORTHOPAEDIC SURGERY

## 2022-01-14 PROCEDURE — 1036F TOBACCO NON-USER: CPT | Performed by: ORTHOPAEDIC SURGERY

## 2022-01-14 RX ORDER — METOPROLOL SUCCINATE 50 MG/1
TABLET, EXTENDED RELEASE ORAL
COMMUNITY
Start: 2022-01-13 | End: 2022-10-10 | Stop reason: SDUPTHER

## 2022-01-14 NOTE — PROGRESS NOTES
Patient ID: Zachery Toussaint is a 80 y.o. female    Chief Compliant:  Chief Complaint   Patient presents with    Hip Pain     rech right hip pain        Diagnostic imaging:        Assessment and Plan:  1. Hip pain, right    2. Primary osteoarthritis of right hip      Right hip pain tolerable with IR injection and PT    Candidate for R BUCK if her symptoms worsen     Follow up prn    HPI:  This is a 80 y.o. female who presents to the clinic today for right hip pain. Patient notes significant relief of pain with IR right hip injection and PT. She reports intermittent mild left sided lower back pain    Review of Systems   All other systems reviewed and are negative. Past History:    Current Outpatient Medications:     meloxicam (MOBIC) 15 MG tablet, TAKE 1 TABLET DAILY, Disp: 90 tablet, Rfl: 3    hydroCHLOROthiazide (HYDRODIURIL) 25 MG tablet, TAKE 1 TABLET DAILY, Disp: 90 tablet, Rfl: 3  Allergies   Allergen Reactions    Minocin [Minocycline] Nausea Only and Other (See Comments)     Dizzy    Oxycodone Nausea Only     Social History     Socioeconomic History    Marital status:      Spouse name: Not on file    Number of children: Not on file    Years of education: Not on file    Highest education level: Not on file   Occupational History    Not on file   Tobacco Use    Smoking status: Former Smoker     Packs/day: 0.25     Years: 1.00     Pack years: 0.25     Types: Cigarettes     Start date: 1997     Quit date: 1998     Years since quittin.0    Smokeless tobacco: Never Used    Tobacco comment: Smoked for about 1 year   Vaping Use    Vaping Use: Never used   Substance and Sexual Activity    Alcohol use:  Yes     Alcohol/week: 0.0 standard drinks     Comment: Occasionally, once or twice a year    Drug use: No    Sexual activity: Yes     Partners: Male     Birth control/protection: Post-menopausal     Comment:  24 years   Other Topics Concern    Not on file   Social History Narrative    Not on file     Social Determinants of Health     Financial Resource Strain: Low Risk     Difficulty of Paying Living Expenses: Not hard at all   Food Insecurity: No Food Insecurity    Worried About Running Out of Food in the Last Year: Never true    [address] in the Last Year: Never true   Transportation Needs:     Lack of Transportation (Medical): Not on file    Lack of Transportation (Non-Medical): Not on file   Physical Activity:     Days of Exercise per Week: Not on file    Minutes of Exercise per Session: Not on file   Stress:     Feeling of Stress : Not on file   Social Connections:     Frequency of Communication with Friends and Family: Not on file    Frequency of Social Gatherings with Friends and Family: Not on file    Attends Faith Services: Not on file    Active Member of [address] [place] [address] or Organizations: Not on file    Attends Club or Organization Meetings: Not on file    Marital Status: Not on file   Intimate Partner Violence:     Fear of Current or Ex-Partner: Not on file    Emotionally Abused: Not on file    Physically Abused: Not on file    Sexually Abused: Not on file   Housing Stability:     Unable to Pay for Housing in the Last Year: Not on file    Number of [place] in the Last Year: Not on file    Unstable Housing in the Last Year: Not on file     Past Medical History:   Diagnosis Date    Asthma     Chalazion of right lower eyelid 2014    Colon polyps     Degenerative joint disease of knee, left     Dysmenorrhea     GERD (gastroesophageal reflux disease)     Greater trochanteric bursitis of right hip     Hot flashes     Hyperlipidemia     Hypertension     Impaired fasting glucose     Obesity     Scaphoid fracture of wrist     Right.  Sleep disturbances     Tobacco abuse     Remote and limited.     Uterine fibroid      Past Surgical History:   Procedure Laterality Date    BREAST REDUCTION SURGERY Bilateral 1996    CARDIAC CATHETERIZATION  06/15/2007    No significant coronary artery disease, preserved LV systolic function.   SECTION      COLONOSCOPY  09/10/2008    Sigmoid diverticulosis, history of polyps.  COLONOSCOPY  2007    Multiple polyps of the colon, rectal polyps, sigmoid polyps, and splenic flexure polyps.  COLONOSCOPY  2006    Pedunculated thin polyp near the transverse colon, small polyp at the dentate line versus a hemorrhoid.  COLONOSCOPY      COLONOSCOPY  2014    Dr. Cesia Cancino. adenom. x 2, 5yr follow up   [facility] COLONOSCOPY BIOPSY/STOMA N/A 2020    hyperplastic polyp X 3, Dr. Colvin Hashimoto, TOTAL ABDOMINAL      KNEE ARTHROSCOPY Left 1999    Torn degenerate posterior third medial meniscus.  MOUTH SURGERY Right 5/1/15    OTHER SURGICAL HISTORY Left 2012    Left knee injection for left knee pain.  OTHER SURGICAL HISTORY Right 2006    Bursa injection for greater trochanteric bursitis.  SALPINGO-OOPHORECTOMY Left     TONSILLECTOMY AND ADENOIDECTOMY      TOTAL KNEE ARTHROPLASTY Left      Family History   Problem Relation Age of Onset    Stomach Cancer Mother     Cervical Cancer Mother     Stroke Father     Cancer Father         Throat.  Breast Cancer Father 79        father cancer right breast    Cancer Brother         Lymphoma.  Stroke Maternal Grandmother     Heart Attack Maternal Grandfather         Myocardial infarction.  Colon Cancer Brother     Migraines Son     Asthma Daughter     Lung Cancer Sister     No Known Problems Sister     No Known Problems Sister     No Known Problems Sister     No Known Problems Maternal Aunt         Physical Exam:  Vitals signs and nursing note reviewed. Constitutional:       Appearance: well-developed. HENT:      Head: Normocephalic and atraumatic.       Nose: Nose normal.   Eyes:      Conjunctiva/sclera: Conjunctivae normal.   Neck: Musculoskeletal: Normal range of motion and neck supple. Pulmonary:      Effort: Pulmonary effort is normal. No respiratory distress. Musculoskeletal:      Comments: Normal gait     Skin:     General: Skin is warm and dry. Neurological:      Mental Status: Alert and oriented to person, place, and time. Sensory: No sensory deficit. Psychiatric:         Behavior: Behavior normal.         Thought Content: Thought content normal.        Provider Attestation:  Adarsh Becker, personally performed the services described in this documentation. All medical record entries made by the scribe were at my direction and in my presence. I have reviewed the chart and discharge instructions and agree that the records reflect my personal performance and is accurate and complete. Mary Kate Harrington MD 1/14/22     Scribe Attestation:  By signing my name below, Fiordaliza Clark, attest that this documentation has been prepared under the direction and in the presence of Dr. Alyce Samayoa. Electronically signed: Dave Rojas, 1/14/22     Please note that this chart was generated using voice recognition Dragon dictation software. Although every effort was made to ensure the accuracy of this automated transcription, some errors in transcription may have occurred.

## 2022-01-19 NOTE — DISCHARGE SUMMARY
Fair  [] Poor     Goal Status:  [x] Achieved [] Partially Achieved  [] Not Achieved       Electronically signed by:  Rhiannon Bella, PT

## 2022-02-14 RX ORDER — HYDROCHLOROTHIAZIDE 25 MG/1
TABLET ORAL
Qty: 90 TABLET | Refills: 3 | Status: SHIPPED | OUTPATIENT
Start: 2022-02-14

## 2022-03-23 ENCOUNTER — HOSPITAL ENCOUNTER (OUTPATIENT)
Dept: LAB | Age: 72
Discharge: HOME | End: 2022-03-23
Payer: MEDICARE

## 2022-03-23 DIAGNOSIS — R73.01 IMPAIRED FASTING GLUCOSE: ICD-10-CM

## 2022-03-23 DIAGNOSIS — I10 ESSENTIAL HYPERTENSION: ICD-10-CM

## 2022-03-23 DIAGNOSIS — E78.00 PURE HYPERCHOLESTEROLEMIA: ICD-10-CM

## 2022-03-23 LAB
ABSOLUTE EOS #: 0.18 K/UL (ref 0–0.44)
ABSOLUTE IMMATURE GRANULOCYTE: <0.03 K/UL (ref 0–0.3)
ABSOLUTE LYMPH #: 2.79 K/UL (ref 1.1–3.7)
ABSOLUTE MONO #: 0.56 K/UL (ref 0.1–1.2)
ALBUMIN SERPL-MCNC: 4.1 G/DL (ref 3.5–5.2)
ALBUMIN/GLOBULIN RATIO: 1.1 (ref 1–2.5)
ALP BLD-CCNC: 105 U/L (ref 35–104)
ALT SERPL-CCNC: 15 U/L (ref 5–33)
ANION GAP SERPL CALCULATED.3IONS-SCNC: 8 MMOL/L (ref 9–17)
AST SERPL-CCNC: 15 U/L
BASOPHILS # BLD: 1 % (ref 0–2)
BASOPHILS ABSOLUTE: 0.03 K/UL (ref 0–0.2)
BILIRUB SERPL-MCNC: 0.32 MG/DL (ref 0.3–1.2)
BUN BLDV-MCNC: 26 MG/DL (ref 8–23)
BUN/CREAT BLD: 24 (ref 9–20)
CALCIUM SERPL-MCNC: 9.3 MG/DL (ref 8.6–10.4)
CHLORIDE BLD-SCNC: 107 MMOL/L (ref 98–107)
CHOLESTEROL/HDL RATIO: 3.8
CHOLESTEROL: 196 MG/DL
CO2: 30 MMOL/L (ref 20–31)
CREAT SERPL-MCNC: 1.08 MG/DL (ref 0.5–0.9)
EOSINOPHILS RELATIVE PERCENT: 3 % (ref 1–4)
ESTIMATED AVERAGE GLUCOSE: 126 MG/DL
GFR AFRICAN AMERICAN: >60 ML/MIN
GFR NON-AFRICAN AMERICAN: 50 ML/MIN
GFR SERPL CREATININE-BSD FRML MDRD: ABNORMAL ML/MIN/{1.73_M2}
GLUCOSE BLD-MCNC: 111 MG/DL (ref 70–99)
HBA1C MFR BLD: 6 % (ref 4–6)
HCT VFR BLD CALC: 40.4 % (ref 36.3–47.1)
HDLC SERPL-MCNC: 51 MG/DL
HEMOGLOBIN: 12.9 G/DL (ref 11.9–15.1)
IMMATURE GRANULOCYTES: 0 %
LDL CHOLESTEROL: 121 MG/DL (ref 0–130)
LYMPHOCYTES # BLD: 42 % (ref 24–43)
MCH RBC QN AUTO: 27 PG (ref 25.2–33.5)
MCHC RBC AUTO-ENTMCNC: 31.9 G/DL (ref 25.2–33.5)
MCV RBC AUTO: 84.5 FL (ref 82.6–102.9)
MONOCYTES # BLD: 9 % (ref 3–12)
NRBC AUTOMATED: 0 PER 100 WBC
PDW BLD-RTO: 13.4 % (ref 11.8–14.4)
PLATELET # BLD: 330 K/UL (ref 138–453)
PMV BLD AUTO: 9.5 FL (ref 8.1–13.5)
POTASSIUM SERPL-SCNC: 4.6 MMOL/L (ref 3.7–5.3)
RBC # BLD: 4.78 M/UL (ref 3.95–5.11)
SEG NEUTROPHILS: 45 % (ref 36–65)
SEGMENTED NEUTROPHILS ABSOLUTE COUNT: 3.04 K/UL (ref 1.5–8.1)
SODIUM BLD-SCNC: 145 MMOL/L (ref 135–144)
TOTAL PROTEIN: 8 G/DL (ref 6.4–8.3)
TRIGL SERPL-MCNC: 122 MG/DL
WBC # BLD: 6.6 K/UL (ref 3.5–11.3)

## 2022-03-23 PROCEDURE — 80061 LIPID PANEL: CPT

## 2022-03-23 PROCEDURE — 36415 COLL VENOUS BLD VENIPUNCTURE: CPT

## 2022-03-23 PROCEDURE — 80053 COMPREHEN METABOLIC PANEL: CPT

## 2022-03-23 PROCEDURE — 85025 COMPLETE CBC W/AUTO DIFF WBC: CPT

## 2022-03-23 PROCEDURE — 83036 HEMOGLOBIN GLYCOSYLATED A1C: CPT

## 2022-04-06 ENCOUNTER — OFFICE VISIT (OUTPATIENT)
Dept: FAMILY MEDICINE CLINIC | Age: 72
End: 2022-04-06
Payer: MEDICARE

## 2022-04-06 VITALS
WEIGHT: 253 LBS | HEIGHT: 68 IN | BODY MASS INDEX: 38.34 KG/M2 | SYSTOLIC BLOOD PRESSURE: 136 MMHG | HEART RATE: 56 BPM | DIASTOLIC BLOOD PRESSURE: 74 MMHG

## 2022-04-06 DIAGNOSIS — D12.4 ADENOMATOUS POLYP OF DESCENDING COLON: Primary | ICD-10-CM

## 2022-04-06 DIAGNOSIS — K21.9 GASTROESOPHAGEAL REFLUX DISEASE WITHOUT ESOPHAGITIS: ICD-10-CM

## 2022-04-06 DIAGNOSIS — Z79.890 POSTMENOPAUSAL HRT (HORMONE REPLACEMENT THERAPY): ICD-10-CM

## 2022-04-06 DIAGNOSIS — J45.21 MILD INTERMITTENT ASTHMA WITH EXACERBATION: ICD-10-CM

## 2022-04-06 DIAGNOSIS — M17.12 PRIMARY OSTEOARTHRITIS OF LEFT KNEE: ICD-10-CM

## 2022-04-06 DIAGNOSIS — M70.62 TROCHANTERIC BURSITIS, LEFT HIP: ICD-10-CM

## 2022-04-06 DIAGNOSIS — E78.00 PURE HYPERCHOLESTEROLEMIA: ICD-10-CM

## 2022-04-06 DIAGNOSIS — R73.01 IMPAIRED FASTING GLUCOSE: ICD-10-CM

## 2022-04-06 DIAGNOSIS — J45.20 MILD INTERMITTENT ASTHMA WITHOUT COMPLICATION: ICD-10-CM

## 2022-04-06 DIAGNOSIS — I10 PRIMARY HYPERTENSION: ICD-10-CM

## 2022-04-06 DIAGNOSIS — E66.09 CLASS 2 OBESITY DUE TO EXCESS CALORIES WITHOUT SERIOUS COMORBIDITY WITH BODY MASS INDEX (BMI) OF 37.0 TO 37.9 IN ADULT: ICD-10-CM

## 2022-04-06 PROCEDURE — 99212 OFFICE O/P EST SF 10 MIN: CPT

## 2022-04-06 PROCEDURE — G8399 PT W/DXA RESULTS DOCUMENT: HCPCS | Performed by: FAMILY MEDICINE

## 2022-04-06 PROCEDURE — G8427 DOCREV CUR MEDS BY ELIG CLIN: HCPCS | Performed by: FAMILY MEDICINE

## 2022-04-06 PROCEDURE — 1036F TOBACCO NON-USER: CPT | Performed by: FAMILY MEDICINE

## 2022-04-06 PROCEDURE — G8417 CALC BMI ABV UP PARAM F/U: HCPCS | Performed by: FAMILY MEDICINE

## 2022-04-06 PROCEDURE — 4040F PNEUMOC VAC/ADMIN/RCVD: CPT | Performed by: FAMILY MEDICINE

## 2022-04-06 PROCEDURE — 99214 OFFICE O/P EST MOD 30 MIN: CPT | Performed by: FAMILY MEDICINE

## 2022-04-06 PROCEDURE — 1123F ACP DISCUSS/DSCN MKR DOCD: CPT | Performed by: FAMILY MEDICINE

## 2022-04-06 PROCEDURE — 3017F COLORECTAL CA SCREEN DOC REV: CPT | Performed by: FAMILY MEDICINE

## 2022-04-06 PROCEDURE — 1090F PRES/ABSN URINE INCON ASSESS: CPT | Performed by: FAMILY MEDICINE

## 2022-04-06 RX ORDER — ALBUTEROL SULFATE 90 UG/1
2 AEROSOL, METERED RESPIRATORY (INHALATION) EVERY 6 HOURS PRN
Qty: 1 EACH | Refills: 3 | Status: SHIPPED | OUTPATIENT
Start: 2022-04-06

## 2022-04-06 ASSESSMENT — ENCOUNTER SYMPTOMS
ALLERGIC/IMMUNOLOGIC NEGATIVE: 1
RESPIRATORY NEGATIVE: 1
BACK PAIN: 0
GASTROINTESTINAL NEGATIVE: 1
EYES NEGATIVE: 1

## 2022-04-06 ASSESSMENT — PATIENT HEALTH QUESTIONNAIRE - PHQ9
SUM OF ALL RESPONSES TO PHQ QUESTIONS 1-9: 0
SUM OF ALL RESPONSES TO PHQ9 QUESTIONS 1 & 2: 0
SUM OF ALL RESPONSES TO PHQ QUESTIONS 1-9: 0
2. FEELING DOWN, DEPRESSED OR HOPELESS: 0
SUM OF ALL RESPONSES TO PHQ QUESTIONS 1-9: 0
SUM OF ALL RESPONSES TO PHQ QUESTIONS 1-9: 0
1. LITTLE INTEREST OR PLEASURE IN DOING THINGS: 0

## 2022-04-06 NOTE — PATIENT INSTRUCTIONS
Hospital Outpatient Visit on 03/23/2022   Component Date Value Ref Range Status    Hemoglobin A1C 03/23/2022 6.0  4.0 - 6.0 % Final    Estimated Avg Glucose 03/23/2022 126  mg/dL Final    Comment: The ADA and AACC recommend providing the estimated average glucose result to permit better   patient understanding of their HBA1c result.       WBC 03/23/2022 6.6  3.5 - 11.3 k/uL Final    RBC 03/23/2022 4.78  3.95 - 5.11 m/uL Final    Hemoglobin 03/23/2022 12.9  11.9 - 15.1 g/dL Final    Hematocrit 03/23/2022 40.4  36.3 - 47.1 % Final    MCV 03/23/2022 84.5  82.6 - 102.9 fL Final    MCH 03/23/2022 27.0  25.2 - 33.5 pg Final    MCHC 03/23/2022 31.9  25.2 - 33.5 g/dL Final    RDW 03/23/2022 13.4  11.8 - 14.4 % Final    Platelets 55/11/9496 330  138 - 453 k/uL Final    MPV 03/23/2022 9.5  8.1 - 13.5 fL Final    NRBC Automated 03/23/2022 0.0  0.0 per 100 WBC Final    Seg Neutrophils 03/23/2022 45  36 - 65 % Final    Lymphocytes 03/23/2022 42  24 - 43 % Final    Monocytes 03/23/2022 9  3 - 12 % Final    Eosinophils % 03/23/2022 3  1 - 4 % Final    Basophils 03/23/2022 1  0 - 2 % Final    Immature Granulocytes 03/23/2022 0  0 % Final    Segs Absolute 03/23/2022 3.04  1.50 - 8.10 k/uL Final    Absolute Lymph # 03/23/2022 2.79  1.10 - 3.70 k/uL Final    Absolute Mono # 03/23/2022 0.56  0.10 - 1.20 k/uL Final    Absolute Eos # 03/23/2022 0.18  0.00 - 0.44 k/uL Final    Basophils Absolute 03/23/2022 0.03  0.00 - 0.20 k/uL Final    Absolute Immature Granulocyte 03/23/2022 <0.03  0.00 - 0.30 k/uL Final    Glucose 03/23/2022 111* 70 - 99 mg/dL Final    BUN 03/23/2022 26* 8 - 23 mg/dL Final    CREATININE 03/23/2022 1.08* 0.50 - 0.90 mg/dL Final    Bun/Cre Ratio 03/23/2022 24* 9 - 20 Final    Calcium 03/23/2022 9.3  8.6 - 10.4 mg/dL Final    Sodium 03/23/2022 145* 135 - 144 mmol/L Final    Potassium 03/23/2022 4.6  3.7 - 5.3 mmol/L Final    Chloride 03/23/2022 107  98 - 107 mmol/L Final    CO2 03/23/2022 30  20 - 31 mmol/L Final    Anion Gap 03/23/2022 8* 9 - 17 mmol/L Final    Alkaline Phosphatase 03/23/2022 105* 35 - 104 U/L Final    ALT 03/23/2022 15  5 - 33 U/L Final    AST 03/23/2022 15  <32 U/L Final    Total Bilirubin 03/23/2022 0.32  0.3 - 1.2 mg/dL Final    Total Protein 03/23/2022 8.0  6.4 - 8.3 g/dL Final    Albumin 03/23/2022 4.1  3.5 - 5.2 g/dL Final    Albumin/Globulin Ratio 03/23/2022 1.1  1.0 - 2.5 Final    GFR Non- 03/23/2022 50* >60 mL/min Final    GFR  03/23/2022 >60  >60 mL/min Final    GFR Comment 03/23/2022        Final    Comment: Average GFR for 79or more years old:   76 mL/min/1.73sq m  Chronic Kidney Disease:   <60 mL/min/1.73sq m  Kidney failure:   <15 mL/min/1.73sq m              eGFR calculated using average adult body mass. Additional eGFR calculator available at:        [employer]            Cholesterol 03/23/2022 196  <200 mg/dL Final    Comment:    Cholesterol Guidelines:      <200  Desirable   200-240  Borderline      >240  Undesirable         HDL 03/23/2022 51  >40 mg/dL Final    Comment:    HDL Guidelines:    <40     Undesirable   40-59    Borderline    >59     Desirable         LDL Cholesterol 03/23/2022 121  0 - 130 mg/dL Final    Comment:    LDL Guidelines:     <100    Desirable   100-129   Near to/above Desirable   130-159   Borderline      >159   Undesirable     Direct (measured) LDL and calculated LDL are not interchangeable tests.  Chol/HDL Ratio 03/23/2022 3.8  <5 Final            Triglycerides 03/23/2022 122  <150 mg/dL Final    Comment:    Triglyceride Guidelines:     <150   Desirable   150-199  Borderline   200-499  High     >499   Very high   Based on AHA Guidelines for fasting triglyceride, October 2012.

## 2022-04-06 NOTE — PROGRESS NOTES
Subjective:      Patient ID: Marquis Bean is a 80 y.o. female. Hypertension    Gastroesophageal Reflux    Hyperlipidemia    Other  Pertinent negatives include no arthralgias (nothing particularly problematic at present in joints. rare groin/hip discomfort. ). Back Pain    Groin Pain  Pertinent negatives include no back pain. Routine  follow up on chronic medical conditions colon polyps, htn, hyperlipidemia, gerd, obesity, insomnia, oa , refills, and review of updated labs. She had covid positive test .  She had a fairly mild course with no sequlae. She had right and left tka per Dr. Mattie Espinoza at [facility].  She is a Jehovah-witness and wanted to pursue the [address] they have at [facility], where she had her prior knee replacement done. Both knees doing well at present. She is back to work after retiring from [employer]. 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 She lost wt. Down to 235, but gained wt. Back. Up and down by report. bp running a little high recently. She feels she has a lot on her place. Lost her spouse to pancreatic cancer may 2021. Lots of responsibilities with renters and property. Compliant with medications. Sleep doing well, up to 5-6 hours/night. No gerd symptoms to report. Hot flashes periodically, ongoing. No asthma to report. Left trochanteric bursitis improved/resolved after injection and remains quiescent. Groin pain also resolved. More right hip pain at present. .  Getting into the car right leg first stretches the left groin some. She has had 3 right hip joint injections through IR. Helped 4 months first inj, 2 nd shot only 4 weeks. Following with Dr. Naresh Redman. Ongoing hip pain. Her lower back pain has resolved with therapy for the most part.   She has stretches and exercises to use for exacerbations, which seems to be working consistently at present. No asthma concerns. No injuries or illnesses to report. She denies depression. A lot of stress. Has 15 yr old grandson that lives with her and helps out a lot. Past Medical History:   Diagnosis Date    Asthma     Chalazion of right lower eyelid     Colon polyps     Degenerative joint disease of knee, left     Dysmenorrhea     GERD (gastroesophageal reflux disease)     Greater trochanteric bursitis of right hip     Hot flashes     Hyperlipidemia     Hypertension     Impaired fasting glucose     Obesity     Scaphoid fracture of wrist     Right.  Sleep disturbances     Tobacco abuse     Remote and limited.  Uterine fibroid      Past Surgical History:   Procedure Laterality Date    BREAST REDUCTION SURGERY Bilateral     CARDIAC CATHETERIZATION  06/15/2007    No significant coronary artery disease, preserved LV systolic function.   SECTION      COLONOSCOPY  09/10/2008    Sigmoid diverticulosis, history of polyps.  COLONOSCOPY  2007    Multiple polyps of the colon, rectal polyps, sigmoid polyps, and splenic flexure polyps.  COLONOSCOPY  2006    Pedunculated thin polyp near the transverse colon, small polyp at the dentate line versus a hemorrhoid.  COLONOSCOPY      COLONOSCOPY  2014    Dr. Inessa Morton. adenom. x 2, 5yr follow up   [address] [place] [address] COLONOSCOPY BIOPSY/STOMA N/A 2020    hyperplastic polyp X 3, Dr. Bolanos Gip, TOTAL ABDOMINAL      KNEE ARTHROSCOPY Left 1999    Torn degenerate posterior third medial meniscus.  MOUTH SURGERY Right 5/1/15    OTHER SURGICAL HISTORY Left 2012    Left knee injection for left knee pain.  OTHER SURGICAL HISTORY Right 2006    Bursa injection for greater trochanteric bursitis.     SALPINGO-OOPHORECTOMY Left     TONSILLECTOMY AND ADENOIDECTOMY      TOTAL KNEE ARTHROPLASTY Left      Current Outpatient Medications   Medication Sig Dispense Refill    hydroCHLOROthiazide (HYDRODIURIL) 25 MG tablet TAKE 1 TABLET DAILY 90 tablet 3    metoprolol succinate (TOPROL XL) 50 MG extended release tablet       meloxicam (MOBIC) 15 MG tablet TAKE 1 TABLET DAILY 90 tablet 3     No current facility-administered medications for this visit. Allergies   Allergen Reactions    Minocin [Minocycline] Nausea Only and Other (See Comments)     Dizzy    Oxycodone Nausea Only       Review of Systems   Constitutional: Negative. HENT: Negative. Eyes: Negative. Respiratory: Negative. Cardiovascular: Negative. Gastrointestinal: Negative. Endocrine: Negative. Genitourinary: Negative. Musculoskeletal: Negative for arthralgias (nothing particularly problematic at present in joints. rare groin/hip discomfort. ) and back pain. Skin: Negative. Allergic/Immunologic: Negative. Neurological: Negative. Hematological: Negative. Psychiatric/Behavioral: Negative. Objective:   Physical Exam  Constitutional:       General: She is not in acute distress. Appearance: She is well-developed. HENT:      Head: Normocephalic and atraumatic. Right Ear: External ear normal.      Left Ear: External ear normal.      Mouth/Throat:      Pharynx: No oropharyngeal exudate. Eyes:      General: No scleral icterus. Conjunctiva/sclera: Conjunctivae normal.   Neck:      Thyroid: No thyromegaly. Cardiovascular:      Rate and Rhythm: Normal rate and regular rhythm. Heart sounds: Normal heart sounds. No murmur heard. Pulmonary:      Effort: Pulmonary effort is normal. No respiratory distress. Breath sounds: Normal breath sounds. No wheezing. Abdominal:      General: Bowel sounds are normal. There is no distension. Palpations: Abdomen is soft. Tenderness: There is no abdominal tenderness. There is no rebound. Musculoskeletal:         General: Normal range of motion.       Cervical back: Neck supple. Lumbar back: No tenderness. Normal range of motion. Skin:     General: Skin is warm and dry. Findings: No erythema or rash. Neurological:      Mental Status: She is alert and oriented to person, place, and time. Psychiatric:         Behavior: Behavior normal.         Thought Content:  Thought content normal.         Judgment: Judgment normal.       /74 (Site: Right Upper Arm, Position: Sitting, Cuff Size: Large Adult)   Pulse 56   Ht 5' 8\" (1.727 m)   Wt 253 lb (114.8 kg)   LMP  (LMP Unknown)   BMI 38.47 kg/m²      Hospital Outpatient Visit on 03/23/2022   Component Date Value Ref Range Status    Hemoglobin A1C 03/23/2022 6.0  4.0 - 6.0 % Final    Estimated Avg Glucose 03/23/2022 126  mg/dL Final    WBC 03/23/2022 6.6  3.5 - 11.3 k/uL Final    RBC 03/23/2022 4.78  3.95 - 5.11 m/uL Final    Hemoglobin 03/23/2022 12.9  11.9 - 15.1 g/dL Final    Hematocrit 03/23/2022 40.4  36.3 - 47.1 % Final    MCV 03/23/2022 84.5  82.6 - 102.9 fL Final    MCH 03/23/2022 27.0  25.2 - 33.5 pg Final    MCHC 03/23/2022 31.9  25.2 - 33.5 g/dL Final    RDW 03/23/2022 13.4  11.8 - 14.4 % Final    Platelets 95/06/5664 330  138 - 453 k/uL Final    MPV 03/23/2022 9.5  8.1 - 13.5 fL Final    NRBC Automated 03/23/2022 0.0  0.0 per 100 WBC Final    Seg Neutrophils 03/23/2022 45  36 - 65 % Final    Lymphocytes 03/23/2022 42  24 - 43 % Final    Monocytes 03/23/2022 9  3 - 12 % Final    Eosinophils % 03/23/2022 3  1 - 4 % Final    Basophils 03/23/2022 1  0 - 2 % Final    Immature Granulocytes 03/23/2022 0  0 % Final    Segs Absolute 03/23/2022 3.04  1.50 - 8.10 k/uL Final    Absolute Lymph # 03/23/2022 2.79  1.10 - 3.70 k/uL Final    Absolute Mono # 03/23/2022 0.56  0.10 - 1.20 k/uL Final    Absolute Eos # 03/23/2022 0.18  0.00 - 0.44 k/uL Final    Basophils Absolute 03/23/2022 0.03  0.00 - 0.20 k/uL Final    Absolute Immature Granulocyte 03/23/2022 <0.03  0.00 - 0.30 k/uL Final  Glucose 03/23/2022 111* 70 - 99 mg/dL Final    BUN 03/23/2022 26* 8 - 23 mg/dL Final    CREATININE 03/23/2022 1.08* 0.50 - 0.90 mg/dL Final    Bun/Cre Ratio 03/23/2022 24* 9 - 20 Final    Calcium 03/23/2022 9.3  8.6 - 10.4 mg/dL Final    Sodium 03/23/2022 145* 135 - 144 mmol/L Final    Potassium 03/23/2022 4.6  3.7 - 5.3 mmol/L Final    Chloride 03/23/2022 107  98 - 107 mmol/L Final    CO2 03/23/2022 30  20 - 31 mmol/L Final    Anion Gap 03/23/2022 8* 9 - 17 mmol/L Final    Alkaline Phosphatase 03/23/2022 105* 35 - 104 U/L Final    ALT 03/23/2022 15  5 - 33 U/L Final    AST 03/23/2022 15  <32 U/L Final    Total Bilirubin 03/23/2022 0.32  0.3 - 1.2 mg/dL Final    Total Protein 03/23/2022 8.0  6.4 - 8.3 g/dL Final    Albumin 03/23/2022 4.1  3.5 - 5.2 g/dL Final    Albumin/Globulin Ratio 03/23/2022 1.1  1.0 - 2.5 Final    GFR Non- 03/23/2022 50* >60 mL/min Final    GFR  03/23/2022 >60  >60 mL/min Final    GFR Comment 03/23/2022        Final    Cholesterol 03/23/2022 196  <200 mg/dL Final    HDL 03/23/2022 51  >40 mg/dL Final    LDL Cholesterol 03/23/2022 121  0 - 130 mg/dL Final    Chol/HDL Ratio 03/23/2022 3.8  <5 Final    Triglycerides 03/23/2022 122  <150 mg/dL Final         Assessment:      Encounter Diagnoses   Name Primary?  Adenomatous polyp of descending colon Yes    Postmenopausal HRT (hormone replacement therapy)     Primary hypertension     Pure hypercholesterolemia     Gastroesophageal reflux disease without esophagitis     Class 2 obesity due to excess calories without serious comorbidity with body mass index (BMI) of 37.0 to 37.9 in adult     Primary osteoarthritis of left knee     Mild intermittent asthma without complication     Trochanteric bursitis, left hip     Impaired fasting glucose            Plan:            flu vaccine given today at visit.       Colon polyps: last scope 11/12/14: polyps of descending colon and rectum, sigmoid diverticulosis. Adenomatous x 2. Repeat 1/2/2020, 3 polyps. 5 yr follow up.       HRT: estrace tablets through gynecology. . (s/p hysterectomy). pap and pelvic completed. Normal. Some hot flashes at times. No other concerns.       Htn:   Elevations being seen over the interval.  Lot more stress. Started toprol 50mg/day. Seeing some bradycardia down into the low 50s. Feels a little winded after bending over , but not dizzy or fatigued. May need to reduce dose if symptomatic.       Hyperlipidemia: no active treatment at present. Ldl at 118-130. She declines intervention with medical.    She wants to concentrate more on diet.       Gerd: quiescent mostly on omeprazole at present. Working better than past meds. Using prn /14 day intervals     Obesity: bmi at 38.47 at present,  stable . Discussed wt. Loss planning . She rides bike. Has been successful in the past but fails to keep the wt. Off.      Chronic sleep disturbances with daytime fatigue and somnolence. Sleeping 6 +hrs/night at present. Satisfied with sleep at present.      DJD s/p bilateral knee replacements. Doing well , no significant pain any more after most recent replacement. Some right hip discomfort in the groin and lateral hip. Asthma; rare attacks, usually environmental triggers. Just prn albuterol use at present. No recent issues. Trochanteric bursitis left hip. Resolved, quiescent since injection in April. Observation at present. Right hip currently with lateral hip pain, cant sleep on that side. Offered inj. prn     ifbs: 106--110. Discussed wt. Loss and low carb. Diet. Up to 111 this visit. a1c 6%. Asking her to commit to wt. Loss again.

## 2022-04-11 ENCOUNTER — OFFICE VISIT (OUTPATIENT)
Dept: OBGYN | Age: 72
End: 2022-04-11
Payer: MEDICARE

## 2022-04-11 VITALS
WEIGHT: 250 LBS | HEART RATE: 65 BPM | HEIGHT: 68 IN | BODY MASS INDEX: 37.89 KG/M2 | OXYGEN SATURATION: 96 % | SYSTOLIC BLOOD PRESSURE: 134 MMHG | DIASTOLIC BLOOD PRESSURE: 82 MMHG

## 2022-04-11 DIAGNOSIS — E66.01 SEVERE OBESITY (BMI 35.0-39.9) WITH COMORBIDITY (HCC): ICD-10-CM

## 2022-04-11 DIAGNOSIS — Z80.7 FAMILY HISTORY OF LYMPHOMA: ICD-10-CM

## 2022-04-11 DIAGNOSIS — Z80.0 FAMILY HISTORY OF THROAT CANCER: ICD-10-CM

## 2022-04-11 DIAGNOSIS — Z80.49 FAMILY HISTORY OF CERVICAL CANCER: ICD-10-CM

## 2022-04-11 DIAGNOSIS — Z80.1 FAMILY HISTORY OF LUNG CANCER: ICD-10-CM

## 2022-04-11 DIAGNOSIS — Z80.0 FAMILY HISTORY OF COLON CANCER: ICD-10-CM

## 2022-04-11 DIAGNOSIS — Z80.3 FAMILY HISTORY OF BREAST CANCER IN MALE: Primary | ICD-10-CM

## 2022-04-11 PROCEDURE — G0101 CA SCREEN;PELVIC/BREAST EXAM: HCPCS | Performed by: NURSE PRACTITIONER

## 2022-04-11 PROCEDURE — 99212 OFFICE O/P EST SF 10 MIN: CPT

## 2022-04-11 RX ORDER — ZOSTER VACCINE RECOMBINANT, ADJUVANTED 50 MCG/0.5
0.5 KIT INTRAMUSCULAR SEE ADMIN INSTRUCTIONS
Qty: 0.5 ML | Refills: 0 | Status: SHIPPED | OUTPATIENT
Start: 2022-04-11 | End: 2022-10-08

## 2022-04-11 ASSESSMENT — PATIENT HEALTH QUESTIONNAIRE - PHQ9
SUM OF ALL RESPONSES TO PHQ9 QUESTIONS 1 & 2: 0
1. LITTLE INTEREST OR PLEASURE IN DOING THINGS: 0
2. FEELING DOWN, DEPRESSED OR HOPELESS: 0
SUM OF ALL RESPONSES TO PHQ QUESTIONS 1-9: 0

## 2022-04-11 ASSESSMENT — ENCOUNTER SYMPTOMS
ALLERGIC/IMMUNOLOGIC NEGATIVE: 1
GASTROINTESTINAL NEGATIVE: 1
EYES NEGATIVE: 1
RESPIRATORY NEGATIVE: 1

## 2022-04-11 NOTE — PROGRESS NOTES
Subjective:      Patient ID: Baldemar Ramos  is a 80 y.o.   AA ,female coming into office regarding   Chief Complaint   Patient presents with    Gynecologic Exam     Hot flashes       OB History    Para Term  AB Living   2 2 2     2   SAB IAB Ectopic Molar Multiple Live Births             2      # Outcome Date GA Lbr Larry/2nd Weight Sex Delivery Anes PTL Lv   2 Term 73   7 lb 10 oz (3.459 kg) M CS-LTranv   RENE   1 Term 72   7 lb 8 oz (3.402 kg) F Vag-Spont   RENE       This is a 79 y/o   AA female here for her annual gyn exam. She denies vaginal bleeding, discharge, urge or rectal incontinence. She had a hysterectomy (one fallopian tube and ovary remain) in  due to multiple fibroids. She still c/o some hot flashed; ie, 2-3 x per week. She had been on ERT from 1998 x 10-15 years. She also had a bilateral breast reduction in . She is a [employer] and does not include of receiving blood products. Her   aabout 1 year ago from pancreatic cancer. She's been vaccinated against the flu, covid x 2 vaccines nd booster. Her last vaginal pap smear was done 3/10/21 which was neg., no HPv was feldman. She reports no abnormal pap smears in her lifetime. Her last mammogram was 21 which was neg. Last colonoscopy on o which showed 3 polyps; and to repeat in 5 years (). Fam. Hx.: Dad with throat cancer in his 29's (heavy smoker) then diagnosed with breast cancer in his 66's; mom with cervical cancer; then 25 years later had stomach cancer. , she has \"1/2\" brothers and sisters: a sister with lung cancer (smoker); a brother at age 32 with lymphoma and ; a brother with colon cancer at age 45. She is interested in receiving the Shingrix vaccine.     Past Medical History:   Diagnosis Date    Asthma     Chalazion of right lower eyelid     Colon polyps     Degenerative joint disease of knee, left     Dysmenorrhea     GERD (gastroesophageal reflux disease)     Greater trochanteric bursitis of right hip     History of blood transfusion     Hot flashes     Hyperlipidemia     Hypertension     Impaired fasting glucose     Menopausal symptoms     Obesity     Overactive bladder     Scaphoid fracture of wrist     Right.  Sleep disturbances     Tobacco abuse     Remote and limited.  Uterine fibroid      Review of Systems   Constitutional: Negative. HENT: Negative. Eyes: Negative. Respiratory: Negative. Cardiovascular: Negative. Gastrointestinal: Negative. Endocrine: Negative. Genitourinary: Negative. Musculoskeletal: Negative. Skin: Negative. Allergic/Immunologic: Negative. Neurological: Negative. Hematological: Negative. Objective:     Vitals:    04/11/22 1025   BP: 134/82   Site: Left Upper Arm   Position: Sitting   Cuff Size: Large Adult   Pulse: 65   SpO2: 96%   Weight: 250 lb (113.4 kg)   Height: 5' 8\" (1.727 m)      Physical Exam  Vitals reviewed. Constitutional:       Appearance: Normal appearance. HENT:      Head: Normocephalic. Nose: Nose normal.      Mouth/Throat:      Mouth: Mucous membranes are moist.   Cardiovascular:      Rate and Rhythm: Normal rate and regular rhythm. Pulses: Normal pulses. Heart sounds: Normal heart sounds. Pulmonary:      Effort: Pulmonary effort is normal.      Breath sounds: Normal breath sounds. Abdominal:      General: Abdomen is flat. Palpations: Abdomen is soft. Genitourinary:     General: Normal vulva. Musculoskeletal:         General: Normal range of motion. Cervical back: Normal range of motion and neck supple. Skin:     General: Skin is warm and dry. Neurological:      Mental Status: She is alert. Psychiatric:         Mood and Affect: Mood normal.       Inspection negative. No nipple discharge or bleeding. No palpable mass.  She does have bilateral scars below each breats from her breast reduction    Vulva:norml, no lesions  Vagina; no lesions palpated  Cervix: surgically absent  Uterus: surgically absent  Ovaries: 1 ovary remains; not palpated, no masses, NT    Sexually Active:Yes    Any bleeding or pain with intercourse: No    Last Sexual Encounter: Genital; 2 years ago  Protected or Unprotected:Protected, monogomous     Last Pap: 3/10/21    Last HPV:not done    High Risk:not done    Last Mammogram: 11/4/21    Last Dexascan:11/21/19: normal    Do you do self breast exams: Yes      Assessment:   1/ annual gyn exam  2. Post menopausal   Diagnosis Orders   1. Family history of breast cancer in male  [facility]   2. Family history of lung cancer  [facility]   3. Family history of [facility], [facility]   4. Family history of cervical cancer  [facility]   5. Family history of throat cancer  [address] [place] [address]   6. Family history of colon cancer  [address] [place] [address]   7. Severe obesity (BMI 35.0-39. 9) with comorbidity ([address]             Plan:   1. No paps needed anymore accdg. To ACOG and ASCCP  2. Get Shringix vaccine--started today  3. Referral to genetic counselor  4. I spent 30 min. This pt. Face to face. 5. RTO 1 yr. prn    No follow-ups on file.      Orders Placed This Encounter   Procedures   [facility]     Referral Priority:   Routine     Referral Type:   Eval and Treat     Referral Reason:   Specialty Services Required     Referred to Provider:   Samanta Evans     Requested Specialty:   Genetic Counselor     Number of Visits Requested:   1       Electronically signed by:  MAYCOL Pina CNP

## 2022-04-23 ENCOUNTER — OFFICE VISIT (OUTPATIENT)
Dept: PRIMARY CARE CLINIC | Age: 72
End: 2022-04-23
Payer: MEDICARE

## 2022-04-23 VITALS
OXYGEN SATURATION: 97 % | HEART RATE: 54 BPM | TEMPERATURE: 97.8 F | SYSTOLIC BLOOD PRESSURE: 142 MMHG | WEIGHT: 250.4 LBS | BODY MASS INDEX: 37.95 KG/M2 | HEIGHT: 68 IN | RESPIRATION RATE: 20 BRPM | DIASTOLIC BLOOD PRESSURE: 84 MMHG

## 2022-04-23 DIAGNOSIS — J45.20 MILD INTERMITTENT ASTHMA WITHOUT COMPLICATION: ICD-10-CM

## 2022-04-23 DIAGNOSIS — J40 BRONCHITIS: Primary | ICD-10-CM

## 2022-04-23 PROCEDURE — 3017F COLORECTAL CA SCREEN DOC REV: CPT | Performed by: PHYSICIAN ASSISTANT

## 2022-04-23 PROCEDURE — 99212 OFFICE O/P EST SF 10 MIN: CPT | Performed by: PHYSICIAN ASSISTANT

## 2022-04-23 PROCEDURE — 1090F PRES/ABSN URINE INCON ASSESS: CPT | Performed by: PHYSICIAN ASSISTANT

## 2022-04-23 PROCEDURE — G8427 DOCREV CUR MEDS BY ELIG CLIN: HCPCS | Performed by: PHYSICIAN ASSISTANT

## 2022-04-23 PROCEDURE — G8417 CALC BMI ABV UP PARAM F/U: HCPCS | Performed by: PHYSICIAN ASSISTANT

## 2022-04-23 PROCEDURE — G8399 PT W/DXA RESULTS DOCUMENT: HCPCS | Performed by: PHYSICIAN ASSISTANT

## 2022-04-23 PROCEDURE — 4040F PNEUMOC VAC/ADMIN/RCVD: CPT | Performed by: PHYSICIAN ASSISTANT

## 2022-04-23 PROCEDURE — 1123F ACP DISCUSS/DSCN MKR DOCD: CPT | Performed by: PHYSICIAN ASSISTANT

## 2022-04-23 PROCEDURE — 1036F TOBACCO NON-USER: CPT | Performed by: PHYSICIAN ASSISTANT

## 2022-04-23 PROCEDURE — 99213 OFFICE O/P EST LOW 20 MIN: CPT | Performed by: PHYSICIAN ASSISTANT

## 2022-04-23 RX ORDER — PREDNISONE 20 MG/1
20 TABLET ORAL 2 TIMES DAILY
Qty: 10 TABLET | Refills: 0 | Status: SHIPPED | OUTPATIENT
Start: 2022-04-23 | End: 2022-04-28

## 2022-04-23 RX ORDER — AZITHROMYCIN 250 MG/1
250 TABLET, FILM COATED ORAL SEE ADMIN INSTRUCTIONS
Qty: 6 TABLET | Refills: 0 | Status: SHIPPED | OUTPATIENT
Start: 2022-04-23 | End: 2022-04-28

## 2022-04-23 RX ORDER — DEXTROMETHORPHAN HYDROBROMIDE AND PROMETHAZINE HYDROCHLORIDE 15; 6.25 MG/5ML; MG/5ML
5 SYRUP ORAL 4 TIMES DAILY PRN
Qty: 120 ML | Refills: 0 | Status: SHIPPED | OUTPATIENT
Start: 2022-04-23 | End: 2022-04-30

## 2022-04-23 ASSESSMENT — ENCOUNTER SYMPTOMS
WHEEZING: 1
RHINORRHEA: 1
SORE THROAT: 1
GASTROINTESTINAL NEGATIVE: 1
COUGH: 1

## 2022-04-23 NOTE — PATIENT INSTRUCTIONS
Patient Education        Bronchitis: Care Instructions  Your Care Instructions     Bronchitis is inflammation of the bronchial tubes, which carry air to the lungs. The tubes swell and produce mucus, or phlegm. The mucus and inflamedbronchial tubes make you cough. You may have trouble breathing. Most cases of bronchitis are caused by viruses like those that cause colds. Antibiotics usually do not help and they may be harmful. Bronchitis usually develops rapidly and lasts about 2 to 3 weeks in otherwisehealthy people. Follow-up care is a key part of your treatment and safety. Be sure to make and go to all appointments, and call your doctor if you are having problems. It's also a good idea to know your test results and keep alist of the medicines you take. How can you care for yourself at home?  Take all medicines exactly as prescribed. Call your doctor if you think you are having a problem with your medicine.  Get some extra rest.   Take an over-the-counter pain medicine, such as acetaminophen (Tylenol), ibuprofen (Advil, Motrin), or naproxen (Aleve) to reduce fever and relieve body aches. Read and follow all instructions on the label.  Do not take two or more pain medicines at the same time unless the doctor told you to. Many pain medicines have acetaminophen, which is Tylenol. Too much acetaminophen (Tylenol) can be harmful.  Take an over-the-counter cough medicine to help quiet a dry, hacking cough so that you can sleep. Avoid cough medicines that have more than one active ingredient. Read and follow all instructions on the label.  Do not smoke. Smoking can make bronchitis worse. If you need help quitting, talk to your doctor about stop-smoking programs and medicines. These can increase your chances of quitting for good. When should you call for help? Call 911 anytime you think you may need emergency care. For example, call if:     You have severe trouble breathing.    Call your doctor now or seek immediate medical care if:     You have new or worse trouble breathing.      You cough up dark brown or bloody mucus (sputum).      You have a new or higher fever.      You have a new rash. Watch closely for changes in your health, and be sure to contact your doctor if:     You cough more deeply or more often, especially if you notice more mucus or a change in the color of your mucus.      You are not getting better as expected. Where can you learn more? Go to https://[employer]. org and sign in to your [employer] account. Enter H333 in the Fliggo box to learn more about \"Bronchitis: Care Instructions. \"     If you do not have an account, please click on the \"Sign Up Now\" link. Current as of: July 6, 2021               Content Version: 13.2  © 4936-0506 [employer], [employer]. Care instructions adapted under license by [facility] ([facility]). If you have questions about a medical condition or this instruction, always ask your healthcare professional. [address] any warranty or liability for your use of this information.

## 2022-04-23 NOTE — PROGRESS NOTES
Subjective:      Patient ID: Mikala Mckeon is a 80 y.o. female. Cough  This is a new problem. The current episode started in the past 7 days. The problem has been gradually worsening. The cough is productive of sputum (yellow). Associated symptoms include nasal congestion, postnasal drip, rhinorrhea, a sore throat and wheezing. Pertinent negatives include no ear pain, fever or headaches. She has tried a beta-agonist inhaler for the symptoms. The treatment provided mild relief. Her past medical history is significant for asthma. Recent Travel Screening and Travel History documentation:     Travel Screening     Question   Response    In the last 10 days, have you been in contact with someone who was confirmed or suspected to have Coronavirus/COVID-19? No / Unsure    Have you had a COVID-19 viral test in the last 10 days? No    Do you have any of the following new or worsening symptoms? Cough; Fatigue;Runny nose    Have you traveled internationally or domestically in the last month? No      Travel History   Travel since 03/23/22    No documented travel since 03/23/22          Review of Systems   Constitutional: Negative for fever. HENT: Positive for postnasal drip, rhinorrhea and sore throat. Negative for ear pain. Respiratory: Positive for cough and wheezing. Cardiovascular: Negative. Gastrointestinal: Negative. Neurological: Negative for headaches. Objective:   Physical Exam  HENT:      Head: Normocephalic. Right Ear: Tympanic membrane normal.      Left Ear: Tympanic membrane normal.      Nose: Congestion and rhinorrhea present. Mouth/Throat:      Mouth: Mucous membranes are moist.   Eyes:      Pupils: Pupils are equal, round, and reactive to light. Cardiovascular:      Rate and Rhythm: Normal rate. Pulmonary:      Effort: Pulmonary effort is normal. No respiratory distress. Breath sounds: Wheezing present. Musculoskeletal:      Cervical back: Neck supple. Neurological:      General: No focal deficit present. Mental Status: She is alert and oriented to person, place, and time. Psychiatric:         Mood and Affect: Mood normal.         Assessment:      1. Bronchitis    2. Mild intermittent asthma without complication          Plan:      ZPak. Prednisone 20 mg bid x 5 days. Push fluids. Supportive care advised. Promethazine DM PRN cough. Incentive spirometry. Follow-up PRN/as planned with PCP.         SONJA Celeste

## 2022-04-25 ENCOUNTER — INITIAL CONSULT (OUTPATIENT)
Dept: ONCOLOGY | Age: 72
End: 2022-04-25
Payer: MEDICARE

## 2022-04-25 DIAGNOSIS — Z80.3 FAMILY HISTORY OF BREAST CANCER: Primary | ICD-10-CM

## 2022-04-25 DIAGNOSIS — Z80.0 FAMILY HISTORY OF COLON CANCER: ICD-10-CM

## 2022-04-25 PROCEDURE — 96040 PR GENETIC COUNSELING, EACH 30 MIN: CPT | Performed by: GENETIC COUNSELOR, MS

## 2022-04-25 NOTE — PROGRESS NOTES
3 [facility] Counseling Program   Hereditary Cancer Risk Assessment     Name: Anya Casey   YOB: 1950   Date of Consultation: 22     Ms. Guillen was seen at the [facility] for genetic counseling on 22. Ms. Chong Stewart was referred by MAYCOL Moore CNP due to her family history of cancer. PERSONAL HISTORY   Ms. Chong Stewart is a 80 y.o. female with no personal history of cancer. She did have a hysterectomy (one fallopian tube and ovary remain) in  due to fibroids. She reports completing annual mammograms and colonoscopies every 5 years. FAMILY HISTORY  Ms. Chong Stewart has one son and one daughter. Her father was diagnosed with throat cancer in his 35s and later developed unilateral male breast cancer in his 76s. Her father had only one brother. There are no known cancers in her extended paternal family. Ms. Nayana Fitzgerald mother was diagnosed with cervical cancer in her 35s and then later developed stomach cancer in her 62s. Ms. Chong Stewart has several maternal half siblings. This includes a half sister who  at age 64 from lung cancer, a half brother who  at 32 from [facility] and a half brother who  at 36 from colon cancer. Ms. Chong Stewart reports ancestry and denies any known Ashkenazi Tenriism heritage. RISK ASSESSMENT   We discussed that approximately 5-10% of cancers are due to a hereditary gene mutation which causes an increased risk for certain cancers. Hereditary cancers are typically diagnosed at younger ages (under age 46y) and occur in multiple generations of a family. Multiple individuals with the same type of cancer (example: breast or colorectal) or uncommon cancers (example: ovarian, pancreatic, male breast cancer) are also features of hereditary cancers. In summary, Ms. Guillen meets the [address] [place] [address] (NCCN) guidelines for genetic testing of the BRCA1/2 genes based on having a paternal first degree relative with male breast cancer. She also meets the NCCN guidelines for genetic testing with a maternal second degree relative with colon cancer under age 48 and a maternal first degree relative with a stomach / GI cancer. The NCCN guidelines also recognize that an individual's personal and/or family history may be explained by more than one inherited cancer syndrome. Thus, a multi-gene panel may be more efficient, more cost effecting, and increases the yield of detecting a hereditary mutation which would impact medical management. Given her personal and/or family history, we recommend testing for the following genes at minimum: TORIBIO, CHEK2, NBN, and PALB2. DISCUSSION  We discussed that the BRCA1/2 genes are the most common genes associated with hereditary breast and ovarian cancer. We also discussed that genetic testing is available for multiple other genes related to hereditary cancer. Some of these genes are known to carry a significant increased risk for several cancers including colon, breast, uterine, ovarian, stomach, and pancreatic cancer, while some of these genes are believed to have a moderate increased risk for breast and other cancers. We discussed the possibility of finding a mutation in genes with limited information to guide medical management, as well we as the possibility of identifying variants of uncertain significance (VUS). We discussed the risks, benefits, and limitations of genetic testing. Possible test results were discussed as well as potential screening and prevention strategies. Specifically, we discussed increased breast cancer surveillance by mammogram and breast MRI as well as the option of prophylactic mastectomy. We discussed the recommendation for prophylactic oophorectomy for results which suggest an increased risk for ovarian cancer. Lastly, we discussed that the results of Ms. Guillen's genetic testing may be beneficial in defining her risk for cancer as well as for her family members. SUMMARY & PLAN  1) Ms. Luly Crook meets the NCCN criteria for genetic testing based on her family history of various cancers including male breast cancer and early onset colon cancer. 2) Genetic testing via a multi-gene panel was recommended and offered to Ms. Guillen. 3) Ms. Luly Crook elected to proceed with the CancerNext Expanded + RNA Insight gene panel. 4) Ms. Luly Crook is aware that she will receive a notification from [email or web address] if the out of pocket cost for testing exceeds $100 (based on individual insurance plan) and the option to proceed with the self pay price of $249.     5) Informed consent was obtained and a blood sample was sent to [email or web address]. We will call Ms. Guillen with results as soon as they are available. A follow up appointment may be recommended. A summary letter with results and final medical management recommendations will be sent once available. A total of 40 minutes were spent face to face with Ms. Luly Crook and 50% of the time was spent educating and counseling. The [address]  [facility] would be glad to offer our assistance should you have any questions or concerns about this information. Please feel free to contact us at [phone]. Syed Alvarez.  Francisco Young MS, [facility]   Licensed Genetic Counselor

## 2022-05-12 ENCOUNTER — TELEPHONE (OUTPATIENT)
Dept: ONCOLOGY | Age: 72
End: 2022-05-12

## 2022-05-12 NOTE — TELEPHONE ENCOUNTER
3 [facility]   Hereditary Cancer Risk Assessment     Name: Lena Cartagena  YOB: 1950  Date of Results Disclosure: 05/12/22     HISTORY   Ms. Jennifer Velásquez was seen for genetic counseling at the request of MATT Bryant due to her family history of various cancers. At that time, Ms. Jennifer Velásquez chose to pursue genetic testing via the CancerNext Expanded + RNA gene panel. These results were discussed with Ms. Jennifer Velásquez via telephone. A summary of Ms. Guillen's results and recommendations are below. RESULTS  [employer] CancerNext-Expanded Panel + RNAinsight: NEGATIVE - NO CLINICALLY SIGNIFICANT MUTATIONS DETECTED   This panel included the analysis of 77 genes associated with hereditary cancer including: AIP, ALK, APC, TORIBIO, BAP1, BARD1, BLM, BMPR1A, BRCA1, BRCA2, BRIP1, CDC73, CDH1, CDK4, CDKN1B, CDKN2A, CHEK2, CTNNA1, DICER1, EGFR, EGLN1, EPCAM, FANCC, FH, FLCN, GALNT12, GREM1, HOXB13, KIF1B, KIT1, LZTR1, MAX, MEN1, MET, MITF, MLH1, MSH2, MSH3, MSH6, MUTYH, NBN, NF1, NF2, NTHL1, PALB2, PDGFRA, PHOX2B, PMS2, POLD1, POLE, POT1, RCVYC2E, PTCH1, PTEN, RAD51C, RAD51D, RB1, RECQL, RET, SDHA, SDHAF2, SDHB, SDHC, ,SDHD, SMAD4, SMARCA4, SMARCB1, SMARCE1, STK11, SUFU, CFJW478, TP53, TSC1, TSC2, VHL, and XRCC2. In addition, no clinically relevant aberrant RNA transcripts were detected in select genes. Please refer to genetic test report for technical details. Additional findings: VARIANT OF UNCERTAIN SIGNIFICANCE - TORIBIO [id number]   A variant of uncertain significance (VUS) occurs when the laboratory does not have enough data to determine whether the genetic variant is benign (not associated with cancer) or pathogenic (associated with cancer). Because the significance of the TORIBIO gene VUS is unknown, medical management must be based on Ms. Guillen's personal history and family history of cancer. We discussed that Ms. Guillen's negative test result greatly reduces the likelihood that she carries a hereditary gene mutation. However, it is possible that her family history of cancer is due to a hereditary mutation which she did not inherit. It is also possible that her family history of cancer may be due to a gene for which testing was not performed or which has yet to be discovered. RECOMMENDATIONS  1) Ms. Chandni Rodriguez should continue general population cancer screening guidelines as directed by her physicians. RECOMMENDATIONS FOR FAMILY MEMBERS   1) Genetic testing is not recommended for Ms. Guillen's children based on her negative test results. However, this recommendation does not take into consideration any family history of cancer in their paternal family. 2) It is possible that the cancers in Ms. Guillen's family are due to a hereditary gene mutation that she did not inherit. Therefore, her relatives (particularly those with a current or previous cancer diagnosis) may consider genetic counseling and testing to clarify this possibility. Relatives may contact the [facility] at [phone] or locate a genetic counselor at www. [email or web address].     3) We encourage Ms. Guillen's relatives to discuss their family history of cancer with their physicians to determine the most appropriate cancer screening recommendations. SUMMARY & PLAN   1) Ms. Nata Cabello genetic test results are negative meaning there were no clinically significant mutations detected in the 77 genes analyzed. 2) A variant of uncertain significance was detected in the TORIBIO gene(s). We will contact Ms. Guillen when there is additional information available regarding the classification of the genetic variant(s). 3) There are no changes in medical management for Ms. Guillen based on her negative genetic test results. 4) We encourage Ms. Guillen to contact us every 1-2 years to determine if there are any new genetic testing or research options available. 5) We encourage Ms. Guillen to contact us with updates to her personal and/or familys cancer history as this information may alter our assessment and/or recommendations. The [address] [place] [address] would be glad to offer our assistance should you have any questions or concerns about this information. Please feel free to contact us at [phone]. Quiana Gómez.  Bertha Maddox MS, [facility]   Licensed Genetic Counselor         CC:  Ms. Heaven Adams, APRN-CNP

## 2022-05-17 RX ORDER — MELOXICAM 15 MG/1
TABLET ORAL
Qty: 90 TABLET | Refills: 3 | Status: SHIPPED | OUTPATIENT
Start: 2022-05-17

## 2022-06-08 ENCOUNTER — IMMUNIZATION (OUTPATIENT)
Dept: LAB | Age: 72
End: 2022-06-08
Payer: MEDICARE

## 2022-06-08 PROCEDURE — 91306 COVID-19, MODERNA BOOSTER, (AGE 18 YRS+), IM, 50MCG/0.25ML: CPT | Performed by: INTERNAL MEDICINE

## 2022-06-08 PROCEDURE — PBSHW COVID-19, MODERNA BOOSTER, (AGE 18 YRS+), IM, 50MCG/0.25ML: Performed by: INTERNAL MEDICINE

## 2022-06-09 ENCOUNTER — TELEPHONE (OUTPATIENT)
Dept: FAMILY MEDICINE CLINIC | Age: 72
End: 2022-06-09

## 2022-10-03 ENCOUNTER — HOSPITAL ENCOUNTER (OUTPATIENT)
Dept: LAB | Age: 72
Discharge: HOME | End: 2022-10-03
Payer: MEDICARE

## 2022-10-03 DIAGNOSIS — R73.01 IMPAIRED FASTING GLUCOSE: ICD-10-CM

## 2022-10-03 DIAGNOSIS — I10 PRIMARY HYPERTENSION: ICD-10-CM

## 2022-10-03 DIAGNOSIS — E78.00 PURE HYPERCHOLESTEROLEMIA: ICD-10-CM

## 2022-10-03 LAB
ABSOLUTE EOS #: 0.24 K/UL (ref 0–0.44)
ABSOLUTE IMMATURE GRANULOCYTE: <0.03 K/UL (ref 0–0.3)
ABSOLUTE LYMPH #: 2.63 K/UL (ref 1.1–3.7)
ABSOLUTE MONO #: 0.62 K/UL (ref 0.1–1.2)
ALBUMIN SERPL-MCNC: 3.9 G/DL (ref 3.5–5.2)
ALBUMIN/GLOBULIN RATIO: 1 (ref 1–2.5)
ALP BLD-CCNC: 125 U/L (ref 35–104)
ALT SERPL-CCNC: 15 U/L (ref 5–33)
ANION GAP SERPL CALCULATED.3IONS-SCNC: 8 MMOL/L (ref 9–17)
AST SERPL-CCNC: 16 U/L
BASOPHILS # BLD: 1 % (ref 0–2)
BASOPHILS ABSOLUTE: 0.04 K/UL (ref 0–0.2)
BILIRUB SERPL-MCNC: 0.3 MG/DL (ref 0.3–1.2)
BUN BLDV-MCNC: 12 MG/DL (ref 8–23)
BUN/CREAT BLD: 11 (ref 9–20)
CALCIUM SERPL-MCNC: 9.3 MG/DL (ref 8.6–10.4)
CHLORIDE BLD-SCNC: 104 MMOL/L (ref 98–107)
CHOLESTEROL/HDL RATIO: 4.2
CHOLESTEROL: 208 MG/DL
CO2: 29 MMOL/L (ref 20–31)
CREAT SERPL-MCNC: 1.09 MG/DL (ref 0.5–0.9)
EOSINOPHILS RELATIVE PERCENT: 4 % (ref 1–4)
GFR AFRICAN AMERICAN: 60 ML/MIN
GFR NON-AFRICAN AMERICAN: 49 ML/MIN
GFR SERPL CREATININE-BSD FRML MDRD: ABNORMAL ML/MIN/{1.73_M2}
GLUCOSE BLD-MCNC: 112 MG/DL (ref 70–99)
HCT VFR BLD CALC: 41.7 % (ref 36.3–47.1)
HDLC SERPL-MCNC: 49 MG/DL
HEMOGLOBIN: 13.3 G/DL (ref 11.9–15.1)
IMMATURE GRANULOCYTES: 0 %
LDL CHOLESTEROL: 136 MG/DL (ref 0–130)
LYMPHOCYTES # BLD: 39 % (ref 24–43)
MCH RBC QN AUTO: 27.1 PG (ref 25.2–33.5)
MCHC RBC AUTO-ENTMCNC: 31.9 G/DL (ref 25.2–33.5)
MCV RBC AUTO: 84.9 FL (ref 82.6–102.9)
MONOCYTES # BLD: 9 % (ref 3–12)
NRBC AUTOMATED: 0 PER 100 WBC
PDW BLD-RTO: 13.3 % (ref 11.8–14.4)
PLATELET # BLD: 327 K/UL (ref 138–453)
PMV BLD AUTO: 9.4 FL (ref 8.1–13.5)
POTASSIUM SERPL-SCNC: 4 MMOL/L (ref 3.7–5.3)
RBC # BLD: 4.91 M/UL (ref 3.95–5.11)
SEG NEUTROPHILS: 47 % (ref 36–65)
SEGMENTED NEUTROPHILS ABSOLUTE COUNT: 3.2 K/UL (ref 1.5–8.1)
SODIUM BLD-SCNC: 141 MMOL/L (ref 135–144)
TOTAL PROTEIN: 7.7 G/DL (ref 6.4–8.3)
TRIGL SERPL-MCNC: 115 MG/DL
WBC # BLD: 6.7 K/UL (ref 3.5–11.3)

## 2022-10-03 PROCEDURE — 80053 COMPREHEN METABOLIC PANEL: CPT

## 2022-10-03 PROCEDURE — 83036 HEMOGLOBIN GLYCOSYLATED A1C: CPT

## 2022-10-03 PROCEDURE — 80061 LIPID PANEL: CPT

## 2022-10-03 PROCEDURE — 85025 COMPLETE CBC W/AUTO DIFF WBC: CPT

## 2022-10-03 PROCEDURE — 36415 COLL VENOUS BLD VENIPUNCTURE: CPT

## 2022-10-05 LAB
ESTIMATED AVERAGE GLUCOSE: 123 MG/DL
HBA1C MFR BLD: 5.9 % (ref 4–6)

## 2022-10-10 ENCOUNTER — OFFICE VISIT (OUTPATIENT)
Dept: FAMILY MEDICINE CLINIC | Age: 72
End: 2022-10-10
Payer: MEDICARE

## 2022-10-10 VITALS
WEIGHT: 253 LBS | DIASTOLIC BLOOD PRESSURE: 72 MMHG | BODY MASS INDEX: 38.34 KG/M2 | HEIGHT: 68 IN | HEART RATE: 72 BPM | SYSTOLIC BLOOD PRESSURE: 128 MMHG

## 2022-10-10 DIAGNOSIS — J45.20 MILD INTERMITTENT ASTHMA WITHOUT COMPLICATION: ICD-10-CM

## 2022-10-10 DIAGNOSIS — R73.01 IMPAIRED FASTING GLUCOSE: ICD-10-CM

## 2022-10-10 DIAGNOSIS — I10 PRIMARY HYPERTENSION: ICD-10-CM

## 2022-10-10 DIAGNOSIS — M17.12 PRIMARY OSTEOARTHRITIS OF LEFT KNEE: ICD-10-CM

## 2022-10-10 DIAGNOSIS — M70.62 TROCHANTERIC BURSITIS, LEFT HIP: ICD-10-CM

## 2022-10-10 DIAGNOSIS — D12.4 ADENOMATOUS POLYP OF DESCENDING COLON: Primary | ICD-10-CM

## 2022-10-10 DIAGNOSIS — K21.9 GASTROESOPHAGEAL REFLUX DISEASE WITHOUT ESOPHAGITIS: ICD-10-CM

## 2022-10-10 DIAGNOSIS — E66.09 CLASS 2 OBESITY DUE TO EXCESS CALORIES WITHOUT SERIOUS COMORBIDITY WITH BODY MASS INDEX (BMI) OF 37.0 TO 37.9 IN ADULT: ICD-10-CM

## 2022-10-10 DIAGNOSIS — Z79.890 POSTMENOPAUSAL HRT (HORMONE REPLACEMENT THERAPY): ICD-10-CM

## 2022-10-10 DIAGNOSIS — E78.00 PURE HYPERCHOLESTEROLEMIA: ICD-10-CM

## 2022-10-10 PROCEDURE — 99213 OFFICE O/P EST LOW 20 MIN: CPT | Performed by: FAMILY MEDICINE

## 2022-10-10 PROCEDURE — G8427 DOCREV CUR MEDS BY ELIG CLIN: HCPCS | Performed by: FAMILY MEDICINE

## 2022-10-10 PROCEDURE — G8484 FLU IMMUNIZE NO ADMIN: HCPCS | Performed by: FAMILY MEDICINE

## 2022-10-10 PROCEDURE — 99214 OFFICE O/P EST MOD 30 MIN: CPT | Performed by: FAMILY MEDICINE

## 2022-10-10 PROCEDURE — 1036F TOBACCO NON-USER: CPT | Performed by: FAMILY MEDICINE

## 2022-10-10 PROCEDURE — G8417 CALC BMI ABV UP PARAM F/U: HCPCS | Performed by: FAMILY MEDICINE

## 2022-10-10 PROCEDURE — G8399 PT W/DXA RESULTS DOCUMENT: HCPCS | Performed by: FAMILY MEDICINE

## 2022-10-10 PROCEDURE — 3017F COLORECTAL CA SCREEN DOC REV: CPT | Performed by: FAMILY MEDICINE

## 2022-10-10 PROCEDURE — 1123F ACP DISCUSS/DSCN MKR DOCD: CPT | Performed by: FAMILY MEDICINE

## 2022-10-10 PROCEDURE — 1090F PRES/ABSN URINE INCON ASSESS: CPT | Performed by: FAMILY MEDICINE

## 2022-10-10 RX ORDER — METOPROLOL SUCCINATE 50 MG/1
50 TABLET, EXTENDED RELEASE ORAL DAILY
Qty: 90 TABLET | Refills: 3 | Status: SHIPPED | OUTPATIENT
Start: 2022-10-10

## 2022-10-10 SDOH — ECONOMIC STABILITY: FOOD INSECURITY: WITHIN THE PAST 12 MONTHS, YOU WORRIED THAT YOUR FOOD WOULD RUN OUT BEFORE YOU GOT MONEY TO BUY MORE.: NEVER TRUE

## 2022-10-10 SDOH — ECONOMIC STABILITY: FOOD INSECURITY: WITHIN THE PAST 12 MONTHS, THE FOOD YOU BOUGHT JUST DIDN'T LAST AND YOU DIDN'T HAVE MONEY TO GET MORE.: NEVER TRUE

## 2022-10-10 ASSESSMENT — ENCOUNTER SYMPTOMS
EYES NEGATIVE: 1
GASTROINTESTINAL NEGATIVE: 1
ALLERGIC/IMMUNOLOGIC NEGATIVE: 1
BACK PAIN: 0
RESPIRATORY NEGATIVE: 1

## 2022-10-10 ASSESSMENT — PATIENT HEALTH QUESTIONNAIRE - PHQ9
SUM OF ALL RESPONSES TO PHQ QUESTIONS 1-9: 0
SUM OF ALL RESPONSES TO PHQ QUESTIONS 1-9: 0
1. LITTLE INTEREST OR PLEASURE IN DOING THINGS: 0
SUM OF ALL RESPONSES TO PHQ QUESTIONS 1-9: 0
SUM OF ALL RESPONSES TO PHQ9 QUESTIONS 1 & 2: 0
SUM OF ALL RESPONSES TO PHQ QUESTIONS 1-9: 0
2. FEELING DOWN, DEPRESSED OR HOPELESS: 0

## 2022-10-10 ASSESSMENT — SOCIAL DETERMINANTS OF HEALTH (SDOH): HOW HARD IS IT FOR YOU TO PAY FOR THE VERY BASICS LIKE FOOD, HOUSING, MEDICAL CARE, AND HEATING?: NOT HARD AT ALL

## 2022-10-10 NOTE — PROGRESS NOTES
Subjective:      Patient ID: Jalen Llanos is a 71 y.o. female. Hypertension    Gastroesophageal Reflux    Hyperlipidemia    Other  Pertinent negatives include no arthralgias (nothing particularly problematic at present in joints. rare groin/hip discomfort. ). Back Pain    Groin Pain  Pertinent negatives include no back pain. Routine  follow up on chronic medical conditions colon polyps, htn, hyperlipidemia, gerd, obesity, insomnia, oa , refills, and review of updated labs. She had covid positive test .  She had a fairly mild course with no sequlae. She had right and left tka per Dr. Hakeem Street at [facility].  She is a Jehovah-witness and wanted to pursue the [address] they have at [facility], where she had her prior knee replacement done. Both knees doing well at present. She is back to work after retiring from [employer]. 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 She lost wt. Down to 235, but gained wt. Back. Up and down by report. Hips now cause her some discomfort. Getting by with hip injections in [place] last month, and some therapy . Bp doing better at present. Lost her spouse to pancreatic cancer may 2021. Lots of responsibilities with renters and property. Compliant with medications. Sleep doing well, up to 5-6 hours/night. No gerd symptoms to report. Hot flashes periodically, ongoing. No asthma to report. Left trochanteric bursitis improved/resolved after injection and remains quiescent. Groin pain also resolved. More right hip pain at present. .  Getting into the car right leg first stretches the left groin some. She has had 3+ right hip joint injections through IR. Helped 4 months first inj, 2 nd shot only 4 weeks. Following with Dr. David Rodriguez. Ongoing hip pain. Her lower back pain has resolved with therapy for the most part.   She has stretches and exercises to Bursa injection for greater trochanteric bursitis. SALPINGO-OOPHORECTOMY Left     TONSILLECTOMY AND ADENOIDECTOMY      TOTAL KNEE ARTHROPLASTY Left 2013     Current Outpatient Medications   Medication Sig Dispense Refill    meloxicam (MOBIC) 15 MG tablet TAKE 1 TABLET DAILY 90 tablet 3    albuterol sulfate  (90 Base) MCG/ACT inhaler Inhale 2 puffs into the lungs every 6 hours as needed for Wheezing or Shortness of Breath 1 each 3    hydroCHLOROthiazide (HYDRODIURIL) 25 MG tablet TAKE 1 TABLET DAILY 90 tablet 3    metoprolol succinate (TOPROL XL) 50 MG extended release tablet        No current facility-administered medications for this visit. Allergies   Allergen Reactions    Minocin [Minocycline] Nausea Only and Other (See Comments)     Dizzy    Oxycodone Nausea Only       Review of Systems   Constitutional: Negative. HENT: Negative. Eyes: Negative. Respiratory: Negative. Cardiovascular: Negative. Gastrointestinal: Negative. Endocrine: Negative. Genitourinary: Negative. Musculoskeletal:  Negative for arthralgias (nothing particularly problematic at present in joints. rare groin/hip discomfort. ) and back pain. Skin: Negative. Allergic/Immunologic: Negative. Neurological: Negative. Hematological: Negative. Psychiatric/Behavioral: Negative. Objective:   Physical Exam  Constitutional:       General: She is not in acute distress. Appearance: She is well-developed. HENT:      Head: Normocephalic and atraumatic. Right Ear: External ear normal.      Left Ear: External ear normal.      Mouth/Throat:      Pharynx: No oropharyngeal exudate. Eyes:      General: No scleral icterus. Conjunctiva/sclera: Conjunctivae normal.   Neck:      Thyroid: No thyromegaly. Cardiovascular:      Rate and Rhythm: Normal rate and regular rhythm. Heart sounds: Normal heart sounds. No murmur heard.   Pulmonary:      Effort: Pulmonary effort is normal. No respiratory distress. Breath sounds: Normal breath sounds. No wheezing. Abdominal:      General: Bowel sounds are normal. There is no distension. Palpations: Abdomen is soft. Tenderness: There is no abdominal tenderness. There is no rebound. Musculoskeletal:         General: Normal range of motion. Cervical back: Neck supple. Lumbar back: No tenderness. Normal range of motion. Skin:     General: Skin is warm and dry. Findings: No erythema or rash. Neurological:      Mental Status: She is alert and oriented to person, place, and time. Psychiatric:         Behavior: Behavior normal.         Thought Content:  Thought content normal.         Judgment: Judgment normal.     /72 (Site: Right Upper Arm, Position: Sitting, Cuff Size: Large Adult)   Pulse 72   Ht 5' 8\" (1.727 m)   Wt 253 lb (114.8 kg)   LMP  (LMP Unknown)   BMI 38.47 kg/m²      Hospital Outpatient Visit on 10/03/2022   Component Date Value Ref Range Status    Hemoglobin A1C 10/03/2022 5.9  4.0 - 6.0 % Final    Estimated Avg Glucose 10/03/2022 123  mg/dL Final    WBC 10/03/2022 6.7  3.5 - 11.3 k/uL Final    RBC 10/03/2022 4.91  3.95 - 5.11 m/uL Final    Hemoglobin 10/03/2022 13.3  11.9 - 15.1 g/dL Final    Hematocrit 10/03/2022 41.7  36.3 - 47.1 % Final    MCV 10/03/2022 84.9  82.6 - 102.9 fL Final    MCH 10/03/2022 27.1  25.2 - 33.5 pg Final    MCHC 10/03/2022 31.9  25.2 - 33.5 g/dL Final    RDW 10/03/2022 13.3  11.8 - 14.4 % Final    Platelets 67/73/1973 327  138 - 453 k/uL Final    MPV 10/03/2022 9.4  8.1 - 13.5 fL Final    NRBC Automated 10/03/2022 0.0  0.0 per 100 WBC Final    Seg Neutrophils 10/03/2022 47  36 - 65 % Final    Lymphocytes 10/03/2022 39  24 - 43 % Final    Monocytes 10/03/2022 9  3 - 12 % Final    Eosinophils % 10/03/2022 4  1 - 4 % Final    Basophils 10/03/2022 1  0 - 2 % Final    Immature Granulocytes 10/03/2022 0  0 % Final    Segs Absolute 10/03/2022 3.20  1.50 - 8.10 k/uL Final Absolute Lymph # 10/03/2022 2.63  1.10 - 3.70 k/uL Final    Absolute Mono # 10/03/2022 0.62  0.10 - 1.20 k/uL Final    Absolute Eos # 10/03/2022 0.24  0.00 - 0.44 k/uL Final    Basophils Absolute 10/03/2022 0.04  0.00 - 0.20 k/uL Final    Absolute Immature Granulocyte 10/03/2022 <0.03  0.00 - 0.30 k/uL Final    Cholesterol 10/03/2022 208 (A)  <200 mg/dL Final    HDL 10/03/2022 49  >40 mg/dL Final    LDL Cholesterol 10/03/2022 136 (A)  0 - 130 mg/dL Final    Chol/HDL Ratio 10/03/2022 4.2  <5 Final    Triglycerides 10/03/2022 115  <150 mg/dL Final    Glucose 10/03/2022 112 (A)  70 - 99 mg/dL Final    BUN 10/03/2022 12  8 - 23 mg/dL Final    Creatinine 10/03/2022 1.09 (A)  0.50 - 0.90 mg/dL Final    Bun/Cre Ratio 10/03/2022 11  9 - 20 Final    Calcium 10/03/2022 9.3  8.6 - 10.4 mg/dL Final    Sodium 10/03/2022 141  135 - 144 mmol/L Final    Potassium 10/03/2022 4.0  3.7 - 5.3 mmol/L Final    Chloride 10/03/2022 104  98 - 107 mmol/L Final    CO2 10/03/2022 29  20 - 31 mmol/L Final    Anion Gap 10/03/2022 8 (A)  9 - 17 mmol/L Final    Alkaline Phosphatase 10/03/2022 125 (A)  35 - 104 U/L Final    ALT 10/03/2022 15  5 - 33 U/L Final    AST 10/03/2022 16  <32 U/L Final    Total Bilirubin 10/03/2022 0.3  0.3 - 1.2 mg/dL Final    Total Protein 10/03/2022 7.7  6.4 - 8.3 g/dL Final    Albumin 10/03/2022 3.9  3.5 - 5.2 g/dL Final    Albumin/Globulin Ratio 10/03/2022 1.0  1.0 - 2.5 Final    GFR Non- 10/03/2022 49 (A)  >60 mL/min Final    GFR  10/03/2022 60 (A)  >60 mL/min Final    GFR Comment 10/03/2022        Final         Assessment:      Encounter Diagnoses   Name Primary?     Adenomatous polyp of descending colon Yes    Postmenopausal HRT (hormone replacement therapy)     Primary hypertension     Pure hypercholesterolemia     Gastroesophageal reflux disease without esophagitis     Class 2 obesity due to excess calories without serious comorbidity with body mass index (BMI) of 37.0 to 37.9 in adult     Primary osteoarthritis of left knee     Mild intermittent asthma without complication     Trochanteric bursitis, left hip     Impaired fasting glucose                Plan:            flu vaccine given today at visit. Colon polyps: last scope 11/12/14: polyps of descending colon and rectum, sigmoid diverticulosis. Adenomatous x 2. Repeat 1/2/2020, 3 polyps. 5 yr follow up. HRT: estrace tablets through gynecology. . (s/p hysterectomy). pap and pelvic completed. Normal. Some hot flashes at times. No other concerns. Htn:   Elevations being seen over the interval.  Lot more stress. Started toprol 50mg/day. Seeing some bradycardia down into the low 50s. Feels a little winded after bending over , but not dizzy or fatigued. May need to reduce dose if symptomatic. Hyperlipidemia: no active treatment at present. Ldl at 118-130. She declines intervention with medical.    She wants to concentrate more on diet. Gerd: quiescent mostly on omeprazole at present. Working better than past meds. Using prn /14 day intervals     Obesity: bmi at 38.47 at present,  stable . Discussed wt. Loss planning . She rides bike. Has been successful in the past but fails to keep the wt. Off. Chronic sleep disturbances with daytime fatigue and somnolence. Sleeping 6 +hrs/night at present. Satisfied with sleep at present. DJD s/p bilateral knee replacements. Doing well , no significant pain any more after most recent replacement. Some right hip discomfort in the groin and lateral hip. Asthma; rare attacks, usually environmental triggers. Just prn albuterol use at present. No recent issues. Trochanteric bursitis left hip. Resolved, quiescent since injection in April. Observation at present. Right hip currently with lateral hip pain, cant sleep on that side. Offered inj. prn     ifbs: 106--112. Discussed wt. Loss and low carb. Diet. Up to 111 this visit. a1c 5.9%.   Asking her to commit to wt. Loss again. Eating more greens at present.          Discussed AWV

## 2022-10-10 NOTE — PATIENT INSTRUCTIONS
Hospital Outpatient Visit on 10/03/2022   Component Date Value Ref Range Status    Hemoglobin A1C 10/03/2022 5.9  4.0 - 6.0 % Final    Estimated Avg Glucose 10/03/2022 123  mg/dL Final    Comment: The ADA and AACC recommend providing the estimated average glucose result to permit better   patient understanding of their HBA1c result.       WBC 10/03/2022 6.7  3.5 - 11.3 k/uL Final    RBC 10/03/2022 4.91  3.95 - 5.11 m/uL Final    Hemoglobin 10/03/2022 13.3  11.9 - 15.1 g/dL Final    Hematocrit 10/03/2022 41.7  36.3 - 47.1 % Final    MCV 10/03/2022 84.9  82.6 - 102.9 fL Final    MCH 10/03/2022 27.1  25.2 - 33.5 pg Final    MCHC 10/03/2022 31.9  25.2 - 33.5 g/dL Final    RDW 10/03/2022 13.3  11.8 - 14.4 % Final    Platelets 22/78/9145 327  138 - 453 k/uL Final    MPV 10/03/2022 9.4  8.1 - 13.5 fL Final    NRBC Automated 10/03/2022 0.0  0.0 per 100 WBC Final    Seg Neutrophils 10/03/2022 47  36 - 65 % Final    Lymphocytes 10/03/2022 39  24 - 43 % Final    Monocytes 10/03/2022 9  3 - 12 % Final    Eosinophils % 10/03/2022 4  1 - 4 % Final    Basophils 10/03/2022 1  0 - 2 % Final    Immature Granulocytes 10/03/2022 0  0 % Final    Segs Absolute 10/03/2022 3.20  1.50 - 8.10 k/uL Final    Absolute Lymph # 10/03/2022 2.63  1.10 - 3.70 k/uL Final    Absolute Mono # 10/03/2022 0.62  0.10 - 1.20 k/uL Final    Absolute Eos # 10/03/2022 0.24  0.00 - 0.44 k/uL Final    Basophils Absolute 10/03/2022 0.04  0.00 - 0.20 k/uL Final    Absolute Immature Granulocyte 10/03/2022 <0.03  0.00 - 0.30 k/uL Final    Cholesterol 10/03/2022 208 (A)  <200 mg/dL Final    Comment:    Cholesterol Guidelines:      <200  Desirable   200-240  Borderline      >240  Undesirable         HDL 10/03/2022 49  >40 mg/dL Final    Comment:    HDL Guidelines:    <40     Undesirable   40-59    Borderline    >59     Desirable         LDL Cholesterol 10/03/2022 136 (A)  0 - 130 mg/dL Final    Comment:    LDL Guidelines:     <100    Desirable   100-129   Near to/above Desirable   130-159   Borderline      >159   Undesirable     Direct (measured) LDL and calculated LDL are not interchangeable tests. Chol/HDL Ratio 10/03/2022 4.2  <5 Final            Triglycerides 10/03/2022 115  <150 mg/dL Final    Comment:    Triglyceride Guidelines:     <150   Desirable   150-199  Borderline   200-499  High     >499   Very high   Based on AHA Guidelines for fasting triglyceride, October 2012. Glucose 10/03/2022 112 (A)  70 - 99 mg/dL Final    BUN 10/03/2022 12  8 - 23 mg/dL Final    Creatinine 10/03/2022 1.09 (A)  0.50 - 0.90 mg/dL Final    Bun/Cre Ratio 10/03/2022 11  9 - 20 Final    Calcium 10/03/2022 9.3  8.6 - 10.4 mg/dL Final    Sodium 10/03/2022 141  135 - 144 mmol/L Final    Potassium 10/03/2022 4.0  3.7 - 5.3 mmol/L Final    Chloride 10/03/2022 104  98 - 107 mmol/L Final    CO2 10/03/2022 29  20 - 31 mmol/L Final    Anion Gap 10/03/2022 8 (A)  9 - 17 mmol/L Final    Alkaline Phosphatase 10/03/2022 125 (A)  35 - 104 U/L Final    ALT 10/03/2022 15  5 - 33 U/L Final    AST 10/03/2022 16  <32 U/L Final    Total Bilirubin 10/03/2022 0.3  0.3 - 1.2 mg/dL Final    Total Protein 10/03/2022 7.7  6.4 - 8.3 g/dL Final    Albumin 10/03/2022 3.9  3.5 - 5.2 g/dL Final    Albumin/Globulin Ratio 10/03/2022 1.0  1.0 - 2.5 Final    GFR Non- 10/03/2022 49 (A)  >60 mL/min Final    GFR  10/03/2022 60 (A)  >60 mL/min Final    GFR Comment 10/03/2022        Final    Comment: Average GFR for 79or more years old:   76 mL/min/1.73sq m  Chronic Kidney Disease:   <60 mL/min/1.73sq m  Kidney failure:   <15 mL/min/1.73sq m              eGFR calculated using average adult body mass.  Additional eGFR calculator available at:        [email or web address]

## 2022-10-12 ENCOUNTER — IMMUNIZATION (OUTPATIENT)
Dept: LAB | Age: 72
End: 2022-10-12
Payer: MEDICARE

## 2022-10-12 PROCEDURE — PBSHW COVID-19, MODERNA BIVALENT BOOSTER, (AGE 18Y+), IM, 50 MCG/0.5 ML: Performed by: FAMILY MEDICINE

## 2022-10-12 PROCEDURE — 91313 COVID-19, MODERNA BIVALENT BOOSTER, (AGE 18Y+), IM, 50 MCG/0.5 ML: CPT | Performed by: FAMILY MEDICINE

## 2022-11-16 ENCOUNTER — TELEMEDICINE (OUTPATIENT)
Dept: FAMILY MEDICINE CLINIC | Age: 72
End: 2022-11-16
Payer: MEDICARE

## 2022-11-16 DIAGNOSIS — Z00.00 MEDICARE ANNUAL WELLNESS VISIT, SUBSEQUENT: Primary | ICD-10-CM

## 2022-11-16 PROCEDURE — 1123F ACP DISCUSS/DSCN MKR DOCD: CPT | Performed by: FAMILY MEDICINE

## 2022-11-16 PROCEDURE — G8484 FLU IMMUNIZE NO ADMIN: HCPCS | Performed by: FAMILY MEDICINE

## 2022-11-16 PROCEDURE — G0439 PPPS, SUBSEQ VISIT: HCPCS | Performed by: FAMILY MEDICINE

## 2022-11-16 PROCEDURE — 3017F COLORECTAL CA SCREEN DOC REV: CPT | Performed by: FAMILY MEDICINE

## 2022-11-16 ASSESSMENT — PATIENT HEALTH QUESTIONNAIRE - PHQ9
SUM OF ALL RESPONSES TO PHQ QUESTIONS 1-9: 0
SUM OF ALL RESPONSES TO PHQ QUESTIONS 1-9: 0
1. LITTLE INTEREST OR PLEASURE IN DOING THINGS: 0
SUM OF ALL RESPONSES TO PHQ9 QUESTIONS 1 & 2: 0
SUM OF ALL RESPONSES TO PHQ QUESTIONS 1-9: 0
SUM OF ALL RESPONSES TO PHQ QUESTIONS 1-9: 0
2. FEELING DOWN, DEPRESSED OR HOPELESS: 0

## 2022-11-16 ASSESSMENT — LIFESTYLE VARIABLES
HOW OFTEN DO YOU HAVE A DRINK CONTAINING ALCOHOL: MONTHLY OR LESS
HOW MANY STANDARD DRINKS CONTAINING ALCOHOL DO YOU HAVE ON A TYPICAL DAY: 1 OR 2

## 2022-11-16 NOTE — PROGRESS NOTES
Medicare Annual Wellness Visit    Almaz العراقي is here for Medicare AW    Assessment & Plan    Recommendations for Preventive Services Due: see orders and patient instructions/AVS.  Recommended screening schedule for the next 5-10 years is provided to the patient in written form: see Patient Instructions/AVS.     No follow-ups on file. Subjective       Patient's complete Health Risk Assessment and screening values have been reviewed and are found in Flowsheets. The following problems were reviewed today and where indicated follow up appointments were made and/or referrals ordered.     Positive Risk Factor Screenings with Interventions:             General Health and ACP:  General  In general, how would you say your health is?: Good  In the past 7 days, have you experienced any of the following: New or Increased Pain, New or Increased Fatigue, Loneliness, Social Isolation, Stress or Anger?: No  Do you get the social and emotional support that you need?: Yes  Do you have a Living Will?: Yes    Advance Directives       Power of  Living Will ACP-Advance Directive ACP-Power of     Not on File Not on File Not on File Filed        General Health Risk Interventions:  No Living Will: ACP documents already completed- patient asked to provide copy to the office    Health Habits/Nutrition:  Physical Activity: Inactive    Days of Exercise per Week: 0 days    Minutes of Exercise per Session: 0 min     Have you lost any weight without trying in the past 3 months?: No     Have you seen the dentist within the past year?: Yes  Health Habits/Nutrition Interventions:  Inadequate physical activity:  patient is not ready to increase his/her physical activity level at this time             Objective      Patient-Reported Vitals  Patient-Reported Height: 5'8.25\"            Allergies   Allergen Reactions    Minocin [Minocycline] Nausea Only and Other (See Comments)     Dizzy    Oxycodone Nausea Only     Prior to Visit Medications    Medication Sig Taking? Authorizing Provider   metoprolol succinate (TOPROL XL) 50 MG extended release tablet Take 1 tablet by mouth daily Yes Irma Bradford MD   meloxicam (MOBIC) 15 MG tablet TAKE 1 TABLET DAILY Yes Irma Bradford MD   albuterol sulfate  (90 Base) MCG/ACT inhaler Inhale 2 puffs into the lungs every 6 hours as needed for Wheezing or Shortness of Breath Yes Irma Bradford MD   hydroCHLOROthiazide (HYDRODIURIL) 25 MG tablet TAKE 1 TABLET DAILY Yes MD Mami Lee (Including outside providers/suppliers regularly involved in providing care):   Patient Care Team:  Irma Bradford MD as PCP - General (Family Medicine)  Irma Bradford MD as PCP - [facility] Empaneled Provider     Reviewed and updated this visit:  Tobacco  Allergies  Meds  Med Hx  Surg Hx  Soc Hx  Fam Hx          Darnell Cyndi, was evaluated through a synchronous (real-time) telephone encounter. The patient (or guardian if applicable) is aware that this is a billable service, which includes applicable co-pays. This Virtual Visit was conducted with patient's (and/or legal guardian's) consent. The visit was conducted pursuant to the emergency declaration under the [address] [place] [address], [address] [place] [address] authority and the [employer] and [employer] General Act. Patient identification was verified, and a caregiver was present when appropriate. The patient was located at Home: [address] [place] [address]. Provider was located at [facility] ([address] [place] [address] Dept): [address] [place] [address],  [id number]. Everett Duff RN, 11/16/2022, performed the documented evaluation under the direct supervision of the attending physician. This encounter was performed under myIrma MDs, direct supervision, 11/16/2022.

## 2022-11-16 NOTE — PATIENT INSTRUCTIONS
Personalized Preventive Plan for Akash Sagastume - 11/16/2022  Medicare offers a range of preventive health benefits. Some of the tests and screenings are paid in full while other may be subject to a deductible, co-insurance, and/or copay. Some of these benefits include a comprehensive review of your medical history including lifestyle, illnesses that may run in your family, and various assessments and screenings as appropriate. After reviewing your medical record and screening and assessments performed today your provider may have ordered immunizations, labs, imaging, and/or referrals for you. A list of these orders (if applicable) as well as your Preventive Care list are included within your After Visit Summary for your review. Other Preventive Recommendations:    A preventive eye exam performed by an eye specialist is recommended every 1-2 years to screen for glaucoma; cataracts, macular degeneration, and other eye disorders. A preventive dental visit is recommended every 6 months. Try to get at least 150 minutes of exercise per week or 10,000 steps per day on a pedometer . Order or download the FREE \"Exercise & Physical Activity: Your Everyday Guide\" from [employer]. Call 9-697.449.9518 or search [employer] online. You need 0451-1538 mg of calcium and 9412-3224 IU of vitamin D per day. It is possible to meet your calcium requirement with diet alone, but a vitamin D supplement is usually necessary to meet this goal.  When exposed to the sun, use a sunscreen that protects against both UVA and UVB radiation with an SPF of 30 or greater. Reapply every 2 to 3 hours or after sweating, drying off with a towel, or swimming. Always wear a seat belt when traveling in a car. Always wear a helmet when riding a bicycle or motorcycle.

## 2023-02-15 DIAGNOSIS — M16.12 PRIMARY OSTEOARTHRITIS OF LEFT HIP: Primary | ICD-10-CM

## 2023-02-15 DIAGNOSIS — M25.552 LEFT HIP PAIN: ICD-10-CM

## 2023-02-24 ENCOUNTER — HOSPITAL ENCOUNTER (OUTPATIENT)
Dept: GENERAL RADIOLOGY | Age: 73
End: 2023-02-24
Payer: MEDICARE

## 2023-02-24 ENCOUNTER — OFFICE VISIT (OUTPATIENT)
Dept: ORTHOPEDIC SURGERY | Age: 73
End: 2023-02-24
Payer: MEDICARE

## 2023-02-24 VITALS
BODY MASS INDEX: 38.34 KG/M2 | DIASTOLIC BLOOD PRESSURE: 78 MMHG | SYSTOLIC BLOOD PRESSURE: 138 MMHG | WEIGHT: 253 LBS | HEART RATE: 68 BPM | HEIGHT: 68 IN

## 2023-02-24 DIAGNOSIS — M16.12 PRIMARY OSTEOARTHRITIS OF LEFT HIP: ICD-10-CM

## 2023-02-24 DIAGNOSIS — M16.12 OSTEOARTHRITIS OF LEFT HIP, UNSPECIFIED OSTEOARTHRITIS TYPE: ICD-10-CM

## 2023-02-24 DIAGNOSIS — M16.0 ARTHRITIS OF BOTH HIPS: ICD-10-CM

## 2023-02-24 DIAGNOSIS — M25.552 LEFT HIP PAIN: ICD-10-CM

## 2023-02-24 DIAGNOSIS — M25.552 LEFT HIP PAIN: Primary | ICD-10-CM

## 2023-02-24 PROCEDURE — G8484 FLU IMMUNIZE NO ADMIN: HCPCS | Performed by: ORTHOPAEDIC SURGERY

## 2023-02-24 PROCEDURE — 99213 OFFICE O/P EST LOW 20 MIN: CPT | Performed by: ORTHOPAEDIC SURGERY

## 2023-02-24 PROCEDURE — 3017F COLORECTAL CA SCREEN DOC REV: CPT | Performed by: ORTHOPAEDIC SURGERY

## 2023-02-24 PROCEDURE — 3075F SYST BP GE 130 - 139MM HG: CPT | Performed by: ORTHOPAEDIC SURGERY

## 2023-02-24 PROCEDURE — 1090F PRES/ABSN URINE INCON ASSESS: CPT | Performed by: ORTHOPAEDIC SURGERY

## 2023-02-24 PROCEDURE — G8428 CUR MEDS NOT DOCUMENT: HCPCS | Performed by: ORTHOPAEDIC SURGERY

## 2023-02-24 PROCEDURE — G8417 CALC BMI ABV UP PARAM F/U: HCPCS | Performed by: ORTHOPAEDIC SURGERY

## 2023-02-24 PROCEDURE — G8399 PT W/DXA RESULTS DOCUMENT: HCPCS | Performed by: ORTHOPAEDIC SURGERY

## 2023-02-24 PROCEDURE — 99212 OFFICE O/P EST SF 10 MIN: CPT | Performed by: ORTHOPAEDIC SURGERY

## 2023-02-24 PROCEDURE — 1036F TOBACCO NON-USER: CPT | Performed by: ORTHOPAEDIC SURGERY

## 2023-02-24 PROCEDURE — 3078F DIAST BP <80 MM HG: CPT | Performed by: ORTHOPAEDIC SURGERY

## 2023-02-24 PROCEDURE — 73502 X-RAY EXAM HIP UNI 2-3 VIEWS: CPT

## 2023-02-24 PROCEDURE — 1123F ACP DISCUSS/DSCN MKR DOCD: CPT | Performed by: ORTHOPAEDIC SURGERY

## 2023-02-24 NOTE — PROGRESS NOTES
Patient ID: Ann-Marie Bryant is a 71 y.o. female    Chief Compliant:  Chief Complaint   Patient presents with    Hip Pain     Left hip pain        Diagnostic imaging:    AP pelvis AP lateral left hip central hip arthritis bilateral with some endplate sclerosis and marginal osteophytes superiorly the joint spaces fairly well-maintained    Assessment and Plan:  1. Left hip pain    2. Arthritis of both hips      History of excellent outcome post right hip IR injection    Left hip pain ongoing 1 and a half months    IR injection for left hip    Follow up after injection    HPI:  This is a 71 y.o. female who presents to the clinic today for left hip pain. Patient last seen on 1/14/22 for right hip pain. Patient complains of left groin pain and has difficulty standing from a seated position as well as tying her shoe. She states this pain has been present for about a month and a half. Review of Systems   All other systems reviewed and are negative. Past History:    Current Outpatient Medications:     metoprolol succinate (TOPROL XL) 50 MG extended release tablet, Take 1 tablet by mouth daily, Disp: 90 tablet, Rfl: 3    meloxicam (MOBIC) 15 MG tablet, TAKE 1 TABLET DAILY, Disp: 90 tablet, Rfl: 3    albuterol sulfate  (90 Base) MCG/ACT inhaler, Inhale 2 puffs into the lungs every 6 hours as needed for Wheezing or Shortness of Breath, Disp: 1 each, Rfl: 3    hydroCHLOROthiazide (HYDRODIURIL) 25 MG tablet, TAKE 1 TABLET DAILY, Disp: 90 tablet, Rfl: 3  Allergies   Allergen Reactions    Minocin [Minocycline] Nausea Only and Other (See Comments)     Dizzy    Oxycodone Nausea Only     Social History     Socioeconomic History    Marital status:       Spouse name: Not on file    Number of children: Not on file    Years of education: Not on file    Highest education level: Not on file   Occupational History    Not on file   Tobacco Use    Smoking status: Former     Packs/day: 0.25     Years: 1.00     Pack years: 0.25     Types: Cigarettes     Start date: 1997     Quit date: 1998     Years since quittin.1    Smokeless tobacco: Never    Tobacco comments:     Smoked for about 1 year   Vaping Use    Vaping Use: Never used   Substance and Sexual Activity    Alcohol use: Yes     Comment: seldom to rare    Drug use: No    Sexual activity: Not Currently     Partners: Male     Birth control/protection: Post-menopausal     Comment:  24 years   Other Topics Concern    Not on file   Social History Narrative    Not on file     Social Determinants of Health     Financial Resource Strain: Low Risk     Difficulty of Paying Living Expenses: Not hard at all   Food Insecurity: No Food Insecurity    Worried About Running Out of Food in the Last Year: Never true    Ran Out of Food in the Last Year: Never true   Transportation Needs: Not on file   Physical Activity: Inactive    Days of Exercise per Week: 0 days    Minutes of Exercise per Session: 0 min   Stress: Not on file   Social Connections: Not on file   Intimate Partner Violence: Not on file   Housing Stability: Not on file     Past Medical History:   Diagnosis Date    Asthma     Chalazion of right lower eyelid     Colon polyps     Degenerative joint disease of knee, left     Dysmenorrhea     GERD (gastroesophageal reflux disease)     Greater trochanteric bursitis of right hip     History of blood transfusion     Hot flashes     Hyperlipidemia     Hypertension     Impaired fasting glucose     Menopausal symptoms     Obesity     Overactive bladder     Scaphoid fracture of wrist     Right. Sleep disturbances     Tobacco abuse     Remote and limited. Uterine fibroid      Past Surgical History:   Procedure Laterality Date    BREAST REDUCTION SURGERY Bilateral 1996    CARDIAC CATHETERIZATION  06/15/2007    No significant coronary artery disease, preserved LV systolic function.       SECTION      COLONOSCOPY  09/10/2008    Sigmoid diverticulosis, history of polyps. COLONOSCOPY  09/18/2007    Multiple polyps of the colon, rectal polyps, sigmoid polyps, and splenic flexure polyps. COLONOSCOPY  07/31/2006    Pedunculated thin polyp near the transverse colon, small polyp at the dentate line versus a hemorrhoid. COLONOSCOPY  2013    COLONOSCOPY  11/2014    Dr. Sujey Sifuentes. adenom. x 2, 5yr follow up    [facility] COLONOSCOPY BIOPSY/STOMA N/A 1/2/2020    hyperplastic polyp X 3, Dr. Barrera Embs, TOTAL ABDOMINAL (CERVIX REMOVED)      KNEE ARTHROSCOPY Left 05/21/1999    Torn degenerate posterior third medial meniscus. MOUTH SURGERY Right 5/1/15    OTHER SURGICAL HISTORY Left 09/11/2012    Left knee injection for left knee pain. OTHER SURGICAL HISTORY Right 09/12/2006    Bursa injection for greater trochanteric bursitis. SALPINGO-OOPHORECTOMY Left     TONSILLECTOMY AND ADENOIDECTOMY      TOTAL KNEE ARTHROPLASTY Left 2013     Family History   Problem Relation Age of Onset    Cancer Mother     Stomach Cancer Mother     Cervical Cancer Mother     Stroke Father     Cancer Father         Throat. Breast Cancer Father 79        father cancer right breast    Lung Cancer Sister     No Known Problems Sister     No Known Problems Sister     No Known Problems Sister     Cancer Brother         Lymphoma. Colon Cancer Brother     No Known Problems Maternal Aunt     Stroke Maternal Grandmother     Heart Attack Maternal Grandfather         Myocardial infarction. Asthma Daughter     Migraines Son         Physical Exam:  Vitals signs and nursing note reviewed. Constitutional:       Appearance: well-developed. HENT:      Head: Normocephalic and atraumatic. Nose: Nose normal.   Eyes:      Conjunctiva/sclera: Conjunctivae normal.   Neck:      Musculoskeletal: Normal range of motion and neck supple. Pulmonary:      Effort: Pulmonary effort is normal. No respiratory distress.    Musculoskeletal:      Comments: Normal gait     Skin:     General: Skin is warm and dry. Neurological:      Mental Status: Alert and oriented to person, place, and time. Sensory: No sensory deficit. Psychiatric:         Behavior: Behavior normal.         Thought Content: Thought content normal.    Range of motion left hip reveals her causes some left buttock and groin pain    Provider Attestation:  Danyel Becker, personally performed the services described in this documentation. All medical record entries made by the scribe were at my direction and in my presence. I have reviewed the chart and discharge instructions and agree that the records reflect my personal performance and is accurate and complete. Deloise Lombard Vanice Pouch, MD 2/24/23       Scribe Attestation:  By signing my name below, Debbie Jason, attest that this documentation has been prepared under the direction and in the presence of Dr. Cirilo Cho. Electronically signed: Clotilde Nyhan, Scribe, 2/24/23     Please note that this chart was generated using voice recognition Dragon dictation software. Although every effort was made to ensure the accuracy of this automated transcription, some errors in transcription may have occurred.

## 2023-03-09 ENCOUNTER — HOSPITAL ENCOUNTER (OUTPATIENT)
Dept: GENERAL RADIOLOGY | Age: 73
Discharge: HOME | End: 2023-03-11
Payer: MEDICARE

## 2023-03-09 DIAGNOSIS — M16.0 ARTHRITIS OF BOTH HIPS: ICD-10-CM

## 2023-03-09 DIAGNOSIS — M25.552 LEFT HIP PAIN: ICD-10-CM

## 2023-03-09 DIAGNOSIS — M16.12 OSTEOARTHRITIS OF LEFT HIP, UNSPECIFIED OSTEOARTHRITIS TYPE: ICD-10-CM

## 2023-03-09 PROCEDURE — 2500000003 HC RX 250 WO HCPCS

## 2023-03-09 PROCEDURE — 20610 DRAIN/INJ JOINT/BURSA W/O US: CPT

## 2023-03-09 PROCEDURE — 77002 NEEDLE LOCALIZATION BY XRAY: CPT

## 2023-03-09 PROCEDURE — 6360000004 HC RX CONTRAST MEDICATION: Performed by: ORTHOPAEDIC SURGERY

## 2023-03-09 PROCEDURE — 6360000002 HC RX W HCPCS

## 2023-03-09 RX ADMIN — IOHEXOL 5 ML: 240 INJECTION, SOLUTION INTRATHECAL; INTRAVASCULAR; INTRAVENOUS; ORAL at 13:31

## 2023-03-10 ENCOUNTER — OFFICE VISIT (OUTPATIENT)
Dept: ORTHOPEDIC SURGERY | Age: 73
End: 2023-03-10
Payer: MEDICARE

## 2023-03-10 VITALS
HEIGHT: 68 IN | SYSTOLIC BLOOD PRESSURE: 139 MMHG | BODY MASS INDEX: 38.34 KG/M2 | DIASTOLIC BLOOD PRESSURE: 84 MMHG | WEIGHT: 253 LBS | HEART RATE: 77 BPM

## 2023-03-10 DIAGNOSIS — Z96.653 HISTORY OF BILATERAL KNEE REPLACEMENT: ICD-10-CM

## 2023-03-10 DIAGNOSIS — M25.552 LEFT HIP PAIN: Primary | ICD-10-CM

## 2023-03-10 DIAGNOSIS — M16.12 OSTEOARTHRITIS OF LEFT HIP, UNSPECIFIED OSTEOARTHRITIS TYPE: ICD-10-CM

## 2023-03-10 PROCEDURE — 99213 OFFICE O/P EST LOW 20 MIN: CPT | Performed by: ORTHOPAEDIC SURGERY

## 2023-03-10 RX ORDER — MELOXICAM 15 MG/1
TABLET ORAL
Qty: 90 TABLET | Refills: 1 | Status: SHIPPED | OUTPATIENT
Start: 2023-03-10

## 2023-03-10 RX ORDER — HYDROCHLOROTHIAZIDE 25 MG/1
TABLET ORAL
Qty: 90 TABLET | Refills: 1 | Status: SHIPPED | OUTPATIENT
Start: 2023-03-10

## 2023-03-10 RX ORDER — METOPROLOL SUCCINATE 50 MG/1
50 TABLET, EXTENDED RELEASE ORAL DAILY
Qty: 90 TABLET | Refills: 1 | Status: SHIPPED | OUTPATIENT
Start: 2023-03-10

## 2023-03-10 NOTE — PROGRESS NOTES
Patient ID: Felicia Sheriff is a 71 y.o. female    Chief Compliant:  Chief Complaint   Patient presents with    Hip Pain     Rec left hip, post injection        Diagnostic imaging:    X-rays bilateral hips largely end-stage degenerative arthritis centrally superiorly the patient actually has a little bit better joint surface     Assessment and Plan:  1. Left hip pain    2. Osteoarthritis of left hip, unspecified osteoarthritis type    History bilateral total knees    Left hip pain, great improvement to pain following IR hip injection    Follow up 8 weeks with knee x-rays    HPI:  This is a 71 y.o. female who presents to the clinic today for left hip pain. Patient followed with an IR hip injection with great improvement to pain. Patient with history of excellent outcome following a right IR hip injection for right hip pain. She reports having a lump of soft tissue along the medial aspect of her right shin that is painful with palpation. Review of Systems   All other systems reviewed and are negative. Past History:    Current Outpatient Medications:     metoprolol succinate (TOPROL XL) 50 MG extended release tablet, Take 1 tablet by mouth daily, Disp: 90 tablet, Rfl: 3    meloxicam (MOBIC) 15 MG tablet, TAKE 1 TABLET DAILY, Disp: 90 tablet, Rfl: 3    albuterol sulfate  (90 Base) MCG/ACT inhaler, Inhale 2 puffs into the lungs every 6 hours as needed for Wheezing or Shortness of Breath, Disp: 1 each, Rfl: 3    hydroCHLOROthiazide (HYDRODIURIL) 25 MG tablet, TAKE 1 TABLET DAILY, Disp: 90 tablet, Rfl: 3  Allergies   Allergen Reactions    Minocin [Minocycline] Nausea Only and Other (See Comments)     Dizzy    Oxycodone Nausea Only     Social History     Socioeconomic History    Marital status:       Spouse name: Not on file    Number of children: Not on file    Years of education: Not on file    Highest education level: Not on file   Occupational History    Not on file   Tobacco Use    Smoking status: Former     Packs/day: 0.25     Years: 1.00     Pack years: 0.25     Types: Cigarettes     Start date: 1997     Quit date: 1998     Years since quittin.2    Smokeless tobacco: Never    Tobacco comments:     Smoked for about 1 year   Vaping Use    Vaping Use: Never used   Substance and Sexual Activity    Alcohol use: Yes     Comment: seldom to rare    Drug use: No    Sexual activity: Not Currently     Partners: Male     Birth control/protection: Post-menopausal     Comment:  24 years   Other Topics Concern    Not on file   Social History Narrative    Not on file     Social Determinants of Health     Financial Resource Strain: Low Risk     Difficulty of Paying Living Expenses: Not hard at all   Food Insecurity: No Food Insecurity    Worried About Running Out of Food in the Last Year: Never true    Ran Out of Food in the Last Year: Never true   Transportation Needs: Not on file   Physical Activity: Inactive    Days of Exercise per Week: 0 days    Minutes of Exercise per Session: 0 min   Stress: Not on file   Social Connections: Not on file   Intimate Partner Violence: Not on file   Housing Stability: Not on file     Past Medical History:   Diagnosis Date    Asthma     Chalazion of right lower eyelid     Colon polyps     Degenerative joint disease of knee, left     Dysmenorrhea     GERD (gastroesophageal reflux disease)     Greater trochanteric bursitis of right hip     History of blood transfusion     Hot flashes     Hyperlipidemia     Hypertension     Impaired fasting glucose     Menopausal symptoms     Obesity     Overactive bladder     Scaphoid fracture of wrist     Right. Sleep disturbances     Tobacco abuse     Remote and limited. Uterine fibroid      Past Surgical History:   Procedure Laterality Date    BREAST REDUCTION SURGERY Bilateral     CARDIAC CATHETERIZATION  06/15/2007    No significant coronary artery disease, preserved LV systolic function.       SECTION      COLONOSCOPY  09/10/2008    Sigmoid diverticulosis, history of polyps. COLONOSCOPY  09/18/2007    Multiple polyps of the colon, rectal polyps, sigmoid polyps, and splenic flexure polyps. COLONOSCOPY  07/31/2006    Pedunculated thin polyp near the transverse colon, small polyp at the dentate line versus a hemorrhoid. COLONOSCOPY  2013    COLONOSCOPY  11/2014    Dr. Chris Carlos. adenom. x 2, 5yr follow up    [facility] COLONOSCOPY BIOPSY/STOMA N/A 1/2/2020    hyperplastic polyp X 3, Dr. Flory Witt, TOTAL ABDOMINAL (CERVIX REMOVED)      KNEE ARTHROSCOPY Left 05/21/1999    Torn degenerate posterior third medial meniscus. MOUTH SURGERY Right 5/1/15    OTHER SURGICAL HISTORY Left 09/11/2012    Left knee injection for left knee pain. OTHER SURGICAL HISTORY Right 09/12/2006    Bursa injection for greater trochanteric bursitis. SALPINGO-OOPHORECTOMY Left     TONSILLECTOMY AND ADENOIDECTOMY      TOTAL KNEE ARTHROPLASTY Left 2013     Family History   Problem Relation Age of Onset    Cancer Mother     Stomach Cancer Mother     Cervical Cancer Mother     Stroke Father     Cancer Father         Throat. Breast Cancer Father 79        father cancer right breast    Lung Cancer Sister     No Known Problems Sister     No Known Problems Sister     No Known Problems Sister     Cancer Brother         Lymphoma. Colon Cancer Brother     No Known Problems Maternal Aunt     Stroke Maternal Grandmother     Heart Attack Maternal Grandfather         Myocardial infarction. Asthma Daughter     Migraines Son         Physical Exam:  Vitals signs and nursing note reviewed. Constitutional:       Appearance: well-developed. HENT:      Head: Normocephalic and atraumatic. Nose: Nose normal.   Eyes:      Conjunctiva/sclera: Conjunctivae normal.   Neck:      Musculoskeletal: Normal range of motion and neck supple.    Pulmonary:      Effort: Pulmonary effort is normal. No respiratory distress. Musculoskeletal:      Comments: Normal gait     Skin:     General: Skin is warm and dry. Neurological:      Mental Status: Alert and oriented to person, place, and time. Sensory: No sensory deficit. Psychiatric:         Behavior: Behavior normal.         Thought Content: Thought content normal.        Provider Attestation:  Clayton Becker, personally performed the services described in this documentation. All medical record entries made by the scribe were at my direction and in my presence. I have reviewed the chart and discharge instructions and agree that the records reflect my personal performance and is accurate and complete. Janette Garrison MD 3/10/23       Scribe Attestation:  By signing my name below, Mauricio Snider, attest that this documentation has been prepared under the direction and in the presence of Dr. Chasity Dennis. Electronically signed: Dave Gonzalez, 3/10/23     Please note that this chart was generated using voice recognition Dragon dictation software. Although every effort was made to ensure the accuracy of this automated transcription, some errors in transcription may have occurred.

## 2023-03-10 NOTE — TELEPHONE ENCOUNTER
Mecca Connolly called requesting a refill of the below medication which has been pended for you:     Requested Prescriptions     Pending Prescriptions Disp Refills    hydroCHLOROthiazide (HYDRODIURIL) 25 MG tablet 90 tablet 1     Sig: TAKE 1 TABLET DAILY    meloxicam (MOBIC) 15 MG tablet 90 tablet 1     Sig: TAKE 1 TABLET DAILY    metoprolol succinate (TOPROL XL) 50 MG extended release tablet 90 tablet 1     Sig: Take 1 tablet by mouth daily       Last Appointment Date: 11/16/2022  Next Appointment Date: 4/10/2023    Allergies   Allergen Reactions    Minocin [Minocycline] Nausea Only and Other (See Comments)     Dizzy    Oxycodone Nausea Only

## 2023-04-06 ENCOUNTER — HOSPITAL ENCOUNTER (OUTPATIENT)
Age: 73
Discharge: HOME | End: 2023-04-06
Payer: MEDICARE

## 2023-04-06 DIAGNOSIS — R73.01 IMPAIRED FASTING GLUCOSE: ICD-10-CM

## 2023-04-06 DIAGNOSIS — E78.00 PURE HYPERCHOLESTEROLEMIA: ICD-10-CM

## 2023-04-06 DIAGNOSIS — I10 PRIMARY HYPERTENSION: ICD-10-CM

## 2023-04-06 LAB
ABSOLUTE EOS #: 0.16 K/UL (ref 0–0.44)
ABSOLUTE IMMATURE GRANULOCYTE: <0.03 K/UL (ref 0–0.3)
ABSOLUTE LYMPH #: 3.07 K/UL (ref 1.1–3.7)
ABSOLUTE MONO #: 0.71 K/UL (ref 0.1–1.2)
ALBUMIN SERPL-MCNC: 4 G/DL (ref 3.5–5.2)
ALBUMIN/GLOBULIN RATIO: 1 (ref 1–2.5)
ALP SERPL-CCNC: 111 U/L (ref 35–104)
ALT SERPL-CCNC: 18 U/L (ref 5–33)
ANION GAP SERPL CALCULATED.3IONS-SCNC: 6 MMOL/L (ref 9–17)
AST SERPL-CCNC: 20 U/L
BASOPHILS # BLD: 0 % (ref 0–2)
BASOPHILS ABSOLUTE: 0.03 K/UL (ref 0–0.2)
BILIRUB SERPL-MCNC: 0.3 MG/DL (ref 0.3–1.2)
BUN SERPL-MCNC: 20 MG/DL (ref 8–23)
BUN/CREAT BLD: 19 (ref 9–20)
CALCIUM SERPL-MCNC: 9.6 MG/DL (ref 8.6–10.4)
CHLORIDE SERPL-SCNC: 105 MMOL/L (ref 98–107)
CHOLEST SERPL-MCNC: 219 MG/DL
CHOLESTEROL/HDL RATIO: 4.4
CO2 SERPL-SCNC: 30 MMOL/L (ref 20–31)
CREAT SERPL-MCNC: 1.07 MG/DL (ref 0.5–0.9)
EOSINOPHILS RELATIVE PERCENT: 2 % (ref 1–4)
EST. AVERAGE GLUCOSE BLD GHB EST-MCNC: 128 MG/DL
GFR SERPL CREATININE-BSD FRML MDRD: 56 ML/MIN/1.73M2
GLUCOSE SERPL-MCNC: 90 MG/DL (ref 70–99)
HBA1C MFR BLD: 6.1 % (ref 4–6)
HCT VFR BLD AUTO: 41.4 % (ref 36.3–47.1)
HDLC SERPL-MCNC: 50 MG/DL
HGB BLD-MCNC: 13 G/DL (ref 11.9–15.1)
IMMATURE GRANULOCYTES: 0 %
LDLC SERPL CALC-MCNC: 140 MG/DL (ref 0–130)
LYMPHOCYTES # BLD: 43 % (ref 24–43)
MCH RBC QN AUTO: 26.5 PG (ref 25.2–33.5)
MCHC RBC AUTO-ENTMCNC: 31.4 G/DL (ref 25.2–33.5)
MCV RBC AUTO: 84.5 FL (ref 82.6–102.9)
MONOCYTES # BLD: 10 % (ref 3–12)
NRBC AUTOMATED: 0 PER 100 WBC
PDW BLD-RTO: 13.6 % (ref 11.8–14.4)
PLATELET # BLD AUTO: 342 K/UL (ref 138–453)
PMV BLD AUTO: 8.6 FL (ref 8.1–13.5)
POTASSIUM SERPL-SCNC: 4.6 MMOL/L (ref 3.7–5.3)
PROT SERPL-MCNC: 8 G/DL (ref 6.4–8.3)
RBC # BLD: 4.9 M/UL (ref 3.95–5.11)
SEG NEUTROPHILS: 45 % (ref 36–65)
SEGMENTED NEUTROPHILS ABSOLUTE COUNT: 3.23 K/UL (ref 1.5–8.1)
SODIUM SERPL-SCNC: 141 MMOL/L (ref 135–144)
TRIGL SERPL-MCNC: 147 MG/DL
WBC # BLD AUTO: 7.2 K/UL (ref 3.5–11.3)

## 2023-04-06 PROCEDURE — 80061 LIPID PANEL: CPT

## 2023-04-06 PROCEDURE — 36415 COLL VENOUS BLD VENIPUNCTURE: CPT

## 2023-04-06 PROCEDURE — 80053 COMPREHEN METABOLIC PANEL: CPT

## 2023-04-06 PROCEDURE — 83036 HEMOGLOBIN GLYCOSYLATED A1C: CPT

## 2023-04-06 PROCEDURE — 85025 COMPLETE CBC W/AUTO DIFF WBC: CPT

## 2023-04-19 ENCOUNTER — HOSPITAL ENCOUNTER (OUTPATIENT)
Dept: MAMMOGRAPHY | Age: 73
Discharge: HOME | End: 2023-04-21
Payer: MEDICARE

## 2023-04-19 DIAGNOSIS — Z12.31 ENCOUNTER FOR SCREENING MAMMOGRAM FOR BREAST CANCER: ICD-10-CM

## 2023-04-19 PROCEDURE — 77063 BREAST TOMOSYNTHESIS BI: CPT

## 2023-05-06 ENCOUNTER — OFFICE VISIT (OUTPATIENT)
Dept: PRIMARY CARE CLINIC | Age: 73
End: 2023-05-06
Payer: MEDICARE

## 2023-05-06 VITALS
OXYGEN SATURATION: 98 % | DIASTOLIC BLOOD PRESSURE: 88 MMHG | TEMPERATURE: 98.2 F | WEIGHT: 255 LBS | BODY MASS INDEX: 38.65 KG/M2 | HEIGHT: 68 IN | SYSTOLIC BLOOD PRESSURE: 150 MMHG | HEART RATE: 50 BPM

## 2023-05-06 DIAGNOSIS — J06.9 UPPER RESPIRATORY TRACT INFECTION, UNSPECIFIED TYPE: Primary | ICD-10-CM

## 2023-05-06 DIAGNOSIS — J45.41 MODERATE PERSISTENT ASTHMA WITH EXACERBATION: ICD-10-CM

## 2023-05-06 PROCEDURE — 99212 OFFICE O/P EST SF 10 MIN: CPT | Performed by: NURSE PRACTITIONER

## 2023-05-06 RX ORDER — PREDNISONE 20 MG/1
20 TABLET ORAL 2 TIMES DAILY
Qty: 10 TABLET | Refills: 0 | Status: SHIPPED | OUTPATIENT
Start: 2023-05-06 | End: 2023-05-11

## 2023-05-06 RX ORDER — AZITHROMYCIN 250 MG/1
TABLET, FILM COATED ORAL
Qty: 1 PACKET | Refills: 0 | Status: SHIPPED | OUTPATIENT
Start: 2023-05-06

## 2023-05-06 ASSESSMENT — PATIENT HEALTH QUESTIONNAIRE - PHQ9
SUM OF ALL RESPONSES TO PHQ QUESTIONS 1-9: 0
SUM OF ALL RESPONSES TO PHQ QUESTIONS 1-9: 0
1. LITTLE INTEREST OR PLEASURE IN DOING THINGS: 0
SUM OF ALL RESPONSES TO PHQ QUESTIONS 1-9: 0
2. FEELING DOWN, DEPRESSED OR HOPELESS: 0
SUM OF ALL RESPONSES TO PHQ QUESTIONS 1-9: 0
SUM OF ALL RESPONSES TO PHQ9 QUESTIONS 1 & 2: 0

## 2023-05-06 ASSESSMENT — ENCOUNTER SYMPTOMS
COUGH: 1
SORE THROAT: 1
SHORTNESS OF BREATH: 0
WHEEZING: 1
CHEST TIGHTNESS: 0

## 2023-05-06 NOTE — PROGRESS NOTES
[facility]             [address] [facility], [address]                        Telephone [phone]             Fax [phone]     Amber Hamilton  1950  [facility] [id number]   Date of visit:  5/6/2023    Subjective:    Amber Hamilton is a 71 y.o.  female who presents to [facility] today (5/6/2023) for evaluation of:    Chief Complaint   Patient presents with    Cough     S.O.B. HX of asthma. Congestion , sinus sx. Sx began 4 days ago       Cough  This is a new problem. The current episode started in the past 7 days (05/02/23). The problem has been gradually worsening. The problem occurs every few minutes. The cough is Non-productive. Associated symptoms include headaches (intermittent), postnasal drip, a sore throat (scratchy began today) and wheezing. Pertinent negatives include no chest pain, chills, fever, rash or shortness of breath. Nothing aggravates the symptoms. Treatments tried: albuterol inhaler. The treatment provided mild relief. Her past medical history is significant for asthma.      She has the following problem list:  Patient Active Problem List   Diagnosis    Postmenopausal HRT (hormone replacement therapy)    Reflux    Colon polyp    Tenosynovitis of foot and ankle R/L    Plantar fascial fibromatosis R/L    Foot pain    Hypertension    Hyperlipidemia    Obesity    Sleep disturbances    Degenerative joint disease of knee, left    Asthma    Impaired fasting glucose    Low back pain    Primary osteoarthritis of right hip    Bilateral hip joint arthritis    Lumbar spondylosis    BMI 37.0-37.9, adult    History of bilateral knee replacement        Current medications are:  Current Outpatient Medications   Medication Sig Dispense Refill    predniSONE (DELTASONE) 20 MG tablet Take 1 tablet by mouth 2 times daily for 5 days 10 tablet 0    azithromycin (ZITHROMAX Z-ANDERSON) 250 MG tablet Take 2 tablets (500 mg) on

## 2023-05-10 DIAGNOSIS — Z96.653 HISTORY OF BILATERAL KNEE REPLACEMENT: Primary | ICD-10-CM

## 2023-05-12 ENCOUNTER — OFFICE VISIT (OUTPATIENT)
Dept: ORTHOPEDIC SURGERY | Age: 73
End: 2023-05-12
Payer: MEDICARE

## 2023-05-12 ENCOUNTER — HOSPITAL ENCOUNTER (OUTPATIENT)
Dept: GENERAL RADIOLOGY | Age: 73
End: 2023-05-12
Payer: MEDICARE

## 2023-05-12 VITALS
SYSTOLIC BLOOD PRESSURE: 185 MMHG | BODY MASS INDEX: 38.8 KG/M2 | HEART RATE: 45 BPM | DIASTOLIC BLOOD PRESSURE: 92 MMHG | HEIGHT: 68 IN | WEIGHT: 256 LBS

## 2023-05-12 DIAGNOSIS — M16.12 OSTEOARTHRITIS OF LEFT HIP, UNSPECIFIED OSTEOARTHRITIS TYPE: ICD-10-CM

## 2023-05-12 DIAGNOSIS — Z96.653 HISTORY OF TOTAL KNEE ARTHROPLASTY, BILATERAL: Primary | ICD-10-CM

## 2023-05-12 DIAGNOSIS — Z96.653 HISTORY OF BILATERAL KNEE REPLACEMENT: ICD-10-CM

## 2023-05-12 DIAGNOSIS — M25.552 LEFT HIP PAIN: ICD-10-CM

## 2023-05-12 PROCEDURE — 73562 X-RAY EXAM OF KNEE 3: CPT

## 2023-05-12 PROCEDURE — 99213 OFFICE O/P EST LOW 20 MIN: CPT | Performed by: ORTHOPAEDIC SURGERY

## 2023-05-12 NOTE — PROGRESS NOTES
Patient ID: Mikala Pinedo is a 71 y.o. female    Chief Compliant:  Chief Complaint   Patient presents with    Knee Pain     Rech left hip/ kendra knee          Diagnostic imaging:    AP lateral bilateral knees status post bilateral total knee arthroplasty components in excellent position no signs of loosening    Assessment and Plan:  1. History of total knee arthroplasty, bilateral    2. Left hip pain    3. Osteoarthritis of left hip, unspecified osteoarthritis type      Patient continues to do well with her hip and knee pain. Follow-up as needed    History of bilateral TKA, stable    Left hip pain with continued excellent outcome post IR hip injection    Follow up prn    HPI:  This is a 71 y.o. female who presents to the clinic today for history of bilateral TKA. Patient states her knees are doing well. Her hips continue to do well post IR injection. Review of Systems   All other systems reviewed and are negative. Past History:    Current Outpatient Medications:     albuterol sulfate HFA (PROVENTIL;VENTOLIN;PROAIR) 108 (90 Base) MCG/ACT inhaler, Inhale 2 puffs into the lungs every 6 hours as needed for Wheezing or Shortness of Breath, Disp: 1 each, Rfl: 3    hydroCHLOROthiazide (HYDRODIURIL) 25 MG tablet, TAKE 1 TABLET DAILY, Disp: 90 tablet, Rfl: 1    meloxicam (MOBIC) 15 MG tablet, TAKE 1 TABLET DAILY, Disp: 90 tablet, Rfl: 1    metoprolol succinate (TOPROL XL) 50 MG extended release tablet, Take 1 tablet by mouth daily, Disp: 90 tablet, Rfl: 1  Allergies   Allergen Reactions    Minocin [Minocycline] Nausea Only and Other (See Comments)     Dizzy    Oxycodone Nausea Only     Social History     Socioeconomic History    Marital status:       Spouse name: Not on file    Number of children: Not on file    Years of education: Not on file    Highest education level: Not on file   Occupational History    Not on file   Tobacco Use    Smoking status: Former     Packs/day: 0.25     Years: 1.00     Pack

## 2023-06-04 ENCOUNTER — HOSPITAL ENCOUNTER (EMERGENCY)
Age: 73
Discharge: HOME | End: 2023-06-04
Attending: EMERGENCY MEDICINE
Payer: MEDICARE

## 2023-06-04 VITALS
SYSTOLIC BLOOD PRESSURE: 186 MMHG | OXYGEN SATURATION: 98 % | TEMPERATURE: 97.8 F | HEART RATE: 60 BPM | DIASTOLIC BLOOD PRESSURE: 88 MMHG | HEIGHT: 68 IN | RESPIRATION RATE: 16 BRPM | WEIGHT: 250 LBS | BODY MASS INDEX: 37.89 KG/M2

## 2023-06-04 DIAGNOSIS — L08.9 INFECTED SEBACEOUS CYST OF SKIN: Primary | ICD-10-CM

## 2023-06-04 DIAGNOSIS — L72.3 INFECTED SEBACEOUS CYST OF SKIN: Primary | ICD-10-CM

## 2023-06-04 PROCEDURE — 99283 EMERGENCY DEPT VISIT LOW MDM: CPT

## 2023-06-04 RX ORDER — CEPHALEXIN 500 MG/1
500 CAPSULE ORAL 3 TIMES DAILY
Qty: 15 CAPSULE | Refills: 0 | Status: SHIPPED | OUTPATIENT
Start: 2023-06-04 | End: 2023-06-09

## 2023-06-04 ASSESSMENT — LIFESTYLE VARIABLES: HOW OFTEN DO YOU HAVE A DRINK CONTAINING ALCOHOL: NEVER

## 2023-06-04 ASSESSMENT — ENCOUNTER SYMPTOMS
ROS SKIN COMMENTS: PER HPI.
VOMITING: 0
NAUSEA: 0
SHORTNESS OF BREATH: 0

## 2023-06-05 ENCOUNTER — FOLLOWUP TELEPHONE ENCOUNTER (OUTPATIENT)
Dept: INPATIENT UNIT | Age: 73
End: 2023-06-05

## 2023-06-06 ENCOUNTER — INITIAL CONSULT (OUTPATIENT)
Dept: SURGERY | Age: 73
End: 2023-06-06
Payer: MEDICARE

## 2023-06-06 VITALS
HEIGHT: 68 IN | BODY MASS INDEX: 39.1 KG/M2 | HEART RATE: 82 BPM | WEIGHT: 258 LBS | DIASTOLIC BLOOD PRESSURE: 86 MMHG | SYSTOLIC BLOOD PRESSURE: 138 MMHG

## 2023-06-06 DIAGNOSIS — L72.0 EPIDERMAL CYST: Primary | ICD-10-CM

## 2023-06-06 PROCEDURE — 3079F DIAST BP 80-89 MM HG: CPT | Performed by: SURGERY

## 2023-06-06 PROCEDURE — 3017F COLORECTAL CA SCREEN DOC REV: CPT | Performed by: SURGERY

## 2023-06-06 PROCEDURE — G8417 CALC BMI ABV UP PARAM F/U: HCPCS | Performed by: SURGERY

## 2023-06-06 PROCEDURE — G8427 DOCREV CUR MEDS BY ELIG CLIN: HCPCS | Performed by: SURGERY

## 2023-06-06 PROCEDURE — 1123F ACP DISCUSS/DSCN MKR DOCD: CPT | Performed by: SURGERY

## 2023-06-06 PROCEDURE — G8399 PT W/DXA RESULTS DOCUMENT: HCPCS | Performed by: SURGERY

## 2023-06-06 PROCEDURE — 3075F SYST BP GE 130 - 139MM HG: CPT | Performed by: SURGERY

## 2023-06-06 PROCEDURE — 99212 OFFICE O/P EST SF 10 MIN: CPT | Performed by: SURGERY

## 2023-06-06 PROCEDURE — 1036F TOBACCO NON-USER: CPT | Performed by: SURGERY

## 2023-06-06 PROCEDURE — 1090F PRES/ABSN URINE INCON ASSESS: CPT | Performed by: SURGERY

## 2023-06-06 PROCEDURE — 99213 OFFICE O/P EST LOW 20 MIN: CPT | Performed by: SURGERY

## 2023-06-06 NOTE — ASSESSMENT & PLAN NOTE
Patient here to have ruptured epidermal cyst as well as inflammatory reaction possible infection. Not able to express any material from this today and it seems like it is probably fully drained. Still does have open wound with some mild induration. I discussed the likely diagnosis with the patient and I discussed that is possible we would like to try and get that cyst capsule out. With her having likely active infection right now try and forego any immediate surgical interventions prevent any spread of infection. We will plan to bring antibiotics. Continue dry dressing to wound until it heals over. Wash daily soap and water. Plan to have the patient back in 4 to 6 weeks to do office procedure to try and excise any redness of the capsule to minimize risk of recurrence. Patient is to call the office if she begins to have any worsening of symptoms in the meantime.

## 2023-06-06 NOTE — PROGRESS NOTES
meantime.          (Please note that portions of this note were completed with a voice recognition program.  Efforts were made to edit the dictations but occasionally words are mis-transcribed.)

## 2023-07-17 RX ORDER — MELOXICAM 15 MG/1
TABLET ORAL
Qty: 90 TABLET | Refills: 3 | OUTPATIENT
Start: 2023-07-17

## 2023-07-17 RX ORDER — METOPROLOL SUCCINATE 50 MG/1
50 TABLET, EXTENDED RELEASE ORAL DAILY
Qty: 90 TABLET | Refills: 3 | OUTPATIENT
Start: 2023-07-17

## 2023-07-17 RX ORDER — HYDROCHLOROTHIAZIDE 25 MG/1
TABLET ORAL
Qty: 90 TABLET | Refills: 3 | OUTPATIENT
Start: 2023-07-17

## 2023-07-27 ENCOUNTER — PROCEDURE VISIT (OUTPATIENT)
Dept: SURGERY | Age: 73
End: 2023-07-27
Payer: MEDICARE

## 2023-07-27 VITALS
WEIGHT: 251 LBS | BODY MASS INDEX: 38.04 KG/M2 | SYSTOLIC BLOOD PRESSURE: 136 MMHG | HEIGHT: 68 IN | DIASTOLIC BLOOD PRESSURE: 74 MMHG | HEART RATE: 78 BPM

## 2023-07-27 DIAGNOSIS — L72.0 EPIDERMAL CYST: Primary | ICD-10-CM

## 2023-07-27 PROCEDURE — 99214 OFFICE O/P EST MOD 30 MIN: CPT | Performed by: SURGERY

## 2023-07-27 NOTE — PROGRESS NOTES
Cigarettes     Start date: 1997     Quit date: 1998     Years since quittin.5    Smokeless tobacco: Never    Tobacco comments:     Smoked for about 1 year   Vaping Use    Vaping Use: Never used   Substance and Sexual Activity    Alcohol use: Yes     Comment: seldom to rare    Drug use: No    Sexual activity: Not Currently     Partners: Male     Birth control/protection: Post-menopausal     Comment:  24 years   Other Topics Concern    Not on file   Social History Narrative    Not on file     Social Determinants of Health     Financial Resource Strain: Low Risk     Difficulty of Paying Living Expenses: Not hard at all   Food Insecurity: No Food Insecurity    Worried About Running Out of Food in the Last Year: Never true    [address] in the Last Year: Never true   Transportation Needs: Unknown    Lack of Transportation (Medical): Not on file    Lack of Transportation (Non-Medical): No   Physical Activity: Inactive    Days of Exercise per Week: 0 days    Minutes of Exercise per Session: 0 min   Stress: Not on file   Social Connections: Not on file   Intimate Partner Violence: Not on file   Housing Stability: Unknown    Unable to Pay for Housing in the Last Year: Not on file    Number of Places Lived in the Last Year: Not on file    Unstable Housing in the Last Year: No       ROS:   Review of Systems - Negative except as noted in HPI      Objective   Vitals:    23 1440   BP: 136/74   Pulse: 78     General:in no apparent distress and well developed and well nourished  Eyes: No gross abnormalities.   Ears, Nose, Throat: hearing grossly normal bilaterally  Neck: neck supple and non tender without mass  Lungs: clear to auscultation without wheezes or rales   Heart: S1S2, no mumurs, RRR  Abdomen: soft, nontender, no HSM, no guarding, no rebound, no masses  Extremity: negative  Neuro: CN II-XII grossly intact    The left upper shoulder there is a healed scar that measures approximately 1-1/2

## 2023-07-27 NOTE — PATIENT INSTRUCTIONS
SIGNS OF INFECTION  - Redness, swelling, skin hot  - Wound bed turns black or stringy yellow  - Foul odor  - Increased drainage or pus  - Increased pain  - Fever greater than 100F    CALL YOUR DOCTOR OR SEEK MEDICAL ATTENTION IF SIGNS OF INFECTION.   DO NOT WAIT UNTIL YOUR NEXT APPOINTMENT    Call the Wound Care Nurse with any other questions or concerns- [phone]

## 2023-07-27 NOTE — ASSESSMENT & PLAN NOTE
Patient appears to have had a ruptured epidermal inclusion cyst that has now fairly well-healed. We are going to go ahead and excise the scar and explore for any remnant of cyst capsule to ensure we minimize risk of recurrence. Risks of the procedure including bleeding, infection, scarring, pain, damage surrounding structures, need for additional surgery and anesthesia risk are discussed and consent is obtained.

## 2023-08-01 ENCOUNTER — OFFICE VISIT (OUTPATIENT)
Dept: SURGERY | Age: 73
End: 2023-08-01
Payer: MEDICARE

## 2023-08-01 VITALS
DIASTOLIC BLOOD PRESSURE: 84 MMHG | SYSTOLIC BLOOD PRESSURE: 132 MMHG | BODY MASS INDEX: 38.8 KG/M2 | HEIGHT: 68 IN | HEART RATE: 80 BPM | WEIGHT: 256 LBS

## 2023-08-01 DIAGNOSIS — L72.0 EPIDERMAL CYST: Primary | ICD-10-CM

## 2023-08-01 PROCEDURE — 1036F TOBACCO NON-USER: CPT | Performed by: SURGERY

## 2023-08-01 PROCEDURE — 3017F COLORECTAL CA SCREEN DOC REV: CPT | Performed by: SURGERY

## 2023-08-01 PROCEDURE — G8399 PT W/DXA RESULTS DOCUMENT: HCPCS | Performed by: SURGERY

## 2023-08-01 PROCEDURE — G8427 DOCREV CUR MEDS BY ELIG CLIN: HCPCS | Performed by: SURGERY

## 2023-08-01 PROCEDURE — 99212 OFFICE O/P EST SF 10 MIN: CPT | Performed by: SURGERY

## 2023-08-01 PROCEDURE — 3075F SYST BP GE 130 - 139MM HG: CPT | Performed by: SURGERY

## 2023-08-01 PROCEDURE — 1090F PRES/ABSN URINE INCON ASSESS: CPT | Performed by: SURGERY

## 2023-08-01 PROCEDURE — 99213 OFFICE O/P EST LOW 20 MIN: CPT | Performed by: SURGERY

## 2023-08-01 PROCEDURE — 1123F ACP DISCUSS/DSCN MKR DOCD: CPT | Performed by: SURGERY

## 2023-08-01 PROCEDURE — G8417 CALC BMI ABV UP PARAM F/U: HCPCS | Performed by: SURGERY

## 2023-08-01 PROCEDURE — 3079F DIAST BP 80-89 MM HG: CPT | Performed by: SURGERY

## 2023-08-01 NOTE — PROGRESS NOTES
Subjective   Meghna Jean Baptiste is a 71 y.o. female who presents today for follow-up after excision of scar and ruptured epidermal cyst.  Patient comes in today for suture removal and wound check. Denies any problems since last being seen. Not having any pain. Denies any drainage from the incision. No other complaints. Past Medical History:   Diagnosis Date    Asthma     Chalazion of right lower eyelid     Colon polyps     Degenerative joint disease of knee, left     Dysmenorrhea     GERD (gastroesophageal reflux disease)     Greater trochanteric bursitis of right hip     History of blood transfusion     Hot flashes     Hyperlipidemia     Hypertension     Impaired fasting glucose     Menopausal symptoms     Obesity     Overactive bladder     Scaphoid fracture of wrist     Right. Sleep disturbances     Tobacco abuse     Remote and limited. Uterine fibroid        Past Surgical History:   Procedure Laterality Date    BREAST REDUCTION SURGERY Bilateral     CARDIAC CATHETERIZATION  06/15/2007    No significant coronary artery disease, preserved LV systolic function.  SECTION      COLONOSCOPY  09/10/2008    Sigmoid diverticulosis, history of polyps. COLONOSCOPY  2007    Multiple polyps of the colon, rectal polyps, sigmoid polyps, and splenic flexure polyps. COLONOSCOPY  2006    Pedunculated thin polyp near the transverse colon, small polyp at the dentate line versus a hemorrhoid. COLONOSCOPY  2013    COLONOSCOPY  2014    Dr. Dominic Smith. adenom. x 2, 5yr follow up    [address] [facility] COLONOSCOPY BIOPSY/STOMA N/A 2020    hyperplastic polyp X 3, Dr. Carmelina Naqvi, TOTAL ABDOMINAL (CERVIX REMOVED)      KNEE ARTHROSCOPY Left 1999    Torn degenerate posterior third medial meniscus. MOUTH SURGERY Right 5/1/15    OTHER SURGICAL HISTORY Left 2012    Left knee injection for left knee pain.     OTHER SURGICAL HISTORY Right 2006

## 2023-08-01 NOTE — ASSESSMENT & PLAN NOTE
Patient doing well following excision. Not having any issues. We will remove sutures today and placed Steri-Strips. Patient is to allow these to off on their own over the next week. May return to all normal activities. Patient to call with any issues. Otherwise follow-up as needed.

## 2023-10-10 ENCOUNTER — IMMUNIZATION (OUTPATIENT)
Dept: LAB | Age: 73
End: 2023-10-10
Payer: MEDICARE

## 2023-10-10 ENCOUNTER — OFFICE VISIT (OUTPATIENT)
Dept: PODIATRY | Age: 73
End: 2023-10-10
Payer: MEDICARE

## 2023-10-10 VITALS
WEIGHT: 252 LBS | SYSTOLIC BLOOD PRESSURE: 124 MMHG | DIASTOLIC BLOOD PRESSURE: 78 MMHG | BODY MASS INDEX: 38.19 KG/M2 | HEIGHT: 68 IN | HEART RATE: 58 BPM

## 2023-10-10 DIAGNOSIS — M76.822 POSTERIOR TIBIAL TENDINITIS OF LEFT LOWER EXTREMITY: Primary | ICD-10-CM

## 2023-10-10 DIAGNOSIS — M79.672 FOOT PAIN, LEFT: ICD-10-CM

## 2023-10-10 PROCEDURE — 3078F DIAST BP <80 MM HG: CPT | Performed by: PODIATRIST

## 2023-10-10 PROCEDURE — 3074F SYST BP LT 130 MM HG: CPT | Performed by: PODIATRIST

## 2023-10-10 PROCEDURE — G8484 FLU IMMUNIZE NO ADMIN: HCPCS | Performed by: PODIATRIST

## 2023-10-10 PROCEDURE — 3017F COLORECTAL CA SCREEN DOC REV: CPT | Performed by: PODIATRIST

## 2023-10-10 PROCEDURE — 90694 VACC AIIV4 NO PRSRV 0.5ML IM: CPT | Performed by: FAMILY MEDICINE

## 2023-10-10 PROCEDURE — 99203 OFFICE O/P NEW LOW 30 MIN: CPT | Performed by: PODIATRIST

## 2023-10-10 PROCEDURE — G8399 PT W/DXA RESULTS DOCUMENT: HCPCS | Performed by: PODIATRIST

## 2023-10-10 PROCEDURE — PBSHW INFLUENZA, FLUAD, (AGE 65 Y+), IM, PF, 0.5 ML: Performed by: FAMILY MEDICINE

## 2023-10-10 PROCEDURE — 1036F TOBACCO NON-USER: CPT | Performed by: PODIATRIST

## 2023-10-10 PROCEDURE — G8417 CALC BMI ABV UP PARAM F/U: HCPCS | Performed by: PODIATRIST

## 2023-10-10 PROCEDURE — 1123F ACP DISCUSS/DSCN MKR DOCD: CPT | Performed by: PODIATRIST

## 2023-10-10 PROCEDURE — 1090F PRES/ABSN URINE INCON ASSESS: CPT | Performed by: PODIATRIST

## 2023-10-10 PROCEDURE — G8427 DOCREV CUR MEDS BY ELIG CLIN: HCPCS | Performed by: PODIATRIST

## 2023-10-10 RX ORDER — METHYLPREDNISOLONE 4 MG/1
TABLET ORAL
Qty: 1 KIT | Refills: 0 | Status: SHIPPED | OUTPATIENT
Start: 2023-10-10

## 2023-10-10 NOTE — PROGRESS NOTES
Subjective:  Arleth Camarillo is a 68 y.o. female who presents to the office today complaining of pain on the Left foot. Symptoms began 5 month(s) ago. History of injury: no.  Patient relates pain is Present. Pain is rated 4 out of 10 and is described as waxing and waning. Treatments prior to today's visit include: none. Currently denies F/C/N/V. Allergies   Allergen Reactions    Minocin [Minocycline] Nausea Only and Other (See Comments)     Dizzy    Oxycodone Nausea Only       Past Medical History:   Diagnosis Date    Asthma     Chalazion of right lower eyelid 2014    Colon polyps     Degenerative joint disease of knee, left     Dysmenorrhea     GERD (gastroesophageal reflux disease)     Greater trochanteric bursitis of right hip     History of blood transfusion     Hot flashes     Hyperlipidemia     Hypertension     Impaired fasting glucose     Menopausal symptoms     Obesity     Overactive bladder     Scaphoid fracture of wrist     Right. Sleep disturbances     Tobacco abuse     Remote and limited. Uterine fibroid        Prior to Admission medications    Medication Sig Start Date End Date Taking?  Authorizing Provider   methylPREDNISolone (MEDROL DOSEPACK) 4 MG tablet Take by mouth. 10/10/23  Yes Daina Partida DPM   albuterol sulfate HFA (PROVENTIL;VENTOLIN;PROAIR) 108 (90 Base) MCG/ACT inhaler Inhale 2 puffs into the lungs every 6 hours as needed for Wheezing or Shortness of Breath 4/12/23  Yes Tricia Spurling, MD   hydroCHLOROthiazide (HYDRODIURIL) 25 MG tablet TAKE 1 TABLET DAILY 3/10/23  Yes Tricia Spurling, MD   meloxicam (MOBIC) 15 MG tablet TAKE 1 TABLET DAILY 3/10/23  Yes Tricia Spurling, MD   metoprolol succinate (TOPROL XL) 50 MG extended release tablet Take 1 tablet by mouth daily 3/10/23  Yes Tricia Spurling, MD       Past Surgical History:   Procedure Laterality Date    BREAST REDUCTION SURGERY Bilateral 1996    CARDIAC CATHETERIZATION  06/15/2007    No significant coronary artery disease,

## 2023-10-12 ENCOUNTER — OFFICE VISIT (OUTPATIENT)
Dept: FAMILY MEDICINE CLINIC | Age: 73
End: 2023-10-12
Payer: MEDICARE

## 2023-10-12 VITALS
HEART RATE: 60 BPM | WEIGHT: 251 LBS | OXYGEN SATURATION: 96 % | SYSTOLIC BLOOD PRESSURE: 136 MMHG | DIASTOLIC BLOOD PRESSURE: 78 MMHG | HEIGHT: 68 IN | BODY MASS INDEX: 38.04 KG/M2

## 2023-10-12 DIAGNOSIS — R73.01 IMPAIRED FASTING GLUCOSE: ICD-10-CM

## 2023-10-12 DIAGNOSIS — K21.9 GASTROESOPHAGEAL REFLUX DISEASE WITHOUT ESOPHAGITIS: ICD-10-CM

## 2023-10-12 DIAGNOSIS — J45.20 MILD INTERMITTENT ASTHMA WITHOUT COMPLICATION: ICD-10-CM

## 2023-10-12 DIAGNOSIS — E66.09 CLASS 2 OBESITY DUE TO EXCESS CALORIES WITHOUT SERIOUS COMORBIDITY WITH BODY MASS INDEX (BMI) OF 37.0 TO 37.9 IN ADULT: ICD-10-CM

## 2023-10-12 DIAGNOSIS — I10 PRIMARY HYPERTENSION: ICD-10-CM

## 2023-10-12 DIAGNOSIS — Z79.890 POSTMENOPAUSAL HRT (HORMONE REPLACEMENT THERAPY): ICD-10-CM

## 2023-10-12 DIAGNOSIS — M17.12 PRIMARY OSTEOARTHRITIS OF LEFT KNEE: ICD-10-CM

## 2023-10-12 DIAGNOSIS — M70.62 TROCHANTERIC BURSITIS, LEFT HIP: ICD-10-CM

## 2023-10-12 DIAGNOSIS — D12.4 ADENOMATOUS POLYP OF DESCENDING COLON: Primary | ICD-10-CM

## 2023-10-12 DIAGNOSIS — E78.00 PURE HYPERCHOLESTEROLEMIA: ICD-10-CM

## 2023-10-12 PROCEDURE — G8427 DOCREV CUR MEDS BY ELIG CLIN: HCPCS | Performed by: FAMILY MEDICINE

## 2023-10-12 PROCEDURE — 99214 OFFICE O/P EST MOD 30 MIN: CPT | Performed by: FAMILY MEDICINE

## 2023-10-12 PROCEDURE — 1036F TOBACCO NON-USER: CPT | Performed by: FAMILY MEDICINE

## 2023-10-12 PROCEDURE — 3017F COLORECTAL CA SCREEN DOC REV: CPT | Performed by: FAMILY MEDICINE

## 2023-10-12 PROCEDURE — 1123F ACP DISCUSS/DSCN MKR DOCD: CPT | Performed by: FAMILY MEDICINE

## 2023-10-12 PROCEDURE — 3078F DIAST BP <80 MM HG: CPT | Performed by: FAMILY MEDICINE

## 2023-10-12 PROCEDURE — 99213 OFFICE O/P EST LOW 20 MIN: CPT | Performed by: FAMILY MEDICINE

## 2023-10-12 PROCEDURE — 1090F PRES/ABSN URINE INCON ASSESS: CPT | Performed by: FAMILY MEDICINE

## 2023-10-12 PROCEDURE — G8399 PT W/DXA RESULTS DOCUMENT: HCPCS | Performed by: FAMILY MEDICINE

## 2023-10-12 PROCEDURE — G8484 FLU IMMUNIZE NO ADMIN: HCPCS | Performed by: FAMILY MEDICINE

## 2023-10-12 PROCEDURE — G8417 CALC BMI ABV UP PARAM F/U: HCPCS | Performed by: FAMILY MEDICINE

## 2023-10-12 PROCEDURE — 3075F SYST BP GE 130 - 139MM HG: CPT | Performed by: FAMILY MEDICINE

## 2023-10-12 RX ORDER — MELOXICAM 15 MG/1
TABLET ORAL
Qty: 90 TABLET | Refills: 1 | Status: SHIPPED | OUTPATIENT
Start: 2023-10-12

## 2023-10-12 RX ORDER — METOPROLOL SUCCINATE 50 MG/1
50 TABLET, EXTENDED RELEASE ORAL DAILY
Qty: 90 TABLET | Refills: 1 | Status: SHIPPED | OUTPATIENT
Start: 2023-10-12

## 2023-10-12 RX ORDER — HYDROCHLOROTHIAZIDE 25 MG/1
TABLET ORAL
Qty: 90 TABLET | Refills: 1 | Status: SHIPPED | OUTPATIENT
Start: 2023-10-12

## 2023-10-12 ASSESSMENT — ENCOUNTER SYMPTOMS
EYES NEGATIVE: 1
GASTROINTESTINAL NEGATIVE: 1
WHEEZING: 1
ALLERGIC/IMMUNOLOGIC NEGATIVE: 1
BACK PAIN: 0

## 2023-10-12 NOTE — PROGRESS NOTES
Subjective:      Patient ID: Wendy Stewart is a 68 y.o. female. Hypertension  Associated symptoms include neck pain. Gastroesophageal Reflux  She complains of wheezing. Hyperlipidemia    Other  Associated symptoms include neck pain. Pertinent negatives include no arthralgias (nothing particularly problematic at present in joints. rare groin/hip discomfort. ). Back Pain    Groin Pain  Pertinent negatives include no back pain. Neck Pain     Wheezing   Associated symptoms include neck pain. Routine  follow up on chronic medical conditions colon polyps, htn, hyperlipidemia, gerd, obesity, insomnia, oa , refills, and review of updated labs. She had covid positive test .  She had a fairly mild course with no sequlae. She had right and left tka per Dr. Lamar Enciso at [facility].  She is a Jehovah-witness and wanted to pursue the [place] they have at [facility], where she had her prior knee replacement done. Both knees doing well at present. She is back to work after retiring from [employer]. 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 She lost wt. Down to 235, but gained wt. Back. Up and down by report. Hips now cause her some discomfort. Getting by with hip injections in [place] last month, and some therapy . Bp doing better at present. Lost her spouse to pancreatic cancer may 2021. Lots of responsibilities with renters and property. Compliant with medications. Sleep doing well, up to 5-6 hours/night. No gerd symptoms to report. Hot flashes periodically, ongoing. No asthma to report. Some cramps in feet and legs at night. Left trochanteric bursitis improved/resolved after injection and remains quiescent. Groin pain also resolved. More right hip pain at present. .  Getting into the car right leg first stretches the left groin some.   She has had 3+ right hip joint injections

## 2023-10-20 ENCOUNTER — HOSPITAL ENCOUNTER (OUTPATIENT)
Age: 73
Discharge: HOME | End: 2023-10-20
Payer: MEDICARE

## 2023-10-20 DIAGNOSIS — E78.00 PURE HYPERCHOLESTEROLEMIA: ICD-10-CM

## 2023-10-20 DIAGNOSIS — K21.9 GASTROESOPHAGEAL REFLUX DISEASE WITHOUT ESOPHAGITIS: ICD-10-CM

## 2023-10-20 DIAGNOSIS — R73.01 IMPAIRED FASTING GLUCOSE: ICD-10-CM

## 2023-10-20 LAB
ALBUMIN SERPL-MCNC: 4.1 G/DL (ref 3.5–5.2)
ALBUMIN/GLOB SERPL: 1 {RATIO} (ref 1–2.5)
ALP SERPL-CCNC: 113 U/L (ref 35–104)
ALT SERPL-CCNC: 16 U/L (ref 5–33)
ANION GAP SERPL CALCULATED.3IONS-SCNC: 7 MMOL/L (ref 9–17)
AST SERPL-CCNC: 17 U/L
BASOPHILS # BLD: 0.04 K/UL (ref 0–0.2)
BASOPHILS NFR BLD: 1 % (ref 0–2)
BILIRUB SERPL-MCNC: 0.4 MG/DL (ref 0.3–1.2)
BUN SERPL-MCNC: 19 MG/DL (ref 8–23)
BUN/CREAT SERPL: 17 (ref 9–20)
CALCIUM SERPL-MCNC: 9.6 MG/DL (ref 8.6–10.4)
CHLORIDE SERPL-SCNC: 106 MMOL/L (ref 98–107)
CHOLEST SERPL-MCNC: 202 MG/DL (ref 0–199)
CHOLESTEROL/HDL RATIO: 4
CO2 SERPL-SCNC: 30 MMOL/L (ref 20–31)
CREAT SERPL-MCNC: 1.1 MG/DL (ref 0.5–0.9)
EOSINOPHIL # BLD: 0.32 K/UL (ref 0–0.44)
EOSINOPHILS RELATIVE PERCENT: 4 % (ref 1–4)
ERYTHROCYTE [DISTWIDTH] IN BLOOD BY AUTOMATED COUNT: 13.3 % (ref 11.8–14.4)
EST. AVERAGE GLUCOSE BLD GHB EST-MCNC: 126 MG/DL
GFR SERPL CREATININE-BSD FRML MDRD: 53 ML/MIN/1.73M2
GLUCOSE SERPL-MCNC: 112 MG/DL (ref 70–99)
HBA1C MFR BLD: 6 %
HCT VFR BLD AUTO: 41.7 % (ref 36.3–47.1)
HDLC SERPL-MCNC: 48 MG/DL
HGB BLD-MCNC: 13.3 G/DL (ref 11.9–15.1)
IMM GRANULOCYTES # BLD AUTO: <0.03 K/UL (ref 0–0.3)
IMM GRANULOCYTES NFR BLD: 0 %
LDLC SERPL CALC-MCNC: 127 MG/DL (ref 0–100)
LYMPHOCYTES NFR BLD: 3.24 K/UL (ref 1.1–3.7)
LYMPHOCYTES RELATIVE PERCENT: 40 % (ref 24–43)
MCH RBC QN AUTO: 26.2 PG (ref 25.2–33.5)
MCHC RBC AUTO-ENTMCNC: 31.9 G/DL (ref 25.2–33.5)
MCV RBC AUTO: 82.2 FL (ref 82.6–102.9)
MONOCYTES NFR BLD: 0.68 K/UL (ref 0.1–1.2)
MONOCYTES NFR BLD: 8 % (ref 3–12)
NEUTROPHILS NFR BLD: 47 % (ref 36–65)
NEUTS SEG NFR BLD: 3.84 K/UL (ref 1.5–8.1)
NRBC BLD-RTO: 0 PER 100 WBC
PLATELET # BLD AUTO: 342 K/UL (ref 138–453)
PMV BLD AUTO: 8.7 FL (ref 8.1–13.5)
POTASSIUM SERPL-SCNC: 4.2 MMOL/L (ref 3.7–5.3)
PROT SERPL-MCNC: 8.2 G/DL (ref 6.4–8.3)
RBC # BLD AUTO: 5.07 M/UL (ref 3.95–5.11)
RBC # BLD: ABNORMAL 10*6/UL
SODIUM SERPL-SCNC: 143 MMOL/L (ref 135–144)
TRIGL SERPL-MCNC: 135 MG/DL (ref 0–149)
VLDLC SERPL CALC-MCNC: 27 MG/DL
WBC OTHER # BLD: 8.1 K/UL (ref 3.5–11.3)

## 2023-10-20 PROCEDURE — 83036 HEMOGLOBIN GLYCOSYLATED A1C: CPT

## 2023-10-20 PROCEDURE — 80053 COMPREHEN METABOLIC PANEL: CPT

## 2023-10-20 PROCEDURE — 36415 COLL VENOUS BLD VENIPUNCTURE: CPT

## 2023-10-20 PROCEDURE — 85025 COMPLETE CBC W/AUTO DIFF WBC: CPT

## 2023-10-20 PROCEDURE — 80061 LIPID PANEL: CPT

## 2023-11-03 ENCOUNTER — TELEPHONE (OUTPATIENT)
Dept: FAMILY MEDICINE CLINIC | Age: 73
End: 2023-11-03

## 2023-11-03 ENCOUNTER — HOSPITAL ENCOUNTER (OUTPATIENT)
Age: 73
Discharge: HOME | End: 2023-11-03
Payer: MEDICARE

## 2023-11-03 ENCOUNTER — OFFICE VISIT (OUTPATIENT)
Dept: ORTHOPEDIC SURGERY | Age: 73
End: 2023-11-03
Payer: MEDICARE

## 2023-11-03 ENCOUNTER — HOSPITAL ENCOUNTER (OUTPATIENT)
Dept: NON INVASIVE DIAGNOSTICS | Age: 73
Discharge: HOME | End: 2023-11-03
Payer: MEDICARE

## 2023-11-03 VITALS
HEIGHT: 68 IN | WEIGHT: 251 LBS | DIASTOLIC BLOOD PRESSURE: 74 MMHG | SYSTOLIC BLOOD PRESSURE: 130 MMHG | BODY MASS INDEX: 38.04 KG/M2 | HEART RATE: 80 BPM

## 2023-11-03 DIAGNOSIS — M25.551 HIP PAIN, RIGHT: ICD-10-CM

## 2023-11-03 DIAGNOSIS — Z01.818 PRE-OP TESTING: ICD-10-CM

## 2023-11-03 DIAGNOSIS — M16.0 ARTHRITIS OF BOTH HIPS: Primary | ICD-10-CM

## 2023-11-03 DIAGNOSIS — Z01.818 PRE-OP TESTING: Primary | ICD-10-CM

## 2023-11-03 LAB
BACTERIA URNS QL MICRO: ABNORMAL
EPI CELLS #/AREA URNS HPF: ABNORMAL /HPF (ref 0–5)
RBC #/AREA URNS HPF: ABNORMAL /HPF (ref 0–4)
WBC #/AREA URNS HPF: ABNORMAL /HPF (ref 0–4)

## 2023-11-03 PROCEDURE — 81015 MICROSCOPIC EXAM OF URINE: CPT

## 2023-11-03 PROCEDURE — G8417 CALC BMI ABV UP PARAM F/U: HCPCS | Performed by: ORTHOPAEDIC SURGERY

## 2023-11-03 PROCEDURE — 3075F SYST BP GE 130 - 139MM HG: CPT | Performed by: ORTHOPAEDIC SURGERY

## 2023-11-03 PROCEDURE — 99215 OFFICE O/P EST HI 40 MIN: CPT | Performed by: ORTHOPAEDIC SURGERY

## 2023-11-03 PROCEDURE — 1036F TOBACCO NON-USER: CPT | Performed by: ORTHOPAEDIC SURGERY

## 2023-11-03 PROCEDURE — 3078F DIAST BP <80 MM HG: CPT | Performed by: ORTHOPAEDIC SURGERY

## 2023-11-03 PROCEDURE — G8484 FLU IMMUNIZE NO ADMIN: HCPCS | Performed by: ORTHOPAEDIC SURGERY

## 2023-11-03 PROCEDURE — 1123F ACP DISCUSS/DSCN MKR DOCD: CPT | Performed by: ORTHOPAEDIC SURGERY

## 2023-11-03 PROCEDURE — 99213 OFFICE O/P EST LOW 20 MIN: CPT | Performed by: ORTHOPAEDIC SURGERY

## 2023-11-03 PROCEDURE — 1090F PRES/ABSN URINE INCON ASSESS: CPT | Performed by: ORTHOPAEDIC SURGERY

## 2023-11-03 PROCEDURE — 3017F COLORECTAL CA SCREEN DOC REV: CPT | Performed by: ORTHOPAEDIC SURGERY

## 2023-11-03 PROCEDURE — G8399 PT W/DXA RESULTS DOCUMENT: HCPCS | Performed by: ORTHOPAEDIC SURGERY

## 2023-11-03 PROCEDURE — G8427 DOCREV CUR MEDS BY ELIG CLIN: HCPCS | Performed by: ORTHOPAEDIC SURGERY

## 2023-11-03 NOTE — PROGRESS NOTES
software. Although every effort was made to ensure the accuracy of this automated transcription, some errors in transcription may have occurred.

## 2023-11-03 NOTE — TELEPHONE ENCOUNTER
Cyndy Robles in ortho is calling stating that patient needs a surgery clearance. Patient was seen 10/12/23 and she wants to know if this note can be addended. Please let joseph in ortho know.

## 2023-11-05 LAB
EKG ATRIAL RATE: 47 BPM
EKG P AXIS: 12 DEGREES
EKG P-R INTERVAL: 132 MS
EKG Q-T INTERVAL: 478 MS
EKG QRS DURATION: 84 MS
EKG QTC CALCULATION (BAZETT): 423 MS
EKG R AXIS: 3 DEGREES
EKG T AXIS: 12 DEGREES
EKG VENTRICULAR RATE: 47 BPM

## 2023-11-06 ENCOUNTER — HOSPITAL ENCOUNTER (OUTPATIENT)
Age: 73
Discharge: HOME | End: 2023-11-06
Payer: MEDICARE

## 2023-11-06 DIAGNOSIS — M16.11 PRIMARY OSTEOARTHRITIS OF RIGHT HIP: ICD-10-CM

## 2023-11-06 DIAGNOSIS — M16.11 PRIMARY OSTEOARTHRITIS OF RIGHT HIP: Primary | ICD-10-CM

## 2023-11-06 DIAGNOSIS — Z01.818 PREOP TESTING: ICD-10-CM

## 2023-11-06 LAB
BACTERIA URNS QL MICRO: ABNORMAL
BILIRUB UR QL STRIP: NEGATIVE
CHARACTER UR: ABNORMAL
CLARITY UR: CLEAR
COLOR UR: YELLOW
EPI CELLS #/AREA URNS HPF: ABNORMAL /HPF (ref 0–5)
GLUCOSE UR STRIP-MCNC: NEGATIVE MG/DL
HGB UR QL STRIP.AUTO: ABNORMAL
KETONES UR STRIP-MCNC: NEGATIVE MG/DL
LEUKOCYTE ESTERASE UR QL STRIP: NEGATIVE
NITRITE UR QL STRIP: NEGATIVE
PH UR STRIP: 6 [PH] (ref 5–6)
PROT UR STRIP-MCNC: NEGATIVE MG/DL
RBC #/AREA URNS HPF: ABNORMAL /HPF (ref 0–4)
SP GR UR STRIP: 1.01 (ref 1.01–1.02)
UROBILINOGEN UR STRIP-ACNC: NORMAL EU/DL (ref 0–1)
WBC #/AREA URNS HPF: ABNORMAL /HPF (ref 0–4)

## 2023-11-06 PROCEDURE — 81001 URINALYSIS AUTO W/SCOPE: CPT

## 2023-11-09 NOTE — TELEPHONE ENCOUNTER
I added an addendum to my 10/12/23 note to clear patient for hip surgery. Surgeon/anesthesia should read the note and be aware that she is a Jehovah Witness and will not accept blood products/transfusions.

## 2023-11-13 ENCOUNTER — NURSE ONLY (OUTPATIENT)
Dept: ORTHOPEDIC SURGERY | Age: 73
End: 2023-11-13
Payer: MEDICARE

## 2023-11-13 ENCOUNTER — HOSPITAL ENCOUNTER (OUTPATIENT)
Age: 73
Setting detail: SPECIMEN
Discharge: HOME | End: 2023-11-13
Payer: MEDICARE

## 2023-11-13 ENCOUNTER — TELEPHONE (OUTPATIENT)
Dept: ORTHOPEDIC SURGERY | Age: 73
End: 2023-11-13

## 2023-11-13 VITALS — BODY MASS INDEX: 38.04 KG/M2 | WEIGHT: 251 LBS | HEIGHT: 68 IN

## 2023-11-13 DIAGNOSIS — M16.11 PRIMARY OSTEOARTHRITIS OF RIGHT HIP: ICD-10-CM

## 2023-11-13 DIAGNOSIS — Z01.818 PREOP TESTING: ICD-10-CM

## 2023-11-13 DIAGNOSIS — M16.0 ARTHRITIS OF BOTH HIPS: Primary | ICD-10-CM

## 2023-11-13 DIAGNOSIS — M25.551 HIP PAIN, RIGHT: ICD-10-CM

## 2023-11-13 PROCEDURE — 87641 MR-STAPH DNA AMP PROBE: CPT

## 2023-11-13 PROCEDURE — 99215 OFFICE O/P EST HI 40 MIN: CPT

## 2023-11-13 NOTE — PROGRESS NOTES
Patient here to schedule her right total hip with chris patel the procedure and instructions reviewed and copy given to her, we are working on the date, here at [facility], she will call with questions or changes.

## 2023-11-14 LAB
MRSA, DNA, NASAL: NEGATIVE
SPECIMEN DESCRIPTION: NORMAL

## 2023-12-15 ENCOUNTER — HOSPITAL ENCOUNTER (OUTPATIENT)
Age: 73
Discharge: HOME | End: 2023-12-15
Payer: MEDICARE

## 2023-12-15 DIAGNOSIS — Z01.818 PREOP TESTING: ICD-10-CM

## 2023-12-15 DIAGNOSIS — M16.11 PRIMARY OSTEOARTHRITIS OF RIGHT HIP: ICD-10-CM

## 2023-12-15 LAB
ERYTHROCYTE [DISTWIDTH] IN BLOOD BY AUTOMATED COUNT: 13.5 % (ref 11.8–14.4)
HCT VFR BLD AUTO: 41.1 % (ref 36.3–47.1)
HGB BLD-MCNC: 13 G/DL (ref 11.9–15.1)
MCH RBC QN AUTO: 26.2 PG (ref 25.2–33.5)
MCHC RBC AUTO-ENTMCNC: 31.6 G/DL (ref 25.2–33.5)
MCV RBC AUTO: 82.9 FL (ref 82.6–102.9)
NRBC BLD-RTO: 0 PER 100 WBC
PLATELET # BLD AUTO: 336 K/UL (ref 138–453)
PMV BLD AUTO: 9.2 FL (ref 8.1–13.5)
RBC # BLD AUTO: 4.96 M/UL (ref 3.95–5.11)
WBC OTHER # BLD: 7.1 K/UL (ref 3.5–11.3)

## 2023-12-15 PROCEDURE — 36415 COLL VENOUS BLD VENIPUNCTURE: CPT

## 2023-12-15 PROCEDURE — 85027 COMPLETE CBC AUTOMATED: CPT

## 2023-12-22 ENCOUNTER — HOSPITAL ENCOUNTER (INPATIENT)
Age: 73
LOS: 1 days | Discharge: HOME HEALTH | DRG: 470 | End: 2023-12-23
Attending: ORTHOPAEDIC SURGERY | Admitting: ORTHOPAEDIC SURGERY
Payer: MEDICARE

## 2023-12-22 ENCOUNTER — APPOINTMENT (OUTPATIENT)
Dept: GENERAL RADIOLOGY | Age: 73
DRG: 470 | End: 2023-12-22
Attending: ORTHOPAEDIC SURGERY
Payer: MEDICARE

## 2023-12-22 DIAGNOSIS — M16.11 ARTHRITIS OF RIGHT HIP: Primary | ICD-10-CM

## 2023-12-22 PROCEDURE — 99024 POSTOP FOLLOW-UP VISIT: CPT | Performed by: ORTHOPAEDIC SURGERY

## 2023-12-22 PROCEDURE — 6370000000 HC RX 637 (ALT 250 FOR IP): Performed by: ORTHOPAEDIC SURGERY

## 2023-12-22 PROCEDURE — 3600000004 HC SURGERY LEVEL 4 BASE: Performed by: ORTHOPAEDIC SURGERY

## 2023-12-22 PROCEDURE — 7100000000 HC PACU RECOVERY - FIRST 15 MIN: Performed by: ORTHOPAEDIC SURGERY

## 2023-12-22 PROCEDURE — 1200000000 HC SEMI PRIVATE

## 2023-12-22 PROCEDURE — 6360000002 HC RX W HCPCS: Performed by: NURSE ANESTHETIST, CERTIFIED REGISTERED

## 2023-12-22 PROCEDURE — 3600000014 HC SURGERY LEVEL 4 ADDTL 15MIN: Performed by: ORTHOPAEDIC SURGERY

## 2023-12-22 PROCEDURE — 0SR90JZ REPLACEMENT OF RIGHT HIP JOINT WITH SYNTHETIC SUBSTITUTE, OPEN APPROACH: ICD-10-PCS | Performed by: ORTHOPAEDIC SURGERY

## 2023-12-22 PROCEDURE — 3700000000 HC ANESTHESIA ATTENDED CARE: Performed by: ORTHOPAEDIC SURGERY

## 2023-12-22 PROCEDURE — 6360000002 HC RX W HCPCS: Performed by: ORTHOPAEDIC SURGERY

## 2023-12-22 PROCEDURE — 97161 PT EVAL LOW COMPLEX 20 MIN: CPT

## 2023-12-22 PROCEDURE — 2709999900 HC NON-CHARGEABLE SUPPLY: Performed by: ORTHOPAEDIC SURGERY

## 2023-12-22 PROCEDURE — C1776 JOINT DEVICE (IMPLANTABLE): HCPCS | Performed by: ORTHOPAEDIC SURGERY

## 2023-12-22 PROCEDURE — 2580000003 HC RX 258: Performed by: ORTHOPAEDIC SURGERY

## 2023-12-22 PROCEDURE — 73501 X-RAY EXAM HIP UNI 1 VIEW: CPT

## 2023-12-22 PROCEDURE — 3700000001 HC ADD 15 MINUTES (ANESTHESIA): Performed by: ORTHOPAEDIC SURGERY

## 2023-12-22 PROCEDURE — 2700000000 HC OXYGEN THERAPY PER DAY

## 2023-12-22 PROCEDURE — 7100000001 HC PACU RECOVERY - ADDTL 15 MIN: Performed by: ORTHOPAEDIC SURGERY

## 2023-12-22 DEVICE — SHELL ACET SZ F DIA54MM 4 H OSSEOTI LIMIT H 2 MOBILITY G7: Type: IMPLANTABLE DEVICE | Site: HIP | Status: FUNCTIONAL

## 2023-12-22 DEVICE — HEAD FEM DIA36MM -3MM OFFSET HIP CO CHROM TYP 1 TAPR MOD G7: Type: IMPLANTABLE DEVICE | Site: HIP | Status: FUNCTIONAL

## 2023-12-22 DEVICE — STEM FEM SZ 13 L146MM 133DEG DST HIP PPS STD OFFSET TYP 1: Type: IMPLANTABLE DEVICE | Site: HIP | Status: FUNCTIONAL

## 2023-12-22 DEVICE — IMPLANTABLE DEVICE: Type: IMPLANTABLE DEVICE | Site: HIP | Status: FUNCTIONAL

## 2023-12-22 RX ORDER — IPRATROPIUM BROMIDE AND ALBUTEROL SULFATE 2.5; .5 MG/3ML; MG/3ML
1 SOLUTION RESPIRATORY (INHALATION)
Status: DISCONTINUED | OUTPATIENT
Start: 2023-12-22 | End: 2023-12-22 | Stop reason: HOSPADM

## 2023-12-22 RX ORDER — SODIUM CHLORIDE 0.9 % (FLUSH) 0.9 %
5-40 SYRINGE (ML) INJECTION PRN
Status: DISCONTINUED | OUTPATIENT
Start: 2023-12-22 | End: 2023-12-22 | Stop reason: HOSPADM

## 2023-12-22 RX ORDER — HYDRALAZINE HYDROCHLORIDE 20 MG/ML
10 INJECTION INTRAMUSCULAR; INTRAVENOUS
Status: COMPLETED | OUTPATIENT
Start: 2023-12-22 | End: 2023-12-22

## 2023-12-22 RX ORDER — MORPHINE SULFATE 4 MG/ML
4 INJECTION, SOLUTION INTRAMUSCULAR; INTRAVENOUS
Status: DISCONTINUED | OUTPATIENT
Start: 2023-12-22 | End: 2023-12-23 | Stop reason: HOSPADM

## 2023-12-22 RX ORDER — SODIUM CHLORIDE 9 MG/ML
INJECTION, SOLUTION INTRAVENOUS PRN
Status: DISCONTINUED | OUTPATIENT
Start: 2023-12-22 | End: 2023-12-22 | Stop reason: HOSPADM

## 2023-12-22 RX ORDER — ASPIRIN 81 MG/1
81 TABLET ORAL DAILY
Qty: 90 TABLET | Refills: 0 | Status: SHIPPED | OUTPATIENT
Start: 2023-12-22

## 2023-12-22 RX ORDER — SODIUM CHLORIDE 0.9 % (FLUSH) 0.9 %
5-40 SYRINGE (ML) INJECTION PRN
Status: DISCONTINUED | OUTPATIENT
Start: 2023-12-22 | End: 2023-12-23 | Stop reason: HOSPADM

## 2023-12-22 RX ORDER — HYDROCHLOROTHIAZIDE 25 MG/1
25 TABLET ORAL DAILY
Status: DISCONTINUED | OUTPATIENT
Start: 2023-12-22 | End: 2023-12-23 | Stop reason: HOSPADM

## 2023-12-22 RX ORDER — ONDANSETRON 2 MG/ML
4 INJECTION INTRAMUSCULAR; INTRAVENOUS
Status: DISCONTINUED | OUTPATIENT
Start: 2023-12-22 | End: 2023-12-22 | Stop reason: HOSPADM

## 2023-12-22 RX ORDER — DIPHENHYDRAMINE HYDROCHLORIDE 50 MG/ML
12.5 INJECTION INTRAMUSCULAR; INTRAVENOUS
Status: DISCONTINUED | OUTPATIENT
Start: 2023-12-22 | End: 2023-12-22 | Stop reason: HOSPADM

## 2023-12-22 RX ORDER — ASPIRIN 81 MG/1
81 TABLET ORAL 2 TIMES DAILY
Status: DISCONTINUED | OUTPATIENT
Start: 2023-12-22 | End: 2023-12-23 | Stop reason: HOSPADM

## 2023-12-22 RX ORDER — SODIUM CHLORIDE 0.9 % (FLUSH) 0.9 %
5-40 SYRINGE (ML) INJECTION EVERY 12 HOURS SCHEDULED
Status: DISCONTINUED | OUTPATIENT
Start: 2023-12-22 | End: 2023-12-23 | Stop reason: HOSPADM

## 2023-12-22 RX ORDER — MORPHINE SULFATE 2 MG/ML
2 INJECTION, SOLUTION INTRAMUSCULAR; INTRAVENOUS
Status: DISCONTINUED | OUTPATIENT
Start: 2023-12-22 | End: 2023-12-23 | Stop reason: HOSPADM

## 2023-12-22 RX ORDER — LABETALOL HYDROCHLORIDE 5 MG/ML
10 INJECTION, SOLUTION INTRAVENOUS
Status: DISCONTINUED | OUTPATIENT
Start: 2023-12-22 | End: 2023-12-22 | Stop reason: HOSPADM

## 2023-12-22 RX ORDER — SODIUM CHLORIDE, SODIUM LACTATE, POTASSIUM CHLORIDE, CALCIUM CHLORIDE 600; 310; 30; 20 MG/100ML; MG/100ML; MG/100ML; MG/100ML
INJECTION, SOLUTION INTRAVENOUS CONTINUOUS
Status: DISCONTINUED | OUTPATIENT
Start: 2023-12-22 | End: 2023-12-22

## 2023-12-22 RX ORDER — HYDROCODONE BITARTRATE AND ACETAMINOPHEN 5; 325 MG/1; MG/1
1 TABLET ORAL EVERY 4 HOURS PRN
Status: DISCONTINUED | OUTPATIENT
Start: 2023-12-22 | End: 2023-12-23 | Stop reason: HOSPADM

## 2023-12-22 RX ORDER — MEPERIDINE HYDROCHLORIDE 50 MG/ML
12.5 INJECTION INTRAMUSCULAR; INTRAVENOUS; SUBCUTANEOUS EVERY 5 MIN PRN
Status: DISCONTINUED | OUTPATIENT
Start: 2023-12-22 | End: 2023-12-22 | Stop reason: HOSPADM

## 2023-12-22 RX ORDER — HYDROCODONE BITARTRATE AND ACETAMINOPHEN 5; 325 MG/1; MG/1
1 TABLET ORAL EVERY 6 HOURS PRN
Qty: 28 TABLET | Refills: 0 | Status: SHIPPED | OUTPATIENT
Start: 2023-12-22 | End: 2023-12-29

## 2023-12-22 RX ORDER — DROPERIDOL 2.5 MG/ML
0.62 INJECTION, SOLUTION INTRAMUSCULAR; INTRAVENOUS EVERY 6 HOURS PRN
Status: DISCONTINUED | OUTPATIENT
Start: 2023-12-22 | End: 2023-12-23 | Stop reason: HOSPADM

## 2023-12-22 RX ORDER — SODIUM CHLORIDE 9 MG/ML
INJECTION, SOLUTION INTRAVENOUS PRN
Status: DISCONTINUED | OUTPATIENT
Start: 2023-12-22 | End: 2023-12-23 | Stop reason: HOSPADM

## 2023-12-22 RX ORDER — METOPROLOL SUCCINATE 50 MG/1
50 TABLET, EXTENDED RELEASE ORAL DAILY
Status: DISCONTINUED | OUTPATIENT
Start: 2023-12-22 | End: 2023-12-23 | Stop reason: HOSPADM

## 2023-12-22 RX ORDER — VANCOMYCIN HYDROCHLORIDE 1 G/20ML
INJECTION, POWDER, LYOPHILIZED, FOR SOLUTION INTRAVENOUS PRN
Status: DISCONTINUED | OUTPATIENT
Start: 2023-12-22 | End: 2023-12-22 | Stop reason: ALTCHOICE

## 2023-12-22 RX ORDER — ALBUTEROL SULFATE 2.5 MG/3ML
2.5 SOLUTION RESPIRATORY (INHALATION) EVERY 6 HOURS PRN
Status: DISCONTINUED | OUTPATIENT
Start: 2023-12-22 | End: 2023-12-23 | Stop reason: HOSPADM

## 2023-12-22 RX ORDER — HYDROCODONE BITARTRATE AND ACETAMINOPHEN 5; 325 MG/1; MG/1
2 TABLET ORAL EVERY 4 HOURS PRN
Status: DISCONTINUED | OUTPATIENT
Start: 2023-12-22 | End: 2023-12-23 | Stop reason: HOSPADM

## 2023-12-22 RX ORDER — SODIUM CHLORIDE 0.9 % (FLUSH) 0.9 %
5-40 SYRINGE (ML) INJECTION EVERY 12 HOURS SCHEDULED
Status: DISCONTINUED | OUTPATIENT
Start: 2023-12-22 | End: 2023-12-22 | Stop reason: HOSPADM

## 2023-12-22 RX ADMIN — HYDROCODONE BITARTRATE AND ACETAMINOPHEN 1 TABLET: 5; 325 TABLET ORAL at 23:15

## 2023-12-22 RX ADMIN — Medication 2000 MG: at 15:18

## 2023-12-22 RX ADMIN — HYDROMORPHONE HYDROCHLORIDE 0.25 MG: 1 INJECTION, SOLUTION INTRAMUSCULAR; INTRAVENOUS; SUBCUTANEOUS at 10:08

## 2023-12-22 RX ADMIN — HYDROMORPHONE HYDROCHLORIDE 0.5 MG: 1 INJECTION, SOLUTION INTRAMUSCULAR; INTRAVENOUS; SUBCUTANEOUS at 09:53

## 2023-12-22 RX ADMIN — HYDROMORPHONE HYDROCHLORIDE 0.5 MG: 1 INJECTION, SOLUTION INTRAMUSCULAR; INTRAVENOUS; SUBCUTANEOUS at 09:41

## 2023-12-22 RX ADMIN — HYDROCHLOROTHIAZIDE 25 MG: 25 TABLET ORAL at 14:10

## 2023-12-22 RX ADMIN — ASPIRIN 81 MG: 81 TABLET, COATED ORAL at 21:00

## 2023-12-22 RX ADMIN — HYDRALAZINE HYDROCHLORIDE 10 MG: 20 INJECTION, SOLUTION INTRAMUSCULAR; INTRAVENOUS at 10:27

## 2023-12-22 RX ADMIN — DROPERIDOL 0.62 MG: 2.5 INJECTION, SOLUTION INTRAMUSCULAR; INTRAVENOUS at 15:56

## 2023-12-22 RX ADMIN — Medication 2000 MG: at 22:49

## 2023-12-22 RX ADMIN — HYDROCODONE BITARTRATE AND ACETAMINOPHEN 2 TABLET: 5; 325 TABLET ORAL at 12:02

## 2023-12-22 RX ADMIN — HYDRALAZINE HYDROCHLORIDE 10 MG: 20 INJECTION, SOLUTION INTRAMUSCULAR; INTRAVENOUS at 10:06

## 2023-12-22 RX ADMIN — SODIUM CHLORIDE, PRESERVATIVE FREE 10 ML: 5 INJECTION INTRAVENOUS at 21:00

## 2023-12-22 RX ADMIN — SODIUM CHLORIDE, POTASSIUM CHLORIDE, SODIUM LACTATE AND CALCIUM CHLORIDE: 600; 310; 30; 20 INJECTION, SOLUTION INTRAVENOUS at 07:03

## 2023-12-22 NOTE — BRIEF OP NOTE
Brief Postoperative Note      Patient: Ama Johns  YOB: 1950  MRN: [id number]    Date of Procedure: 12/22/2023    Pre-Op Diagnosis Codes:     * Primary osteoarthritis of right hip [M16.11]    Post-Op Diagnosis: Same       Procedure(s):  Right Total Hip ARTHROPLASTY -101    Surgeon(s):  Adalgisa Haas MD    Assistant:  * No surgical staff found *    Anesthesia: General    Estimated Blood Loss (mL): 188     Complications: None    Specimens:   * No specimens in log *    Implants:  Implant Name Type Inv.  Item Serial No.  Lot No. LRB No. Used Action   SHELL ACET SZ F [id number] 4 H OSSEOTI LIMIT H 2 MOBILITY G7 - [id number]  SHELL ACET SZ F [id number] 4 H OSSEOTI LIMIT H 2 MOBILITY G7  [employer]- [id number] Right 1 Implanted   LINER ACET HW F 36 MM LONGEVITY G7 - [id number]  LINER ACET HW F 36 MM LONGEVITY G7  [employer]- [id number] Right 1 Implanted   STEM FEM SZ 13 L146MM 133DEG DST HIP PPS STD OFFSET TYP 1 - [id number]  STEM FEM SZ 13 L146MM 133DEG DST HIP PPS STD OFFSET TYP 1  [employer]- [id number] Right 1 Implanted   HEAD FEM [id number] -3MM OFFSET HIP CO CHROM TYP 1 TAPR MOD G7 - [id number]  HEAD FEM [id number] -3MM OFFSET HIP CO CHROM TYP 1 TAPR MOD G7  [employer]- [id number] Right 1 Implanted         Drains: * No LDAs found *    Findings: see dictation      Electronically signed by Adalgisa Haas MD on 12/22/2023 at 9:15 AM

## 2023-12-22 NOTE — CARE COORDINATION
Case Management Assessment  Initial Evaluation    Date/Time of Evaluation: 12/22/2023 1:11 PM  Assessment Completed by: Fela Villalobos RN    If patient is discharged prior to next notation, then this note serves as note for discharge by case management. Patient Name: Ugo Wright                   YOB: 1950  Diagnosis: Primary osteoarthritis of right hip [M16.11]  Arthritis of right hip [M16.11]                   Date / Time: 12/22/2023  6:14 AM    Patient Admission Status: Inpatient   Readmission Risk (Low < 19, Mod (19-27), High > 27): Readmission Risk Score: 11.8    Current PCP: Victoria Canada MD  PCP verified by CM? Yes    Chart Reviewed: Yes      History Provided by:    Patient Orientation: Alert and Oriented    Patient Cognition: Alert    Hospitalization in the last 30 days (Readmission):  No    If yes, Readmission Assessment in  Navigator will be completed. Advance Directives:      Code Status: Full Code   Patient's Primary Decision Maker is:      Primary Decision MakeKateThe [facility] - [phone]    Discharge Planning:    Patient lives with: Alone Type of Home: House  Primary Care Giver: Self  Patient Support Systems include: Children   Current Financial resources: Medicare  Current community resources: None  Current services prior to admission: None            Current DME:              Type of Home Care services:  OT, PT    ADLS  Prior functional level: Independent in ADLs/IADLs  Current functional level: Independent in ADLs/IADLs    PT AM-PAC:   /24  OT AM-PAC:   /24    Family can provide assistance at DC: Yes  Would you like Case Management to discuss the discharge plan with any other family members/significant others, and if so, who?  No  Plans to Return to Present Housing: Yes  Other Identified Issues/Barriers to RETURNING to current housing: none   Potential Assistance needed at discharge: N/A            Potential DME:    Patient expects to discharge to: [address] [place] [address]

## 2023-12-22 NOTE — OP NOTE
[address] [facility] [address]                 [address] [place] [address],Building [facility] [address]                                OPERATIVE REPORT    PATIENT NAME: Anthony Norwood                        :        1950  MED REC NO:   [id number]                             ROOM:         ACCOUNT NO:   [de-identified]                           ADMIT DATE: 2023  PROVIDER:     Lucio Gonzalez MD    DATE OF PROCEDURE:  2023    PREOPERATIVE DIAGNOSIS:  Degenerative arthritis, right hip. POSTOPERATIVE DIAGNOSIS:  Degenerative arthritis, right hip. PROCEDURE PERFORMED:  Right total hip arthroplasty. OPERATING SURGEON:  Salvador Fabian. Nasreen Song MD    ASSISTANT:  None. ANESTHESIA:  General.    BLOOD LOSS:  300. COMPLICATIONS:  None. SPECIMEN:  None. IMPLANTS:  _____ OsseoTi G7 acetabulum 54 with a 36 mm neutral head and  a size 14 stem. DRAINS:  None. FINDINGS:  1. Cross-table AP x-ray showed acceptable hardware position. 2.  Above average stability of the components. 3.  Excellent intraoperative stability of the hip, although just a  little bit less stability on 90-degrees of hip flexion than normal, but  I think this was probably the thigh hinging on the positioning peg. PROCEDURE IN DETAIL:  The patient was taken to the operating room,  placed under general anesthesia, positioned in the lateral decubitus  position. Right lower extremity was prepped and draped in the usual  sterile fashion. Posterolateral approach to the hip was made with  incision through skin, subcutaneous fat, tensor fascia ketty. The  trochanteric bursa was taken down and then a T-capsulotomy in line with  posterior aspect of the piriformis tendon was made. Hip was dislocated. Femoral neck cut was made. Head was inspected and found to be  significantly denuded of articular cartilage. Now the acetabulum was addressed. Minimal labrum remaining. Significant degenerative arthritis.   We gradually reamed

## 2023-12-22 NOTE — PLAN OF CARE
Problem: Discharge Planning  Goal: Discharge to home or other facility with appropriate resources  Outcome: Progressing  Flowsheets (Taken 12/22/2023 1040)  Discharge to home or other facility with appropriate resources: Refer to discharge planning if patient needs post-hospital services based on physician order or complex needs related to functional status, cognitive ability or social support system     Problem: Pain  Goal: Verbalizes/displays adequate comfort level or baseline comfort level  Outcome: Progressing     Problem: Skin/Tissue Integrity  Goal: Absence of new skin breakdown  Description: 1. Monitor for areas of redness and/or skin breakdown  2. Assess vascular access sites hourly  3. Every 4-6 hours minimum:  Change oxygen saturation probe site  4. Every 4-6 hours:  If on nasal continuous positive airway pressure, respiratory therapy assess nares and determine need for appliance change or resting period.   Outcome: Progressing     Problem: ABCDS Injury Assessment  Goal: Absence of physical injury  Outcome: Progressing     Problem: Safety - Adult  Goal: Free from fall injury  Outcome: Progressing

## 2023-12-22 NOTE — PROGRESS NOTES
pain  Therapy Prognosis: Good  Decision Making: Low Complexity  Activity Tolerance  Activity Tolerance: Patient tolerated evaluation without incident;Patient limited by pain     Plan   Physical Therapy Plan  General Plan: 2 times a day 7 days a week  Current Treatment Recommendations: Strengthening, ROM, Balance training, Functional mobility training, Transfer training, IADL training, ADL/Self-care training, Endurance training, Gait training, Neuromuscular re-education, Manual, Stair training, Pain management, Home exercise program, Safety education & training, Patient/Caregiver education & training, Equipment evaluation, education, & procurement, Therapeutic activities, Co-Treatment  Safety Devices  Type of Devices: Gait belt, Left in bed, Call light within reach, Bed alarm in place, Nurse notified     Restrictions  Restrictions/Precautions  Restrictions/Precautions: Weight Bearing  Lower Extremity Weight Bearing Restrictions  Right Lower Extremity Weight Bearing: Weight Bearing As Tolerated  Position Activity Restriction  Hip Precautions: No hip flexion > 90 degrees, No ADduction, Posterior hip precautions, No hip internal rotation     Subjective   General  Chart Reviewed: Yes  Patient assessed for rehabilitation services?: Yes  Family / Caregiver Present: Yes  Referring Practitioner: Aurora Whitaker MD  Referral Date : 12/22/23  Diagnosis: S/P R BUCK performed on 12/22/23  Follows Commands: Within Functional Limits  Subjective  Subjective: Nursing and patient agreeable to PT eval. Patient in bed upon arrival.     Social/Functional History  Social/Functional History  Lives With: Family  Type of Home: House  Home Layout: One level  Home Access: Level entry  Bathroom Shower/Tub: Walk-in shower  Bathroom Toilet: Handicap height  Bathroom Equipment: Grab bars in shower, Grab bars around toilet, Shower chair  Home Equipment: Cane  ADL Assistance: [address] [facility];Precautions;Transfer Training  Education Method: Demonstration;Verbal  Education Outcome: Verbalized understanding;Continued education needed;Demonstrated understanding    Therapy Time   Individual Concurrent Group Co-treatment   Time In 1210         Time Out [address]         Minutes Enma Bermudez [facility]

## 2023-12-23 VITALS
BODY MASS INDEX: 39.06 KG/M2 | HEIGHT: 69 IN | SYSTOLIC BLOOD PRESSURE: 123 MMHG | RESPIRATION RATE: 16 BRPM | OXYGEN SATURATION: 96 % | DIASTOLIC BLOOD PRESSURE: 66 MMHG | HEART RATE: 72 BPM | TEMPERATURE: 99.1 F | WEIGHT: 263.7 LBS

## 2023-12-23 PROCEDURE — 97110 THERAPEUTIC EXERCISES: CPT | Performed by: PHYSICAL THERAPY ASSISTANT

## 2023-12-23 PROCEDURE — 99024 POSTOP FOLLOW-UP VISIT: CPT | Performed by: ORTHOPAEDIC SURGERY

## 2023-12-23 PROCEDURE — 97116 GAIT TRAINING THERAPY: CPT | Performed by: PHYSICAL THERAPY ASSISTANT

## 2023-12-23 PROCEDURE — 2580000003 HC RX 258: Performed by: ORTHOPAEDIC SURGERY

## 2023-12-23 PROCEDURE — 6370000000 HC RX 637 (ALT 250 FOR IP): Performed by: ORTHOPAEDIC SURGERY

## 2023-12-23 RX ADMIN — HYDROCODONE BITARTRATE AND ACETAMINOPHEN 1 TABLET: 5; 325 TABLET ORAL at 06:04

## 2023-12-23 RX ADMIN — HYDROCHLOROTHIAZIDE 25 MG: 25 TABLET ORAL at 09:14

## 2023-12-23 RX ADMIN — ASPIRIN 81 MG: 81 TABLET, COATED ORAL at 09:14

## 2023-12-23 RX ADMIN — SODIUM CHLORIDE, PRESERVATIVE FREE 10 ML: 5 INJECTION INTRAVENOUS at 09:14

## 2023-12-23 RX ADMIN — METOPROLOL SUCCINATE 50 MG: 50 TABLET, EXTENDED RELEASE ORAL at 09:13

## 2023-12-23 RX ADMIN — HYDROCODONE BITARTRATE AND ACETAMINOPHEN 1 TABLET: 5; 325 TABLET ORAL at 10:26

## 2023-12-23 NOTE — PLAN OF CARE
Problem: Discharge Planning  Goal: Discharge to home or other facility with appropriate resources  12/23/2023 0216 by Tiffany Cummings RN  Outcome: Progressing  12/22/2023 1623 by Nathalie Tadeo RN  Outcome: Progressing  Flowsheets (Taken 12/22/2023 1040)  Discharge to home or other facility with appropriate resources: Refer to discharge planning if patient needs post-hospital services based on physician order or complex needs related to functional status, cognitive ability or social support system     Problem: Pain  Goal: Verbalizes/displays adequate comfort level or baseline comfort level  12/23/2023 0216 by Tiffany Cummings RN  Outcome: Progressing  12/22/2023 1623 by Nathalie Tadeo RN  Outcome: Progressing     Problem: Skin/Tissue Integrity  Goal: Absence of new skin breakdown  Description: 1. Monitor for areas of redness and/or skin breakdown  2. Assess vascular access sites hourly  3. Every 4-6 hours minimum:  Change oxygen saturation probe site  4. Every 4-6 hours:  If on nasal continuous positive airway pressure, respiratory therapy assess nares and determine need for appliance change or resting period.   12/23/2023 0216 by Tiffany Cummings RN  Outcome: Progressing  12/22/2023 1623 by Nathalie Tadeo RN  Outcome: Progressing     Problem: ABCDS Injury Assessment  Goal: Absence of physical injury  12/23/2023 0216 by Tiffany Cummings RN  Outcome: Progressing  12/22/2023 1623 by Nathalie Tadeo RN  Outcome: Progressing     Problem: Safety - Adult  Goal: Free from fall injury  12/23/2023 0216 by Tiffany Cummings RN  Outcome: Progressing  12/22/2023 1623 by Nathalie Tadeo RN  Outcome: Progressing

## 2023-12-23 NOTE — PLAN OF CARE
Problem: Discharge Planning  Goal: Discharge to home or other facility with appropriate resources  12/23/2023 1253 by Dipti Law RN  Outcome: Completed  Flowsheets (Taken 12/23/2023 0840)  Discharge to home or other facility with appropriate resources:   Identify barriers to discharge with patient and caregiver   Identify discharge learning needs (meds, wound care, etc)   Arrange for needed discharge resources and transportation as appropriate   Refer to discharge planning if patient needs post-hospital services based on physician order or complex needs related to functional status, cognitive ability or social support system  12/23/2023 0806 by Dipti Law RN  Outcome: Progressing  12/23/2023 0216 by Dimple Rosario RN  Outcome: Progressing     Problem: Pain  Goal: Verbalizes/displays adequate comfort level or baseline comfort level  12/23/2023 1253 by Dipti Law RN  Outcome: Completed  12/23/2023 0806 by Dipti Law RN  Outcome: Progressing  12/23/2023 0216 by Dimple Rosario RN  Outcome: Progressing     Problem: Skin/Tissue Integrity  Goal: Absence of new skin breakdown  Description: 1. Monitor for areas of redness and/or skin breakdown  2. Assess vascular access sites hourly  3. Every 4-6 hours minimum:  Change oxygen saturation probe site  4. Every 4-6 hours:  If on nasal continuous positive airway pressure, respiratory therapy assess nares and determine need for appliance change or resting period.   12/23/2023 1253 by Dipti Law RN  Outcome: Completed  12/23/2023 0806 by Dipti Law RN  Outcome: Progressing  12/23/2023 0216 by Dimple Rosario RN  Outcome: Progressing     Problem: ABCDS Injury Assessment  Goal: Absence of physical injury  12/23/2023 1253 by Dipti Law RN  Outcome: Completed  12/23/2023 0806 by Dipti Law RN  Outcome: Progressing  12/23/2023 0216 by Dimple Rosario RN  Outcome: Progressing     Problem: Safety - Adult  Goal: Free from

## 2023-12-23 NOTE — PROGRESS NOTES
Physical Therapy  Facility/Department: [facility]  PROGRESSIVE CARE  Daily Treatment Note  NAME: Meghna Jean Baptiste  : 1950  MRN: [id number]    Date of Service: 2023    Discharge Recommendations:  Home with assist PRN, Home with Home health PT, Outpatient PT, Continue to assess pending progress, Patient would benefit from continued therapy after discharge   PT Equipment Recommendations  Equipment Needed: Yes  [place] Fallen: Rolling    Patient Diagnosis(es): The encounter diagnosis was Arthritis of right hip. Assessment   Activity Tolerance: Patient limited by fatigue;Patient limited by endurance  Equipment Needed: Yes     Plan    Physical Therapy Plan  General Plan: 2 times a day 7 days a week  Current Treatment Recommendations: Strengthening;ROM;Balance training;Functional mobility training;Transfer training;IADL training;ADL/Self-care training; Endurance training;Gait training;Neuromuscular re-education;Manual;Stair training;Pain management;Home exercise program;Safety education & training;Patient/Caregiver education & training;Equipment evaluation, education, & procurement; Therapeutic activities; Co-Treatment     Restrictions  Restrictions/Precautions  Restrictions/Precautions: Weight Bearing  Lower Extremity Weight Bearing Restrictions  Right Lower Extremity Weight Bearing: Weight Bearing As Tolerated  Position Activity Restriction  Hip Precautions: No hip flexion > 90 degrees, No ADduction, Posterior hip precautions, No hip internal rotation     Subjective    Subjective  Subjective: Pt. relates pain this date rated 3-4/10 through right groin  Pain: Pain this date rated 3-4/10.   Orientation  Overall Orientation Status: Within Normal Limits  Cognition  Overall Cognitive Status: WNL     Objective   Vitals  O2 Device: None (Room air)  Bed Mobility Training  Bed Mobility Training: No  Balance  Sitting: Intact  Standing: High guard  Transfer Training  Transfer Training: Yes  Overall Level of Assistance: Stand-by

## 2023-12-23 NOTE — PLAN OF CARE
Problem: Discharge Planning  Goal: Discharge to home or other facility with appropriate resources  12/23/2023 0806 by Lillie Vázquez RN  Outcome: Progressing  12/23/2023 0216 by Johnny Paige RN  Outcome: Progressing     Problem: Pain  Goal: Verbalizes/displays adequate comfort level or baseline comfort level  12/23/2023 0806 by Lillie Vázquez RN  Outcome: Progressing  12/23/2023 0216 by Johnny Paige RN  Outcome: Progressing     Problem: Skin/Tissue Integrity  Goal: Absence of new skin breakdown  Description: 1. Monitor for areas of redness and/or skin breakdown  2. Assess vascular access sites hourly  3. Every 4-6 hours minimum:  Change oxygen saturation probe site  4. Every 4-6 hours:  If on nasal continuous positive airway pressure, respiratory therapy assess nares and determine need for appliance change or resting period.   12/23/2023 0806 by Lillie Vázquez RN  Outcome: Progressing  12/23/2023 0216 by Johnny Paige RN  Outcome: Progressing     Problem: ABCDS Injury Assessment  Goal: Absence of physical injury  12/23/2023 0806 by Lillie Vázquez RN  Outcome: Progressing  12/23/2023 0216 by Johnny Paige RN  Outcome: Progressing     Problem: Safety - Adult  Goal: Free from fall injury  12/23/2023 0806 by Lillie Vázquez RN  Outcome: Progressing  12/23/2023 0216 by Johnny Paige RN  Outcome: Progressing

## 2023-12-26 ENCOUNTER — CARE COORDINATION (OUTPATIENT)
Dept: CASE MANAGEMENT | Age: 73
End: 2023-12-26

## 2023-12-26 DIAGNOSIS — M16.11 ARTHRITIS OF RIGHT HIP: Primary | ICD-10-CM

## 2023-12-26 PROCEDURE — 1111F DSCHRG MED/CURRENT MED MERGE: CPT | Performed by: FAMILY MEDICINE

## 2023-12-26 NOTE — CARE COORDINATION
Care Transitions Initial Follow Up Call    Call within 2 business days of discharge: Yes    Patient Current Location:  Home: [address] [place] [address] [address] [place] [id number]    Care Transition Nurse contacted the patient by telephone to perform post hospital discharge assessment. Verified name and  with patient as identifiers. Provided introduction to self, and explanation of the Care Transition Nurse role. Patient: Vernell Alba Patient : 1950   MRN: [id number]  Reason for Admission: Arthritis of right hip. S/P Rt total hip arthroplasty  Discharge Date: 23 RARS: Readmission Risk Score: 6.6      Last Discharge Facility       Date Complaint Diagnosis Description Type Department Provider    23  Arthritis of right hip Admission (Discharged) Charlotte Gary MD            Was this an external facility discharge? No Discharge Facility: [address] [place] [address] to be reviewed by the provider   Additional needs identified to be addressed with provider: No  none               Method of communication with provider: none. Was able to contact Jennifer Reyez for initial transitional outreach. She stated that she was doing all right, just having some pain. She said that she just had a bowel movement, continues with some swelling with no increase; no fever/chills; no increased drainage and no numbness/tingling or color change to lower Rt leg. She stated that she had all her medications and does have post op visit scheduled for 24. She had no further questions/concerns. Care Transition Nurse reviewed discharge instructions with patient who verbalized understanding. The patient was given an opportunity to ask questions and does not have any further questions or concerns at this time. Were discharge instructions available to patient? Yes. Reviewed appropriate site of care based on symptoms and resources available to patient including: PCP  Specialist  When to call 911.  The patient agrees to contact the PCP office for

## 2023-12-26 NOTE — DISCHARGE SUMMARY
Discharge Summary    Attending Physician: No att. providers found  Admit Date: 12/22/2023  Discharge Date: 12/23/2023   Primary Care Physician: Jacqueline Cabello MD    Admitting Diagnosis:  Principal Problem:    Arthritis of right hip  Resolved Problems:    * No resolved hospital problems. *        Discharge Diagnoses:  Principal Problem:    Arthritis of right hip  Resolved Problems:    * No resolved hospital problems. *         Past Medical History:   Diagnosis Date    Asthma     Chalazion of right lower eyelid 2014    Colon polyps     Degenerative joint disease of knee, left     Dysmenorrhea     GERD (gastroesophageal reflux disease)     Greater trochanteric bursitis of right hip     History of blood transfusion     Hot flashes     Hyperlipidemia     Hypertension     Impaired fasting glucose     Menopausal symptoms     Obesity     Overactive bladder     Scaphoid fracture of wrist     Right. Sleep disturbances     Tobacco abuse     Remote and limited. Uterine fibroid        Procedures Performed and Findings  Procedure(s):  Right Total Hip ARTHROPLASTY -101     Consultations Obtained  IP CONSULT TO [address] [place] [address] Course  uncomplicated    Discharge Medications       Medication List        START taking these medications      aspirin 81 MG EC tablet  Take 1 tablet by mouth daily 1 po bid for 1 month then dc     HYDROcodone-acetaminophen 5-325 MG per tablet  Commonly known as: Norco  Take 1 tablet by mouth every 6 hours as needed for Pain for up to 7 days. Intended supply: 7 days.  Take lowest dose possible to manage pain Max Daily Amount: 4 tablets            CONTINUE taking these medications      albuterol sulfate  (90 Base) MCG/ACT inhaler  Commonly known as: PROVENTIL;VENTOLIN;PROAIR  Inhale 2 puffs into the lungs every 6 hours as needed for Wheezing or Shortness of Breath     hydroCHLOROthiazide 25 MG tablet  Commonly known as: HYDRODIURIL  TAKE 1 TABLET DAILY     metoprolol succinate 50 MG

## 2023-12-27 ENCOUNTER — NURSE ONLY (OUTPATIENT)
Dept: ORTHOPEDIC SURGERY | Age: 73
End: 2023-12-27
Payer: MEDICARE

## 2023-12-27 ENCOUNTER — HOSPITAL ENCOUNTER (OUTPATIENT)
Dept: PHYSICAL THERAPY | Age: 73
Setting detail: THERAPIES SERIES
Discharge: HOME | End: 2023-12-27
Payer: MEDICARE

## 2023-12-27 DIAGNOSIS — Z96.641 S/P TOTAL RIGHT HIP ARTHROPLASTY: Primary | ICD-10-CM

## 2023-12-27 DIAGNOSIS — M16.11 PRIMARY OSTEOARTHRITIS OF RIGHT HIP: Primary | ICD-10-CM

## 2023-12-27 DIAGNOSIS — Z09 POSTOP CHECK: Primary | ICD-10-CM

## 2023-12-27 PROCEDURE — 97161 PT EVAL LOW COMPLEX 20 MIN: CPT

## 2023-12-27 PROCEDURE — 99214 OFFICE O/P EST MOD 30 MIN: CPT

## 2023-12-27 PROCEDURE — 97110 THERAPEUTIC EXERCISES: CPT

## 2023-12-27 NOTE — FLOWSHEET NOTE
Physical Therapy Daily Treatment Note    Date:  2023    Patient Name:  Lakshmi Nugent    :    MRN: [id number]  Restrictions/Precautions:   Post hip precautions  Medical/Treatment Diagnosis Information:   Diagnosis: M16.11 (ICD-10-CM) - Primary osteoarthritis of right hip   DOS -   Insurance/Certification information:  PT Insurance Information: Medicare, [address] [facility] [employer]  Physician Information:   Bennett Pratt MD  Plan of care signed (Y/N): N   Visit# / total visits:  1/10  Pain level: 7/10       Time In: 812  Time Out: 845    Progress Note: []  Yes  []  No  Next due by: Visit #10  or by 24    Subjective:       Objective:   Observation:   Test measurements:      Exercises: Hip precautions  Exercise/Equipment Resistance/Repetitions Other comments   Nustep          Counter ex     Step ups     Mini squats     STS     LAQ 10x    Hip abd/add     HS curls           Quad set 10x    Heel slides 10x    SLR 10x Manual assist   Supine hip abd 10x Manual assist   SAQ 10x    bridges     [x] Provided verbal/tactile cueing for activities related to strengthening, flexibility, endurance, ROM. (53118)  [] Provided verbal/tactile cueing for activities related to improving balance, coordination, kinesthetic sense, posture, motor skill, proprioception. (21532)    Therapeutic Activities:     [] Therapeutic activities, direct (one-on-one) patient contact (use of dynamic activities to improve functional performance). (65533)    Gait:   [] Provided training and instruction to the patient for ambulation re-education. (89115)    Self-Care/ADL's  [] Self-care/home management training and compensatory training, meal preparation, safety procedures, and instructions in use of assistive technology devices/adaptive equipment, direct one-on-one contact.  (62481)    Home Exercise Program:     [x] Reviewed/Progressed HEP activities related to strengthening, flexibility, endurance, ROM. (33843)  [] Reviewed/Progressed HEP activities

## 2023-12-27 NOTE — PLAN OF CARE
[facility]    [x] [place]  Phone: [phone]  Fax: [phone]      [] [place]  Phone: [phone]  Fax: [phone]        To:   Dasia Fregoso MD       Patient: Yoon Callahan  : 1950   MRN: [id number]  Evaluation Date: 2023      Diagnosis Information:  Diagnosis: M16.11 (ICD-10-CM) - Primary osteoarthritis of right hip         Physical Therapy Certification    Dear Dr. Dasia Fregoso MD    The following patient has been evaluated for physical therapy services and for therapy to continue, Medicare requires monthly physician review of the treatment plan. Please review the attached evaluation and/or summary of the patient's plan of care, and verify that you agree therapy should continue by signing the attached document and sending it back to our office. Plan of Care/Treatment to date:  [x] Therapeutic Exercise    [x] Modalities:  [x] Therapeutic Activity     [] Ultrasound  [x] Electrical Stimulation  [x] Gait Training      [] Cervical Traction [] Lumbar Traction  [x] Neuromuscular Re-education    [x] Cold/hotpack [] Iontophoresis   [x] Instruction in HEP     Other:  [x] Manual Therapy      []             [] Aquatic Therapy      []                 Goals:  Short Term Goals  Time Frame for Short Term Goals: 2 weeks  Short Term Goal 1: Advance written HEP    Long Term Goals  Time Frame for Long Term Goals : 6 weeks  Long Term Goal 1: Patient will report pain controlled at 1-2/10 to allow patient to get a better nights rest.  Long Term Goal 2: Patient will score > or = to 55/80 on the LEFs to allow patient to complete her daily ADLs with greater ease. Long Term Goal 3: Patient will demonstrate right hip AROM to WNL as allowed per protocol to allow patient to ambulate with a normalized gait pattern and improve ability to navigate stairs.   Long Term Goal 4: Patient will demonstrate right hip strength to 4-4+/5 to allow patient to complete car and

## 2023-12-27 NOTE — PROGRESS NOTES
PATIENT PRESENTED TO [facility] FOR A WOUND CHECK OF RIGHT HIP  NO SIGNS OF DRAINAGE OR REDNESS  PATIENT TO FOLLOW UP AS SCHEDULED OR SOONER IF NEEDED

## 2023-12-27 NOTE — PROGRESS NOTES
Functions, Activity Limitations Requiring Skilled Therapeutic Intervention: Decreased functional mobility ; Decreased ADL status; Decreased ROM; Decreased strength;Decreased endurance;Decreased balance; Increased pain;Decreased coordination  Assessment: Patient is a 67 y/o F who is s/p R BUCK performed on 12/22/23. Patient demonstrating limitations with right hip/knee strength, ROM, endurance, and balance limiting her ability to complete her daily ADLs and recreational activities. Therapy Prognosis: Good  Activity Tolerance  Activity Tolerance: Patient tolerated evaluation without incident  Activity Tolerance: Patient tolerated evaluation without incident     Plan:    Physical Therapy Plan  Plan weeks: 6  Current Treatment Recommendations: Strengthening, ROM, Balance training, Functional mobility training, Transfer training, ADL/Self-care training, IADL training, Endurance training, Gait training, Stair training, Neuromuscular re-education, Manual, Pain management, Home exercise program, Safety education & training, Patient/Caregiver education & training, Equipment evaluation, education, & procurement, Therapeutic activities, Modalities    OutComes Score:  LEFS Total Score: 22 (12/27/23 0817)    Goals:  Short Term Goals  Time Frame for Short Term Goals: 2 weeks  Short Term Goal 1: Advance written HEP  Long Term Goals  Time Frame for Long Term Goals : 6 weeks  Long Term Goal 1: Patient will report pain controlled at 1-2/10 to allow patient to get a better nights rest.  Long Term Goal 2: Patient will score > or = to 55/80 on the LEFs to allow patient to complete her daily ADLs with greater ease. Long Term Goal 3: Patient will demonstrate right hip AROM to WNL as allowed per protocol to allow patient to ambulate with a normalized gait pattern and improve ability to navigate stairs.   Long Term Goal 4: Patient will demonstrate right hip strength to 4-4+/5 to allow patient to complete car and chair transfers with greater

## 2023-12-28 ENCOUNTER — HOSPITAL ENCOUNTER (OUTPATIENT)
Dept: PHYSICAL THERAPY | Age: 73
Setting detail: THERAPIES SERIES
Discharge: HOME | End: 2023-12-28
Payer: MEDICARE

## 2023-12-28 PROCEDURE — 97110 THERAPEUTIC EXERCISES: CPT | Performed by: PHYSICAL THERAPY ASSISTANT

## 2024-01-02 ENCOUNTER — CARE COORDINATION (OUTPATIENT)
Dept: CASE MANAGEMENT | Age: 74
End: 2024-01-02

## 2024-01-02 ENCOUNTER — HOSPITAL ENCOUNTER (OUTPATIENT)
Dept: PHYSICAL THERAPY | Age: 74
Setting detail: THERAPIES SERIES
Discharge: HOME | End: 2024-01-02
Payer: MEDICARE

## 2024-01-02 PROCEDURE — 97110 THERAPEUTIC EXERCISES: CPT | Performed by: PHYSICAL THERAPY ASSISTANT

## 2024-01-02 NOTE — CARE COORDINATION
Care Transitions Follow Up Call    Patient Current Location:  Home: [facility] [address] [place] [address]    Care Transition Nurse contacted the patient by telephone to follow up after admission on 23.  Verified name and  with patient as identifiers.    Patient: Megha Guillen  Patient : 1950   MRN: [id number]  Reason for Admission: right hip arthroplasty  Discharge Date: 23 RARS: Readmission Risk Score: 6.6      Needs to be reviewed by the provider   Additional needs identified to be addressed with provider: No  none             Method of communication with provider: none.    Spoke to patient for transitions call. Stated she is progressing well, went to OP PT today. Pt's daughter has been assisting with care, will be returning home next week. Pt's grand daughter and daughter will continue to take pt to therapy and appts. Pt has Ortho post op on 24. Pt is taking Norco as needed, took one today prior to therapy. Discussed replacing Norco with Tylenol for discomfort. Stated she will try Tylenol instead d/t constipation issues with Norco. No concerns at this time.     Addressed changes since last contact:   OP PT twice a week, weaning off Norco. Post op on   Discussed follow-up appointments.     Follow Up  Future Appointments   Date Time Provider Department Center   2024 11:15 AM Sangita Gallegos PTA [facility] [place]   2024 10:15 [facility]   2024 10:00 AM Demetrius Stroud PTA [facility] [place]   1/10/2024  3:30 PM Bety Crawford PTA [facility] [place]   2024 10:00 AM Demetrius Stroud PTA [facility] [place]   4/15/2024 10:00 AM [facility]         Care Transition Nurse reviewed discharge instructions with patient and discussed any barriers to care and/or understanding of plan of care after discharge. Discussed appropriate site of care based on symptoms and resources available to patient including: PCP  Specialist  When to call 911  [facility]

## 2024-01-02 NOTE — FLOWSHEET NOTE
proprioception. (43412)    Therapeutic Activities:     [] Therapeutic activities, direct (one-on-one) patient contact (use of dynamic activities to improve functional performance). (27354)    Gait:   [] Provided training and instruction to the patient for ambulation re-education. (54069)    Self-Care/ADL's  [] Self-care/home management training and compensatory training, meal preparation, safety procedures, and instructions in use of assistive technology devices/adaptive equipment, direct one-on-one contact. (37411)    Home Exercise Program:     [x] Reviewed/Progressed HEP activities related to strengthening, flexibility, endurance, ROM. (32544)  [] Reviewed/Progressed HEP activities related to improving balance, coordination, kinesthetic sense, posture, motor skill, proprioception.  (21684)    Manual Treatments:    [] Provided manual therapy to mobilize soft tissue/joints for the purpose of modulating pain, promoting relaxation,  increasing ROM, reducing/eliminating soft tissue swelling/inflammation/restriction, improving soft tissue extensibility. (11053)    Service Based Modalities:      Timed Code Treatment Minutes:  40' JERALD    Total Treatment Minutes:   40    Treatment/Activity Tolerance:  [x] Patient tolerated treatment well [] Patient limited by fatique  [] Patient limited by pain  [] Patient limited by other medical complications  [] Other:     Prognosis: [x] Good [] Fair  [] Poor    Patient Requires Follow-up: [x] Yes  [] No      Goals:  Short Term Goals  Time Frame for Short Term Goals: 2 weeks  Short Term Goal 1: Advance written HEP (Ongoing)    Long Term Goals  Time Frame for Long Term Goals : 6 weeks  Long Term Goal 1: Patient will report pain controlled at 1-2/10 to allow patient to get a better nights rest.  Long Term Goal 2: Patient will score > or = to 55/80 on the LEFs to allow patient to complete her daily ADLs with greater ease.  Long Term Goal 3: Patient will demonstrate right hip AROM to WNL as

## 2024-01-03 NOTE — PROGRESS NOTES
I have reviewed and agree to the content of the note written by the PTA.  Electronically signed by Yusuf Cervantes PT [id number]

## 2024-01-04 ENCOUNTER — HOSPITAL ENCOUNTER (OUTPATIENT)
Dept: PHYSICAL THERAPY | Age: 74
Setting detail: THERAPIES SERIES
Discharge: HOME | End: 2024-01-04
Payer: MEDICARE

## 2024-01-04 PROCEDURE — 97110 THERAPEUTIC EXERCISES: CPT

## 2024-01-04 NOTE — FLOWSHEET NOTE
Physical Therapy Daily Treatment Note    Date:  2024    Patient Name:  Megha Guillen    :  1950  MRN: [id number]  Restrictions/Precautions:   Post hip precautions  Medical/Treatment Diagnosis Information:   Diagnosis: M16.11 (ICD-10-CM) - Primary osteoarthritis of right hip   DOS - 23  Insurance/Certification information:  PT Insurance Information: Medicare, BCBS  Physician Information:   Rakesh Becker MD  Plan of care signed (Y/N): N   Visit# / total visits:  4/10  Pain level: 3/10       Time In: 11:11  Time Out: 11:43    Progress Note: []  Yes  []  No  Next due by: Visit #10  or by 24    Subjective: Pt was able to ambulate to therapy. Is a little achy this date.    Objective: JERALD compete per flow chart to facilitate strength, motion and stability to allow ease with daily ambulation and ADL's. Verbal cueing for progressions of exercises.  Fatigue noted with prolonged standing exercises, seated rest breaks requires.  Progress next session    Observation: Difficulty with sitting noted this date.   Test measurements:  STS on first attempt with UEs    Exercises: Hip precautions  Exercise/Equipment Resistance/Repetitions Other comments   Nustep          Counter ex 10x ea HR,TR, March, HS curls   3-way hip 10x    Step ups 10x 4'' ea    Mini squats 10x    STS     LAQ 10x    Hip abd/add 15x Ball/GR    HS curls 10x GR          Quad set 10x    Heel slides  10x    SLR 10x Manual assist   Supine hip abd 10x    SAQ 10x    bridges 10x    [x] Provided verbal/tactile cueing for activities related to strengthening, flexibility, endurance, ROM. (54761)  [] Provided verbal/tactile cueing for activities related to improving balance, coordination, kinesthetic sense, posture, motor skill, proprioception. (85069)    Therapeutic Activities:     [] Therapeutic activities, direct (one-on-one) patient contact (use of dynamic activities to improve functional performance). (08066)    Gait:   [] Provided training and

## 2024-01-05 ENCOUNTER — OFFICE VISIT (OUTPATIENT)
Dept: ORTHOPEDIC SURGERY | Age: 74
End: 2024-01-05
Payer: MEDICARE

## 2024-01-05 VITALS
WEIGHT: 263 LBS | SYSTOLIC BLOOD PRESSURE: 146 MMHG | BODY MASS INDEX: 39.86 KG/M2 | HEART RATE: 70 BPM | HEIGHT: 68 IN | DIASTOLIC BLOOD PRESSURE: 84 MMHG

## 2024-01-05 DIAGNOSIS — Z96.641 HISTORY OF TOTAL HIP ARTHROPLASTY, RIGHT: Primary | ICD-10-CM

## 2024-01-05 PROCEDURE — 99024 POSTOP FOLLOW-UP VISIT: CPT | Performed by: ORTHOPAEDIC SURGERY

## 2024-01-05 PROCEDURE — 99213 OFFICE O/P EST LOW 20 MIN: CPT | Performed by: ORTHOPAEDIC SURGERY

## 2024-01-05 NOTE — PROGRESS NOTES
fibroid      Past Surgical History:   Procedure Laterality Date    BREAST REDUCTION SURGERY Bilateral 1996    CARDIAC CATHETERIZATION  06/15/2007    No significant coronary artery disease, preserved LV systolic function.      SECTION      COLONOSCOPY  09/10/2008    Sigmoid diverticulosis, history of polyps.    COLONOSCOPY  2007    Multiple polyps of the colon, rectal polyps, sigmoid polyps, and splenic flexure polyps.    COLONOSCOPY  2006    Pedunculated thin polyp near the transverse colon, small polyp at the dentate line versus a hemorrhoid.    COLONOSCOPY  2013    COLONOSCOPY  2014    Dr. Lindsey. adenom. x 2, 5yr follow up    DILATION AND CURETTAGE OF UTERUS      HC COLONOSCOPY BIOPSY/STOMA N/A 2020    hyperplastic polyp X 3, Dr. Weber    HYSTERECTOMY, TOTAL ABDOMINAL (CERVIX REMOVED)      KNEE ARTHROSCOPY Left 1999    Torn degenerate posterior third medial meniscus.    MOUTH SURGERY Right 2015    OTHER SURGICAL HISTORY Left 2012    Left knee injection for left knee pain.    OTHER SURGICAL HISTORY Right 2006    Bursa injection for greater trochanteric bursitis.    SALPINGO-OOPHORECTOMY Left     TONSILLECTOMY AND ADENOIDECTOMY      TOTAL HIP ARTHROPLASTY Right 2023    Right Total Hip ARTHROPLASTY -101 performed by Rakesh Becker MD at [facility] OR    TOTAL KNEE ARTHROPLASTY Left      Family History   Problem Relation Age of Onset    Cancer Mother     Stomach Cancer Mother     Cervical Cancer Mother     Stroke Father     Cancer Father         Throat.    Breast Cancer Father 70        father cancer right breast    Lung Cancer Sister     No Known Problems Sister     No Known Problems Sister     No Known Problems Sister     Cancer Brother         Lymphoma.    Colon Cancer Brother     No Known Problems Maternal Aunt     Stroke Maternal Grandmother     Heart Attack Maternal Grandfather         Myocardial infarction.    Asthma Daughter     Migraines Son

## 2024-01-09 ENCOUNTER — HOSPITAL ENCOUNTER (OUTPATIENT)
Dept: PHYSICAL THERAPY | Age: 74
Setting detail: THERAPIES SERIES
Discharge: HOME | End: 2024-01-09
Payer: MEDICARE

## 2024-01-09 PROCEDURE — 97110 THERAPEUTIC EXERCISES: CPT | Performed by: PHYSICAL THERAPY ASSISTANT

## 2024-01-09 NOTE — FLOWSHEET NOTE
Physical Therapy Daily Treatment Note    Date:  2024    Patient Name:  Megha Guillen    :  1950  MRN: [id number]  Restrictions/Precautions:   Post hip precautions  Medical/Treatment Diagnosis Information:   Diagnosis: M16.11 (ICD-10-CM) - Primary osteoarthritis of right hip   DOS - 23  Insurance/Certification information:  PT Insurance Information: Medicare, BCBS  Physician Information:   Rakesh Becker MD  Plan of care signed (Y/N): N   Visit# / total visits:  5/10  Pain level: 0/10       Time In: 1007  Time Out: 1049    Progress Note: []  Yes  []  No  Next due by: Visit #10  or by 24    Subjective: Pt was able to ambulate to therapy.Min stiffness only. No pain reported this date. Patient states she has not needed to take pain medication for several days.     Objective: JERALD compete per flow chart to facilitate strength, motion and stability to allow ease with daily ambulation and ADL's. Verbal cueing for progressions of exercises.  Fatigue noted with prolonged standing exercises, seated rest breaks requires.  Advanced and progressed several exercises to improve motion and decrease pain and discomfort. Tightness through hip adductors remains. Added gentle PROM to improve.     Observation: Difficulty with sit to stand without UE assist  Test measurements:      Exercises: Hip precautions  Exercise/Equipment Resistance/Repetitions Other comments   Nustep          Counter ex 15x ea HR,TR, March, HS curls   3-way hip 15x    Step ups 10x 4'' ea    Mini squats 10x    STS 10x  Foam, No UE assistance   LAQ 10x    Hip abd/add 15x Ball/GR    HS curls 15x GR     PROM Hip abd 5'    Quad set 10x    Heel slides  10x    SLR 10x Manual assist   Supine hip abd 10x    SAQ 15x    bridges 10x    [x] Provided verbal/tactile cueing for activities related to strengthening, flexibility, endurance, ROM. (21824)  [] Provided verbal/tactile cueing for activities related to improving balance, coordination, kinesthetic sense,

## 2024-01-10 ENCOUNTER — CARE COORDINATION (OUTPATIENT)
Dept: CASE MANAGEMENT | Age: 74
End: 2024-01-10

## 2024-01-10 ENCOUNTER — HOSPITAL ENCOUNTER (OUTPATIENT)
Dept: PHYSICAL THERAPY | Age: 74
Setting detail: THERAPIES SERIES
Discharge: HOME | End: 2024-01-10
Payer: MEDICARE

## 2024-01-10 NOTE — FLOWSHEET NOTE
Outpatient Physical Therapy    [x] [place]  Phone: [phone]  Fax: [phone]      [] [place]  Phone: [phone]  Fax: [phone]    Physical Therapy  Cancellation/No-show Note  Patient Name:  Megha Guillen  :  1950   Date:  1/10/2024  Cancelled visits to date: 1  No-shows to date: 0    For today's appointment patient:  [x]  Cancelled  []  Rescheduled appointment  []  No-show     Reason given by patient:  [x]  Patient ill  []  Conflicting appointment  []  No transportation    []  Conflict with work  []  No reason given  []  Other:     Comments:      Electronically signed by: Bety Crawford PTA

## 2024-01-12 ENCOUNTER — HOSPITAL ENCOUNTER (OUTPATIENT)
Dept: PHYSICAL THERAPY | Age: 74
Setting detail: THERAPIES SERIES
Discharge: HOME | End: 2024-01-12
Payer: MEDICARE

## 2024-01-12 PROCEDURE — 97110 THERAPEUTIC EXERCISES: CPT | Performed by: PHYSICAL THERAPY ASSISTANT

## 2024-01-12 NOTE — FLOWSHEET NOTE
Physical Therapy Daily Treatment Note    Date:  2024    Patient Name:  Megha Guillen    :  1950  MRN: [id number]  Restrictions/Precautions:   Post hip precautions  Medical/Treatment Diagnosis Information:   Diagnosis: M16.11 (ICD-10-CM) - Primary osteoarthritis of right hip   DOS - 23  Insurance/Certification information:  PT Insurance Information: Medicare, BCBS  Physician Information:   aRkesh Becker MD  Plan of care signed (Y/N): N   Visit# / total visits:  6/10  Pain level: 0/10       Time In: 1002  Time Out: 1047    Progress Note: []  Yes  []  No  Next due by: Visit #10  or by 24    Subjective: Stiffness, general soreness remains. No pain this date.     Objective: JERALD compete per flow chart to facilitate strength, motion and stability to allow ease with daily ambulation and ADL's. Verbal cueing for progressions of exercises.  Fatigue noted with prolonged standing exercises, seated rest breaks requires.  Advanced and progressed several exercises to improve motion and decrease pain and discomfort. Tightness through hip adductors remains. Gentle PROM to improve.     Observation:   Test measurements: Knee flexion: 4+/5  Ext:  5/5    Exercises: Hip precautions  Exercise/Equipment Resistance/Repetitions Other comments   Nustep 5'         Counter ex 20x ea ADV HR,TR, March, HS curls   3-way hip 20x ADV   Step ups 10x 6'' ea    Mini squats 20x    STS 10x  Foam, No UE assistance   LAQ 15x    Hip abd/add 15x Ball/GR    HS curls 15x GR          PROM Hip abd 5'         Quad set 10x    Heel slides  10x    SLR 10x Manual assist   Supine hip abd 10x    SAQ 15x    bridges 10x    [x] Provided verbal/tactile cueing for activities related to strengthening, flexibility, endurance, ROM. (82383)  [] Provided verbal/tactile cueing for activities related to improving balance, coordination, kinesthetic sense, posture, motor skill, proprioception. (54591)    Therapeutic Activities:     [] Therapeutic activities,

## 2024-01-15 ENCOUNTER — HOSPITAL ENCOUNTER (OUTPATIENT)
Dept: PHYSICAL THERAPY | Age: 74
Setting detail: THERAPIES SERIES
Discharge: HOME | End: 2024-01-15
Payer: MEDICARE

## 2024-01-15 PROCEDURE — 97110 THERAPEUTIC EXERCISES: CPT

## 2024-01-15 NOTE — FLOWSHEET NOTE
(one-on-one) patient contact (use of dynamic activities to improve functional performance). (29290)    Gait:   [] Provided training and instruction to the patient for ambulation re-education. (49092)    Self-Care/ADL's  [] Self-care/home management training and compensatory training, meal preparation, safety procedures, and instructions in use of assistive technology devices/adaptive equipment, direct one-on-one contact. (88692)    Home Exercise Program:     [x] Reviewed/Progressed HEP activities related to strengthening, flexibility, endurance, ROM. (21877)  [] Reviewed/Progressed HEP activities related to improving balance, coordination, kinesthetic sense, posture, motor skill, proprioception.  (65484)    Manual Treatments:    [] Provided manual therapy to mobilize soft tissue/joints for the purpose of modulating pain, promoting relaxation,  increasing ROM, reducing/eliminating soft tissue swelling/inflammation/restriction, improving soft tissue extensibility. (35504)    Service Based Modalities:      Timed Code Treatment Minutes:  42' JERALD    Total Treatment Minutes:   42'    Treatment/Activity Tolerance:  [x] Patient tolerated treatment well [] Patient limited by fatique  [] Patient limited by pain  [] Patient limited by other medical complications  [] Other:     Prognosis: [x] Good [] Fair  [] Poor    Patient Requires Follow-up: [x] Yes  [] No      Goals:  Short Term Goals  Time Frame for Short Term Goals: 2 weeks  Short Term Goal 1: Advance written HEP (Ongoing)    Long Term Goals  Time Frame for Long Term Goals : 6 weeks  Long Term Goal 1: Patient will report pain controlled at 1-2/10 to allow patient to get a better nights rest.  Long Term Goal 2: Patient will score > or = to 55/80 on the LEFs to allow patient to complete her daily ADLs with greater ease.  Long Term Goal 3: Patient will demonstrate right hip AROM to WNL as allowed per protocol to allow patient to ambulate with a normalized gait pattern and

## 2024-01-16 ENCOUNTER — CARE COORDINATION (OUTPATIENT)
Dept: CASE MANAGEMENT | Age: 74
End: 2024-01-16

## 2024-01-16 ENCOUNTER — HOSPITAL ENCOUNTER (OUTPATIENT)
Dept: PHYSICAL THERAPY | Age: 74
Setting detail: THERAPIES SERIES
Discharge: HOME | End: 2024-01-16
Payer: MEDICARE

## 2024-01-16 PROCEDURE — 97110 THERAPEUTIC EXERCISES: CPT

## 2024-01-16 NOTE — FLOWSHEET NOTE
the patient for ambulation re-education. (53118)    Self-Care/ADL's  [] Self-care/home management training and compensatory training, meal preparation, safety procedures, and instructions in use of assistive technology devices/adaptive equipment, direct one-on-one contact. (10796)    Home Exercise Program:     [x] Reviewed/Progressed HEP activities related to strengthening, flexibility, endurance, ROM. (63558)  [] Reviewed/Progressed HEP activities related to improving balance, coordination, kinesthetic sense, posture, motor skill, proprioception.  (17826)    Manual Treatments:    [] Provided manual therapy to mobilize soft tissue/joints for the purpose of modulating pain, promoting relaxation,  increasing ROM, reducing/eliminating soft tissue swelling/inflammation/restriction, improving soft tissue extensibility. (19199)    Service Based Modalities:      Timed Code Treatment Minutes:  38' JERALD    Total Treatment Minutes:   38'    Treatment/Activity Tolerance:  [x] Patient tolerated treatment well [] Patient limited by fatique  [] Patient limited by pain  [] Patient limited by other medical complications  [] Other:     Prognosis: [x] Good [] Fair  [] Poor    Patient Requires Follow-up: [x] Yes  [] No      Goals:  Short Term Goals  Time Frame for Short Term Goals: 2 weeks  Short Term Goal 1: Advance written HEP (Ongoing)    Long Term Goals  Time Frame for Long Term Goals : 6 weeks  Long Term Goal 1: Patient will report pain controlled at 1-2/10 to allow patient to get a better nights rest.  Long Term Goal 2: Patient will score > or = to 55/80 on the LEFs to allow patient to complete her daily ADLs with greater ease.  Long Term Goal 3: Patient will demonstrate right hip AROM to WNL as allowed per protocol to allow patient to ambulate with a normalized gait pattern and improve ability to navigate stairs.  Long Term Goal 4: Patient will demonstrate right hip strength to 4-4+/5 to allow patient to complete car and chair

## 2024-01-16 NOTE — CARE COORDINATION
Care Transitions Follow Up Call    Patient Current Location:  Home: [address] [place] [address]    Care Transition Nurse contacted the patient by telephone to follow up after admission on 23.  Verified name and  with patient as identifiers.    Patient: Megha Guillen  Patient : 1950   MRN: [id number]  Reason for Admission: Arthritis of right hip. S/P Rt total hip arthroplasty  Discharge Date: 23 RARS: Readmission Risk Score: 6.6      Needs to be reviewed by the provider   Additional needs identified to be addressed with provider: No  none             Method of communication with provider: none.    Was able to contact Megha for final outreach.  She stated that she was doing well.  She said her Rt hip is doing good.  She denied any pain, incision is healing and she using a cane to ambulate.  She did say that with the therapy that she is receiving , her Lt hip is now hurting.  She said that her Lt hip does have some problems and will probably need replaced in the future.  She will be seeing ortho on .  She had no further questions/concerns.  Informed of final outreach.     Addressed changes since last contact:  none  Discussed follow-up appointments. If no appointment was previously scheduled, appointment scheduling offered: Yes.   Is follow up appointment scheduled within 7 days of discharge? No.    Follow Up  Future Appointments   Date Time Provider Department Center   2024 10:45 AM Demetrius Stroud PTA [facility] [place]   2024 10:00 AM Demetrius Stroud PTA [facility] [place]   2024 10:00 AM Demetrius Stroud PTA [facility]   2024 10:00 AM Demetrius Stroud PTA [facility]   2024 10:30 AM Rakesh Becker MD [facility]   4/15/2024 10:00 AM [facility]     External follow up appointment(s): no    Care Transition Nurse reviewed medical action plan with patient and discussed any barriers to care and/or understanding of plan of care after discharge. Discussed

## 2024-01-19 ENCOUNTER — HOSPITAL ENCOUNTER (OUTPATIENT)
Dept: PHYSICAL THERAPY | Age: 74
Setting detail: THERAPIES SERIES
Discharge: HOME | End: 2024-01-19
Payer: MEDICARE

## 2024-01-19 PROCEDURE — 97110 THERAPEUTIC EXERCISES: CPT | Performed by: PHYSICAL THERAPY ASSISTANT

## 2024-01-19 NOTE — FLOWSHEET NOTE
Physical Therapy Daily Treatment Note    Date:  2024    Patient Name:  Megha Guillen    :  1950  MRN: [id number]  Restrictions/Precautions:   Post hip precautions  Medical/Treatment Diagnosis Information:   Diagnosis: M16.11 (ICD-10-CM) - Primary osteoarthritis of right hip   DOS - 23  Insurance/Certification information:  PT Insurance Information: Medicare, BCBS  Physician Information:   Rakesh Becker MD  Plan of care signed (Y/N): N   Visit# / total visits:  9/10  Pain level: 0/10       Time In: 1043  Time Out: 1122    Progress Note: []  Yes  []  No  Next due by: Visit #10  or by 24    Subjective: Pain this date rated 0/10 in hip, stiffness in knees and left groin. Reviewed need to perform HEP regularly.     Objective: JERALD compete per flow chart to facilitate strength, motion and stability to allow ease with daily ambulation and ADL's. Verbal cueing for progressions of exercises. Advanced and progressed several exercises to improve motion and strength, stability. Fatigue noted with progression and prolonged exercises but no increase in pain noted.     Observation:   Test measurements:     Exercises: Hip precautions  Exercise/Equipment Resistance/Repetitions Other comments   Nustep 5' L2        Counter ex 10x2# ea HR,TR, March, HS curls   3-way hip 10x2#    Step ups 10x 6'' ea    Mini squats 20x    STS 10x  Foam, No UE assistance   LAQ 15x2#    Hip abd/add 20x Ball/GR    HS curls 20x GR          PROM Hip abd 5'         Quad set 20x    Heel slides  20x    SLR 10x Manual assist   Supine hip abd 10x    SAQ 15x    bridges 10x    [x] Provided verbal/tactile cueing for activities related to strengthening, flexibility, endurance, ROM. (30571)  [] Provided verbal/tactile cueing for activities related to improving balance, coordination, kinesthetic sense, posture, motor skill, proprioception. (57731)    Therapeutic Activities:     [] Therapeutic activities, direct (one-on-one) patient contact (use of

## 2024-01-22 ENCOUNTER — HOSPITAL ENCOUNTER (OUTPATIENT)
Dept: PHYSICAL THERAPY | Age: 74
Setting detail: THERAPIES SERIES
Discharge: HOME | End: 2024-01-22
Payer: MEDICARE

## 2024-01-22 DIAGNOSIS — Z96.641 HISTORY OF TOTAL HIP ARTHROPLASTY, RIGHT: Primary | ICD-10-CM

## 2024-01-22 PROCEDURE — 97110 THERAPEUTIC EXERCISES: CPT

## 2024-01-22 NOTE — PLAN OF CARE
[facility]  Rehabilitation and Sports Medicine    [x] [place]  Phone: [phone]  Fax: [phone]      [] [place]  Phone: [phone]  Fax: [phone]    Physical Therapy Progress Note  Date: 2024        Patient Name:  Megha Guillen    :  1950  MRN: [id number]  Restrictions/Precautions:   Post hip precautions  Medical/Treatment Diagnosis Information:   Diagnosis: M16.11 (ICD-10-CM) - Primary osteoarthritis of right hip   DOS - 23  Insurance/Certification information:  PT Insurance Information: Medicare, BCBS  Physician Information:   Rakesh Becker MD  Plan of care signed (Y/N): N   Visit# / total visits:  1/10 2nd POC 10 total   Pain level:      0/10     Time Period for Report: 23 - 24   Cancels/No-shows to date:      Plan of Care/Treatment to date:  [x] Therapeutic Exercise    [] Modalities:  [] Therapeutic Activity     [] Ultrasound  [] Electrical Stimulation  [] Gait Training      [] Cervical Traction    [] Lumbar Traction  [] Neuromuscular Re-education  [] Cold/hotpack [] Iontophoresis  [x] Instruction in HEP      Other:  [] Manual Therapy       []    [] Aquatic Therapy       []                           Subjective: Patient reporting that she has no pain upon arrival. Still getting stiff in the mornings. Noting that she hasn't had any pain since one day last week. Reporting that she feels as if she is 70-75% better. Reporting that she has been getting her own socks on as well as has been able to lotion her feet/lower legs by herself. Still hasn't tried to put on any other shoes other than her slipons yet. Wanting to continue with PT, but wanting to continue 2x/week instead of 3x.      RTD: 24 for 6 week follow up         Objective: JERALD compete per flow chart to facilitate strength, motion and stability to allow ease with daily ambulation and ADL's. Verbal cueing for progressions of exercises. Fatigue noted with progression and prolonged exercises but

## 2024-01-22 NOTE — FLOWSHEET NOTE
Physical Therapy Daily Treatment Note    Date:  2024    Patient Name:  Megha Guillen    :  1950  MRN: [id number]  Restrictions/Precautions:   Post hip precautions  Medical/Treatment Diagnosis Information:   Diagnosis: M16.11 (ICD-10-CM) - Primary osteoarthritis of right hip   DOS - 23  Insurance/Certification information:  PT Insurance Information: Medicare, BCBS  Physician Information:   Rakesh Becker MD  Plan of care signed (Y/N): N   Visit# / total visits:  1/10 2nd POC 10 total   Pain level: 0/10       Time In: 957  Time Out: 1040    Progress Note: [x]  Yes  []  No  Next due by: Visit #10  or by 24    Subjective: Patient reporting that she has no pain upon arrival. Still getting stiff in the mornings. Noting that she hasn't had any pain since one day last week. Reporting that she feels as if she is 70-75% better. Reporting that she has been getting her own socks on as well as has been able to lotion her feet/lower legs by herself. Still hasn't tried to put on any other shoes other than her slipons yet. Wanting to continue with PT, but wanting to continue 2x/week instead of 3x.     RTD: 24 for 6 week follow up        Objective: JERALD compete per flow chart to facilitate strength, motion and stability to allow ease with daily ambulation and ADL's. Verbal cueing for progressions of exercises. Fatigue noted with progression and prolonged exercises but no increase in pain noted.     Observation:   Test measurements:     Patient scored 57/80 on the LEFs this date.     Right hip flexion:  90 Deg (as allowed per protocol) 3-/5  Right hip abduction 20 Deg (AROM) 3-/5     Quad lag noted with SLR - able to complete 1-2 IND, but required min A with the rest due to pain    Right knee AROM 0 - 118 deg  Knee flexion  4+/5  Knee ext  4+/5      Exercises: Hip precautions  Exercise/Equipment Resistance/Repetitions Other comments   Nustep 5' L2        Counter ex 10x2# ea HR,TR, March, HS curls   3-way hip

## 2024-01-23 ENCOUNTER — APPOINTMENT (OUTPATIENT)
Dept: PHYSICAL THERAPY | Age: 74
End: 2024-01-23
Payer: MEDICARE

## 2024-01-26 ENCOUNTER — HOSPITAL ENCOUNTER (OUTPATIENT)
Dept: PHYSICAL THERAPY | Age: 74
Setting detail: THERAPIES SERIES
Discharge: HOME | End: 2024-01-26
Payer: MEDICARE

## 2024-01-26 PROCEDURE — 97110 THERAPEUTIC EXERCISES: CPT | Performed by: PHYSICAL THERAPY ASSISTANT

## 2024-01-26 NOTE — FLOWSHEET NOTE
Physical Therapy Daily Treatment Note    Date:  2024    Patient Name:  Megha Guillen    :  1950  MRN: [id number]  Restrictions/Precautions:   Post hip precautions  Medical/Treatment Diagnosis Information:   Diagnosis: M16.11 (ICD-10-CM) - Primary osteoarthritis of right hip   DOS - 23  Insurance/Certification information:  PT Insurance Information: Medicare, BCBS  Physician Information:   Rakesh Becker MD  Plan of care signed (Y/N): N   Visit# / total visits:  3/10 2nd POC 12 total   Pain level: 0/10       Time In: 1003  Time Out: 168660    Progress Note: []  Yes  [x]  No  Next due by: Visit #10  or by 24    Subjective: Patient relates having no pain at initiation of session. Pain this date rated 0/10 in right. All pain in left hip. Verbalizes understanding and compliance with current HEP.     RTD: 24 for 6 week follow up        Objective: JERALD compete per flow chart to facilitate strength, motion and stability to allow ease with daily ambulation and ADL's. Verbal cueing for progressions of exercises. Fatigue noted with progression and prolonged exercises but no increase in pain noted. Advanced and progressed several exercises to improve strength and stability.     Observation:   Test measurements:     Knee flexion  4+/5  Knee ext  4+/5  Hip abd:  4/5      Exercises: Hip precautions  Exercise/Equipment Resistance/Repetitions Other comments   Nustep 5' L2        Counter ex 15x2# ea HR,TR, March, HS curls   3-way hip 15x2#    Step ups 10x 6'' ea F/L   Mini squats 10x 3 way    STS 10x  Foam, No UE assistance   LAQ 15x2#    Hip abd/add 20x Ball/GR    HS curls 20x GR    lunges     Lateral banded walks     Monster walks     SLS           PROM Hip abd          Quad set 20x    Heel slides  20x    SLR 10x Manual assist   Supine hip abd 10x    SAQ 15x    bridges 10x    Prone hip extension 10x         [x] Provided verbal/tactile cueing for activities related to strengthening, flexibility, endurance,

## 2024-01-30 ENCOUNTER — HOSPITAL ENCOUNTER (OUTPATIENT)
Dept: PHYSICAL THERAPY | Age: 74
Setting detail: THERAPIES SERIES
Discharge: HOME | End: 2024-01-30
Payer: MEDICARE

## 2024-01-30 PROCEDURE — 97110 THERAPEUTIC EXERCISES: CPT | Performed by: PHYSICAL THERAPY ASSISTANT

## 2024-01-30 NOTE — FLOWSHEET NOTE
Physical Therapy Daily Treatment Note    Date:  2024    Patient Name:  Megha Guillen    :  1950  MRN: [id number]  Restrictions/Precautions:   Post hip precautions  Medical/Treatment Diagnosis Information:   Diagnosis: M16.11 (ICD-10-CM) - Primary osteoarthritis of right hip   DOS - 23  Insurance/Certification information:  PT Insurance Information: Medicare, BCBS  Physician Information:   Rakesh Becker MD  Plan of care signed (Y/N): N   Visit# / total visits:  4/10 2nd POC 13 total   Pain level: 2/10 Right, 5/10 left       Time In: 1048  Time Out: 1128    Progress Note: []  Yes  [x]  No  Next due by: Visit #10  or by 24    Subjective: Patient relates pain rated 2/10, achy. Greatest issue with left hip, 5/10 today.     RTD: 24 for 6 week follow up        Objective: JERALD compete per flow chart to facilitate strength, motion and stability to allow ease with daily ambulation and ADL's. Verbal cueing for progressions of exercises. Fatigue noted with progression and prolonged exercises but no increase in pain noted. Advanced and progressed several exercises to improve strength and stability.     Observation: Primary c/o is now left hip. To see physician 2024  Test measurements:     Exercises: Hip precautions  Exercise/Equipment Resistance/Repetitions Other comments   Nustep 5' L2        Counter ex 15x2# ea HR,TR, March, HS curls   3-way hip 15x2#    Step ups 10x 6'' ea F/L   Mini squats 10x 3 way    STS 10x  Foam, No UE assistance   LAQ 15x2#    Hip abd/add 20x Ball/GR    HS curls 20x GR    lunges 10x    Lateral banded walks     Monster walks     SLS           PROM Hip abd          Quad set 20x    Heel slides  20x    SLR 10x Manual assist   Supine hip abd 10x    SAQ 15x    bridges 10x    Prone hip extension Patient declined         [x] Provided verbal/tactile cueing for activities related to strengthening, flexibility, endurance, ROM. (27934)  [] Provided verbal/tactile cueing for activities

## 2024-02-01 ENCOUNTER — HOSPITAL ENCOUNTER (OUTPATIENT)
Dept: PHYSICAL THERAPY | Age: 74
Setting detail: THERAPIES SERIES
Discharge: HOME | End: 2024-02-01
Payer: MEDICARE

## 2024-02-01 PROCEDURE — 97110 THERAPEUTIC EXERCISES: CPT | Performed by: PHYSICAL THERAPY ASSISTANT

## 2024-02-01 NOTE — FLOWSHEET NOTE
Physical Therapy Daily Treatment Note    Date:  2024    Patient Name:  Megha Guillen    :  1950  MRN: [id number]  Restrictions/Precautions:   Post hip precautions  Medical/Treatment Diagnosis Information:   Diagnosis: M16.11 (ICD-10-CM) - Primary osteoarthritis of right hip   DOS - 23  Insurance/Certification information:  PT Insurance Information: Medicare, BCBS  Physician Information:   Rakesh Becker MD  Plan of care signed (Y/N): Y  Visit# / total visits:  4/10 2nd POC 14 total   Pain level: 0/10 Right, 5/10 left       Time In: 340 Time Out: 422    Progress Note: []  Yes  [x]  No  Next due by: Visit #10  or by 24    Subjective: Patient relates pain rated 2/10, achy. Greatest issue with left hip, 5/10 today.     RTD: 24 for 6 week follow up        Objective: JERALD compete per flow chart to facilitate strength, motion and stability to allow ease with daily ambulation and ADL's. Verbal cueing for progressions of exercises. Fatigue noted with progression and prolonged exercises but no increase in pain noted. Advanced and progressed several exercises to improve strength and stability. Gait trained with cane in right UE due to ongoing pain and discomfort in left hip.    Observation: Primary c/o is now left hip. To see physician 2024  Test measurements:     Exercises: Hip precautions  Exercise/Equipment Resistance/Repetitions Other comments   Nustep 5' L2        Counter ex 20x2# ea HR,TR, March, HS curls   3-way hip 20x2#    Step ups 10x 6'' ea F/L   Mini squats 10x 3 way    STS 10x  Foam, No UE assistance   LAQ 15x2#    Hip abd/add 20x Ball/GR    HS curls 20x GR    lunges 10x    Lateral banded walks     Monster walks     SLS           PROM Hip abd          Quad set     Heel slides  20x    SLR 10x Manual assist   Supine hip abd 10x    SAQ 15x    bridges 10x    Prone hip extension Patient declined         [x] Provided verbal/tactile cueing for activities related to strengthening, flexibility,  stretcher

## 2024-02-02 ENCOUNTER — HOSPITAL ENCOUNTER (OUTPATIENT)
Dept: GENERAL RADIOLOGY | Age: 74
End: 2024-02-02
Attending: ORTHOPAEDIC SURGERY
Payer: MEDICARE

## 2024-02-02 ENCOUNTER — OFFICE VISIT (OUTPATIENT)
Dept: ORTHOPEDIC SURGERY | Age: 74
End: 2024-02-02
Attending: ORTHOPAEDIC SURGERY
Payer: MEDICARE

## 2024-02-02 VITALS
HEIGHT: 68 IN | HEART RATE: 74 BPM | SYSTOLIC BLOOD PRESSURE: 136 MMHG | WEIGHT: 263 LBS | BODY MASS INDEX: 39.86 KG/M2 | DIASTOLIC BLOOD PRESSURE: 88 MMHG

## 2024-02-02 DIAGNOSIS — Z09 POSTOP CHECK: ICD-10-CM

## 2024-02-02 DIAGNOSIS — M16.12 OSTEOARTHRITIS OF LEFT HIP, UNSPECIFIED OSTEOARTHRITIS TYPE: ICD-10-CM

## 2024-02-02 DIAGNOSIS — Z96.641 HISTORY OF TOTAL HIP ARTHROPLASTY, RIGHT: Primary | ICD-10-CM

## 2024-02-02 DIAGNOSIS — Z96.641 HISTORY OF TOTAL HIP ARTHROPLASTY, RIGHT: ICD-10-CM

## 2024-02-02 PROCEDURE — 73502 X-RAY EXAM HIP UNI 2-3 VIEWS: CPT

## 2024-02-02 PROCEDURE — 99213 OFFICE O/P EST LOW 20 MIN: CPT | Performed by: ORTHOPAEDIC SURGERY

## 2024-02-02 PROCEDURE — 99024 POSTOP FOLLOW-UP VISIT: CPT | Performed by: ORTHOPAEDIC SURGERY

## 2024-02-02 NOTE — PROGRESS NOTES
Patient ID: Megha Guillen is a 73 y.o. female    Chief Compliant:  Chief Complaint   Patient presents with    Hip Pain     Rech right hip        Diagnostic imaging:    AP pelvis AP lateral right hip status post right total hip arthroplasty end-stage degenerative arthritis left hip    Assessment and Plan:  1. History of total hip arthroplasty, right    2. Postop check    3. Osteoarthritis of left hip, unspecified osteoarthritis type      Okay to restart Mobic.    Patient requesting injection left hip we will provide her that referral    6 weeks post right BUCK, excellent outcome at this time    Left hip pain, IR hip injection    Follow up 8 weeks    HPI:  This is a 73 y.o. female who presents to the clinic today for 6 weeks post right BUCK, 12/22/2023.     Patient states that her right hip is doing much better.    She also has complaints of left hip pain, same as she was prior to right hip BUCK.     Review of Systems   All other systems reviewed and are negative.      Past History:    Current Outpatient Medications:     aspirin 81 MG EC tablet, Take 1 tablet by mouth daily 1 po bid for 1 month then dc, Disp: 90 tablet, Rfl: 0    hydroCHLOROthiazide (HYDRODIURIL) 25 MG tablet, TAKE 1 TABLET DAILY, Disp: 90 tablet, Rfl: 1    metoprolol succinate (TOPROL XL) 50 MG extended release tablet, Take 1 tablet by mouth daily, Disp: 90 tablet, Rfl: 1    albuterol sulfate HFA (PROVENTIL;VENTOLIN;PROAIR) 108 (90 Base) MCG/ACT inhaler, Inhale 2 puffs into the lungs every 6 hours as needed for Wheezing or Shortness of Breath, Disp: 1 each, Rfl: 3  Allergies   Allergen Reactions    Minocin [Minocycline] Nausea Only and Other (See Comments)     Dizzy    Oxycodone Nausea Only     Social History     Socioeconomic History    Marital status:      Spouse name: Not on file    Number of children: Not on file    Years of education: Not on file    Highest education level: Not on file   Occupational History    Not on file   Tobacco Use

## 2024-02-06 ENCOUNTER — HOSPITAL ENCOUNTER (OUTPATIENT)
Dept: PHYSICAL THERAPY | Age: 74
Setting detail: THERAPIES SERIES
Discharge: HOME | End: 2024-02-06
Payer: MEDICARE

## 2024-02-06 PROCEDURE — 97110 THERAPEUTIC EXERCISES: CPT

## 2024-02-06 NOTE — FLOWSHEET NOTE
Physical Therapy Daily Treatment Note    Date:  2024    Patient Name:  Megha Guillen    :  1950  MRN: [id number]  Restrictions/Precautions:   Post hip precautions  Medical/Treatment Diagnosis Information:   Diagnosis: M16.11 (ICD-10-CM) - Primary osteoarthritis of right hip   DOS - 23  Insurance/Certification information:  PT Insurance Information: Medicare, BCBS  Physician Information:   Rakesh Becker MD  Plan of care signed (Y/N): Y  Visit# / total visits:  5/10 2nd POC 15 total   Pain level: 0/10 Right, 5/10 left       Time In: 130 Time Out: 200    Progress Note: []  Yes  [x]  No  Next due by: Visit #10  or by 24    Subjective: Patient reporting that she hasn't had nay pain until today she woke up with posterior hip pain. Rating that pain at 2-3/10. Things are going really well at home. Using cane due to L LE pain. Reporting 85-90% improvement with right hip. Feels as if she can manage at home. Has been managing at home alone for the last couple of weeks. Saw Dr. Becker on Friday who reported that her hip was doing well.        RTD: 24    Objective: Addressed goals this date. Patient reporting has HEP at home. Provided greenTb this date to complete HEP easier. Reviewed weight bearing HEP exercises this date. Demonstrated iND with exercises.     Observation: Primary c/o is now left hip.   Test measurements:     Right hip AROM / Strength   Flexion: 90 deg (stopped per protocol)  4+/5  Abduction:  40 deg  4 to 4+/5  Extension:  5 deg  4 /5    Patient scored 61 /80 on the LEFs this date.     Exercises: Hip precautions  Exercise/Equipment Resistance/Repetitions Other comments   Nustep 5' L2        Counter ex 20x HR,TR, March, HS curls   3-way hip 20x2    Step ups 4 6\" steps with bilateral HR completed 3x F/L   Mini squats 10x 3 way    STS 10x  Foam, No UE assistance   LAQ    Hip abd/add    HS curls    lunges    Lateral banded walks     Monster walks     SLS           PROM Hip abd          Quad

## 2024-02-06 NOTE — DISCHARGE SUMMARY
Flexion: 90 deg (stopped per protocol)                      4+/5  Abduction:  40 deg                  4 to 4+/5  Extension:  5 deg                    4 /5     Patient scored 61 /80 on the LEFs this date.      Assessment: Significant improvement in right hip ROM, strength, balance, pain, endurance, and overall functional abilities.        Plan: D/C to continue with HEP           Goals:   Short Term Goals  Time Frame for Short Term Goals: 2 weeks  Short Term Goal 1: Advance written HEP Met (Ongoing)     Long Term Goals  Time Frame for Long Term Goals : 6 weeks  Long Term Goal 1: Patient will report pain controlled at 1-2/10 to allow patient to get a better nights rest. Partially met   Long Term Goal 2: Patient will score > or = to 55/80 on the LEFs to allow patient to complete her daily ADLs with greater ease. Met (see above)  Long Term Goal 3: Patient will demonstrate right hip AROM to WNL as allowed per protocol to allow patient to ambulate with a normalized gait pattern and improve ability to navigate stairs. Met (see above)  Long Term Goal 4: Patient will demonstrate right hip strength to 4-4+/5 to allow patient to complete car and chair transfers with greater ease. Met  Long Term Goal 5: IND with HEP Met         Percentage of Goals Met: 92            Discharge Prognosis: [] Excellent [x] Good [] Fair  [] Poor     Goal Status:  [] Achieved [x] Partially Achieved  [] Not Achieved       Electronically signed by:  Oralia Menard, PT

## 2024-02-07 ENCOUNTER — HOSPITAL ENCOUNTER (OUTPATIENT)
Dept: GENERAL RADIOLOGY | Age: 74
Discharge: HOME | End: 2024-02-09
Payer: MEDICARE

## 2024-02-07 DIAGNOSIS — M16.12 OSTEOARTHRITIS OF LEFT HIP, UNSPECIFIED OSTEOARTHRITIS TYPE: ICD-10-CM

## 2024-02-07 PROCEDURE — 77002 NEEDLE LOCALIZATION BY XRAY: CPT

## 2024-02-07 PROCEDURE — 20610 DRAIN/INJ JOINT/BURSA W/O US: CPT

## 2024-02-07 PROCEDURE — 2500000003 HC RX 250 WO HCPCS

## 2024-02-07 PROCEDURE — 6360000004 HC RX CONTRAST MEDICATION: Performed by: ORTHOPAEDIC SURGERY

## 2024-02-07 PROCEDURE — 6360000002 HC RX W HCPCS

## 2024-02-07 RX ADMIN — IOHEXOL 10 ML: 240 INJECTION, SOLUTION INTRATHECAL; INTRAVASCULAR; INTRAVENOUS; ORAL at 14:24

## 2024-02-19 RX ORDER — MELOXICAM 15 MG/1
TABLET ORAL
Qty: 90 TABLET | Refills: 3 | OUTPATIENT
Start: 2024-02-19

## 2024-02-19 RX ORDER — HYDROCHLOROTHIAZIDE 25 MG/1
TABLET ORAL
Qty: 90 TABLET | Refills: 3 | OUTPATIENT
Start: 2024-02-19

## 2024-02-19 RX ORDER — METOPROLOL SUCCINATE 50 MG/1
50 TABLET, EXTENDED RELEASE ORAL DAILY
Qty: 90 TABLET | Refills: 3 | OUTPATIENT
Start: 2024-02-19

## 2024-02-21 ENCOUNTER — TELEMEDICINE (OUTPATIENT)
Dept: FAMILY MEDICINE CLINIC | Age: 74
End: 2024-02-21
Payer: MEDICARE

## 2024-02-21 DIAGNOSIS — Z00.00 MEDICARE ANNUAL WELLNESS VISIT, SUBSEQUENT: Primary | ICD-10-CM

## 2024-02-21 PROCEDURE — G0439 PPPS, SUBSEQ VISIT: HCPCS | Performed by: FAMILY MEDICINE

## 2024-02-21 PROCEDURE — 3017F COLORECTAL CA SCREEN DOC REV: CPT | Performed by: FAMILY MEDICINE

## 2024-02-21 PROCEDURE — 1123F ACP DISCUSS/DSCN MKR DOCD: CPT | Performed by: FAMILY MEDICINE

## 2024-02-21 PROCEDURE — G8484 FLU IMMUNIZE NO ADMIN: HCPCS | Performed by: FAMILY MEDICINE

## 2024-02-21 RX ORDER — MELOXICAM 15 MG/1
15 TABLET ORAL DAILY
COMMUNITY

## 2024-02-21 ASSESSMENT — LIFESTYLE VARIABLES
HOW OFTEN DO YOU HAVE A DRINK CONTAINING ALCOHOL: NEVER
HOW MANY STANDARD DRINKS CONTAINING ALCOHOL DO YOU HAVE ON A TYPICAL DAY: PATIENT DOES NOT DRINK

## 2024-02-21 ASSESSMENT — PATIENT HEALTH QUESTIONNAIRE - PHQ9
SUM OF ALL RESPONSES TO PHQ9 QUESTIONS 1 & 2: 0
SUM OF ALL RESPONSES TO PHQ QUESTIONS 1-9: 0
1. LITTLE INTEREST OR PLEASURE IN DOING THINGS: 0

## 2024-02-21 NOTE — PROGRESS NOTES
2 puffs into the lungs every 6 hours as needed for Wheezing or Shortness of Breath Yes Ron Padgett MD   aspirin 81 MG EC tablet Take 1 tablet by mouth daily 1 po bid for 1 month then dc  Patient not taking: Reported on 2/21/2024  Rakesh Becker MD       [facility] (Including outside providers/suppliers regularly involved in providing care):   Patient Care Team:  Ron Padgett MD as PCP - General (Family Medicine)  Ron Padgett MD as PCP - Empaneled Provider     Reviewed and updated this visit:  Tobacco  Allergies  Meds  Med Hx  Surg Hx  Soc Hx  Fam Hx      I, Whitley Bryan RN, 2/21/2024, performed the documented evaluation under the direct supervision of the attending physician.    Megha Guillen, was evaluated through a synchronous (real-time) telephone encounter. The patient (or guardian if applicable) is aware that this is a billable service, which includes applicable co-pays. This Virtual Visit was conducted with patient's (and/or legal guardian's) consent. Patient identification was verified, and a caregiver was present when appropriate.   The patient was located at Home: [address] [place] [address]  Provider was located at Facility (Appt Dept): [address] [place] [address]    This encounter was performed under myRon MD’s, direct supervision, 2/21/2024.  Electronically signed by Ron Padgett MD on 2/29/2024 at 2:09 PM

## 2024-02-21 NOTE — PATIENT INSTRUCTIONS
Learning About Being Active as an Older Adult  Why is being active important as you get older?     Being active is one of the best things you can do for your health. And it's never too late to start. Being active--or getting active, if you aren't already--has definite benefits. It can:  Give you more energy,  Keep your mind sharp.  Improve balance to reduce your risk of falls.  Help you manage chronic illness with fewer medicines.  No matter how old you are, how fit you are, or what health problems you have, there is a form of activity that will work for you. And the more physical activity you can do, the better your overall health will be.  What kinds of activity can help you stay healthy?  Being more active will make your daily activities easier. Physical activity includes planned exercise and things you do in daily life. There are four types of activity:  Aerobic.  Doing aerobic activity makes your heart and lungs strong.  Includes walking, dancing, and gardening.  Aim for at least 2½ hours spread throughout the week.  It improves your energy and can help you sleep better.  Muscle-strengthening.  This type of activity can help maintain muscle and strengthen bones.  Includes climbing stairs, using resistance bands, and lifting or carrying heavy loads.  Aim for at least twice a week.  It can help protect the knees and other joints.  Stretching.  Stretching gives you better range of motion in joints and muscles.  Includes upper arm stretches, calf stretches, and gentle yoga.  Aim for at least twice a week, preferably after your muscles are warmed up from other activities.  It can help you function better in daily life.  Balancing.  This helps you stay coordinated and have good posture.  Includes heel-to-toe walking, lola chi, and certain types of yoga.  Aim for at least 3 days a week.  It can reduce your risk of falling.  Even if you have a hard time meeting the recommendations, it's better to be more active

## 2024-02-23 ENCOUNTER — OFFICE VISIT (OUTPATIENT)
Dept: ORTHOPEDIC SURGERY | Age: 74
End: 2024-02-23
Payer: MEDICARE

## 2024-02-23 VITALS
SYSTOLIC BLOOD PRESSURE: 132 MMHG | HEIGHT: 68 IN | WEIGHT: 263 LBS | BODY MASS INDEX: 39.86 KG/M2 | DIASTOLIC BLOOD PRESSURE: 68 MMHG

## 2024-02-23 DIAGNOSIS — Z96.641 HISTORY OF RIGHT HIP REPLACEMENT: Primary | ICD-10-CM

## 2024-02-23 DIAGNOSIS — M16.12 ARTHRITIS OF LEFT HIP: ICD-10-CM

## 2024-02-23 PROCEDURE — 99024 POSTOP FOLLOW-UP VISIT: CPT | Performed by: ORTHOPAEDIC SURGERY

## 2024-02-23 PROCEDURE — 99213 OFFICE O/P EST LOW 20 MIN: CPT | Performed by: ORTHOPAEDIC SURGERY

## 2024-02-23 NOTE — PROGRESS NOTES
Menopausal symptoms     Obesity     Overactive bladder     Scaphoid fracture of wrist     Right.    Sleep disturbances     Tobacco abuse     Remote and limited.    Uterine fibroid      Past Surgical History:   Procedure Laterality Date    BREAST REDUCTION SURGERY Bilateral 1996    CARDIAC CATHETERIZATION  06/15/2007    No significant coronary artery disease, preserved LV systolic function.      SECTION      COLONOSCOPY  09/10/2008    Sigmoid diverticulosis, history of polyps.    COLONOSCOPY  2007    Multiple polyps of the colon, rectal polyps, sigmoid polyps, and splenic flexure polyps.    COLONOSCOPY  2006    Pedunculated thin polyp near the transverse colon, small polyp at the dentate line versus a hemorrhoid.    COLONOSCOPY      COLONOSCOPY  2014    Dr. Lindsey. adenom. x 2, 5yr follow up    DILATION AND CURETTAGE OF UTERUS      HC COLONOSCOPY BIOPSY/STOMA N/A 2020    hyperplastic polyp X 3, Dr. Weber    HYSTERECTOMY, TOTAL ABDOMINAL (CERVIX REMOVED)      KNEE ARTHROSCOPY Left 1999    Torn degenerate posterior third medial meniscus.    MOUTH SURGERY Right 2015    OTHER SURGICAL HISTORY Left 2012    Left knee injection for left knee pain.    OTHER SURGICAL HISTORY Right 2006    Bursa injection for greater trochanteric bursitis.    SALPINGO-OOPHORECTOMY Left     TONSILLECTOMY AND ADENOIDECTOMY      TOTAL HIP ARTHROPLASTY Right 2023    Right Total Hip ARTHROPLASTY -101 performed by Rakesh Becker MD at [facility] OR    TOTAL KNEE ARTHROPLASTY Left      Family History   Problem Relation Age of Onset    Cancer Mother     Stomach Cancer Mother     Cervical Cancer Mother     Stroke Father     Cancer Father         Throat.    Breast Cancer Father 70        father cancer right breast    Lung Cancer Sister     No Known Problems Sister     No Known Problems Sister     No Known Problems Sister     Cancer Brother         Lymphoma.    Colon Cancer Brother     No

## 2024-03-20 NOTE — FLOWSHEET NOTE
navigate stairs. Long Term Goal 4: Patient will demonstrate right hip strength to 4-4+/5 to allow patient to complete car and chair transfers with greater ease. Long Term Goal 5: IND with HEP    Plan:   [x] Continue per plan of care [] Alter current plan (see comments)  [] Plan of care initiated [] Hold pending MD visit [] Discharge    Plan for Next Session:  Monitor tolerance and advance as able. Electronically signed by:   Phyllis Carlin PTA Valtrex Counseling: I discussed with the patient the risks of valacyclovir including but not limited to kidney damage, nausea, vomiting and severe allergy.  The patient understands that if the infection seems to be worsening or is not improving, they are to call. Hydroxyzine Counseling: Patient advised that the medication is sedating and not to drive a car after taking this medication.  Patient informed of potential adverse effects including but not limited to dry mouth, urinary retention, and blurry vision.  The patient verbalized understanding of the proper use and possible adverse effects of hydroxyzine.  All of the patient's questions and concerns were addressed. Tremfya Counseling: I discussed with the patient the risks of guselkumab including but not limited to immunosuppression, serious infections, and drug reactions.  The patient understands that monitoring is required including a PPD at baseline and must alert us or the primary physician if symptoms of infection or other concerning signs are noted. Zyclara Pregnancy And Lactation Text: This medication is Pregnancy Category C. It is unknown if this medication is excreted in breast milk. Otezla Counseling: The side effects of Otezla were discussed with the patient, including but not limited to worsening or new depression, weight loss, diarrhea, nausea, upper respiratory tract infection, and headache. Patient instructed to call the office should any adverse effect occur.  The patient verbalized understanding of the proper use and possible adverse effects of Otezla.  All the patient's questions and concerns were addressed. Low Dose Naltrexone Pregnancy And Lactation Text: Naltrexone is pregnancy category C.  There have been no adequate and well-controlled studies in pregnant women.  It should be used in pregnancy only if the potential benefit justifies the potential risk to the fetus.   Limited data indicates that naltrexone is minimally excreted into breastmilk. Isotretinoin Counseling: Patient should get monthly blood tests, not donate blood, not drive at night if vision affected, not share medication, and not undergo elective surgery for 6 months after tx completed. Side effects reviewed, pt to contact office should one occur. Topical Ketoconazole Counseling: Patient counseled that this medication may cause skin irritation or allergic reactions.  In the event of skin irritation, the patient was advised to reduce the amount of the drug applied or use it less frequently.   The patient verbalized understanding of the proper use and possible adverse effects of ketoconazole.  All of the patient's questions and concerns were addressed. Minocycline Counseling: Patient advised regarding possible photosensitivity and discoloration of the teeth, skin, lips, tongue and gums.  Patient instructed to avoid sunlight, if possible.  When exposed to sunlight, patients should wear protective clothing, sunglasses, and sunscreen.  The patient was instructed to call the office immediately if the following severe adverse effects occur:  hearing changes, easy bruising/bleeding, severe headache, or vision changes.  The patient verbalized understanding of the proper use and possible adverse effects of minocycline.  All of the patient's questions and concerns were addressed. Eucrisa Counseling: Patient may experience a mild burning sensation during topical application. Eucrisa is not approved in children less than 3 months of age. Methotrexate Pregnancy And Lactation Text: This medication is Pregnancy Category X and is known to cause fetal harm. This medication is excreted in breast milk. Minoxidil Pregnancy And Lactation Text: This medication has not been assigned a Pregnancy Risk Category but animal studies failed to show danger with the topical medication. It is unknown if the medication is excreted in breast milk. Cephalexin Pregnancy And Lactation Text: This medication is Pregnancy Category B and considered safe during pregnancy.  It is also excreted in breast milk but can be used safely for shorter doses. Siliq Pregnancy And Lactation Text: The risk during pregnancy and breastfeeding is uncertain with this medication. Carac Pregnancy And Lactation Text: This medication is Pregnancy Category X and contraindicated in pregnancy and in women who may become pregnant. It is unknown if this medication is excreted in breast milk. Bimzelx Pregnancy And Lactation Text: This medication crosses the placenta and the safety is uncertain during pregnancy. It is unknown if this medication is present in breast milk. Dutasteride Female Counseling: Dutasteride Counseling:  I discussed with the patient the risks of use of dutasteride including but not limited to decreased libido and sexual dysfunction. Explained the teratogenic nature of the medication and stressed the importance of not getting pregnant during treatment. All of the patient's questions and concerns were addressed. Humira Counseling:  I discussed with the patient the risks of adalimumab including but not limited to myelosuppression, immunosuppression, autoimmune hepatitis, demyelinating diseases, lymphoma, and serious infections.  The patient understands that monitoring is required including a PPD at baseline and must alert us or the primary physician if symptoms of infection or other concerning signs are noted. Sotyktu Counseling:  I discussed the most common side effects of Sotyktu including: common cold, sore throat, sinus infections, cold sores, canker sores, folliculitis, and acne.  I also discussed more serious side effects of Sotyktu including but not limited to: serious allergic reactions; increased risk for infections such as TB; cancers such as lymphomas; rhabdomyolysis and elevated CPK; and elevated triglycerides and liver enzymes.  Ketoconazole Counseling:   Patient counseled regarding improving absorption with orange juice.  Adverse effects include but are not limited to breast enlargement, headache, diarrhea, nausea, upset stomach, liver function test abnormalities, taste disturbance, and stomach pain.  There is a rare possibility of liver failure that can occur when taking ketoconazole. The patient understands that monitoring of LFTs may be required, especially at baseline. The patient verbalized understanding of the proper use and possible adverse effects of ketoconazole.  All of the patient's questions and concerns were addressed. Dapsone Counseling: I discussed with the patient the risks of dapsone including but not limited to hemolytic anemia, agranulocytosis, rashes, methemoglobinemia, kidney failure, peripheral neuropathy, headaches, GI upset, and liver toxicity.  Patients who start dapsone require monitoring including baseline LFTs and weekly CBCs for the first month, then every month thereafter.  The patient verbalized understanding of the proper use and possible adverse effects of dapsone.  All of the patient's questions and concerns were addressed. Otezla Pregnancy And Lactation Text: This medication is Pregnancy Category C and it isn't known if it is safe during pregnancy. It is unknown if it is excreted in breast milk. Azathioprine Counseling:  I discussed with the patient the risks of azathioprine including but not limited to myelosuppression, immunosuppression, hepatotoxicity, lymphoma, and infections.  The patient understands that monitoring is required including baseline LFTs, Creatinine, possible TPMP genotyping and weekly CBCs for the first month and then every 2 weeks thereafter.  The patient verbalized understanding of the proper use and possible adverse effects of azathioprine.  All of the patient's questions and concerns were addressed. Rhofade Pregnancy And Lactation Text: This medication has not been assigned a Pregnancy Risk Category. It is unknown if the medication is excreted in breast milk. Hydroxyzine Pregnancy And Lactation Text: This medication is not safe during pregnancy and should not be taken. It is also excreted in breast milk and breast feeding isn't recommended. Tetracycline Counseling: Patient counseled regarding possible photosensitivity and increased risk for sunburn.  Patient instructed to avoid sunlight, if possible.  When exposed to sunlight, patients should wear protective clothing, sunglasses, and sunscreen.  The patient was instructed to call the office immediately if the following severe adverse effects occur:  hearing changes, easy bruising/bleeding, severe headache, or vision changes.  The patient verbalized understanding of the proper use and possible adverse effects of tetracycline.  All of the patient's questions and concerns were addressed. Patient understands to avoid pregnancy while on therapy due to potential birth defects. Mirvaso Counseling: Mirvaso is a topical medication which can decrease superficial blood flow where applied. Side effects are uncommon and include stinging, redness and allergic reactions. Niacinamide Counseling: I recommended taking niacin or niacinamide, also know as vitamin B3, twice daily. Recent evidence suggests that taking vitamin B3 (500 mg twice daily) can reduce the risk of actinic keratoses and non-melanoma skin cancers. Side effects of vitamin B3 include flushing and headache. Simponi Counseling:  I discussed with the patient the risks of golimumab including but not limited to myelosuppression, immunosuppression, autoimmune hepatitis, demyelinating diseases, lymphoma, and serious infections.  The patient understands that monitoring is required including a PPD at baseline and must alert us or the primary physician if symptoms of infection or other concerning signs are noted. Clindamycin Counseling: I counseled the patient regarding use of clindamycin as an antibiotic for prophylactic and/or therapeutic purposes. Clindamycin is active against numerous classes of bacteria, including skin bacteria. Side effects may include nausea, diarrhea, gastrointestinal upset, rash, hives, yeast infections, and in rare cases, colitis. Valtrex Pregnancy And Lactation Text: this medication is Pregnancy Category B and is considered safe during pregnancy. This medication is not directly found in breast milk but it's metabolite acyclovir is present. Minocycline Pregnancy And Lactation Text: This medication is Pregnancy Category D and not consider safe during pregnancy. It is also excreted in breast milk. Dutasteride Pregnancy And Lactation Text: This medication is absolutely contraindicated in women, especially during pregnancy and breast feeding. Feminization of male fetuses is possible if taking while pregnant. Ketoconazole Pregnancy And Lactation Text: This medication is Pregnancy Category C and it isn't know if it is safe during pregnancy. It is also excreted in breast milk and breast feeding isn't recommended. Dapsone Pregnancy And Lactation Text: This medication is Pregnancy Category C and is not considered safe during pregnancy or breast feeding. Topical Ketoconazole Pregnancy And Lactation Text: This medication is Pregnancy Category B and is considered safe during pregnancy. It is unknown if it is excreted in breast milk. Prednisone Counseling:  I discussed with the patient the risks of prolonged use of prednisone including but not limited to weight gain, insomnia, osteoporosis, mood changes, diabetes, susceptibility to infection, glaucoma and high blood pressure.  In cases where prednisone use is prolonged, patients should be monitored with blood pressure checks, serum glucose levels and an eye exam.  Additionally, the patient may need to be placed on GI prophylaxis, PCP prophylaxis, and calcium and vitamin D supplementation and/or a bisphosphonate.  The patient verbalized understanding of the proper use and the possible adverse effects of prednisone.  All of the patient's questions and concerns were addressed. Calcipotriene Counseling:  I discussed with the patient the risks of calcipotriene including but not limited to erythema, scaling, itching, and irritation. Humira Pregnancy And Lactation Text: This medication is Pregnancy Category B and is considered safe during pregnancy. It is unknown if this medication is excreted in breast milk. Isotretinoin Pregnancy And Lactation Text: This medication is Pregnancy Category X and is considered extremely dangerous during pregnancy. It is unknown if it is excreted in breast milk. Opioid Counseling: I discussed with the patient the potential side effects of opioids including but not limited to addiction, altered mental status, and depression. I stressed avoiding alcohol, benzodiazepines, muscle relaxants and sleep aids unless specifically okayed by a physician. The patient verbalized understanding of the proper use and possible adverse effects of opioids. All of the patient's questions and concerns were addressed. They were instructed to flush the remaining pills down the toilet if they did not need them for pain. Solaraze Counseling:  I discussed with the patient the risks of Solaraze including but not limited to erythema, scaling, itching, weeping, crusting, and pain. Sotyktu Pregnancy And Lactation Text: There is insufficient data to evaluate whether or not Sotyktu is safe to use during pregnancy.   It is not known if Sotyktu passes into breast milk and whether or not it is safe to use when breastfeeding.   Oxybutynin Counseling:  I discussed with the patient the risks of oxybutynin including but not limited to skin rash, drowsiness, dry mouth, difficulty urinating, and blurred vision. Cimzia Counseling:  I discussed with the patient the risks of Cimzia including but not limited to immunosuppression, allergic reactions and infections.  The patient understands that monitoring is required including a PPD at baseline and must alert us or the primary physician if symptoms of infection or other concerning signs are noted. Azathioprine Pregnancy And Lactation Text: This medication is Pregnancy Category D and isn't considered safe during pregnancy. It is unknown if this medication is excreted in breast milk. Quinolones Counseling:  I discussed with the patient the risks of fluoroquinolones including but not limited to GI upset, allergic reaction, drug rash, diarrhea, dizziness, photosensitivity, yeast infections, liver function test abnormalities, tendonitis/tendon rupture. Hydroquinone Counseling:  Patient advised that medication may result in skin irritation, lightening (hypopigmentation), dryness, and burning.  In the event of skin irritation, the patient was advised to reduce the amount of the drug applied or use it less frequently.  Rarely, spots that are treated with hydroquinone can become darker (pseudoochronosis).  Should this occur, patient instructed to stop medication and call the office. The patient verbalized understanding of the proper use and possible adverse effects of hydroquinone.  All of the patient's questions and concerns were addressed. Xolair Counseling:  Patient informed of potential adverse effects including but not limited to fever, muscle aches, rash and allergic reactions.  The patient verbalized understanding of the proper use and possible adverse effects of Xolair.  All of the patient's questions and concerns were addressed. Opioid Pregnancy And Lactation Text: These medications can lead to premature delivery and should be avoided during pregnancy. These medications are also present in breast milk in small amounts. Ilumya Counseling: I discussed with the patient the risks of tildrakizumab including but not limited to immunosuppression, malignancy, posterior leukoencephalopathy syndrome, and serious infections.  The patient understands that monitoring is required including a PPD at baseline and must alert us or the primary physician if symptoms of infection or other concerning signs are noted. Clindamycin Pregnancy And Lactation Text: This medication can be used in pregnancy if certain situations. Clindamycin is also present in breast milk. Finasteride Male Counseling: Finasteride Counseling:  I discussed with the patient the risks of use of finasteride including but not limited to decreased libido, decreased ejaculate volume, gynecomastia, and depression. Women should not handle medication.  All of the patient's questions and concerns were addressed. High Dose Vitamin A Counseling: Side effects reviewed, pt to contact office should one occur. Topical Sulfur Applications Counseling: Topical Sulfur Counseling: Patient counseled that this medication may cause skin irritation or allergic reactions.  In the event of skin irritation, the patient was advised to reduce the amount of the drug applied or use it less frequently.   The patient verbalized understanding of the proper use and possible adverse effects of topical sulfur application.  All of the patient's questions and concerns were addressed. Niacinamide Pregnancy And Lactation Text: These medications are considered safe during pregnancy. Xeljanz Counseling: I discussed with the patient the risks of Xeljanz therapy including increased risk of infection, liver issues, headache, diarrhea, or cold symptoms. Live vaccines should be avoided. They were instructed to call if they have any problems. Gabapentin Counseling: I discussed with the patient the risks of gabapentin including but not limited to dizziness, somnolence, fatigue and ataxia. Terbinafine Counseling: Patient counseling regarding adverse effects of terbinafine including but not limited to headache, diarrhea, rash, upset stomach, liver function test abnormalities, itching, taste/smell disturbance, nausea, abdominal pain, and flatulence.  There is a rare possibility of liver failure that can occur when taking terbinafine.  The patient understands that a baseline LFT and kidney function test may be required. The patient verbalized understanding of the proper use and possible adverse effects of terbinafine.  All of the patient's questions and concerns were addressed. Solaraze Pregnancy And Lactation Text: This medication is Pregnancy Category B and is considered safe. There is some data to suggest avoiding during the third trimester. It is unknown if this medication is excreted in breast milk. Prednisone Pregnancy And Lactation Text: This medication is Pregnancy Category C and it isn't know if it is safe during pregnancy. This medication is excreted in breast milk. Calcipotriene Pregnancy And Lactation Text: This medication has not been proven safe during pregnancy. It is unknown if this medication is excreted in breast milk. Albendazole Counseling:  I discussed with the patient the risks of albendazole including but not limited to cytopenia, kidney damage, nausea/vomiting and severe allergy.  The patient understands that this medication is being used in an off-label manner. Cellcept Counseling:  I discussed with the patient the risks of mycophenolate mofetil including but not limited to infection/immunosuppression, GI upset, hypokalemia, hypercholesterolemia, bone marrow suppression, lymphoproliferative disorders, malignancy, GI ulceration/bleed/perforation, colitis, interstitial lung disease, kidney failure, progressive multifocal leukoencephalopathy, and birth defects.  The patient understands that monitoring is required including a baseline creatinine and regular CBC testing. In addition, patient must alert us immediately if symptoms of infection or other concerning signs are noted. Use Enhanced Medication Counseling?: No Quinolones Pregnancy And Lactation Text: This medication is Pregnancy Category C and it isn't know if it is safe during pregnancy. It is also excreted in breast milk. Xolair Pregnancy And Lactation Text: This medication is Pregnancy Category B and is considered safe during pregnancy. This medication is excreted in breast milk. Cimzia Pregnancy And Lactation Text: This medication crosses the placenta but can be considered safe in certain situations. Cimzia may be excreted in breast milk. Topical Sulfur Applications Pregnancy And Lactation Text: This medication is considered safe during pregnancy and breast feeding secondary to limited systemic absorption. Nsaids Counseling: NSAID Counseling: I discussed with the patient that NSAIDs should be taken with food. Prolonged use of NSAIDs can result in the development of stomach ulcers.  Patient advised to stop taking NSAIDs if abdominal pain occurs.  The patient verbalized understanding of the proper use and possible adverse effects of NSAIDs.  All of the patient's questions and concerns were addressed. Opzelura Counseling:  I discussed with the patient the risks of Opzelura including but not limited to nasopharngitis, bronchitis, ear infection, eosinophila, hives, diarrhea, folliculitis, tonsillitis, and rhinorrhea.  Taken orally, this medication has been linked to serious infections; higher rate of mortality; malignancy and lymphoproliferative disorders; major adverse cardiovascular events; thrombosis; thrombocytopenia, anemia, and neutropenia; and lipid elevations. Skyrizi Counseling: I discussed with the patient the risks of risankizumab-rzaa including but not limited to immunosuppression, and serious infections.  The patient understands that monitoring is required including a PPD at baseline and must alert us or the primary physician if symptoms of infection or other concerning signs are noted. Doxycycline Counseling:  Patient counseled regarding possible photosensitivity and increased risk for sunburn.  Patient instructed to avoid sunlight, if possible.  When exposed to sunlight, patients should wear protective clothing, sunglasses, and sunscreen.  The patient was instructed to call the office immediately if the following severe adverse effects occur:  hearing changes, easy bruising/bleeding, severe headache, or vision changes.  The patient verbalized understanding of the proper use and possible adverse effects of doxycycline.  All of the patient's questions and concerns were addressed. Finasteride Female Counseling: Finasteride Counseling:  I discussed with the patient the risks of use of finasteride including but not limited to decreased libido and sexual dysfunction. Explained the teratogenic nature of the medication and stressed the importance of not getting pregnant during treatment. All of the patient's questions and concerns were addressed. High Dose Vitamin A Pregnancy And Lactation Text: High dose vitamin A therapy is contraindicated during pregnancy and breast feeding. Xelflacoz Pregnancy And Lactation Text: This medication is Pregnancy Category D and is not considered safe during pregnancy.  The risk during breast feeding is also uncertain. 5-Fu Counseling: 5-Fluorouracil Counseling:  I discussed with the patient the risks of 5-fluorouracil including but not limited to erythema, scaling, itching, weeping, crusting, and pain. Albendazole Pregnancy And Lactation Text: This medication is Pregnancy Category C and it isn't known if it is safe during pregnancy. It is also excreted in breast milk. Arava Counseling:  Patient counseled regarding adverse effects of Arava including but not limited to nausea, vomiting, abnormalities in liver function tests. Patients may develop mouth sores, rash, diarrhea, and abnormalities in blood counts. The patient understands that monitoring is required including LFTs and blood counts.  There is a rare possibility of scarring of the liver and lung problems that can occur when taking methotrexate. Persistent nausea, loss of appetite, pale stools, dark urine, cough, and shortness of breath should be reported immediately. Patient advised to discontinue Arava treatment and consult with a physician prior to attempting conception. The patient will have to undergo a treatment to eliminate Arava from the body prior to conception. Fluconazole Counseling:  Patient counseled regarding adverse effects of fluconazole including but not limited to headache, diarrhea, nausea, upset stomach, liver function test abnormalities, taste disturbance, and stomach pain.  There is a rare possibility of liver failure that can occur when taking fluconazole.  The patient understands that monitoring of LFTs and kidney function test may be required, especially at baseline. The patient verbalized understanding of the proper use and possible adverse effects of fluconazole.  All of the patient's questions and concerns were addressed. Propranolol Counseling:  I discussed with the patient the risks of propranolol including but not limited to low heart rate, low blood pressure, low blood sugar, restlessness and increased cold sensitivity. They should call the office if they experience any of these side effects. Terbinafine Pregnancy And Lactation Text: This medication is Pregnancy Category B and is considered safe during pregnancy. It is also excreted in breast milk and breast feeding isn't recommended. Gabapentin Pregnancy And Lactation Text: This medication is Pregnancy Category C and isn't considered safe during pregnancy. It is excreted in breast milk. Topical Retinoid counseling:  Patient advised to apply a pea-sized amount only at bedtime and wait 30 minutes after washing their face before applying.  If too drying, patient may add a non-comedogenic moisturizer. The patient verbalized understanding of the proper use and possible adverse effects of retinoids.  All of the patient's questions and concerns were addressed. Cosentyx Counseling:  I discussed with the patient the risks of Cosentyx including but not limited to worsening of Crohn's disease, immunosuppression, allergic reactions and infections.  The patient understands that monitoring is required including a PPD at baseline and must alert us or the primary physician if symptoms of infection or other concerning signs are noted. Opzelura Pregnancy And Lactation Text: There is insufficient data to evaluate drug-associated risk for major birth defects, miscarriage, or other adverse maternal or fetal outcomes.  There is a pregnancy registry that monitors pregnancy outcomes in pregnant persons exposed to the medication during pregnancy.  It is unknown if this medication is excreted in breast milk.  Do not breastfeed during treatment and for about 4 weeks after the last dose. Cibinqo Counseling: I discussed with the patient the risks of Cibinqo therapy including but not limited to common cold, nausea, headache, cold sores, increased blood CPK levels, dizziness, UTIs, fatigue, acne, and vomitting. Live vaccines should be avoided.  This medication has been linked to serious infections; higher rate of mortality; malignancy and lymphoproliferative disorders; major adverse cardiovascular events; thrombosis; thrombocytopenia and lymphopenia; lipid elevations; and retinal detachment. Nsaids Pregnancy And Lactation Text: These medications are considered safe up to 30 weeks gestation. It is excreted in breast milk. Imiquimod Counseling:  I discussed with the patient the risks of imiquimod including but not limited to erythema, scaling, itching, weeping, crusting, and pain.  Patient understands that the inflammatory response to imiquimod is variable from person to person and was educated regarded proper titration schedule.  If flu-like symptoms develop, patient knows to discontinue the medication and contact us. Ivermectin Counseling:  Patient instructed to take medication on an empty stomach with a full glass of water.  Patient informed of potential adverse effects including but not limited to nausea, diarrhea, dizziness, itching, and swelling of the extremities or lymph nodes.  The patient verbalized understanding of the proper use and possible adverse effects of ivermectin.  All of the patient's questions and concerns were addressed. Erivedge Counseling- I discussed with the patient the risks of Erivedge including but not limited to nausea, vomiting, diarrhea, constipation, weight loss, changes in the sense of taste, decreased appetite, muscle spasms, and hair loss.  The patient verbalized understanding of the proper use and possible adverse effects of Erivedge.  All of the patient's questions and concerns were addressed. Rifampin Counseling: I discussed with the patient the risks of rifampin including but not limited to liver damage, kidney damage, red-orange body fluids, nausea/vomiting and severe allergy. Wartpeel Counseling:  I discussed with the patient the risks of Wartpeel including but not limited to erythema, scaling, itching, weeping, crusting, and pain. Doxycycline Pregnancy And Lactation Text: This medication is Pregnancy Category D and not consider safe during pregnancy. It is also excreted in breast milk but is considered safe for shorter treatment courses. Cyclophosphamide Counseling:  I discussed with the patient the risks of cyclophosphamide including but not limited to hair loss, hormonal abnormalities, decreased fertility, abdominal pain, diarrhea, nausea and vomiting, bone marrow suppression and infection. The patient understands that monitoring is required while taking this medication. Azithromycin Counseling:  I discussed with the patient the risks of azithromycin including but not limited to GI upset, allergic reaction, drug rash, diarrhea, and yeast infections. Sski Pregnancy And Lactation Text: This medication is Pregnancy Category D and isn't considered safe during pregnancy. It is excreted in breast milk. Finasteride Pregnancy And Lactation Text: This medication is absolutely contraindicated during pregnancy. It is unknown if it is excreted in breast milk. Propranolol Pregnancy And Lactation Text: This medication is Pregnancy Category C and it isn't known if it is safe during pregnancy. It is excreted in breast milk. Infliximab Counseling:  I discussed with the patient the risks of infliximab including but not limited to myelosuppression, immunosuppression, autoimmune hepatitis, demyelinating diseases, lymphoma, and serious infections.  The patient understands that monitoring is required including a PPD at baseline and must alert us or the primary physician if symptoms of infection or other concerning signs are noted. Glycopyrrolate Counseling:  I discussed with the patient the risks of glycopyrrolate including but not limited to skin rash, drowsiness, dry mouth, difficulty urinating, and blurred vision. Cibinqo Pregnancy And Lactation Text: It is unknown if this medication will adversely affect pregnancy or breast feeding.  You should not take this medication if you are currently pregnant or planning a pregnancy or while breastfeeding. Arava Pregnancy And Lactation Text: This medication is Pregnancy Category X and is absolutely contraindicated during pregnancy. It is unknown if it is excreted in breast milk. Picato Counseling:  I discussed with the patient the risks of Picato including but not limited to erythema, scaling, itching, weeping, crusting, and pain. Azelaic Acid Counseling: Patient counseled that medicine may cause skin irritation and to avoid applying near the eyes.  In the event of skin irritation, the patient was advised to reduce the amount of the drug applied or use it less frequently.   The patient verbalized understanding of the proper use and possible adverse effects of azelaic acid.  All of the patient's questions and concerns were addressed. Stelara Counseling:  I discussed with the patient the risks of ustekinumab including but not limited to immunosuppression, malignancy, posterior leukoencephalopathy syndrome, and serious infections.  The patient understands that monitoring is required including a PPD at baseline and must alert us or the primary physician if symptoms of infection or other concerning signs are noted. Olanzapine Counseling- I discussed with the patient the common side effects of olanzapine including but are not limited to: lack of energy, dry mouth, increased appetite, sleepiness, tremor, constipation, dizziness, changes in behavior, or restlessness.  Explained that teenagers are more likely to experience headaches, abdominal pain, pain in the arms or legs, tiredness, and sleepiness.  Serious side effects include but are not limited: increased risk of death in elderly patients who are confused, have memory loss, or dementia-related psychosis; hyperglycemia; increased cholesterol and triglycerides; and weight gain. Cimetidine Counseling:  I discussed with the patient the risks of Cimetidine including but not limited to gynecomastia, headache, diarrhea, nausea, drowsiness, arrhythmias, pancreatitis, skin rashes, psychosis, bone marrow suppression and kidney toxicity. Rifampin Pregnancy And Lactation Text: This medication is Pregnancy Category C and it isn't know if it is safe during pregnancy. It is also excreted in breast milk and should not be used if you are breast feeding. Glycopyrrolate Pregnancy And Lactation Text: This medication is Pregnancy Category B and is considered safe during pregnancy. It is unknown if it is excreted breast milk. Tazorac Counseling:  Patient advised that medication is irritating and drying.  Patient may need to apply sparingly and wash off after an hour before eventually leaving it on overnight.  The patient verbalized understanding of the proper use and possible adverse effects of tazorac.  All of the patient's questions and concerns were addressed. Erythromycin Counseling:  I discussed with the patient the risks of erythromycin including but not limited to GI upset, allergic reaction, drug rash, diarrhea, increase in liver enzymes, and yeast infections. Drysol Counseling:  I discussed with the patient the risks of drysol/aluminum chloride including but not limited to skin rash, itching, irritation, burning. Azithromycin Pregnancy And Lactation Text: This medication is considered safe during pregnancy and is also secreted in breast milk. Thalidomide Counseling: I discussed with the patient the risks of thalidomide including but not limited to birth defects, anxiety, weakness, chest pain, dizziness, cough and severe allergy. Birth Control Pills Counseling: Birth Control Pill Counseling: I discussed with the patient the potential side effects of OCPs including but not limited to increased risk of stroke, heart attack, thrombophlebitis, deep venous thrombosis, hepatic adenomas, breast changes, GI upset, headaches, and depression.  The patient verbalized understanding of the proper use and possible adverse effects of OCPs. All of the patient's questions and concerns were addressed. Azelaic Acid Pregnancy And Lactation Text: This medication is considered safe during pregnancy and breast feeding. SSKI Counseling:  I discussed with the patient the risks of SSKI including but not limited to thyroid abnormalities, metallic taste, GI upset, fever, headache, acne, arthralgias, paraesthesias, lymphadenopathy, easy bleeding, arrhythmias, and allergic reaction. Acitretin Counseling:  I discussed with the patient the risks of acitretin including but not limited to hair loss, dry lips/skin/eyes, liver damage, hyperlipidemia, depression/suicidal ideation, photosensitivity.  Serious rare side effects can include but are not limited to pancreatitis, pseudotumor cerebri, bony changes, clot formation/stroke/heart attack.  Patient understands that alcohol is contraindicated since it can result in liver toxicity and significantly prolong the elimination of the drug by many years. Olumiant Counseling: I discussed with the patient the risks of Olumiant therapy including but not limited to upper respiratory tract infections, shingles, cold sores, and nausea. Live vaccines should be avoided.  This medication has been linked to serious infections; higher rate of mortality; malignancy and lymphoproliferative disorders; major adverse cardiovascular events; thrombosis; gastrointestinal perforations; neutropenia; lymphopenia; anemia; liver enzyme elevations; and lipid elevations. Dupixent Counseling: I discussed with the patient the risks of dupilumab including but not limited to eye infection and irritation, cold sores, injection site reactions, worsening of asthma, allergic reactions and increased risk of parasitic infection.  Live vaccines should be avoided while taking dupilumab. Dupilumab will also interact with certain medications such as warfarin and cyclosporine. The patient understands that monitoring is required and they must alert us or the primary physician if symptoms of infection or other concerning signs are noted. Griseofulvin Counseling:  I discussed with the patient the risks of griseofulvin including but not limited to photosensitivity, cytopenia, liver damage, nausea/vomiting and severe allergy.  The patient understands that this medication is best absorbed when taken with a fatty meal (e.g., ice cream or french fries). Cyclophosphamide Pregnancy And Lactation Text: This medication is Pregnancy Category D and it isn't considered safe during pregnancy. This medication is excreted in breast milk. Sarecycline Counseling: Patient advised regarding possible photosensitivity and discoloration of the teeth, skin, lips, tongue and gums.  Patient instructed to avoid sunlight, if possible.  When exposed to sunlight, patients should wear protective clothing, sunglasses, and sunscreen.  The patient was instructed to call the office immediately if the following severe adverse effects occur:  hearing changes, easy bruising/bleeding, severe headache, or vision changes.  The patient verbalized understanding of the proper use and possible adverse effects of sarecycline.  All of the patient's questions and concerns were addressed. Olanzapine Pregnancy And Lactation Text: This medication is pregnancy category C.   There are no adequate and well controlled trials with olanzapine in pregnant females.  Olanzapine should be used during pregnancy only if the potential benefit justifies the potential risk to the fetus.   In a study in lactating healthy women, olanzapine was excreted in breast milk.  It is recommended that women taking olanzapine should not breast feed. Clofazimine Counseling:  I discussed with the patient the risks of clofazimine including but not limited to skin and eye pigmentation, liver damage, nausea/vomiting, gastrointestinal bleeding and allergy. Erythromycin Pregnancy And Lactation Text: This medication is Pregnancy Category B and is considered safe during pregnancy. It is also excreted in breast milk. Hydroxychloroquine Counseling:  I discussed with the patient that a baseline ophthalmologic exam is needed at the start of therapy and every year thereafter while on therapy. A CBC may also be warranted for monitoring.  The side effects of this medication were discussed with the patient, including but not limited to agranulocytosis, aplastic anemia, seizures, rashes, retinopathy, and liver toxicity. Patient instructed to call the office should any adverse effect occur.  The patient verbalized understanding of the proper use and possible adverse effects of Plaquenil.  All the patient's questions and concerns were addressed. Rituxan Counseling:  I discussed with the patient the risks of Rituxan infusions. Side effects can include infusion reactions, severe drug rashes including mucocutaneous reactions, reactivation of latent hepatitis and other infections and rarely progressive multifocal leukoencephalopathy.  All of the patient's questions and concerns were addressed. Bactrim Counseling:  I discussed with the patient the risks of sulfa antibiotics including but not limited to GI upset, allergic reaction, drug rash, diarrhea, dizziness, photosensitivity, and yeast infections.  Rarely, more serious reactions can occur including but not limited to aplastic anemia, agranulocytosis, methemoglobinemia, blood dyscrasias, liver or kidney failure, lung infiltrates or desquamative/blistering drug rashes. Birth Control Pills Pregnancy And Lactation Text: This medication should be avoided if pregnant and for the first 30 days post-partum. Winlevi Counseling:  I discussed with the patient the risks of topical clascoterone including but not limited to erythema, scaling, itching, and stinging. Patient voiced their understanding. Acitretin Pregnancy And Lactation Text: This medication is Pregnancy Category X and should not be given to women who are pregnant or may become pregnant in the future. This medication is excreted in breast milk. Tazorac Pregnancy And Lactation Text: This medication is not safe during pregnancy. It is unknown if this medication is excreted in breast milk. Libtayo Counseling- I discussed with the patient the risks of Libtayo including but not limited to nausea, vomiting, diarrhea, and bone or muscle pain.  The patient verbalized understanding of the proper use and possible adverse effects of Libtayo.  All of the patient's questions and concerns were addressed. Cyclosporine Counseling:  I discussed with the patient the risks of cyclosporine including but not limited to hypertension, gingival hyperplasia,myelosuppression, immunosuppression, liver damage, kidney damage, neurotoxicity, lymphoma, and serious infections. The patient understands that monitoring is required including baseline blood pressure, CBC, CMP, lipid panel and uric acid, and then 1-2 times monthly CMP and blood pressure. Dupixent Pregnancy And Lactation Text: This medication likely crosses the placenta but the risk for the fetus is uncertain. This medication is excreted in breast milk. Benzoyl Peroxide Counseling: Patient counseled that medicine may cause skin irritation and bleach clothing.  In the event of skin irritation, the patient was advised to reduce the amount of the drug applied or use it less frequently.   The patient verbalized understanding of the proper use and possible adverse effects of benzoyl peroxide.  All of the patient's questions and concerns were addressed. Detail Level: Detailed Adbry Counseling: I discussed with the patient the risks of tralokinumab including but not limited to eye infection and irritation, cold sores, injection site reactions, worsening of asthma, allergic reactions and increased risk of parasitic infection.  Live vaccines should be avoided while taking tralokinumab. The patient understands that monitoring is required and they must alert us or the primary physician if symptoms of infection or other concerning signs are noted. Olumiant Pregnancy And Lactation Text: Based on animal studies, Olumiant may cause embryo-fetal harm when administered to pregnant women.  The medication should not be used in pregnancy.  Breastfeeding is not recommended during treatment. Griseofulvin Pregnancy And Lactation Text: This medication is Pregnancy Category X and is known to cause serious birth defects. It is unknown if this medication is excreted in breast milk but breast feeding should be avoided. Protopic Counseling: Patient may experience a mild burning sensation during topical application. Protopic is not approved in children less than 2 years of age. There have been case reports of hematologic and skin malignancies in patients using topical calcineurin inhibitors although causality is questionable. Oral Minoxidil Counseling- I discussed with the patient the risks of oral minoxidil including but not limited to shortness of breath, swelling of the feet or ankles, dizziness, lightheadedness, unwanted hair growth and allergic reaction.  The patient verbalized understanding of the proper use and possible adverse effects of oral minoxidil.  All of the patient's questions and concerns were addressed. Metronidazole Counseling:  I discussed with the patient the risks of metronidazole including but not limited to seizures, nausea/vomiting, a metallic taste in the mouth, nausea/vomiting and severe allergy. Elidel Counseling: Patient may experience a mild burning sensation during topical application. Elidel is not approved in children less than 2 years of age. There have been case reports of hematologic and skin malignancies in patients using topical calcineurin inhibitors although causality is questionable. Winlevi Pregnancy And Lactation Text: This medication is considered safe during pregnancy and breastfeeding. Taltz Counseling: I discussed with the patient the risks of ixekizumab including but not limited to immunosuppression, serious infections, worsening of inflammatory bowel disease and drug reactions.  The patient understands that monitoring is required including a PPD at baseline and must alert us or the primary physician if symptoms of infection or other concerning signs are noted. Doxepin Counseling:  Patient advised that the medication is sedating and not to drive a car after taking this medication. Patient informed of potential adverse effects including but not limited to dry mouth, urinary retention, and blurry vision.  The patient verbalized understanding of the proper use and possible adverse effects of doxepin.  All of the patient's questions and concerns were addressed. Enbrel Counseling:  I discussed with the patient the risks of etanercept including but not limited to myelosuppression, immunosuppression, autoimmune hepatitis, demyelinating diseases, lymphoma, and infections.  The patient understands that monitoring is required including a PPD at baseline and must alert us or the primary physician if symptoms of infection or other concerning signs are noted. Tranexamic Acid Counseling:  Patient advised of the small risk of bleeding problems with tranexamic acid. They were also instructed to call if they developed any nausea, vomiting or diarrhea. All of the patient's questions and concerns were addressed. Spironolactone Counseling: Patient advised regarding risks of diarrhea, abdominal pain, hyperkalemia, birth defects (for female patients), liver toxicity and renal toxicity. The patient may need blood work to monitor liver and kidney function and potassium levels while on therapy. The patient verbalized understanding of the proper use and possible adverse effects of spironolactone.  All of the patient's questions and concerns were addressed. Rituxan Pregnancy And Lactation Text: This medication is Pregnancy Category C and it isn't know if it is safe during pregnancy. It is unknown if this medication is excreted in breast milk but similar antibodies are known to be excreted. Bexarotene Counseling:  I discussed with the patient the risks of bexarotene including but not limited to hair loss, dry lips/skin/eyes, liver abnormalities, hyperlipidemia, pancreatitis, depression/suicidal ideation, photosensitivity, drug rash/allergic reactions, hypothyroidism, anemia, leukopenia, infection, cataracts, and teratogenicity.  Patient understands that they will need regular blood tests to check lipid profile, liver function tests, white blood cell count, thyroid function tests and pregnancy test if applicable. Libtayo Pregnancy And Lactation Text: This medication is contraindicated in pregnancy and when breast feeding. Hydroxychloroquine Pregnancy And Lactation Text: This medication has been shown to cause fetal harm but it isn't assigned a Pregnancy Risk Category. There are small amounts excreted in breast milk. Colchicine Counseling:  Patient counseled regarding adverse effects including but not limited to stomach upset (nausea, vomiting, stomach pain, or diarrhea).  Patient instructed to limit alcohol consumption while taking this medication.  Colchicine may reduce blood counts especially with prolonged use.  The patient understands that monitoring of kidney function and blood counts may be required, especially at baseline. The patient verbalized understanding of the proper use and possible adverse effects of colchicine.  All of the patient's questions and concerns were addressed. Itraconazole Counseling:  I discussed with the patient the risks of itraconazole including but not limited to liver damage, nausea/vomiting, neuropathy, and severe allergy.  The patient understands that this medication is best absorbed when taken with acidic beverages such as non-diet cola or ginger ale.  The patient understands that monitoring is required including baseline LFTs and repeat LFTs at intervals.  The patient understands that they are to contact us or the primary physician if concerning signs are noted. Protopic Pregnancy And Lactation Text: This medication is Pregnancy Category C. It is unknown if this medication is excreted in breast milk when applied topically. Bactrim Pregnancy And Lactation Text: This medication is Pregnancy Category D and is known to cause fetal risk.  It is also excreted in breast milk. Benzoyl Peroxide Pregnancy And Lactation Text: This medication is Pregnancy Category C. It is unknown if benzoyl peroxide is excreted in breast milk. Topical Clindamycin Counseling: Patient counseled that this medication may cause skin irritation or allergic reactions.  In the event of skin irritation, the patient was advised to reduce the amount of the drug applied or use it less frequently.   The patient verbalized understanding of the proper use and possible adverse effects of clindamycin.  All of the patient's questions and concerns were addressed. Doxepin Pregnancy And Lactation Text: This medication is Pregnancy Category C and it isn't known if it is safe during pregnancy. It is also excreted in breast milk and breast feeding isn't recommended. Oral Minoxidil Pregnancy And Lactation Text: This medication should only be used when clearly needed if you are pregnant, attempting to become pregnant or breast feeding. Rinvoq Counseling: I discussed with the patient the risks of Rinvoq therapy including but not limited to upper respiratory tract infections, shingles, cold sores, bronchitis, nausea, cough, fever, acne, and headache. Live vaccines should be avoided.  This medication has been linked to serious infections; higher rate of mortality; malignancy and lymphoproliferative disorders; major adverse cardiovascular events; thrombosis; thrombocytopenia, anemia, and neutropenia; lipid elevations; liver enzyme elevations; and gastrointestinal perforations. Adbry Pregnancy And Lactation Text: It is unknown if this medication will adversely affect pregnancy or breast feeding. Spironolactone Pregnancy And Lactation Text: This medication can cause feminization of the male fetus and should be avoided during pregnancy. The active metabolite is also found in breast milk. Tranexamic Acid Pregnancy And Lactation Text: It is unknown if this medication is safe during pregnancy or breast feeding. Metronidazole Pregnancy And Lactation Text: This medication is Pregnancy Category B and considered safe during pregnancy.  It is also excreted in breast milk. Low Dose Naltrexone Counseling- I discussed with the patient the potential risks and side effects of low dose naltrexone including but not limited to: more vivid dreams, headaches, nausea, vomiting, abdominal pain, fatigue, dizziness, and anxiety. Odomzo Counseling- I discussed with the patient the risks of Odomzo including but not limited to nausea, vomiting, diarrhea, constipation, weight loss, changes in the sense of taste, decreased appetite, muscle spasms, and hair loss.  The patient verbalized understanding of the proper use and possible adverse effects of Odomzo.  All of the patient's questions and concerns were addressed. Zyclara Counseling:  I discussed with the patient the risks of imiquimod including but not limited to erythema, scaling, itching, weeping, crusting, and pain.  Patient understands that the inflammatory response to imiquimod is variable from person to person and was educated regarded proper titration schedule.  If flu-like symptoms develop, patient knows to discontinue the medication and contact us. Minoxidil Counseling: Minoxidil is a topical medication which can increase blood flow where it is applied. It is uncertain how this medication increases hair growth. Side effects are uncommon and include stinging and allergic reactions. Siliq Counseling:  I discussed with the patient the risks of Siliq including but not limited to new or worsening depression, suicidal thoughts and behavior, immunosuppression, malignancy, posterior leukoencephalopathy syndrome, and serious infections.  The patient understands that monitoring is required including a PPD at baseline and must alert us or the primary physician if symptoms of infection or other concerning signs are noted. There is also a special program designed to monitor depression which is required with Siliq. Methotrexate Counseling:  Patient counseled regarding adverse effects of methotrexate including but not limited to nausea, vomiting, abnormalities in liver function tests. Patients may develop mouth sores, rash, diarrhea, and abnormalities in blood counts. The patient understands that monitoring is required including LFT's and blood counts.  There is a rare possibility of scarring of the liver and lung problems that can occur when taking methotrexate. Persistent nausea, loss of appetite, pale stools, dark urine, cough, and shortness of breath should be reported immediately. Patient advised to discontinue methotrexate treatment at least three months before attempting to become pregnant.  I discussed the need for folate supplements while taking methotrexate.  These supplements can decrease side effects during methotrexate treatment. The patient verbalized understanding of the proper use and possible adverse effects of methotrexate.  All of the patient's questions and concerns were addressed. Cephalexin Counseling: I counseled the patient regarding use of cephalexin as an antibiotic for prophylactic and/or therapeutic purposes. Cephalexin (commonly prescribed under brand name Keflex) is a cephalosporin antibiotic which is active against numerous classes of bacteria, including most skin bacteria. Side effects may include nausea, diarrhea, gastrointestinal upset, rash, hives, yeast infections, and in rare cases, hepatitis, kidney disease, seizures, fever, confusion, neurologic symptoms, and others. Patients with severe allergies to penicillin medications are cautioned that there is about a 10% incidence of cross-reactivity with cephalosporins. When possible, patients with penicillin allergies should use alternatives to cephalosporins for antibiotic therapy. Bexarotene Pregnancy And Lactation Text: This medication is Pregnancy Category X and should not be given to women who are pregnant or may become pregnant. This medication should not be used if you are breast feeding. Dutasteride Male Counseling: Dustasteride Counseling:  I discussed with the patient the risks of use of dutasteride including but not limited to decreased libido, decreased ejaculate volume, and gynecomastia. Women who can become pregnant should not handle medication.  All of the patient's questions and concerns were addressed. Carac Counseling:  I discussed with the patient the risks of Carac including but not limited to erythema, scaling, itching, weeping, crusting, and pain. Rhofade Counseling: Rhofade is a topical medication which can decrease superficial blood flow where applied. Side effects are uncommon and include stinging, redness and allergic reactions. Rinvoq Pregnancy And Lactation Text: Based on animal studies, Rinvoq may cause embryo-fetal harm when administered to pregnant women.  The medication should not be used in pregnancy.  Breastfeeding is not recommended during treatment and for 6 days after the last dose. Bimzelx Counseling:  I discussed with the patient the risks of Bimzelx including but not limited to depression, immunosuppression, allergic reactions and infections.  The patient understands that monitoring is required including a PPD at baseline and must alert us or the primary physician if symptoms of infection or other concerning signs are noted.

## 2024-04-05 ENCOUNTER — OFFICE VISIT (OUTPATIENT)
Dept: ORTHOPEDIC SURGERY | Age: 74
End: 2024-04-05

## 2024-04-05 VITALS
BODY MASS INDEX: 39.86 KG/M2 | WEIGHT: 263 LBS | HEIGHT: 68 IN | SYSTOLIC BLOOD PRESSURE: 142 MMHG | DIASTOLIC BLOOD PRESSURE: 86 MMHG

## 2024-04-05 DIAGNOSIS — Z96.641 HISTORY OF RIGHT HIP REPLACEMENT: Primary | ICD-10-CM

## 2024-04-05 DIAGNOSIS — M16.12 ARTHRITIS OF LEFT HIP: ICD-10-CM

## 2024-04-05 NOTE — PROGRESS NOTES
Patient ID: Megha Guillen is a 73 y.o. female    Chief Compliant:  Chief Complaint   Patient presents with    History of total hip arthroplasty, right     Right TKA on 2023        Diagnostic imaging:        Assessment and Plan:  1. History of right hip replacement    2. Arthritis of left hip        3 months post right BUCK doing well patient is actually here for follow-up of her left hip pain/arthritis    1 year handicap sticker    Follow up 6 months    HPI:  This is a 73 y.o. female who presents to the clinic today for 3 months post right BUCK, 2023.     Patient has an occasional twinge of pain in her left hip.     Review of Systems   All other systems reviewed and are negative.      Past History:    Current Outpatient Medications:     meloxicam (MOBIC) 15 MG tablet, Take 1 tablet by mouth daily Pt stopped prior to surgery, but has been ok'd to restart, Disp: , Rfl:     hydroCHLOROthiazide (HYDRODIURIL) 25 MG tablet, TAKE 1 TABLET DAILY, Disp: 90 tablet, Rfl: 1    metoprolol succinate (TOPROL XL) 50 MG extended release tablet, Take 1 tablet by mouth daily, Disp: 90 tablet, Rfl: 1    albuterol sulfate HFA (PROVENTIL;VENTOLIN;PROAIR) 108 (90 Base) MCG/ACT inhaler, Inhale 2 puffs into the lungs every 6 hours as needed for Wheezing or Shortness of Breath, Disp: 1 each, Rfl: 3  Allergies   Allergen Reactions    Minocin [Minocycline] Nausea Only and Other (See Comments)     Dizzy    Oxycodone Nausea Only     Social History     Socioeconomic History    Marital status:      Spouse name: Not on file    Number of children: Not on file    Years of education: Not on file    Highest education level: Not on file   Occupational History    Not on file   Tobacco Use    Smoking status: Former     Current packs/day: 0.00     Average packs/day: 0.3 packs/day for 1 year (0.3 ttl pk-yrs)     Types: Cigarettes     Start date: 1997     Quit date: 1998     Years since quittin.2    Smokeless tobacco: Never

## 2024-04-15 ENCOUNTER — OFFICE VISIT (OUTPATIENT)
Dept: FAMILY MEDICINE CLINIC | Age: 74
End: 2024-04-15
Payer: MEDICARE

## 2024-04-15 VITALS
DIASTOLIC BLOOD PRESSURE: 82 MMHG | WEIGHT: 239.6 LBS | OXYGEN SATURATION: 97 % | SYSTOLIC BLOOD PRESSURE: 139 MMHG | BODY MASS INDEX: 36.43 KG/M2 | HEART RATE: 50 BPM

## 2024-04-15 DIAGNOSIS — K21.9 GASTROESOPHAGEAL REFLUX DISEASE WITHOUT ESOPHAGITIS: ICD-10-CM

## 2024-04-15 DIAGNOSIS — R73.01 IMPAIRED FASTING GLUCOSE: Primary | ICD-10-CM

## 2024-04-15 DIAGNOSIS — E78.00 PURE HYPERCHOLESTEROLEMIA: ICD-10-CM

## 2024-04-15 DIAGNOSIS — Z79.890 POSTMENOPAUSAL HRT (HORMONE REPLACEMENT THERAPY): ICD-10-CM

## 2024-04-15 DIAGNOSIS — M17.12 PRIMARY OSTEOARTHRITIS OF LEFT KNEE: ICD-10-CM

## 2024-04-15 DIAGNOSIS — I10 PRIMARY HYPERTENSION: ICD-10-CM

## 2024-04-15 DIAGNOSIS — D12.4 ADENOMATOUS POLYP OF DESCENDING COLON: ICD-10-CM

## 2024-04-15 DIAGNOSIS — J45.20 MILD INTERMITTENT ASTHMA WITHOUT COMPLICATION: ICD-10-CM

## 2024-04-15 DIAGNOSIS — E66.09 CLASS 2 OBESITY DUE TO EXCESS CALORIES WITHOUT SERIOUS COMORBIDITY WITH BODY MASS INDEX (BMI) OF 37.0 TO 37.9 IN ADULT: ICD-10-CM

## 2024-04-15 DIAGNOSIS — M70.62 TROCHANTERIC BURSITIS, LEFT HIP: ICD-10-CM

## 2024-04-15 PROCEDURE — G8417 CALC BMI ABV UP PARAM F/U: HCPCS | Performed by: FAMILY MEDICINE

## 2024-04-15 PROCEDURE — 99213 OFFICE O/P EST LOW 20 MIN: CPT | Performed by: FAMILY MEDICINE

## 2024-04-15 PROCEDURE — 3017F COLORECTAL CA SCREEN DOC REV: CPT | Performed by: FAMILY MEDICINE

## 2024-04-15 PROCEDURE — G8399 PT W/DXA RESULTS DOCUMENT: HCPCS | Performed by: FAMILY MEDICINE

## 2024-04-15 PROCEDURE — G8427 DOCREV CUR MEDS BY ELIG CLIN: HCPCS | Performed by: FAMILY MEDICINE

## 2024-04-15 PROCEDURE — 1123F ACP DISCUSS/DSCN MKR DOCD: CPT | Performed by: FAMILY MEDICINE

## 2024-04-15 PROCEDURE — 99214 OFFICE O/P EST MOD 30 MIN: CPT | Performed by: FAMILY MEDICINE

## 2024-04-15 PROCEDURE — 1036F TOBACCO NON-USER: CPT | Performed by: FAMILY MEDICINE

## 2024-04-15 PROCEDURE — 3079F DIAST BP 80-89 MM HG: CPT | Performed by: FAMILY MEDICINE

## 2024-04-15 PROCEDURE — 1090F PRES/ABSN URINE INCON ASSESS: CPT | Performed by: FAMILY MEDICINE

## 2024-04-15 PROCEDURE — 3075F SYST BP GE 130 - 139MM HG: CPT | Performed by: FAMILY MEDICINE

## 2024-04-15 RX ORDER — METOPROLOL SUCCINATE 50 MG/1
50 TABLET, EXTENDED RELEASE ORAL DAILY
Qty: 90 TABLET | Refills: 1 | Status: SHIPPED | OUTPATIENT
Start: 2024-04-15

## 2024-04-15 RX ORDER — HYDROCHLOROTHIAZIDE 25 MG/1
TABLET ORAL
Qty: 90 TABLET | Refills: 1 | Status: SHIPPED | OUTPATIENT
Start: 2024-04-15

## 2024-04-15 RX ORDER — MELOXICAM 15 MG/1
15 TABLET ORAL DAILY
Qty: 30 TABLET | Refills: 2 | Status: SHIPPED | OUTPATIENT
Start: 2024-04-15 | End: 2024-07-14

## 2024-04-15 RX ORDER — ALBUTEROL SULFATE 90 UG/1
2 AEROSOL, METERED RESPIRATORY (INHALATION) EVERY 6 HOURS PRN
Qty: 1 EACH | Refills: 3 | Status: SHIPPED | OUTPATIENT
Start: 2024-04-15

## 2024-04-15 SDOH — ECONOMIC STABILITY: FOOD INSECURITY: WITHIN THE PAST 12 MONTHS, THE FOOD YOU BOUGHT JUST DIDN'T LAST AND YOU DIDN'T HAVE MONEY TO GET MORE.: NEVER TRUE

## 2024-04-15 SDOH — ECONOMIC STABILITY: FOOD INSECURITY: WITHIN THE PAST 12 MONTHS, YOU WORRIED THAT YOUR FOOD WOULD RUN OUT BEFORE YOU GOT MONEY TO BUY MORE.: NEVER TRUE

## 2024-04-15 SDOH — ECONOMIC STABILITY: HOUSING INSECURITY
IN THE LAST 12 MONTHS, WAS THERE A TIME WHEN YOU DID NOT HAVE A STEADY PLACE TO SLEEP OR SLEPT IN A SHELTER (INCLUDING NOW)?: NO

## 2024-04-15 SDOH — ECONOMIC STABILITY: INCOME INSECURITY: HOW HARD IS IT FOR YOU TO PAY FOR THE VERY BASICS LIKE FOOD, HOUSING, MEDICAL CARE, AND HEATING?: NOT HARD AT ALL

## 2024-04-15 ASSESSMENT — ENCOUNTER SYMPTOMS
WHEEZING: 1
EYES NEGATIVE: 1
GASTROINTESTINAL NEGATIVE: 1
ALLERGIC/IMMUNOLOGIC NEGATIVE: 1
BACK PAIN: 0

## 2024-04-15 NOTE — PATIENT INSTRUCTIONS
Hospital Outpatient Visit on 12/15/2023   Component Date Value Ref Range Status    WBC 12/15/2023 7.1  3.5 - 11.3 k/uL Final    RBC 12/15/2023 4.96  3.95 - 5.11 m/uL Final    Hemoglobin 12/15/2023 13.0  11.9 - 15.1 g/dL Final    Hematocrit 12/15/2023 41.1  36.3 - 47.1 % Final    MCV 12/15/2023 82.9  82.6 - 102.9 fL Final    MCH 12/15/2023 26.2  25.2 - 33.5 pg Final    MCHC 12/15/2023 31.6  25.2 - 33.5 g/dL Final    RDW 12/15/2023 13.5  11.8 - 14.4 % Final    Platelets 12/15/2023 336  138 - 453 k/uL Final    MPV 12/15/2023 9.2  8.1 - 13.5 fL Final    NRBC Automated 12/15/2023 0.0  0.0 per 100 WBC Final

## 2024-04-15 NOTE — PROGRESS NOTES
Subjective:      Patient ID: Megha Guillen is a 73 y.o. female.    Hypertension  Associated symptoms include neck pain.   Gastroesophageal Reflux  She complains of wheezing.   Hyperlipidemia    Other  Associated symptoms include neck pain. Pertinent negatives include no arthralgias (nothing particularly problematic at present in joints.  rare groin/hip discomfort. ).   Back Pain    Groin Pain  Pertinent negatives include no back pain.   Neck Pain     Wheezing   Associated symptoms include neck pain.      Routine  follow up on chronic medical conditions colon polyps, htn, hyperlipidemia, gerd, obesity, insomnia, oa , refills, and review of updated labs.    She had right and left tka per Dr. Green at [facility].  She is a Jehovah-witness and wanted to pursue the [facility] Program they have at [facility], where she had her prior knee replacement done. Both knees doing well at present.   She is back to work after retiring from .  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     Interval right hip replacement through Dr. Becker here in December 2023.  Hip doing well at present.   She lost wt. Down to 235, but gained wt. Back.  Up and down by report.    Bp doing better at present.   Lost her spouse to pancreatic cancer may 2021. Lots of responsibilities with renters and property.   Compliant with medications.  Sleep doing well, up to 5-6 hours/night. No gerd symptoms to report.  Hot flashes periodically, ongoing.  No asthma to report.  Some cramps in feet and legs at night.      Her lower back pain has resolved with therapy for the most part.  She has stretches and exercises to use for exacerbations, which seems to be working consistently at present.     Mild wheezing she attributes to weather changes.  Otherwise no issues with asthma. Mild pnd at present.     No injuries or illnesses to report.      She denies

## 2024-04-16 ENCOUNTER — HOSPITAL ENCOUNTER (OUTPATIENT)
Age: 74
Discharge: HOME | End: 2024-04-16
Payer: MEDICARE

## 2024-04-16 DIAGNOSIS — R73.01 IMPAIRED FASTING GLUCOSE: ICD-10-CM

## 2024-04-16 DIAGNOSIS — I10 PRIMARY HYPERTENSION: ICD-10-CM

## 2024-04-16 DIAGNOSIS — E78.00 PURE HYPERCHOLESTEROLEMIA: ICD-10-CM

## 2024-04-16 LAB
ALBUMIN SERPL-MCNC: 4.1 G/DL (ref 3.5–5.2)
ALBUMIN/GLOB SERPL: 1.1 {RATIO} (ref 1–2.5)
ALP SERPL-CCNC: 113 U/L (ref 35–104)
ALT SERPL-CCNC: 13 U/L (ref 5–33)
ANION GAP SERPL CALCULATED.3IONS-SCNC: 8 MMOL/L (ref 9–17)
AST SERPL-CCNC: 15 U/L
BASOPHILS # BLD: 0.03 K/UL (ref 0–0.2)
BASOPHILS NFR BLD: 1 % (ref 0–2)
BILIRUB SERPL-MCNC: 0.4 MG/DL (ref 0.3–1.2)
BUN SERPL-MCNC: 20 MG/DL (ref 8–23)
BUN/CREAT SERPL: 20 (ref 9–20)
CALCIUM SERPL-MCNC: 9.5 MG/DL (ref 8.6–10.4)
CHLORIDE SERPL-SCNC: 106 MMOL/L (ref 98–107)
CHOLEST SERPL-MCNC: 184 MG/DL (ref 0–199)
CHOLESTEROL/HDL RATIO: 4
CO2 SERPL-SCNC: 29 MMOL/L (ref 20–31)
CREAT SERPL-MCNC: 1 MG/DL (ref 0.5–0.9)
EOSINOPHIL # BLD: 0.19 K/UL (ref 0–0.44)
EOSINOPHILS RELATIVE PERCENT: 3 % (ref 1–4)
ERYTHROCYTE [DISTWIDTH] IN BLOOD BY AUTOMATED COUNT: 14.5 % (ref 11.8–14.4)
EST. AVERAGE GLUCOSE BLD GHB EST-MCNC: 117 MG/DL
GFR SERPL CREATININE-BSD FRML MDRD: 60 ML/MIN/1.73M2
GLUCOSE SERPL-MCNC: 98 MG/DL (ref 70–99)
HBA1C MFR BLD: 5.7 % (ref 4–6)
HCT VFR BLD AUTO: 38.4 % (ref 36.3–47.1)
HDLC SERPL-MCNC: 50 MG/DL
HGB BLD-MCNC: 12.2 G/DL (ref 11.9–15.1)
IMM GRANULOCYTES # BLD AUTO: <0.03 K/UL (ref 0–0.3)
IMM GRANULOCYTES NFR BLD: 0 %
LDLC SERPL CALC-MCNC: 108 MG/DL (ref 0–100)
LYMPHOCYTES NFR BLD: 2.33 K/UL (ref 1.1–3.7)
LYMPHOCYTES RELATIVE PERCENT: 40 % (ref 24–43)
MCH RBC QN AUTO: 25.8 PG (ref 25.2–33.5)
MCHC RBC AUTO-ENTMCNC: 31.8 G/DL (ref 25.2–33.5)
MCV RBC AUTO: 81.2 FL (ref 82.6–102.9)
MONOCYTES NFR BLD: 0.53 K/UL (ref 0.1–1.2)
MONOCYTES NFR BLD: 9 % (ref 3–12)
NEUTROPHILS NFR BLD: 47 % (ref 36–65)
NEUTS SEG NFR BLD: 2.8 K/UL (ref 1.5–8.1)
NRBC BLD-RTO: 0 PER 100 WBC
PLATELET # BLD AUTO: 309 K/UL (ref 138–453)
PMV BLD AUTO: 9.1 FL (ref 8.1–13.5)
POTASSIUM SERPL-SCNC: 4.4 MMOL/L (ref 3.7–5.3)
PROT SERPL-MCNC: 7.8 G/DL (ref 6.4–8.3)
RBC # BLD AUTO: 4.73 M/UL (ref 3.95–5.11)
RBC # BLD: ABNORMAL 10*6/UL
SODIUM SERPL-SCNC: 143 MMOL/L (ref 135–144)
TRIGL SERPL-MCNC: 133 MG/DL
VLDLC SERPL CALC-MCNC: 27 MG/DL
WBC OTHER # BLD: 5.9 K/UL (ref 3.5–11.3)

## 2024-04-16 PROCEDURE — 80061 LIPID PANEL: CPT

## 2024-04-16 PROCEDURE — 36415 COLL VENOUS BLD VENIPUNCTURE: CPT

## 2024-04-16 PROCEDURE — 80053 COMPREHEN METABOLIC PANEL: CPT

## 2024-04-16 PROCEDURE — 83036 HEMOGLOBIN GLYCOSYLATED A1C: CPT

## 2024-04-16 PROCEDURE — 85025 COMPLETE CBC W/AUTO DIFF WBC: CPT

## 2024-06-05 ENCOUNTER — OFFICE VISIT (OUTPATIENT)
Dept: FAMILY MEDICINE CLINIC | Age: 74
End: 2024-06-05
Payer: MEDICARE

## 2024-06-05 VITALS
BODY MASS INDEX: 36.28 KG/M2 | HEART RATE: 51 BPM | SYSTOLIC BLOOD PRESSURE: 126 MMHG | DIASTOLIC BLOOD PRESSURE: 82 MMHG | WEIGHT: 238.6 LBS | OXYGEN SATURATION: 96 %

## 2024-06-05 DIAGNOSIS — R00.1 BRADYCARDIA, UNSPECIFIED: Primary | ICD-10-CM

## 2024-06-05 PROBLEM — R07.9 CHEST PAIN: Status: ACTIVE | Noted: 2024-06-02

## 2024-06-05 PROBLEM — R07.89 CHEST TIGHTNESS: Status: ACTIVE | Noted: 2024-06-02

## 2024-06-05 PROCEDURE — 99213 OFFICE O/P EST LOW 20 MIN: CPT | Performed by: FAMILY MEDICINE

## 2024-06-05 ASSESSMENT — ENCOUNTER SYMPTOMS
GASTROINTESTINAL NEGATIVE: 1
RESPIRATORY NEGATIVE: 1
CHEST TIGHTNESS: 0
EYES NEGATIVE: 1
SHORTNESS OF BREATH: 0
ALLERGIC/IMMUNOLOGIC NEGATIVE: 1

## 2024-06-05 NOTE — PATIENT INSTRUCTIONS
Hospital Outpatient Visit on 04/16/2024   Component Date Value Ref Range Status    Hemoglobin A1C 04/16/2024 5.7  4.0 - 6.0 % Final    Estimated Avg Glucose 04/16/2024 117  mg/dL Final    Comment: The ADA and AACC recommend providing the estimated average glucose result to permit better   patient understanding of their HBA1c result.      WBC 04/16/2024 5.9  3.5 - 11.3 k/uL Final    RBC 04/16/2024 4.73  3.95 - 5.11 m/uL Final    Hemoglobin 04/16/2024 12.2  11.9 - 15.1 g/dL Final    Hematocrit 04/16/2024 38.4  36.3 - 47.1 % Final    MCV 04/16/2024 81.2 (L)  82.6 - 102.9 fL Final    MCH 04/16/2024 25.8  25.2 - 33.5 pg Final    MCHC 04/16/2024 31.8  25.2 - 33.5 g/dL Final    RDW 04/16/2024 14.5 (H)  11.8 - 14.4 % Final    Platelets 04/16/2024 309  138 - 453 k/uL Final    MPV 04/16/2024 9.1  8.1 - 13.5 fL Final    NRBC Automated 04/16/2024 0.0  0.0 per 100 WBC Final    Neutrophils % 04/16/2024 47  36 - 65 % Final    Lymphocytes % 04/16/2024 40  24 - 43 % Final    Monocytes % 04/16/2024 9  3 - 12 % Final    Eosinophils % 04/16/2024 3  1 - 4 % Final    Basophils % 04/16/2024 1  0 - 2 % Final    Immature Granulocytes % 04/16/2024 0  0 % Final    Neutrophils Absolute 04/16/2024 2.80  1.50 - 8.10 k/uL Final    Lymphocytes Absolute 04/16/2024 2.33  1.10 - 3.70 k/uL Final    Monocytes Absolute 04/16/2024 0.53  0.10 - 1.20 k/uL Final    Eosinophils Absolute 04/16/2024 0.19  0.00 - 0.44 k/uL Final    Basophils Absolute 04/16/2024 0.03  0.00 - 0.20 k/uL Final    Immature Granulocytes Absolute 04/16/2024 <0.03  0.00 - 0.30 k/uL Final    RBC Morphology 04/16/2024 ANISOCYTOSIS PRESENT MICROCYTOSIS PRESENT   Final    Cholesterol 04/16/2024 184  0 - 199 mg/dL Final    Comment:    Cholesterol Guidelines:      <200  Desirable   200-240  Borderline      >240  Undesirable         HDL 04/16/2024 50  >40 mg/dL Final    Comment:    HDL Guidelines:    <40     Undesirable   40-59    Borderline    >59     Desirable         LDL Cholesterol

## 2024-06-05 NOTE — PROGRESS NOTES
Megha Guillen (:  1950) is a 73 y.o. female,Established patient, here for evaluation of the following chief complaint(s):  Follow-up (ED follow up from , dx Sinus bradycardia. Patient was talking to dickson  and her watch notified her that her HR was 38. Patient states she just felt tired and her chest was sore but nothing else. Patient was referred to cardiology and she has an appointment scheduled on 2024. )      Assessment & Plan   Encounter Diagnosis   Name Primary?    Bradycardia, unspecified Yes     Chronic.  She does endorse fatigue.  Needing to push to get up and do things.  Feels a little better after starting activity and raising heart rate.  At this time we are going to stop toprol.  Will call next week with bp and pulse.  Anticipating need to add another class of antihypertensive medication without chronotropic activity.         Subjective   HPI  scheduled follow up on recent ED visit.  She presented with some chest tightness and her watch alarmed about her pulse being 38. Bp actually high at the time of presentation.  She has a history of bradycardia, metoprolol dose reduced in the past due to same. Not really dizzy by report.  Pulse with go to the 70-80 range with exertion.  No chest pain.  Negative troponin's.  Hr 60\"s at time of discharge.  Feeling well since discharge.        Past Medical History:   Diagnosis Date    Asthma     Chalazion of right lower eyelid     Colon polyps     Degenerative joint disease of knee, left     Dysmenorrhea     GERD (gastroesophageal reflux disease)     Greater trochanteric bursitis of right hip     History of blood transfusion     Hot flashes     Hyperlipidemia     Hypertension     Impaired fasting glucose     Menopausal symptoms     Obesity     Overactive bladder     Scaphoid fracture of wrist     Right.    Sleep disturbances     Tobacco abuse     Remote and limited.    Uterine fibroid      Past Surgical History:   Procedure Laterality

## 2024-06-10 RX ORDER — MELOXICAM 15 MG/1
15 TABLET ORAL DAILY
Qty: 90 TABLET | Refills: 3 | Status: SHIPPED | OUTPATIENT
Start: 2024-06-10

## 2024-06-10 NOTE — TELEPHONE ENCOUNTER
Megha called requesting a refill of the below medication which has been pended for you:     Requested Prescriptions     Pending Prescriptions Disp Refills    meloxicam (MOBIC) 15 MG tablet [Pharmacy Med Name: Meloxicam 15 MG Oral Tablet] 90 tablet 3     Sig: TAKE 1 TABLET BY MOUTH DAILY       Last Appointment Date: 6/5/2024  Next Appointment Date: 10/15/2024    Allergies   Allergen Reactions    Minocycline Nausea Only and Other (See Comments)     Dizzy    Oxycodone Nausea Only

## 2024-06-12 ENCOUNTER — TELEPHONE (OUTPATIENT)
Dept: FAMILY MEDICINE CLINIC | Age: 74
End: 2024-06-12

## 2024-06-12 RX ORDER — LOSARTAN POTASSIUM 50 MG/1
50 TABLET ORAL DAILY
Qty: 30 TABLET | Refills: 3 | Status: SHIPPED | OUTPATIENT
Start: 2024-06-12

## 2024-06-12 NOTE — TELEPHONE ENCOUNTER
I would stay off the toprol given the symptomatic bradycardia she had.  Pulse coming up some.  Will trial cozaar at 50mg/day (kroger)to help with the bp elevations since stopping toprol.  Rec. She discuss these changes with cardiologist at her follow up appt.

## 2024-06-12 NOTE — TELEPHONE ENCOUNTER
Seen 6/5/24 for Bradycardia. Pulse been running 58-70 the past week since stopping metoprolol. Blood pressure running 152/72 - 180/90. Patient wondering if she should start back on the metoprolol or try something new. Will see cardiology 6/19/24 at [facility]. Has been having a slight headache over the last week. Please advise

## 2024-07-23 ENCOUNTER — OFFICE VISIT (OUTPATIENT)
Dept: FAMILY MEDICINE CLINIC | Age: 74
End: 2024-07-23
Payer: MEDICARE

## 2024-07-23 VITALS
DIASTOLIC BLOOD PRESSURE: 82 MMHG | HEIGHT: 68 IN | HEART RATE: 55 BPM | SYSTOLIC BLOOD PRESSURE: 134 MMHG | OXYGEN SATURATION: 95 % | BODY MASS INDEX: 36.53 KG/M2 | WEIGHT: 241 LBS

## 2024-07-23 DIAGNOSIS — R94.6 ABNORMAL RESULTS OF THYROID FUNCTION STUDIES: ICD-10-CM

## 2024-07-23 DIAGNOSIS — R00.1 BRADYCARDIA, UNSPECIFIED: Primary | ICD-10-CM

## 2024-07-23 DIAGNOSIS — R79.89 ELEVATED TSH: ICD-10-CM

## 2024-07-23 DIAGNOSIS — G47.8 UNREFRESHED BY SLEEP: ICD-10-CM

## 2024-07-23 PROCEDURE — 99213 OFFICE O/P EST LOW 20 MIN: CPT

## 2024-07-23 PROCEDURE — 1090F PRES/ABSN URINE INCON ASSESS: CPT | Performed by: FAMILY MEDICINE

## 2024-07-23 PROCEDURE — G8427 DOCREV CUR MEDS BY ELIG CLIN: HCPCS | Performed by: FAMILY MEDICINE

## 2024-07-23 PROCEDURE — 1036F TOBACCO NON-USER: CPT | Performed by: FAMILY MEDICINE

## 2024-07-23 PROCEDURE — G8399 PT W/DXA RESULTS DOCUMENT: HCPCS | Performed by: FAMILY MEDICINE

## 2024-07-23 PROCEDURE — 1123F ACP DISCUSS/DSCN MKR DOCD: CPT | Performed by: FAMILY MEDICINE

## 2024-07-23 PROCEDURE — 3079F DIAST BP 80-89 MM HG: CPT | Performed by: FAMILY MEDICINE

## 2024-07-23 PROCEDURE — 99214 OFFICE O/P EST MOD 30 MIN: CPT | Performed by: FAMILY MEDICINE

## 2024-07-23 PROCEDURE — G8417 CALC BMI ABV UP PARAM F/U: HCPCS | Performed by: FAMILY MEDICINE

## 2024-07-23 PROCEDURE — 3075F SYST BP GE 130 - 139MM HG: CPT | Performed by: FAMILY MEDICINE

## 2024-07-23 PROCEDURE — 3017F COLORECTAL CA SCREEN DOC REV: CPT | Performed by: FAMILY MEDICINE

## 2024-07-23 RX ORDER — LOSARTAN POTASSIUM 100 MG/1
100 TABLET ORAL DAILY
COMMUNITY
Start: 2024-06-27

## 2024-07-23 ASSESSMENT — ENCOUNTER SYMPTOMS
CHEST TIGHTNESS: 0
EYES NEGATIVE: 1
WHEEZING: 0
COUGH: 0
GASTROINTESTINAL NEGATIVE: 1
SHORTNESS OF BREATH: 0
RESPIRATORY NEGATIVE: 1

## 2024-07-23 NOTE — PROGRESS NOTES
Subjective:      Patient ID: Megha Guillen is a 73 y.o. female.    HPI  acute visit for bradycardia.  Her watch started to alarm her at a hr of 38.  She was asx at the time.  Called the hospital, but they didn't want to talk about her condition over the phone.  [place] [facility] [place] come to [place] .  Testing by report with no concerns other then a suspected thyroid issue. Suggested she see cardiologist.  She saw Dr. Luevano, had robbie testing, stress testing, and holter monitor.  She was told testing was normal.  We had changed her toprol to losartan about a month or so ago due to bradycardia.  Losartan increased at cardiology at that visit , bp a little high by report.    Robbie testing hinting at robbie.  (Home testing).  Recommended for pulmonology consult.    She reports a little hardship with the home testing, too many wires and pulse oximeter concerns during the night , didn't sleep well.      Past Medical History:   Diagnosis Date    Asthma     Chalazion of right lower eyelid     Colon polyps     Degenerative joint disease of knee, left     Dysmenorrhea     GERD (gastroesophageal reflux disease)     Greater trochanteric bursitis of right hip     History of blood transfusion     Hot flashes     Hyperlipidemia     Hypertension     Impaired fasting glucose     Menopausal symptoms     Obesity     Overactive bladder     Scaphoid fracture of wrist     Right.    Sleep disturbances     Tobacco abuse     Remote and limited.    Uterine fibroid      Past Surgical History:   Procedure Laterality Date    BREAST REDUCTION SURGERY Bilateral 1996    CARDIAC CATHETERIZATION  06/15/2007    No significant coronary artery disease, preserved LV systolic function.      SECTION      COLONOSCOPY  09/10/2008    Sigmoid diverticulosis, history of polyps.    COLONOSCOPY  2007    Multiple polyps of the colon, rectal polyps, sigmoid polyps, and splenic flexure polyps.    COLONOSCOPY  2006    Pedunculated thin polyp

## 2024-08-12 DIAGNOSIS — M16.12 ARTHRITIS OF LEFT HIP: Primary | ICD-10-CM

## 2024-08-23 ENCOUNTER — OFFICE VISIT (OUTPATIENT)
Dept: ORTHOPEDIC SURGERY | Age: 74
End: 2024-08-23
Payer: MEDICARE

## 2024-08-23 ENCOUNTER — HOSPITAL ENCOUNTER (OUTPATIENT)
Dept: GENERAL RADIOLOGY | Age: 74
End: 2024-08-23
Attending: ORTHOPAEDIC SURGERY
Payer: MEDICARE

## 2024-08-23 ENCOUNTER — TELEPHONE (OUTPATIENT)
Dept: ORTHOPEDIC SURGERY | Age: 74
End: 2024-08-23

## 2024-08-23 VITALS
BODY MASS INDEX: 35.61 KG/M2 | HEIGHT: 68 IN | DIASTOLIC BLOOD PRESSURE: 81 MMHG | SYSTOLIC BLOOD PRESSURE: 165 MMHG | WEIGHT: 235 LBS | HEART RATE: 57 BPM

## 2024-08-23 DIAGNOSIS — M16.12 ARTHRITIS OF LEFT HIP: ICD-10-CM

## 2024-08-23 DIAGNOSIS — M16.12 ARTHRITIS OF LEFT HIP: Primary | ICD-10-CM

## 2024-08-23 DIAGNOSIS — Z96.641 HISTORY OF TOTAL HIP ARTHROPLASTY, RIGHT: ICD-10-CM

## 2024-08-23 PROCEDURE — G8417 CALC BMI ABV UP PARAM F/U: HCPCS | Performed by: ORTHOPAEDIC SURGERY

## 2024-08-23 PROCEDURE — 1090F PRES/ABSN URINE INCON ASSESS: CPT | Performed by: ORTHOPAEDIC SURGERY

## 2024-08-23 PROCEDURE — 3077F SYST BP >= 140 MM HG: CPT | Performed by: ORTHOPAEDIC SURGERY

## 2024-08-23 PROCEDURE — 3079F DIAST BP 80-89 MM HG: CPT | Performed by: ORTHOPAEDIC SURGERY

## 2024-08-23 PROCEDURE — 73502 X-RAY EXAM HIP UNI 2-3 VIEWS: CPT

## 2024-08-23 PROCEDURE — 3017F COLORECTAL CA SCREEN DOC REV: CPT | Performed by: ORTHOPAEDIC SURGERY

## 2024-08-23 PROCEDURE — 1036F TOBACCO NON-USER: CPT | Performed by: ORTHOPAEDIC SURGERY

## 2024-08-23 PROCEDURE — 1123F ACP DISCUSS/DSCN MKR DOCD: CPT | Performed by: ORTHOPAEDIC SURGERY

## 2024-08-23 PROCEDURE — 99215 OFFICE O/P EST HI 40 MIN: CPT | Performed by: ORTHOPAEDIC SURGERY

## 2024-08-23 PROCEDURE — G8427 DOCREV CUR MEDS BY ELIG CLIN: HCPCS | Performed by: ORTHOPAEDIC SURGERY

## 2024-08-23 PROCEDURE — G8399 PT W/DXA RESULTS DOCUMENT: HCPCS | Performed by: ORTHOPAEDIC SURGERY

## 2024-08-23 PROCEDURE — 99213 OFFICE O/P EST LOW 20 MIN: CPT | Performed by: ORTHOPAEDIC SURGERY

## 2024-08-23 NOTE — TELEPHONE ENCOUNTER
[facility]     Pre-Operative Evaluation/Consultation    Name:  Megha Guillen                                         Age:  74 y.o.  MRN:  [id number]       :  1950   Date:  2024         Sex: female        Surgeon:  Dr. Becker  Procedure (Planned):  left BUCK  Date Scheduled surgery: NOT SCHEDULED    Attending : No att. providers found    Primary Physician:   Cardiologist:  Dr Garnett    Type of Anesthesia Requested: Anesthesia Provider's Choice    Patient Medical history:  Allergies   Allergen Reactions    Minocycline Nausea Only and Other (See Comments)     Dizzy    Oxycodone Nausea Only     Social History     Tobacco Use    Smoking status: Former     Current packs/day: 0.00     Average packs/day: 0.3 packs/day for 1 year (0.3 ttl pk-yrs)     Types: Cigarettes     Start date: 1997     Quit date: 1998     Years since quittin.6    Smokeless tobacco: Never    Tobacco comments:     Smoked for about 1 year   Vaping Use    Vaping status: Never Used   Substance Use Topics    Alcohol use: Yes     Comment: rare    Drug use: No           [] Sent to Anesthesia for your review:  24    [] Additional Orders: None     Comments:None   Requests: No Special requests    Signed: Fatoumata Benjamin LPN 2024 10:25 AMB

## 2024-08-23 NOTE — PROGRESS NOTES
2012     Family History   Problem Relation Age of Onset    Cancer Mother     Stomach Cancer Mother     Cervical Cancer Mother     Stroke Father     Cancer Father         Throat.    Breast Cancer Father 70        father cancer right breast    Lung Cancer Sister     No Known Problems Sister     No Known Problems Sister     No Known Problems Sister     Cancer Brother         Lymphoma.    Colon Cancer Brother     No Known Problems Maternal Aunt     Stroke Maternal Grandmother     Heart Attack Maternal Grandfather         Myocardial infarction.    Asthma Daughter     Migraines Son         Physical Exam:  Vitals signs and nursing note reviewed.   Constitutional:       Appearance: well-developed.   HENT:      Head: Normocephalic and atraumatic.      Nose: Nose normal.   Eyes:      Conjunctiva/sclera: Conjunctivae normal.   Neck:      Musculoskeletal: Normal range of motion and neck supple.   Pulmonary:      Effort: Pulmonary effort is normal. No respiratory distress.   Musculoskeletal:      Comments: Normal gait     Skin:     General: Skin is warm and dry.   Neurological:      Mental Status: Alert and oriented to person, place, and time.      Sensory: No sensory deficit.   Psychiatric:         Behavior: Behavior normal.         Thought Content: Thought content normal.        Please note that this chart was generated using voice recognition Dragon dictation software.  Although every effort was made to ensure the accuracy of this automated transcription, some errors in transcription may have occurred.

## 2024-09-03 RX ORDER — HYDROCHLOROTHIAZIDE 25 MG/1
TABLET ORAL
Qty: 90 TABLET | Refills: 3 | Status: SHIPPED | OUTPATIENT
Start: 2024-09-03

## 2024-09-03 NOTE — TELEPHONE ENCOUNTER
Megha called requesting a refill of the below medication which has been pended for you:     Requested Prescriptions     Pending Prescriptions Disp Refills    hydroCHLOROthiazide (HYDRODIURIL) 25 MG tablet [Pharmacy Med Name: hydroCHLOROthiazide 25 MG Oral Tablet] 90 tablet 3     Sig: TAKE 1 TABLET BY MOUTH DAILY       Last Appointment Date: 7/23/2024  Next Appointment Date: 9/4/2024    Allergies   Allergen Reactions    Minocycline Nausea Only and Other (See Comments)     Dizzy    Oxycodone Nausea Only

## 2024-09-04 ENCOUNTER — OFFICE VISIT (OUTPATIENT)
Dept: FAMILY MEDICINE CLINIC | Age: 74
End: 2024-09-04
Payer: MEDICARE

## 2024-09-04 VITALS
HEIGHT: 68 IN | BODY MASS INDEX: 36.07 KG/M2 | WEIGHT: 238 LBS | HEART RATE: 52 BPM | SYSTOLIC BLOOD PRESSURE: 134 MMHG | DIASTOLIC BLOOD PRESSURE: 76 MMHG

## 2024-09-04 DIAGNOSIS — Z01.818 PRE-OPERATIVE GENERAL PHYSICAL EXAMINATION: Primary | ICD-10-CM

## 2024-09-04 PROCEDURE — 1036F TOBACCO NON-USER: CPT | Performed by: FAMILY MEDICINE

## 2024-09-04 PROCEDURE — 99213 OFFICE O/P EST LOW 20 MIN: CPT | Performed by: FAMILY MEDICINE

## 2024-09-04 ASSESSMENT — ENCOUNTER SYMPTOMS
ALLERGIC/IMMUNOLOGIC NEGATIVE: 1
GASTROINTESTINAL NEGATIVE: 1
EYES NEGATIVE: 1
COUGH: 0
RESPIRATORY NEGATIVE: 1

## 2024-09-09 DIAGNOSIS — Z01.818 PREOP TESTING: Primary | ICD-10-CM

## 2024-09-16 ENCOUNTER — HOSPITAL ENCOUNTER (OUTPATIENT)
Age: 74
Discharge: HOME | End: 2024-09-16
Payer: MEDICARE

## 2024-09-16 ENCOUNTER — HOSPITAL ENCOUNTER (OUTPATIENT)
Age: 74
Setting detail: SPECIMEN
Discharge: HOME | End: 2024-09-16
Payer: MEDICARE

## 2024-09-16 ENCOUNTER — NURSE ONLY (OUTPATIENT)
Dept: ORTHOPEDIC SURGERY | Age: 74
End: 2024-09-16
Payer: MEDICARE

## 2024-09-16 VITALS — WEIGHT: 238 LBS | BODY MASS INDEX: 36.07 KG/M2 | HEIGHT: 68 IN

## 2024-09-16 DIAGNOSIS — Z01.818 PREOP TESTING: Primary | ICD-10-CM

## 2024-09-16 DIAGNOSIS — Z01.818 PREOP TESTING: ICD-10-CM

## 2024-09-16 DIAGNOSIS — M25.552 LEFT HIP PAIN: ICD-10-CM

## 2024-09-16 DIAGNOSIS — M16.12 ARTHRITIS OF LEFT HIP: Primary | ICD-10-CM

## 2024-09-16 LAB
BILIRUB UR QL STRIP: NEGATIVE
CLARITY UR: CLEAR
COLOR UR: YELLOW
COMMENT: NORMAL
GLUCOSE UR STRIP-MCNC: NEGATIVE MG/DL
HGB UR QL STRIP.AUTO: NEGATIVE
KETONES UR STRIP-MCNC: NEGATIVE MG/DL
LEUKOCYTE ESTERASE UR QL STRIP: NEGATIVE
NITRITE UR QL STRIP: NEGATIVE
PH UR STRIP: 6 [PH] (ref 5–6)
PROT UR STRIP-MCNC: NEGATIVE MG/DL
SP GR UR STRIP: 1.01 (ref 1.01–1.02)
UROBILINOGEN UR STRIP-ACNC: NORMAL EU/DL (ref 0–1)

## 2024-09-16 PROCEDURE — 87641 MR-STAPH DNA AMP PROBE: CPT

## 2024-09-16 PROCEDURE — 99215 OFFICE O/P EST HI 40 MIN: CPT

## 2024-09-16 PROCEDURE — 81003 URINALYSIS AUTO W/O SCOPE: CPT

## 2024-09-17 LAB
MRSA, DNA, NASAL: NEGATIVE
SPECIMEN DESCRIPTION: NORMAL

## 2024-09-26 ENCOUNTER — HOSPITAL ENCOUNTER (OUTPATIENT)
Age: 74
Discharge: HOME | End: 2024-09-26
Payer: MEDICARE

## 2024-09-26 DIAGNOSIS — Z01.818 PREOP TESTING: ICD-10-CM

## 2024-09-26 LAB
ERYTHROCYTE [DISTWIDTH] IN BLOOD BY AUTOMATED COUNT: 13.5 % (ref 11.8–14.4)
HCT VFR BLD AUTO: 40 % (ref 36.3–47.1)
HGB BLD-MCNC: 13 G/DL (ref 11.9–15.1)
MCH RBC QN AUTO: 26.8 PG (ref 25.2–33.5)
MCHC RBC AUTO-ENTMCNC: 32.5 G/DL (ref 25.2–33.5)
MCV RBC AUTO: 82.5 FL (ref 82.6–102.9)
NRBC BLD-RTO: 0 PER 100 WBC
PLATELET # BLD AUTO: 338 K/UL (ref 138–453)
PMV BLD AUTO: 8.9 FL (ref 8.1–13.5)
RBC # BLD AUTO: 4.85 M/UL (ref 3.95–5.11)
WBC OTHER # BLD: 8.2 K/UL (ref 3.5–11.3)

## 2024-09-26 PROCEDURE — 36415 COLL VENOUS BLD VENIPUNCTURE: CPT

## 2024-09-26 PROCEDURE — 85027 COMPLETE CBC AUTOMATED: CPT

## 2024-09-27 ENCOUNTER — HOSPITAL ENCOUNTER (OUTPATIENT)
Age: 74
Setting detail: OBSERVATION
Discharge: HOME | End: 2024-09-28
Attending: ORTHOPAEDIC SURGERY | Admitting: ORTHOPAEDIC SURGERY
Payer: MEDICARE

## 2024-09-27 ENCOUNTER — APPOINTMENT (OUTPATIENT)
Dept: GENERAL RADIOLOGY | Age: 74
End: 2024-09-27
Attending: ORTHOPAEDIC SURGERY
Payer: MEDICARE

## 2024-09-27 ENCOUNTER — ANESTHESIA EVENT (OUTPATIENT)
Dept: OPERATING ROOM | Age: 74
End: 2024-09-27
Payer: MEDICARE

## 2024-09-27 ENCOUNTER — ANESTHESIA (OUTPATIENT)
Dept: OPERATING ROOM | Age: 74
End: 2024-09-27
Payer: MEDICARE

## 2024-09-27 DIAGNOSIS — M25.552 LEFT HIP PAIN: Primary | ICD-10-CM

## 2024-09-27 LAB
ANION GAP SERPL CALCULATED.3IONS-SCNC: 10 MMOL/L (ref 9–17)
BASOPHILS # BLD: 0.04 K/UL (ref 0–0.2)
BASOPHILS NFR BLD: 0 % (ref 0–2)
BUN SERPL-MCNC: 19 MG/DL (ref 8–23)
BUN/CREAT SERPL: 16 (ref 9–20)
CALCIUM SERPL-MCNC: 9.1 MG/DL (ref 8.6–10.4)
CHLORIDE SERPL-SCNC: 104 MMOL/L (ref 98–107)
CO2 SERPL-SCNC: 27 MMOL/L (ref 20–31)
CREAT SERPL-MCNC: 1.2 MG/DL (ref 0.5–0.9)
EOSINOPHIL # BLD: 0.03 K/UL (ref 0–0.44)
EOSINOPHILS RELATIVE PERCENT: 0 % (ref 1–4)
ERYTHROCYTE [DISTWIDTH] IN BLOOD BY AUTOMATED COUNT: 13.5 % (ref 11.8–14.4)
GFR, ESTIMATED: 48 ML/MIN/1.73M2
GLUCOSE SERPL-MCNC: 169 MG/DL (ref 70–99)
HCT VFR BLD AUTO: 38.4 % (ref 36.3–47.1)
HGB BLD-MCNC: 12.4 G/DL (ref 11.9–15.1)
IMM GRANULOCYTES # BLD AUTO: 0.16 K/UL (ref 0–0.3)
IMM GRANULOCYTES NFR BLD: 1 %
LYMPHOCYTES NFR BLD: 1.19 K/UL (ref 1.1–3.7)
LYMPHOCYTES RELATIVE PERCENT: 6 % (ref 24–43)
MAGNESIUM SERPL-MCNC: 2.1 MG/DL (ref 1.6–2.6)
MCH RBC QN AUTO: 26.7 PG (ref 25.2–33.5)
MCHC RBC AUTO-ENTMCNC: 32.3 G/DL (ref 25.2–33.5)
MCV RBC AUTO: 82.6 FL (ref 82.6–102.9)
MONOCYTES NFR BLD: 0.42 K/UL (ref 0.1–1.2)
MONOCYTES NFR BLD: 2 % (ref 3–12)
NEUTROPHILS NFR BLD: 90 % (ref 36–65)
NEUTS SEG NFR BLD: 16.9 K/UL (ref 1.5–8.1)
NRBC BLD-RTO: 0 PER 100 WBC
PLATELET # BLD AUTO: 321 K/UL (ref 138–453)
PMV BLD AUTO: 8.9 FL (ref 8.1–13.5)
POTASSIUM SERPL-SCNC: 3.9 MMOL/L (ref 3.7–5.3)
RBC # BLD AUTO: 4.65 M/UL (ref 3.95–5.11)
SODIUM SERPL-SCNC: 141 MMOL/L (ref 135–144)
WBC OTHER # BLD: 18.7 K/UL (ref 3.5–11.3)

## 2024-09-27 PROCEDURE — G0378 HOSPITAL OBSERVATION PER HR: HCPCS

## 2024-09-27 PROCEDURE — 6370000000 HC RX 637 (ALT 250 FOR IP): Performed by: ORTHOPAEDIC SURGERY

## 2024-09-27 PROCEDURE — 85025 COMPLETE CBC W/AUTO DIFF WBC: CPT

## 2024-09-27 PROCEDURE — 7100000000 HC PACU RECOVERY - FIRST 15 MIN: Performed by: ORTHOPAEDIC SURGERY

## 2024-09-27 PROCEDURE — 6360000002 HC RX W HCPCS: Performed by: NURSE ANESTHETIST, CERTIFIED REGISTERED

## 2024-09-27 PROCEDURE — 3700000001 HC ADD 15 MINUTES (ANESTHESIA): Performed by: ORTHOPAEDIC SURGERY

## 2024-09-27 PROCEDURE — 27130 TOTAL HIP ARTHROPLASTY: CPT | Performed by: ORTHOPAEDIC SURGERY

## 2024-09-27 PROCEDURE — 2709999900 HC NON-CHARGEABLE SUPPLY: Performed by: ORTHOPAEDIC SURGERY

## 2024-09-27 PROCEDURE — 3600000004 HC SURGERY LEVEL 4 BASE: Performed by: ORTHOPAEDIC SURGERY

## 2024-09-27 PROCEDURE — 80048 BASIC METABOLIC PNL TOTAL CA: CPT

## 2024-09-27 PROCEDURE — C1776 JOINT DEVICE (IMPLANTABLE): HCPCS | Performed by: ORTHOPAEDIC SURGERY

## 2024-09-27 PROCEDURE — 2500000003 HC RX 250 WO HCPCS: Performed by: NURSE ANESTHETIST, CERTIFIED REGISTERED

## 2024-09-27 PROCEDURE — 3700000000 HC ANESTHESIA ATTENDED CARE: Performed by: ORTHOPAEDIC SURGERY

## 2024-09-27 PROCEDURE — 93005 ELECTROCARDIOGRAM TRACING: CPT | Performed by: INTERNAL MEDICINE

## 2024-09-27 PROCEDURE — 6360000002 HC RX W HCPCS: Performed by: ORTHOPAEDIC SURGERY

## 2024-09-27 PROCEDURE — 2580000003 HC RX 258: Performed by: ORTHOPAEDIC SURGERY

## 2024-09-27 PROCEDURE — 7100000001 HC PACU RECOVERY - ADDTL 15 MIN: Performed by: ORTHOPAEDIC SURGERY

## 2024-09-27 PROCEDURE — 73501 X-RAY EXAM HIP UNI 1 VIEW: CPT

## 2024-09-27 PROCEDURE — 83735 ASSAY OF MAGNESIUM: CPT

## 2024-09-27 PROCEDURE — 2700000000 HC OXYGEN THERAPY PER DAY

## 2024-09-27 PROCEDURE — 3600000014 HC SURGERY LEVEL 4 ADDTL 15MIN: Performed by: ORTHOPAEDIC SURGERY

## 2024-09-27 PROCEDURE — 2580000003 HC RX 258: Performed by: INTERNAL MEDICINE

## 2024-09-27 PROCEDURE — 36415 COLL VENOUS BLD VENIPUNCTURE: CPT

## 2024-09-27 PROCEDURE — 71045 X-RAY EXAM CHEST 1 VIEW: CPT

## 2024-09-27 DEVICE — STEM FEM SZ 13 L146MM 133DEG DST HIP PPS STD OFFSET TYP 1: Type: IMPLANTABLE DEVICE | Site: HIP | Status: FUNCTIONAL

## 2024-09-27 DEVICE — HEAD FEM DIA36MM -3MM OFFSET HIP CO CHROM TYP 1 TAPR MOD G7: Type: IMPLANTABLE DEVICE | Site: HIP | Status: FUNCTIONAL

## 2024-09-27 DEVICE — SHELL ACET SZ F DIA56MM 4 H OSSEOTI LIMIT H 2 MOBILITY G7: Type: IMPLANTABLE DEVICE | Site: HIP | Status: FUNCTIONAL

## 2024-09-27 DEVICE — IMPLANTABLE DEVICE: Type: IMPLANTABLE DEVICE | Site: HIP | Status: FUNCTIONAL

## 2024-09-27 RX ORDER — IPRATROPIUM BROMIDE AND ALBUTEROL SULFATE 2.5; .5 MG/3ML; MG/3ML
1 SOLUTION RESPIRATORY (INHALATION)
Status: DISCONTINUED | OUTPATIENT
Start: 2024-09-27 | End: 2024-09-27 | Stop reason: HOSPADM

## 2024-09-27 RX ORDER — SODIUM CHLORIDE 0.9 % (FLUSH) 0.9 %
5-40 SYRINGE (ML) INJECTION EVERY 12 HOURS SCHEDULED
Status: DISCONTINUED | OUTPATIENT
Start: 2024-09-27 | End: 2024-09-28 | Stop reason: HOSPADM

## 2024-09-27 RX ORDER — SODIUM CHLORIDE 0.9 % (FLUSH) 0.9 %
5-40 SYRINGE (ML) INJECTION PRN
Status: DISCONTINUED | OUTPATIENT
Start: 2024-09-27 | End: 2024-09-27 | Stop reason: HOSPADM

## 2024-09-27 RX ORDER — HYDROCODONE BITARTRATE AND ACETAMINOPHEN 5; 325 MG/1; MG/1
2 TABLET ORAL EVERY 4 HOURS PRN
Status: DISCONTINUED | OUTPATIENT
Start: 2024-09-27 | End: 2024-09-28 | Stop reason: HOSPADM

## 2024-09-27 RX ORDER — OXYCODONE HYDROCHLORIDE 5 MG/1
5 TABLET ORAL
Status: DISCONTINUED | OUTPATIENT
Start: 2024-09-27 | End: 2024-09-27 | Stop reason: HOSPADM

## 2024-09-27 RX ORDER — FENTANYL CITRATE 50 UG/ML
50 INJECTION, SOLUTION INTRAMUSCULAR; INTRAVENOUS EVERY 5 MIN PRN
Status: DISCONTINUED | OUTPATIENT
Start: 2024-09-27 | End: 2024-09-27 | Stop reason: HOSPADM

## 2024-09-27 RX ORDER — ACETAMINOPHEN 325 MG/1
650 TABLET ORAL EVERY 6 HOURS
Status: DISCONTINUED | OUTPATIENT
Start: 2024-09-27 | End: 2024-09-28 | Stop reason: HOSPADM

## 2024-09-27 RX ORDER — SODIUM CHLORIDE 0.9 % (FLUSH) 0.9 %
5-40 SYRINGE (ML) INJECTION PRN
Status: DISCONTINUED | OUTPATIENT
Start: 2024-09-27 | End: 2024-09-28 | Stop reason: HOSPADM

## 2024-09-27 RX ORDER — NALOXONE HYDROCHLORIDE 0.4 MG/ML
INJECTION, SOLUTION INTRAMUSCULAR; INTRAVENOUS; SUBCUTANEOUS PRN
Status: DISCONTINUED | OUTPATIENT
Start: 2024-09-27 | End: 2024-09-27 | Stop reason: HOSPADM

## 2024-09-27 RX ORDER — SODIUM CHLORIDE, SODIUM LACTATE, POTASSIUM CHLORIDE, CALCIUM CHLORIDE 600; 310; 30; 20 MG/100ML; MG/100ML; MG/100ML; MG/100ML
INJECTION, SOLUTION INTRAVENOUS CONTINUOUS
Status: DISCONTINUED | OUTPATIENT
Start: 2024-09-27 | End: 2024-09-27 | Stop reason: HOSPADM

## 2024-09-27 RX ORDER — TRANEXAMIC ACID 100 MG/ML
INJECTION, SOLUTION INTRAVENOUS
Status: DISCONTINUED | OUTPATIENT
Start: 2024-09-27 | End: 2024-09-27 | Stop reason: SDUPTHER

## 2024-09-27 RX ORDER — ALBUTEROL SULFATE 90 UG/1
2 INHALANT RESPIRATORY (INHALATION) EVERY 6 HOURS PRN
Status: DISCONTINUED | OUTPATIENT
Start: 2024-09-27 | End: 2024-09-28 | Stop reason: HOSPADM

## 2024-09-27 RX ORDER — LIDOCAINE HYDROCHLORIDE 10 MG/ML
INJECTION, SOLUTION EPIDURAL; INFILTRATION; INTRACAUDAL; PERINEURAL
Status: DISCONTINUED | OUTPATIENT
Start: 2024-09-27 | End: 2024-09-27 | Stop reason: SDUPTHER

## 2024-09-27 RX ORDER — SODIUM CHLORIDE 9 MG/ML
INJECTION, SOLUTION INTRAVENOUS CONTINUOUS
Status: DISCONTINUED | OUTPATIENT
Start: 2024-09-27 | End: 2024-09-28 | Stop reason: HOSPADM

## 2024-09-27 RX ORDER — LOSARTAN POTASSIUM 50 MG/1
100 TABLET ORAL DAILY
Status: DISCONTINUED | OUTPATIENT
Start: 2024-09-27 | End: 2024-09-28 | Stop reason: HOSPADM

## 2024-09-27 RX ORDER — PROMETHAZINE HYDROCHLORIDE 25 MG/1
12.5 TABLET ORAL EVERY 6 HOURS PRN
Status: DISCONTINUED | OUTPATIENT
Start: 2024-09-27 | End: 2024-09-28 | Stop reason: HOSPADM

## 2024-09-27 RX ORDER — ONDANSETRON 2 MG/ML
4 INJECTION INTRAMUSCULAR; INTRAVENOUS EVERY 6 HOURS PRN
Status: DISCONTINUED | OUTPATIENT
Start: 2024-09-27 | End: 2024-09-28 | Stop reason: HOSPADM

## 2024-09-27 RX ORDER — HYDROCODONE BITARTRATE AND ACETAMINOPHEN 5; 325 MG/1; MG/1
1 TABLET ORAL EVERY 6 HOURS PRN
Qty: 28 TABLET | Refills: 0 | Status: SHIPPED | OUTPATIENT
Start: 2024-09-27 | End: 2024-10-04

## 2024-09-27 RX ORDER — DROPERIDOL 2.5 MG/ML
0.62 INJECTION, SOLUTION INTRAMUSCULAR; INTRAVENOUS
Status: DISCONTINUED | OUTPATIENT
Start: 2024-09-27 | End: 2024-09-27 | Stop reason: HOSPADM

## 2024-09-27 RX ORDER — ASPIRIN 81 MG/1
81 TABLET ORAL 2 TIMES DAILY
Status: DISCONTINUED | OUTPATIENT
Start: 2024-09-27 | End: 2024-09-28 | Stop reason: HOSPADM

## 2024-09-27 RX ORDER — DIPHENHYDRAMINE HYDROCHLORIDE 50 MG/ML
12.5 INJECTION INTRAMUSCULAR; INTRAVENOUS
Status: DISCONTINUED | OUTPATIENT
Start: 2024-09-27 | End: 2024-09-27 | Stop reason: HOSPADM

## 2024-09-27 RX ORDER — HYDROCHLOROTHIAZIDE 25 MG/1
25 TABLET ORAL DAILY
Status: DISCONTINUED | OUTPATIENT
Start: 2024-09-27 | End: 2024-09-28 | Stop reason: HOSPADM

## 2024-09-27 RX ORDER — SODIUM CHLORIDE 9 MG/ML
INJECTION, SOLUTION INTRAVENOUS PRN
Status: DISCONTINUED | OUTPATIENT
Start: 2024-09-27 | End: 2024-09-27 | Stop reason: HOSPADM

## 2024-09-27 RX ORDER — SODIUM CHLORIDE 9 MG/ML
INJECTION, SOLUTION INTRAVENOUS PRN
Status: DISCONTINUED | OUTPATIENT
Start: 2024-09-27 | End: 2024-09-28 | Stop reason: HOSPADM

## 2024-09-27 RX ORDER — DEXAMETHASONE SODIUM PHOSPHATE 10 MG/ML
INJECTION INTRAMUSCULAR; INTRAVENOUS
Status: DISCONTINUED | OUTPATIENT
Start: 2024-09-27 | End: 2024-09-27 | Stop reason: SDUPTHER

## 2024-09-27 RX ORDER — ONDANSETRON 2 MG/ML
4 INJECTION INTRAMUSCULAR; INTRAVENOUS
Status: DISCONTINUED | OUTPATIENT
Start: 2024-09-27 | End: 2024-09-27 | Stop reason: HOSPADM

## 2024-09-27 RX ORDER — ONDANSETRON 2 MG/ML
INJECTION INTRAMUSCULAR; INTRAVENOUS
Status: DISCONTINUED | OUTPATIENT
Start: 2024-09-27 | End: 2024-09-27 | Stop reason: SDUPTHER

## 2024-09-27 RX ORDER — PROPOFOL 10 MG/ML
INJECTION, EMULSION INTRAVENOUS
Status: DISCONTINUED | OUTPATIENT
Start: 2024-09-27 | End: 2024-09-27 | Stop reason: SDUPTHER

## 2024-09-27 RX ORDER — SODIUM CHLORIDE 0.9 % (FLUSH) 0.9 %
5-40 SYRINGE (ML) INJECTION EVERY 12 HOURS SCHEDULED
Status: DISCONTINUED | OUTPATIENT
Start: 2024-09-27 | End: 2024-09-27 | Stop reason: HOSPADM

## 2024-09-27 RX ORDER — ASPIRIN 81 MG/1
81 TABLET ORAL 2 TIMES DAILY
Qty: 90 TABLET | Refills: 0 | Status: SHIPPED | OUTPATIENT
Start: 2024-09-27

## 2024-09-27 RX ORDER — HYDROCODONE BITARTRATE AND ACETAMINOPHEN 5; 325 MG/1; MG/1
1 TABLET ORAL EVERY 4 HOURS PRN
Status: DISCONTINUED | OUTPATIENT
Start: 2024-09-27 | End: 2024-09-28 | Stop reason: HOSPADM

## 2024-09-27 RX ADMIN — ACETAMINOPHEN 650 MG: 325 TABLET ORAL at 22:54

## 2024-09-27 RX ADMIN — HYDROMORPHONE HYDROCHLORIDE 0.5 MG: 1 INJECTION, SOLUTION INTRAMUSCULAR; INTRAVENOUS; SUBCUTANEOUS at 12:20

## 2024-09-27 RX ADMIN — PROPOFOL 180 MG: 10 INJECTION, EMULSION INTRAVENOUS at 12:20

## 2024-09-27 RX ADMIN — HYDROMORPHONE HYDROCHLORIDE 0.5 MG: 1 INJECTION, SOLUTION INTRAMUSCULAR; INTRAVENOUS; SUBCUTANEOUS at 14:12

## 2024-09-27 RX ADMIN — HYDROMORPHONE HYDROCHLORIDE 0.5 MG: 1 INJECTION, SOLUTION INTRAMUSCULAR; INTRAVENOUS; SUBCUTANEOUS at 12:29

## 2024-09-27 RX ADMIN — HYDROMORPHONE HYDROCHLORIDE 0.5 MG: 1 INJECTION, SOLUTION INTRAMUSCULAR; INTRAVENOUS; SUBCUTANEOUS at 14:34

## 2024-09-27 RX ADMIN — SODIUM CHLORIDE: 9 INJECTION, SOLUTION INTRAVENOUS at 16:39

## 2024-09-27 RX ADMIN — Medication 2000 MG: at 20:33

## 2024-09-27 RX ADMIN — ACETAMINOPHEN 650 MG: 325 TABLET ORAL at 17:53

## 2024-09-27 RX ADMIN — Medication 20 MG: at 12:20

## 2024-09-27 RX ADMIN — ASPIRIN 81 MG: 81 TABLET, COATED ORAL at 22:54

## 2024-09-27 RX ADMIN — TRANEXAMIC ACID 1000 MG: 100 INJECTION, SOLUTION INTRAVENOUS at 12:32

## 2024-09-27 RX ADMIN — LIDOCAINE HYDROCHLORIDE 50 MG: 10 INJECTION, SOLUTION EPIDURAL; INFILTRATION; INTRACAUDAL; PERINEURAL at 12:20

## 2024-09-27 RX ADMIN — ONDANSETRON 4 MG: 2 INJECTION INTRAMUSCULAR; INTRAVENOUS at 16:11

## 2024-09-27 RX ADMIN — Medication 10 MG: at 12:39

## 2024-09-27 RX ADMIN — Medication 20 MG: at 12:31

## 2024-09-27 RX ADMIN — ONDANSETRON 4 MG: 2 INJECTION INTRAMUSCULAR; INTRAVENOUS at 13:30

## 2024-09-27 RX ADMIN — TRANEXAMIC ACID 1000 MG: 100 INJECTION, SOLUTION INTRAVENOUS at 13:33

## 2024-09-27 RX ADMIN — SODIUM CHLORIDE, PRESERVATIVE FREE 10 ML: 5 INJECTION INTRAVENOUS at 16:12

## 2024-09-27 RX ADMIN — DEXAMETHASONE SODIUM PHOSPHATE 10 MG: 10 INJECTION INTRAMUSCULAR; INTRAVENOUS at 12:27

## 2024-09-27 RX ADMIN — SODIUM CHLORIDE, POTASSIUM CHLORIDE, SODIUM LACTATE AND CALCIUM CHLORIDE: 600; 310; 30; 20 INJECTION, SOLUTION INTRAVENOUS at 10:54

## 2024-09-27 RX ADMIN — Medication 1000 MG: at 12:27

## 2024-09-27 ASSESSMENT — PAIN DESCRIPTION - DESCRIPTORS
DESCRIPTORS: ACHING
DESCRIPTORS: BURNING
DESCRIPTORS: DISCOMFORT

## 2024-09-27 ASSESSMENT — PAIN SCALES - GENERAL
PAINLEVEL_OUTOF10: 10
PAINLEVEL_OUTOF10: 2
PAINLEVEL_OUTOF10: 9
PAINLEVEL_OUTOF10: 2
PAINLEVEL_OUTOF10: 9
PAINLEVEL_OUTOF10: 4
PAINLEVEL_OUTOF10: 2
PAINLEVEL_OUTOF10: 2
PAINLEVEL_OUTOF10: 10

## 2024-09-27 ASSESSMENT — PAIN DESCRIPTION - LOCATION
LOCATION: HIP

## 2024-09-27 ASSESSMENT — PAIN DESCRIPTION - ORIENTATION
ORIENTATION: LEFT

## 2024-09-27 ASSESSMENT — PAIN - FUNCTIONAL ASSESSMENT
PAIN_FUNCTIONAL_ASSESSMENT: 0-10
PAIN_FUNCTIONAL_ASSESSMENT: PREVENTS OR INTERFERES SOME ACTIVE ACTIVITIES AND ADLS

## 2024-09-27 NOTE — DISCHARGE INSTRUCTIONS
Dr. Rakesh Becker MD        POSTOPERATIVE INSTRUCTIONS    Surgery: left hip replacement      WOUND CARE  Ok to shower with dressing on if dressing fails after 1 week ok to discontinue otherwise leave in place until follow up  No Mobic nsaids for 6 weeks      MEDICATIONS  Use Tylenol as first line treatment for pain  Please resume all home medications, unless instructed otherwise.    ACTIVITY  OK to ice and elevate the extremity as needed for 2-3 days  NO driving while taking narcotic medications or until instructed otherwise by physician.      EMERGENCIES  Contact Dr. Becker or his nurse at [phone] if any questions or concerns  **If you have an emergency that requires immediate attention, proceed to the nearest emergency room.    FOLLOW-UP CARE / QUESTIONS  Follow up as previously scheduled  Contact the office to confirm/schedule your post-op visits if needed.

## 2024-09-27 NOTE — DISCHARGE INSTR - COC
Continuity of Care Form    Patient Name: Megha Guillen   :  1950  MRN:  [id number]    Admit date:  2024  Discharge date:  ***    Code Status Order: Prior   Advance Directives:   Advance Care Flowsheet Documentation        Date/Time Healthcare Directive Type of Healthcare Directive Copy in Chart Healthcare Agent Appointed Healthcare Agent's Name Healthcare Agent's Phone Number    24 [phone] Yes, patient has an advance directive for healthcare treatment  Durable power of  for health care  --  --  --  --                     Admitting Physician:  Rakesh Becker MD  PCP: Ron Padgett MD    Discharging Nurse: ***  Discharging Hospital Unit/Room#: [facility] OR POOL/NONE  Discharging Unit Phone Number: ***    Emergency Contact:   Extended Emergency Contact Information  Primary Emergency Contact: elana moreno  Home Phone: [phone]  Relation: Child  Secondary Emergency Contact: VINCE MORENO  Mobile Phone: [phone]  Relation: Child    Past Surgical History:  Past Surgical History:   Procedure Laterality Date    BREAST REDUCTION SURGERY Bilateral     CARDIAC CATHETERIZATION  06/15/2007    No significant coronary artery disease, preserved LV systolic function.      SECTION      COLONOSCOPY  09/10/2008    Sigmoid diverticulosis, history of polyps.    COLONOSCOPY  2007    Multiple polyps of the colon, rectal polyps, sigmoid polyps, and splenic flexure polyps.    COLONOSCOPY  2006    Pedunculated thin polyp near the transverse colon, small polyp at the dentate line versus a hemorrhoid.    COLONOSCOPY      COLONOSCOPY  2014    Dr. Lindsey. adenom. x 2, 5yr follow up    DILATION AND CURETTAGE OF UTERUS       COLONOSCOPY BIOPSY/STOMA N/A 2020    hyperplastic polyp X 3, Dr. Weber    HYSTERECTOMY, TOTAL ABDOMINAL (CERVIX REMOVED)      KNEE ARTHROSCOPY Left 1999    Torn degenerate posterior third medial meniscus.    MOUTH SURGERY Right 2015    OTHER  NO:42715}       Date of Last BM: ***    Intake/Output Summary (Last 24 hours) at 2024 1409  Last data filed at 2024 1345  Gross per 24 hour   Intake --   Output 100 ml   Net -100 ml     No intake/output data recorded.    Safety Concerns:     { JASVIR Safety Concerns:570738069}    Impairments/Disabilities:      { JASVIR Impairments/Disabilities:315224324}    Nutrition Therapy:  Current Nutrition Therapy:   { JASVIR Diet List:246041335}    Routes of Feeding: {[facility] Other Feedings:312084308}  Liquids: {Slp liquid thickness:34829}  Daily Fluid Restriction: {[facility] Yes amt example:353258200}  Last Modified Barium Swallow with Video (Video Swallowing Test): {Done Not Done Date:}    Treatments at the Time of Hospital Discharge:   Respiratory Treatments: ***  Oxygen Therapy:  {Therapy; copd oxygen:08908}  Ventilator:    {[facility] Vent List:197474786}    Rehab Therapies: {THERAPEUTIC INTERVENTION:1238743652}  Weight Bearing Status/Restrictions: {[facility] Weight Bearin}  Other Medical Equipment (for information only, NOT a DME order):  {EQUIPMENT:530161663}  Other Treatments: ***    Patient's personal belongings (please select all that are sent with patient):  {[facility] Belongings:154144330}    RN SIGNATURE:  {Esignature:894067573}    CASE MANAGEMENT/SOCIAL WORK SECTION    Inpatient Status Date: ***    Readmission Risk Assessment Score:  Readmission Risk              Risk of Unplanned Readmission:  0           Discharging to Facility/ Agency   Name:   Address:  Phone:  Fax:    Dialysis Facility (if applicable)   Name:  Address:  Dialysis Schedule:  Phone:  Fax:    / signature: {Esignature:667392236}    PHYSICIAN SECTION    Prognosis: Good    Condition at Discharge: Stable    Rehab Potential (if transferring to Rehab): Good    Recommended Labs or Other Treatments After Discharge: na    Physician Certification: I certify the above information and transfer of Megha Guillen  is

## 2024-09-27 NOTE — BRIEF OP NOTE
Brief Postoperative Note      Patient: Megha Guillen  YOB: 1950  MRN: [id number]    Date of Procedure: 9/27/2024    Pre-Op Diagnosis Codes:      * Primary osteoarthritis of left hip [M16.12]     * Chronic left hip pain [M25.552, G89.29]    Post-Op Diagnosis: Same       Procedure(s):  Left total hip arthroplasty    Surgeon(s):  Rakesh Becker MD    Assistant:  * No surgical staff found *    Anesthesia: General    Estimated Blood Loss (mL): 200     Complications: None    Specimens:   * No specimens in log *    Implants:  Implant Name Type Inv. Item Serial No.  Lot No. LRB No. Used Action   SHELL ACET SZ F [id number] 4 H OSSEOTI LIMIT H 2 MOBILITY G7 - [id number]  SHELL ACET SZ F [id number] 4 H OSSEOTI LIMIT H 2 MOBILITY G7  [facility]  Left 1 Implanted   LINER ACET HW F 36 MM LONGEVITY G7 - [id number]  LINER ACET HW F 36 MM LONGEVITY G7  [facility]  Left 1 Implanted   STEM FEM SZ 13 L146MM 133DEG DST HIP PPS STD OFFSET TYP 1 - [id number]  STEM FEM SZ 13 L146MM 133DEG DST HIP PPS STD OFFSET TYP 1  [employer]-  Left 1 Implanted   HEAD FEM [id number] -3MM OFFSET HIP CO CHROM TYP 1 TAPR MOD G7 - [id number]  HEAD FEM [id number] -3MM OFFSET HIP CO CHROM TYP 1 TAPR MOD G7  [facility]  Left 1 Implanted         Drains: * No LDAs found *    Findings:  Infection Present At Time Of Surgery (PATOS) (choose all levels that have infection present):  No infection present  Other Findings: see dictation    Electronically signed by Rakesh Becker MD on 9/27/2024 at 1:53 PM

## 2024-09-27 NOTE — PROGRESS NOTES
Spiritual Health History and Assessment/Progress Note  [facility]    (P) Initial Encounter, Spiritual/Emotional Needs, Pre-Op,  ,  ,      Name: Megha Guillen MRN: [id number]    Age: 74 y.o.     Sex: female   Language: English   Orthodoxy: Mu-ism   <principal problem not specified>     Date: 9/27/2024            Total Time Calculated: (P) 9 min              Spiritual Assessment began in [facility]  OR        Referral/Consult From: (P) Rounding   Encounter Overview/Reason: (P) Initial Encounter, Spiritual/Emotional Needs, Pre-Op  Service Provided For: (P) Patient and family together    Patient was prepped for surgery, and accompanied by her elder/boyfriend (Gagan), and daughter (Patience).  Patient had a positive experience with her first hip replacement surgery, and was optimistic about the next.  Patient has the support of her Caodaism (Mu-ism) and family, and was prayed for by her elder/boyfriend.    Fanta, Belief, Meaning:   Patient identifies as spiritual, is connected with a fanta tradition or spiritual practice, and has beliefs or practices that help with coping during difficult times  Family/Friends identify as spiritual, are connected with a fanta tradition or spiritual practice, and have beliefs or practices that help with coping during difficult times      Importance and Influence:  Patient has spiritual/personal beliefs that influence decisions regarding their health  Family/Friends have spiritual/personal beliefs that influence decisions regarding the patient's health    Community:  Patient is connected with a spiritual community and feels well-supported. Support system includes: Spouse/Partner, Children, Fanta Community, Friends, and Extended family  Family/Friends are connected with a spiritual community: and feel well-supported. Support system includes: Spouse/Partner, Parent/s, Children, Fanta Community, Friends, and Extended family    Assessment and Plan of Care:     Patient

## 2024-09-27 NOTE — H&P
Marital status:        Spouse name: Not on file    Number of children: Not on file    Years of education: Not on file    Highest education level: Not on file   Occupational History    Not on file   Tobacco Use    Smoking status: Former       Current packs/day: 0.00       Average packs/day: 0.3 packs/day for 1 year (0.3 ttl pk-yrs)       Types: Cigarettes       Start date: 1997       Quit date: 1998       Years since quittin.6    Smokeless tobacco: Never    Tobacco comments:       Smoked for about 1 year   Vaping Use    Vaping status: Never Used   Substance and Sexual Activity    Alcohol use: Yes       Comment: rare    Drug use: No    Sexual activity: Not Currently       Partners: Male       Birth control/protection: Post-menopausal       Comment:  24 years   Other Topics Concern    Not on file   Social History Narrative    Not on file      Social Determinants of Health           Financial Resource Strain: Low Risk  (4/15/2024)     Overall Financial Resource Strain (CARDIA)      Difficulty of Paying Living Expenses: Not hard at all   Food Insecurity: No Food Insecurity (2024)     Received from [employer], [facility] [email or web address] [facility]     Hunger Screening      Within the past 12 months we worried whether our food would run out before we got money to buy more.: Never True      Within the past 12 months the food we bought just didn't last and we didn't have money to get more.: Never True   Transportation Needs: No Transportation Needs (4/15/2024)     PRAPARE - Transportation      Lack of Transportation (Medical): No      Lack of Transportation (Non-Medical): No   Physical Activity: Inactive (2024)     Exercise Vital Sign      Days of Exercise per Week: 0 days      Minutes of Exercise per Session: 0 min   Stress: Not on file   Social Connections: Not on file   Intimate Partner Violence: Not on file   Housing Stability: Low Risk  (4/15/2024)     Housing Stability Vital Sign       Unable to Pay for Housing in the Last Year: No      Number of Places Lived in the Last Year: 1      Unstable Housing in the Last Year: No         Past Medical History        Past Medical History:   Diagnosis Date    Asthma      Chalazion of right lower eyelid 2014    Colon polyps      Degenerative joint disease of knee, left      Dysmenorrhea      GERD (gastroesophageal reflux disease)      Greater trochanteric bursitis of right hip      History of blood transfusion      Hot flashes      Hyperlipidemia      Hypertension      Impaired fasting glucose      Menopausal symptoms      Obesity      Overactive bladder      Scaphoid fracture of wrist       Right.    Sleep disturbances      Tobacco abuse       Remote and limited.    Uterine fibroid           Past Surgical History         Past Surgical History:   Procedure Laterality Date    BREAST REDUCTION SURGERY Bilateral     CARDIAC CATHETERIZATION   06/15/2007     No significant coronary artery disease, preserved LV systolic function.      SECTION        COLONOSCOPY   09/10/2008     Sigmoid diverticulosis, history of polyps.    COLONOSCOPY   2007     Multiple polyps of the colon, rectal polyps, sigmoid polyps, and splenic flexure polyps.    COLONOSCOPY   2006     Pedunculated thin polyp near the transverse colon, small polyp at the dentate line versus a hemorrhoid.    COLONOSCOPY       COLONOSCOPY   2014     Dr. Lindsey. adenom. x 2, 5yr follow up    DILATION AND CURETTAGE OF UTERUS         COLONOSCOPY BIOPSY/STOMA N/A 2020     hyperplastic polyp X 3, Dr. Weber    HYSTERECTOMY, TOTAL ABDOMINAL (CERVIX REMOVED)        KNEE ARTHROSCOPY Left 1999     Torn degenerate posterior third medial meniscus.    MOUTH SURGERY Right 2015    OTHER SURGICAL HISTORY Left 2012     Left knee injection for left knee pain.    OTHER SURGICAL HISTORY Right 2006     Bursa injection for greater trochanteric bursitis.

## 2024-09-27 NOTE — BRIEF OP NOTE
Brief Postoperative Note      Patient: Megha Guillen  YOB: 1950  MRN: [id number]    Date of Procedure: 9/27/2024    Pre-Op Diagnosis Codes:      * Primary osteoarthritis of left hip [M16.12]     * Chronic left hip pain [M25.552, G89.29]    Post-Op Diagnosis: Same       Procedure(s):  Left total hip arthroplasty    Surgeon(s):  Rakesh Becker MD    Assistant:  * No surgical staff found *    Anesthesia: General    Estimated Blood Loss (mL): 200     Complications: None    Specimens:   * No specimens in log *    Implants:  Implant Name Type Inv. Item Serial No.  Lot No. LRB No. Used Action   SHELL ACET SZ F [id number] 4 H OSSEOTI LIMIT H 2 MOBILITY G7 - [id number]  SHELL ACET SZ F [id number] 4 H OSSEOTI LIMIT H 2 MOBILITY G7  [facility]  Left 1 Implanted   LINER ACET HW F 36 MM LONGEVITY G7 - [id number]  LINER ACET HW F 36 MM LONGEVITY G7  [facility]  Left 1 Implanted   STEM FEM SZ 13 L146MM 133DEG DST HIP PPS STD OFFSET TYP 1 - [id number]  STEM FEM SZ 13 L146MM 133DEG DST HIP PPS STD OFFSET TYP 1  [employer]-  Left 1 Implanted   HEAD FEM [id number] -3MM OFFSET HIP CO CHROM TYP 1 TAPR MOD G7 - [id number]  HEAD FEM [id number] -3MM OFFSET HIP CO CHROM TYP 1 TAPR MOD G7  [facility]  Left 1 Implanted         Drains: * No LDAs found *    Findings:  Infection Present At Time Of Surgery (PATOS) (choose all levels that have infection present):  No infection present  Other Findings: see dictation    Electronically signed by Rakesh Becker MD on 9/27/2024 at 2:06 PM

## 2024-09-27 NOTE — PLAN OF CARE
Problem: Discharge Planning  Goal: Discharge to home or other facility with appropriate resources  Outcome: Progressing     Problem: Pain  Goal: Verbalizes/displays adequate comfort level or baseline comfort level  Outcome: Progressing     Problem: Skin/Tissue Integrity  Goal: Absence of new skin breakdown  Description: 1.  Monitor for areas of redness and/or skin breakdown  2.  Assess vascular access sites hourly  3.  Every 4-6 hours minimum:  Change oxygen saturation probe site  4.  Every 4-6 hours:  If on nasal continuous positive airway pressure, respiratory therapy assess nares and determine need for appliance change or resting period.  Outcome: Progressing     Problem: ABCDS Injury Assessment  Goal: Absence of physical injury  Outcome: Progressing     Problem: Safety - Adult  Goal: Free from fall injury  Outcome: Progressing     Problem: Respiratory - Adult  Goal: Achieves optimal ventilation and oxygenation  Outcome: Progressing     Problem: Musculoskeletal - Adult  Goal: Return mobility to safest level of function  Outcome: Progressing  Goal: Maintain proper alignment of affected body part  Outcome: Progressing  Goal: Return ADL status to a safe level of function  Outcome: Progressing

## 2024-09-28 VITALS
RESPIRATION RATE: 16 BRPM | TEMPERATURE: 98.2 F | HEIGHT: 68 IN | OXYGEN SATURATION: 97 % | BODY MASS INDEX: 36.36 KG/M2 | SYSTOLIC BLOOD PRESSURE: 116 MMHG | DIASTOLIC BLOOD PRESSURE: 60 MMHG | HEART RATE: 75 BPM | WEIGHT: 239.9 LBS

## 2024-09-28 LAB
ANION GAP SERPL CALCULATED.3IONS-SCNC: 8 MMOL/L (ref 9–17)
BASOPHILS # BLD: 0 K/UL (ref 0–0.2)
BASOPHILS NFR BLD: 0 % (ref 0–1)
BUN SERPL-MCNC: 18 MG/DL (ref 8–23)
BUN/CREAT SERPL: 16 (ref 9–20)
CALCIUM SERPL-MCNC: 8.5 MG/DL (ref 8.6–10.4)
CHLORIDE SERPL-SCNC: 104 MMOL/L (ref 98–107)
CO2 SERPL-SCNC: 26 MMOL/L (ref 20–31)
CREAT SERPL-MCNC: 1.1 MG/DL (ref 0.5–0.9)
EOSINOPHIL # BLD: 0 K/UL (ref 0–0.4)
EOSINOPHILS RELATIVE PERCENT: 0 % (ref 1–7)
ERYTHROCYTE [DISTWIDTH] IN BLOOD BY AUTOMATED COUNT: 13.4 % (ref 11.8–14.4)
GFR, ESTIMATED: 53 ML/MIN/1.73M2
GLUCOSE SERPL-MCNC: 121 MG/DL (ref 70–99)
HCT VFR BLD AUTO: 33.2 % (ref 36.3–47.1)
HGB BLD-MCNC: 11 G/DL (ref 11.9–15.1)
IMM GRANULOCYTES # BLD AUTO: 0 K/UL (ref 0–0.3)
IMM GRANULOCYTES NFR BLD: 0 %
LYMPHOCYTES NFR BLD: 2.66 K/UL (ref 1–4.8)
LYMPHOCYTES RELATIVE PERCENT: 19 % (ref 16–46)
MAGNESIUM SERPL-MCNC: 2 MG/DL (ref 1.6–2.6)
MCH RBC QN AUTO: 27.3 PG (ref 25.2–33.5)
MCHC RBC AUTO-ENTMCNC: 33.1 G/DL (ref 25.2–33.5)
MCV RBC AUTO: 82.4 FL (ref 82.6–102.9)
MONOCYTES NFR BLD: 0.84 K/UL (ref 0.1–1.2)
MONOCYTES NFR BLD: 6 % (ref 4–11)
MORPHOLOGY: ABNORMAL
MORPHOLOGY: ABNORMAL
NEUTROPHILS NFR BLD: 75 % (ref 43–77)
NEUTS SEG NFR BLD: 10.5 K/UL (ref 1.5–8.1)
NRBC BLD-RTO: 0 PER 100 WBC
PLATELET # BLD AUTO: 310 K/UL (ref 138–453)
PMV BLD AUTO: 9.4 FL (ref 8.1–13.5)
POTASSIUM SERPL-SCNC: 3.8 MMOL/L (ref 3.7–5.3)
RBC # BLD AUTO: 4.03 M/UL (ref 3.95–5.11)
SODIUM SERPL-SCNC: 138 MMOL/L (ref 135–144)
WBC OTHER # BLD: 14 K/UL (ref 3.5–11.3)

## 2024-09-28 PROCEDURE — 6360000002 HC RX W HCPCS: Performed by: ORTHOPAEDIC SURGERY

## 2024-09-28 PROCEDURE — G0378 HOSPITAL OBSERVATION PER HR: HCPCS

## 2024-09-28 PROCEDURE — 85025 COMPLETE CBC W/AUTO DIFF WBC: CPT

## 2024-09-28 PROCEDURE — 96361 HYDRATE IV INFUSION ADD-ON: CPT

## 2024-09-28 PROCEDURE — 80048 BASIC METABOLIC PNL TOTAL CA: CPT

## 2024-09-28 PROCEDURE — 6370000000 HC RX 637 (ALT 250 FOR IP): Performed by: ORTHOPAEDIC SURGERY

## 2024-09-28 PROCEDURE — 83735 ASSAY OF MAGNESIUM: CPT

## 2024-09-28 PROCEDURE — 2580000003 HC RX 258: Performed by: INTERNAL MEDICINE

## 2024-09-28 PROCEDURE — 97161 PT EVAL LOW COMPLEX 20 MIN: CPT | Performed by: PHYSICAL THERAPIST

## 2024-09-28 PROCEDURE — 36415 COLL VENOUS BLD VENIPUNCTURE: CPT

## 2024-09-28 PROCEDURE — 96375 TX/PRO/DX INJ NEW DRUG ADDON: CPT

## 2024-09-28 PROCEDURE — 96374 THER/PROPH/DIAG INJ IV PUSH: CPT

## 2024-09-28 RX ADMIN — HYDROCODONE BITARTRATE AND ACETAMINOPHEN 2 TABLET: 5; 325 TABLET ORAL at 03:46

## 2024-09-28 RX ADMIN — ASPIRIN 81 MG: 81 TABLET, COATED ORAL at 09:10

## 2024-09-28 RX ADMIN — HYDROCHLOROTHIAZIDE 25 MG: 25 TABLET ORAL at 09:09

## 2024-09-28 RX ADMIN — ACETAMINOPHEN 650 MG: 325 TABLET ORAL at 09:10

## 2024-09-28 RX ADMIN — ONDANSETRON 4 MG: 2 INJECTION INTRAMUSCULAR; INTRAVENOUS at 04:32

## 2024-09-28 RX ADMIN — Medication 2000 MG: at 04:12

## 2024-09-28 RX ADMIN — SODIUM CHLORIDE: 9 INJECTION, SOLUTION INTRAVENOUS at 01:55

## 2024-09-28 RX ADMIN — ACETAMINOPHEN 650 MG: 325 TABLET ORAL at 15:27

## 2024-09-28 RX ADMIN — LOSARTAN POTASSIUM 100 MG: 50 TABLET, FILM COATED ORAL at 09:09

## 2024-09-28 ASSESSMENT — PAIN SCALES - WONG BAKER
WONGBAKER_NUMERICALRESPONSE: NO HURT
WONGBAKER_NUMERICALRESPONSE: HURTS A LITTLE BIT

## 2024-09-28 ASSESSMENT — PAIN SCALES - GENERAL
PAINLEVEL_OUTOF10: 7
PAINLEVEL_OUTOF10: 5

## 2024-09-28 ASSESSMENT — PAIN DESCRIPTION - LOCATION: LOCATION: HIP

## 2024-09-28 ASSESSMENT — PAIN DESCRIPTION - DESCRIPTORS: DESCRIPTORS: ACHING

## 2024-09-28 ASSESSMENT — PAIN - FUNCTIONAL ASSESSMENT: PAIN_FUNCTIONAL_ASSESSMENT: PREVENTS OR INTERFERES SOME ACTIVE ACTIVITIES AND ADLS

## 2024-09-28 ASSESSMENT — PAIN DESCRIPTION - ORIENTATION: ORIENTATION: LEFT

## 2024-09-28 NOTE — PROGRESS NOTES
Physical Therapy  Facility/Department: [facility]  PROGRESSIVE CARE  Physical Therapy Initial Assessment    Name: Megha Guillen  : 1950  MRN: [id number]  Date of Service: 2024    Discharge Recommendations:  Home with assist PRN, Outpatient PT          Patient Diagnosis(es): The encounter diagnosis was Left hip pain.  Past Medical History:  has a past medical history of Asthma, Chalazion of right lower eyelid, Colon polyps, Degenerative joint disease of knee, left, Dysmenorrhea, GERD (gastroesophageal reflux disease), Greater trochanteric bursitis of right hip, History of blood transfusion, Hot flashes, Hyperlipidemia, Hypertension, Impaired fasting glucose, Menopausal symptoms, Obesity, Overactive bladder, Scaphoid fracture of wrist, Sleep disturbances, Tobacco abuse, and Uterine fibroid.  Past Surgical History:  has a past surgical history that includes  section; Hysterectomy, total abdominal; Knee arthroscopy (Left, 1999); Tonsillectomy and adenoidectomy; Dilation and curettage of uterus; other surgical history (Left, 2012); other surgical history (Right, 2006); Colonoscopy (09/10/2008); Colonoscopy (2007); Colonoscopy (2006); Cardiac catheterization (06/15/2007); Colonoscopy (); Total knee arthroplasty (Left, ); Colonoscopy (2014); Mouth surgery (Right, 2015); Colonoscopy Biopsy/Stoma (N/A, 2020); Salpingo-oophorectomy (Left); Breast reduction surgery (Bilateral, ); Total hip arthroplasty (Right, 2023); and Total hip arthroplasty (Left, 2024).    Assessment  Body Structures, Functions, Activity Limitations Requiring Skilled Therapeutic Intervention: Increased pain;Decreased ROM;Decreased strength;Decreased endurance  Therapy Prognosis: Good  Decision Making: Low Complexity  Activity Tolerance  Activity Tolerance: Patient tolerated treatment well    Plan  Physical Therapy Plan  General Plan: 1 time a day 7 days a week  Current Treatment  Static: Good  Sitting - Dynamic: Good  Standing - Static: Good  Standing - Dynamic: Fair          OutComes Score                                                  AM-PAC - Mobility              Tinneti Score       Goals  Short Term Goals  Time Frame for Short Term Goals: 1 day  Short Term Goal 1: Assess functional status  Long Term Goals  Time Frame for Long Term Goals : 2 days  Long Term Goal 1: Transfer SBA  Long Term Goal 2: Gait RW WBAT L 50 ft       Education         Therapy Time   Individual Concurrent Group Co-treatment   Time In 0905         Time Out 0934         Minutes 29                 Yusuf Cervantes, PT

## 2024-09-28 NOTE — PLAN OF CARE
Problem: Pain  Goal: Verbalizes/displays adequate comfort level or baseline comfort level  9/28/2024 0210 by Melody Foy RN  Outcome: Progressing  9/27/2024 1558 by Vy Marley RN  Outcome: Progressing     Problem: Skin/Tissue Integrity  Goal: Absence of new skin breakdown  Description: 1.  Monitor for areas of redness and/or skin breakdown  2.  Assess vascular access sites hourly  3.  Every 4-6 hours minimum:  Change oxygen saturation probe site  4.  Every 4-6 hours:  If on nasal continuous positive airway pressure, respiratory therapy assess nares and determine need for appliance change or resting period.  9/28/2024 0210 by Melody Foy, RN  Outcome: Progressing  9/27/2024 1558 by Vy Marley RN  Outcome: Progressing     Problem: ABCDS Injury Assessment  Goal: Absence of physical injury  9/28/2024 0210 by Melody Foy, RN  Outcome: Progressing  9/27/2024 1558 by Vy Marley RN  Outcome: Progressing

## 2024-09-28 NOTE — PLAN OF CARE
Problem: Discharge Planning  Goal: Discharge to home or other facility with appropriate resources  9/28/2024 1030 by Chanelle Watters RN  Outcome: Progressing  Flowsheets (Taken 9/28/2024 0913)  Discharge to home or other facility with appropriate resources: Identify barriers to discharge with patient and caregiver  9/28/2024 0210 by Melody Foy RN  Outcome: Progressing  Flowsheets (Taken 9/27/2024 2005)  Discharge to home or other facility with appropriate resources:   Identify barriers to discharge with patient and caregiver   Arrange for needed discharge resources and transportation as appropriate   Identify discharge learning needs (meds, wound care, etc)   Refer to discharge planning if patient needs post-hospital services based on physician order or complex needs related to functional status, cognitive ability or social support system     Problem: Pain  Goal: Verbalizes/displays adequate comfort level or baseline comfort level  9/28/2024 1030 by Chanelle Watters RN  Outcome: Progressing  9/28/2024 0210 by Melody Foy RN  Outcome: Progressing     Problem: Skin/Tissue Integrity  Goal: Absence of new skin breakdown  Description: 1.  Monitor for areas of redness and/or skin breakdown  2.  Assess vascular access sites hourly  3.  Every 4-6 hours minimum:  Change oxygen saturation probe site  4.  Every 4-6 hours:  If on nasal continuous positive airway pressure, respiratory therapy assess nares and determine need for appliance change or resting period.  9/28/2024 1030 by Chanelle Watters RN  Outcome: Progressing  9/28/2024 0210 by Melody Foy RN  Outcome: Progressing     Problem: ABCDS Injury Assessment  Goal: Absence of physical injury  9/28/2024 1030 by Chanelle Watters RN  Outcome: Progressing  9/28/2024 0210 by Melody Foy RN  Outcome: Progressing     Problem: Safety - Adult  Goal: Free from fall injury  9/28/2024 1030 by Chanelle Watters RN  Outcome: Progressing  9/28/2024

## 2024-09-28 NOTE — PLAN OF CARE
Problem: Discharge Planning  Goal: Discharge to home or other facility with appropriate resources  9/28/2024 1456 by Chanelle Watters RN  Outcome: Completed  9/28/2024 1030 by Chanelle Watters RN  Outcome: Progressing  Flowsheets (Taken 9/28/2024 0913)  Discharge to home or other facility with appropriate resources: Identify barriers to discharge with patient and caregiver  9/28/2024 0210 by Melody Foy RN  Outcome: Progressing  Flowsheets (Taken 9/27/2024 2005)  Discharge to home or other facility with appropriate resources:   Identify barriers to discharge with patient and caregiver   Arrange for needed discharge resources and transportation as appropriate   Identify discharge learning needs (meds, wound care, etc)   Refer to discharge planning if patient needs post-hospital services based on physician order or complex needs related to functional status, cognitive ability or social support system     Problem: Pain  Goal: Verbalizes/displays adequate comfort level or baseline comfort level  9/28/2024 1456 by Chanelle Watters RN  Outcome: Completed  9/28/2024 1030 by Chanelle Watters RN  Outcome: Progressing  9/28/2024 0210 by Melody Foy RN  Outcome: Progressing     Problem: Skin/Tissue Integrity  Goal: Absence of new skin breakdown  Description: 1.  Monitor for areas of redness and/or skin breakdown  2.  Assess vascular access sites hourly  3.  Every 4-6 hours minimum:  Change oxygen saturation probe site  4.  Every 4-6 hours:  If on nasal continuous positive airway pressure, respiratory therapy assess nares and determine need for appliance change or resting period.  9/28/2024 1456 by Chanelle Watters RN  Outcome: Completed  9/28/2024 1030 by Chanelle Watters RN  Outcome: Progressing  9/28/2024 0210 by Melody Foy RN  Outcome: Progressing     Problem: ABCDS Injury Assessment  Goal: Absence of physical injury  9/28/2024 1456 by Chanelle Watters RN  Outcome: Completed  9/28/2024

## 2024-09-28 NOTE — OP NOTE
[facility]                [address] [place] [address]                            OPERATIVE REPORT      PATIENT NAME: OMEGA SMITH                  : 1950  MED REC NO: [id number]                         ROOM: 0215  ACCOUNT NO: [id number]                       ADMIT DATE: 2024  PROVIDER: Rakesh Becker MD      DATE OF PROCEDURE:  2024    SURGEON:  Rakesh Becker MD    PREOPERATIVE DIAGNOSIS:  Left hip arthritis with hip pain.    POSTOPERATIVE DIAGNOSES:  Left hip arthritis with hip pain.  Avascular necrosis with collapse of the femoral head.    PROCEDURE PERFORMED:  Left total hip arthroplasty    ASSISTANT:  None.    ANESTHESIA:  General.    BLOOD LOSS:  200.    COMPLICATIONS:  None.    SPECIMEN:  None.    IMPLANTS:  [employer] OsseoTi 56 mm cup with a high wall liner, -3 neck and a size 13 stem.    DRAINS:  None.    FINDINGS:    1. The patient with exceptional cup stability and exceptional intraoperative hip joint stability on range of motion checking.  2. The patient with collapse of the medial superior portion of femoral head.    PROCEDURE IN DETAIL:  The patient was taken to the operating room, placed under general anesthesia.  Left lower extremity was prepped and draped in usual sterile fashion.  Posterolateral approach to the hip was made with incision through skin, subcutaneous fat, tensor fascia ketty, which was kind of thin.  T capsulotomy in line with the piriformis tendon, which was also surprisingly thin in this patient, as well as the posterior capsule.  Hip was dislocated.  Femoral cut was made.  Ball was extracted and inspected.  Surprisingly, I did not remember seeing on an x-ray, but she had superior medial collapse of the femoral head, particularly medially.    Attention now directed to acetabulum.  Really not much of a pulvinar.  Significant multiple subchondral cysts.  Sequentially reamed to a size 55, 2 mm bigger than the

## 2024-09-29 LAB
EKG ATRIAL RATE: 74 BPM
EKG P AXIS: 52 DEGREES
EKG P-R INTERVAL: 160 MS
EKG Q-T INTERVAL: 418 MS
EKG QRS DURATION: 94 MS
EKG QTC CALCULATION (BAZETT): 463 MS
EKG R AXIS: -14 DEGREES
EKG T AXIS: 6 DEGREES
EKG VENTRICULAR RATE: 74 BPM

## 2024-09-30 ENCOUNTER — FOLLOWUP TELEPHONE ENCOUNTER (OUTPATIENT)
Dept: INPATIENT UNIT | Age: 74
End: 2024-09-30

## 2024-09-30 ENCOUNTER — CARE COORDINATION (OUTPATIENT)
Dept: CARE COORDINATION | Age: 74
End: 2024-09-30

## 2024-09-30 NOTE — CARE COORDINATION
Care Transitions Note    Initial Call - Call within 2 business days of discharge: Yes    Attempted to reach patient for transitions of care follow up. Unable to reach patient.    Outreach Attempts:   HIPAA compliant voicemail left for patient.     Patient: Megha Guillen    Patient : 1950   MRN: [id number]    Reason for Admission: Lt hip arthroplasty   Discharge Date: 24  RURS: Readmission Risk Score: 6.6    Last Discharge Facility       Date Complaint Diagnosis Description Type Department Provider    24  Left hip pain Admission (Discharged) Rakesh Moses MD            Was this an external facility discharge? No    Follow Up Appointment:   Patient does not have a follow up appointment scheduled at time of call.  Will route pcp office for hfu   Future Appointments         Provider Specialty Dept Phone    10/1/2024 11:00 AM Yusuf Cervantes PT Physical Therapy [phone]    10/4/2024 11:15 AM Ute Bender PA Orthopedic Surgery [phone]    10/15/2024 10:00 AM Ron Padgett MD Family Medicine [phone]            Plan for follow-up on next business day.      Linda Zhao LPN

## 2024-09-30 NOTE — CARE COORDINATION
Care Transitions Note    Initial Call - Call within 2 business days of discharge: Yes    Patient Current Location:  Home: [address] [place] [address]    [facility] Care Coordinator contacted the patient by telephone to perform post hospital discharge assessment, verified name and  as identifiers. Provided introduction to self, and explanation of the Care Transition Nurse role.     Patient: Megha Guillen    Patient : 1950   MRN: [id number]    Reason for Admission: Lt hip pain   Discharge Date: 24  RURS: Readmission Risk Score: 6.6      Last Discharge Facility       Date Complaint Diagnosis Description Type Department Provider    24  Left hip pain Admission (Discharged) RAJI Becker, Rakesh HIGGINBOTHAM MD            Was this an external facility discharge? No    Additional needs identified to be addressed with provider   Pt has appointment 10/15 can HFU be addressed  then per pt request .              Method of communication with provider: chart routing.    Patients top risk factors for readmission: medical condition-Lt hip replacement     Interventions to address risk factors:   Review of patient management of conditions/medications: LT hip replacement   S/s of infection blood clots     Care Summary Note: Writer spoke Megha today she returned writer's call. She stated that she Is doing well. She had Lt total arthroplasty covered with clean dry dressing. She stated she has only had to use pain medication once, she prefer to take tylenol. She denies cardiac symptoms. Fever chills. N/V/D. She denies pain in legs or calf pain. She is ambulating with her walker has PT visit tomorrow. She reports she had Rt hip replacement so she is familiar with process. She has not moved her bowels but her son purchased juice so she is passing gas.     Med review completed mobic stopped 6 weeks. Pt aware to reach out to Dr. Becker office if she has concerns that need their attention.  Writer offered HFU pt asked If  HFU could be

## 2024-10-01 ENCOUNTER — HOSPITAL ENCOUNTER (OUTPATIENT)
Dept: PHYSICAL THERAPY | Age: 74
Setting detail: THERAPIES SERIES
Discharge: HOME | End: 2024-10-01
Payer: MEDICARE

## 2024-10-01 PROCEDURE — 97161 PT EVAL LOW COMPLEX 20 MIN: CPT | Performed by: PHYSICAL THERAPIST

## 2024-10-01 NOTE — PLAN OF CARE
[facility] [facility]  Rehabilitation and Sports Medicine    [] [place]  Phone: [phone]  Fax: [phone]      [] [place]  Phone: [phone]  Fax: [phone]        To:        Patient: Megha Guillen  : 1950   MRN: [id number]  Evaluation Date: 10/1/2024      Diagnosis Information:  Diagnosis: M25.552 L hip pain, s/p L BUCK 24   Treatment Diagnosis: M25.552 L hip pain, s/p L BUCK 24     Physical Therapy Certification Form  Dear   The following patient has been evaluated for physical therapy services and for therapy to continue, Medicare requires monthly physician review of the treatment plan. Please review the attached evaluation and/or summary of the patient's plan of care, and verify that you agree therapy should continue by signing the attached document and sending it back to our office.    Plan of Care/Treatment to date:  [x] Therapeutic Exercise    [] Modalities:  [x] Therapeutic Activity     [] Ultrasound  [] Electrical Stimulation  [x] Gait Training      [] Cervical Traction [] Lumbar Traction  [] Neuromuscular Re-education    [] Cold/hotpack [] Iontophoresis   [x] Instruction in HEP     Other:  [] Manual Therapy      []             [] Aquatic Therapy      []                 Goals:  Short Term Goals  Time Frame for Short Term Goals: 1 week  Short Term Goal 1: Start HEP    Long Term Goals  Time Frame for Long Term Goals : 4-6 weeks  Long Term Goal 1: L hip pain controlled at 2/10 to allow regular ADL  Long Term Goal 2: Gait with cane or no device for over 600 ft for return to community activity  Long Term Goal 3: Able to step up/down 6-8\" steps, inprder to properly navigate multi-level home  Long Term Goal 4: TUG 16\" or less for gait efficiency    Frequency/Duration:10/1/24 - 10/31/24  # Days per week: [] 1 day # Weeks: [] 1 week [] 5 weeks     [] 2 days   [] 2 weeks [] 6 weeks     [x] 3 days   [] 3 weeks [] 7 weeks     [] 4 days   [x] 4 weeks [] 8 weeks    Rehab

## 2024-10-01 NOTE — FLOWSHEET NOTE
Physical Therapy Daily Treatment Note    Date:  10/1/2024    Patient Name:  Megha Guillen    :  1950  MRN: [id number]  Restrictions/Precautions:   R BUCK 2023  Medical/Treatment Diagnosis Information:   Diagnosis: M25.552 L hip pain, s/p L BUCK 24  Treatment Diagnosis: M25.552 L hip pain, s/p L BUCK 24  Insurance/Certification information:  PT Insurance Information: Medicare  Physician Information:   Eugenio  Plan of care signed (Y/N):    Visit# / total visits:   Pain level: 6/10       Time In:10:50   Time Out:11:20    Progress Note: [x]  Yes  []  No  Next due by: Visit #10      Subjective:  See eval     Objective: See eval  Observation:   Test measurements:      Exercises: ( post-lateral approach- No resisted abd 6 weeks)  Exercise/Equipment Resistance/Repetitions Other comments   NUSTEP     Counter ex     Squat Matrix     Step up     STS 4\" booster         Bridging                          Gait cane  When ready             [x] Provided verbal/tactile cueing for activities related to strengthening, flexibility, endurance, ROM. (41156)  [] Provided verbal/tactile cueing for activities related to improving balance, coordination, kinesthetic sense, posture, motor skill, proprioception. (11597)    Therapeutic Activities:     [] Therapeutic activities, direct (one-on-one) patient contact (use of dynamic activities to improve functional performance). (45158)    Gait:   [] Provided training and instruction to the patient for ambulation re-education. (93979)    Self-Care/ADL's  [] Self-care/home management training and compensatory training, meal preparation, safety procedures, and instructions in use of assistive technology devices/adaptive equipment, direct one-on-one contact. (65447)    Home Exercise Program:     [] Reviewed/Progressed HEP activities related to strengthening, flexibility, endurance, ROM. (32052)  [] Reviewed/Progressed HEP activities related to improving balance, coordination,

## 2024-10-01 NOTE — PROGRESS NOTES
Physical Therapy  Initial Assessment  Date: 10/1/2024  Patient Name: Megha Guillen  MRN: [id number]  : 1950    Referring Physician: Rakesh Becker MD [place]   PCP: Ron Padgett MD     Medical Diagnosis: Left hip pain [M25.552] M25.552 L hip pain, s/p L BUCK 24  Treatment Diagnosis: M25.552 L hip pain, s/p L BUCK 24      Insurance: Payor: MEDICARE / Plan: MEDICARE PART A AND B / Product Type: *No Product type* /   Insurance ID: [id number] - (Medicare)      Restrictions:       Subjective:  General  Chart Reviewed: Yes  Patient Assessed for Rehabilitation Services: Yes  History obtained from:: Patient, Chart Review  Diagnosis: M25.552 L hip pain, s/p L BUCK 24  Referring Provider (secondary): Eugenio  Follows Commands: Within Functional Limits  PT Visit Information  Onset Date: 24  PT Insurance Information: Medicare  Referring Provider (secondary): Eugenio  Subjective  Subjective: L BUCK done 24. Went home 24  Comment: R BUCK 2023  Previous treatments prior to current episode?: Surgery       Orientation:  Orientation  Follows Commands: Within Functional Limits    Social History:  Social History  Lives With: Alone  Home Layout: Multi-level  Home Equipment: Cane;Walker - Rolling    Functional Status:  Functional Status  Prior level of function: Independent  Occupation: Retired  Receives Help From: Family  ADL Assistance: Independent  Homemaking Assistance: Independent  Ambulation Assistance: Independent  Active : Yes    Objective:     PROM LLE (degrees)  L Hip Flexion (0-125): 90  L Hip Extension (0-10): 0  L Hip ABduction (0-45): 25    Strength LLE  L Hip Flexion: 3/5  L Hip Extension: 3-/5  L Hip ABduction: 2+/5     Additional Measures  Special Tests: STS in 30\" x6  Other: TUG( RW ) 25\"        Transfers  Sit to Stand: Modified independent  Stand to Sit: Modified independent  Comment: Needs B UE assist       Ambulation  WB Status: WBAT L  Ambulation  Surface: Level tile  Device:

## 2024-10-04 ENCOUNTER — OFFICE VISIT (OUTPATIENT)
Dept: ORTHOPEDIC SURGERY | Age: 74
End: 2024-10-04
Payer: MEDICARE

## 2024-10-04 ENCOUNTER — HOSPITAL ENCOUNTER (OUTPATIENT)
Dept: PHYSICAL THERAPY | Age: 74
Setting detail: THERAPIES SERIES
Discharge: HOME | End: 2024-10-04
Payer: MEDICARE

## 2024-10-04 VITALS
HEIGHT: 68 IN | SYSTOLIC BLOOD PRESSURE: 80 MMHG | DIASTOLIC BLOOD PRESSURE: 64 MMHG | WEIGHT: 238 LBS | RESPIRATION RATE: 16 BRPM | BODY MASS INDEX: 36.07 KG/M2 | HEART RATE: 68 BPM

## 2024-10-04 DIAGNOSIS — E66.01 SEVERE OBESITY (BMI 35.0-39.9) WITH COMORBIDITY: ICD-10-CM

## 2024-10-04 DIAGNOSIS — Z96.641 HISTORY OF TOTAL HIP ARTHROPLASTY, RIGHT: Primary | ICD-10-CM

## 2024-10-04 DIAGNOSIS — Z96.642 S/P TOTAL LEFT HIP ARTHROPLASTY: ICD-10-CM

## 2024-10-04 PROCEDURE — 97110 THERAPEUTIC EXERCISES: CPT

## 2024-10-04 PROCEDURE — 99212 OFFICE O/P EST SF 10 MIN: CPT | Performed by: PHYSICIAN ASSISTANT

## 2024-10-04 NOTE — FLOWSHEET NOTE
Physical Therapy Daily Treatment Note    Date:  10/4/2024    Patient Name:  Megha Guillen    :  1950  MRN: [id number]  Restrictions/Precautions:   R BUCK 2023  Medical/Treatment Diagnosis Information:   Diagnosis: M25.552 L hip pain, s/p L BUCK 24  Treatment Diagnosis: M25.552 L hip pain, s/p L BUCK 24  Insurance/Certification information:  PT Insurance Information: Medicare  Physician Information:   Eugenio  Plan of care signed (Y/N):    Visit# / total visits:   Pain level: 7/10       Time In:  10:12    Time Out:    10:37    Progress Note: []  Yes  [x]  No  Next due by: Visit #10      Subjective:  Pt reports pain in L hip is increased today compared to usual. She has the most pain when sitting down. Pt states the car ride over here was very painful. Pain in hip eases up when she stands up and walks. Pt c/o pain in L lateral lower leg. This feels like a sharp cramp and is intermittent.     Objective:     Observation: There ex completed per flowsheet to facilitate strength and mobility for improved ease with ADLs and improved gait. Verbal cues provided for proper form and sequence of there ex. Pt c/o increased pain in L hip when sitting so did not add seated ex this date. Occasional rest breaks required d/t pain and fatigue. Pt unable to complete Nustep d/t pain when seated.    Test measurements:      Exercises: ( post-lateral approach- No resisted abd 6 weeks)  Exercise/Equipment Resistance/Repetitions Other comments   NUSTEP  Attempted, too painful   Counter ex 10x Heel/toe raises, hip abd, ext, flex, marches   Squat Matrix 5x     Step up     STS 5x 4\" booster         Bridging 10x    SLR (L) 10x With assistance                   Gait cane  When ready             [x] Provided verbal/tactile cueing for activities related to strengthening, flexibility, endurance, ROM. (45606)  [] Provided verbal/tactile cueing for activities related to improving balance, coordination, kinesthetic sense, posture,

## 2024-10-04 NOTE — PROGRESS NOTES
Patient ID: Megha Guillen is a 74 y.o. female    Chief Compliant:  Chief Complaint   Patient presents with    Post-Op Check     6 month octaviano right hip and one week post op left hip      HPI:  This is a 74 y.o. female who presents to the clinic today for almost 1 year follow-up of right hip and 1 week status post left total hip arthroplasty done by Dr. Becker.  Patient is doing exceptionally well with her right hip she just completed a session of physical therapy prior to her office visit so she is pretty fatigued.  Overall states that her left hip is doing exceptionally well and healing well she does not have any problems with the incision no fever, chills.  Did mention that her calf can get some muscle spasms in it with overexertion.  No other numbness or tingling noted.      Review of Systems   All other systems reviewed and are negative.    Past History:    Current Outpatient Medications:     HYDROcodone-acetaminophen (NORCO) 5-325 MG per tablet, Take 1 tablet by mouth every 6 hours as needed for Pain for up to 7 days. Intended supply: 7 days. Take lowest dose possible to manage pain Max Daily Amount: 4 tablets, Disp: 28 tablet, Rfl: 0    aspirin 81 MG EC tablet, Take 1 tablet by mouth in the morning and at bedtime 2 po bid for 1 month then dc, Disp: 90 tablet, Rfl: 0    hydroCHLOROthiazide (HYDRODIURIL) 25 MG tablet, TAKE 1 TABLET BY MOUTH DAILY, Disp: 90 tablet, Rfl: 3    losartan (COZAAR) 100 MG tablet, Take 1 tablet by mouth daily, Disp: , Rfl:     albuterol sulfate HFA (PROVENTIL;VENTOLIN;PROAIR) 108 (90 Base) MCG/ACT inhaler, Inhale 2 puffs into the lungs every 6 hours as needed for Wheezing or Shortness of Breath, Disp: 1 each, Rfl: 3    Handicap Placard MISC, by Does not apply route Expires 04/05/25, Disp: 1 each, Rfl: 0  Allergies   Allergen Reactions    Minocycline Nausea Only and Other (See Comments)     Dizzy    Oxycodone Nausea Only     Social History     Socioeconomic History    Marital status:

## 2024-10-07 ENCOUNTER — HOSPITAL ENCOUNTER (OUTPATIENT)
Dept: PHYSICAL THERAPY | Age: 74
Setting detail: THERAPIES SERIES
Discharge: HOME | End: 2024-10-07
Payer: MEDICARE

## 2024-10-07 ENCOUNTER — OFFICE VISIT (OUTPATIENT)
Dept: FAMILY MEDICINE CLINIC | Age: 74
End: 2024-10-07
Payer: MEDICARE

## 2024-10-07 VITALS
SYSTOLIC BLOOD PRESSURE: 110 MMHG | BODY MASS INDEX: 35.1 KG/M2 | WEIGHT: 231.6 LBS | OXYGEN SATURATION: 99 % | HEIGHT: 68 IN | DIASTOLIC BLOOD PRESSURE: 60 MMHG | HEART RATE: 108 BPM

## 2024-10-07 DIAGNOSIS — Z96.642 HISTORY OF TOTAL LEFT HIP REPLACEMENT: Primary | ICD-10-CM

## 2024-10-07 PROCEDURE — G8399 PT W/DXA RESULTS DOCUMENT: HCPCS | Performed by: FAMILY MEDICINE

## 2024-10-07 PROCEDURE — 3078F DIAST BP <80 MM HG: CPT | Performed by: FAMILY MEDICINE

## 2024-10-07 PROCEDURE — G8427 DOCREV CUR MEDS BY ELIG CLIN: HCPCS | Performed by: FAMILY MEDICINE

## 2024-10-07 PROCEDURE — 99214 OFFICE O/P EST MOD 30 MIN: CPT | Performed by: FAMILY MEDICINE

## 2024-10-07 PROCEDURE — 1090F PRES/ABSN URINE INCON ASSESS: CPT | Performed by: FAMILY MEDICINE

## 2024-10-07 PROCEDURE — G8484 FLU IMMUNIZE NO ADMIN: HCPCS | Performed by: FAMILY MEDICINE

## 2024-10-07 PROCEDURE — 1036F TOBACCO NON-USER: CPT | Performed by: FAMILY MEDICINE

## 2024-10-07 PROCEDURE — G8417 CALC BMI ABV UP PARAM F/U: HCPCS | Performed by: FAMILY MEDICINE

## 2024-10-07 PROCEDURE — 99213 OFFICE O/P EST LOW 20 MIN: CPT | Performed by: FAMILY MEDICINE

## 2024-10-07 PROCEDURE — 97110 THERAPEUTIC EXERCISES: CPT

## 2024-10-07 PROCEDURE — 3074F SYST BP LT 130 MM HG: CPT | Performed by: FAMILY MEDICINE

## 2024-10-07 PROCEDURE — 1123F ACP DISCUSS/DSCN MKR DOCD: CPT | Performed by: FAMILY MEDICINE

## 2024-10-07 PROCEDURE — 3017F COLORECTAL CA SCREEN DOC REV: CPT | Performed by: FAMILY MEDICINE

## 2024-10-07 ASSESSMENT — ENCOUNTER SYMPTOMS
EYES NEGATIVE: 1
GASTROINTESTINAL NEGATIVE: 1
RESPIRATORY NEGATIVE: 1
ALLERGIC/IMMUNOLOGIC NEGATIVE: 1

## 2024-10-07 NOTE — PROGRESS NOTES
Subjective:      Patient ID: Megha Guillen is a 74 y.o. female.    HPI  scheduled post op follow up after left hip replacement 24.  No perceived complications;.  She doesn't like taking the pain meds and using more conservatively.  Prefers standing with walker today.  Still has left gluteal tenderness that pains her enough to not want to sit.  She feels the pain is mostly under control .  Occasional cramp in the \"shin\".  Cramps during the day and at night.  No swelling or redness.  Stiches coming out Friday.  No bruising or bleeding concerns.      Past Medical History:   Diagnosis Date    Asthma     Chalazion of right lower eyelid     Colon polyps     Degenerative joint disease of knee, left     Dysmenorrhea     GERD (gastroesophageal reflux disease)     Greater trochanteric bursitis of right hip     History of blood transfusion     Hot flashes     Hyperlipidemia     Hypertension     Impaired fasting glucose     Menopausal symptoms     Obesity     Overactive bladder     Scaphoid fracture of wrist     Right.    Sleep disturbances     Tobacco abuse     Remote and limited.    Uterine fibroid      Past Surgical History:   Procedure Laterality Date    BREAST REDUCTION SURGERY Bilateral     CARDIAC CATHETERIZATION  06/15/2007    No significant coronary artery disease, preserved LV systolic function.      SECTION      COLONOSCOPY  09/10/2008    Sigmoid diverticulosis, history of polyps.    COLONOSCOPY  2007    Multiple polyps of the colon, rectal polyps, sigmoid polyps, and splenic flexure polyps.    COLONOSCOPY  2006    Pedunculated thin polyp near the transverse colon, small polyp at the dentate line versus a hemorrhoid.    COLONOSCOPY      COLONOSCOPY  2014    Dr. Lindsey. adenom. x 2, 5yr follow up    DILATION AND CURETTAGE OF UTERUS       COLONOSCOPY BIOPSY/STOMA N/A 2020    hyperplastic polyp X 3, Dr. Weber    HYSTERECTOMY, TOTAL ABDOMINAL (CERVIX REMOVED)      KNEE

## 2024-10-07 NOTE — FLOWSHEET NOTE
Physical Therapy Daily Treatment Note    Date:  10/7/2024    Patient Name:  Megha Guillen    :  1950  MRN: [id number]  Restrictions/Precautions:   R BUCK 2023  Medical/Treatment Diagnosis Information:   Diagnosis: M25.552 L hip pain, s/p L BUCK 24  Treatment Diagnosis: M25.552 L hip pain, s/p L BUCK 24  Insurance/Certification information:  PT Insurance Information: Medicare  Physician Information:   Eugenio  Plan of care signed (Y/N):    Visit# / total visits: 3/12  Pain level: 4/10       Time In:    :  Time Out:    :  Progress Note: []  Yes  [x]  No  Next due by: Visit #10      Subjective:  Pt reports pain in L hip is improving. She has not had to take prescription pain medication for several days. She did take Tylenol earlier today. Pain is localized to L lateral hip. She does still have some pain/cramping in L lateral lower leg, but states this has improved. She has tried walking very short distances at home without her walker, but only in areas where she can grab hold of a counter or wall if needed.    Objective: There ex completed per flowsheet to facilitate strength and mobility for improved ease with ADLs and improved gait. Verbal cues provided for proper form and sequence of there ex.  Less rest breaks required today and pt tolerated there ex well. Several exercises added/progressed. Instructed pt in amb with standard cane.    Observation: Pt safe and steady with standard cane. Demonstrated correct sequencing after initial instruction.    Test measurements:      Exercises: ( post-lateral approach- No resisted abd 6 weeks)  Exercise/Equipment Resistance/Repetitions Other comments   NUSTEP  Add if tolerated   Counter ex 15x Heel/toe raises, hip abd, ext, flex, marches   Squat Matrix 5x 3 positions    Step up 5x 4\" B UE support, forward   STS 10x 4\" booster         Bridging 10x    SLR (L) 10x With assistance   Supine hip abd 10x               Gait cane 100'              [x] Provided

## 2024-10-08 NOTE — PROGRESS NOTES
I have reviewed and agree to the content of the note written by the PTA.  Electronically signed by Yusuf Cervantes PT [id number]

## 2024-10-09 ENCOUNTER — HOSPITAL ENCOUNTER (OUTPATIENT)
Dept: PHYSICAL THERAPY | Age: 74
Setting detail: THERAPIES SERIES
Discharge: HOME | End: 2024-10-09
Payer: MEDICARE

## 2024-10-09 ENCOUNTER — CARE COORDINATION (OUTPATIENT)
Dept: CARE COORDINATION | Age: 74
End: 2024-10-09

## 2024-10-09 PROCEDURE — 97110 THERAPEUTIC EXERCISES: CPT | Performed by: PHYSICAL THERAPY ASSISTANT

## 2024-10-09 NOTE — CARE COORDINATION
Care Transitions Note    Follow Up Call     Attempted to reach patient for transitions of care follow up.  Unable to reach patient.      Outreach Attempts:   HIPAA compliant voicemail left for patient.     Care Summary Note: 1st attempt-noted patient has PT today.    Follow Up Appointment:   Future Appointments         Provider Specialty Dept Phone    10/9/2024 11:15 AM Demetrius Stroud, PTA Physical Therapy [phone]    10/11/2024 10:00 AM Rakesh Becker MD Orthopedic Surgery [phone]    10/11/2024 10:30 AM Magdalena Krishnan PTA Physical Therapy [phone]    10/14/2024 10:45 AM Demetrius Stroud, PTA Physical Therapy [phone]    10/15/2024 10:00 AM Ron Padgett MD Family Medicine [phone]    10/16/2024 11:15 AM Bety Crawford, PTA Physical Therapy [phone]    10/18/2024 10:30 AM Demetrius Stroud, PTA Physical Therapy [phone]    10/21/2024 10:45 AM Demetrius Stroud, PTA Physical Therapy [phone]    10/23/2024 11:00 AM Sangita [place]os, PTA Physical Therapy [phone]    10/25/2024 11:30 AM Demetrius Stroud, PTA Physical Therapy [phone]    10/28/2024 11:15 AM Yusuf Cervantes, PT Physical Therapy [phone]    10/30/2024 11:00 AM Sangita [place]os, PTA Physical Therapy [phone]    11/1/2024 11:00 AM Sangita Gallegos, PTA Physical Therapy [phone]            Plan for follow-up on next business day.  based on severity of symptoms and risk factors. Plan for next call:  symptom management-S/S o infection ? Pain ?   follow-up appointment-10/1 how was PT? 10/4 ortho review did she get pcp HFU?  medication management-Still not need dony Inman LPN

## 2024-10-09 NOTE — FLOWSHEET NOTE
Physical Therapy Daily Treatment Note    Date:  10/9/2024    Patient Name:  Megha Guillen    :  1950  MRN: [id number]  Restrictions/Precautions:   R BUCK 2023  Medical/Treatment Diagnosis Information:   Diagnosis: M25.552 L hip pain, s/p L BUCK 24  Treatment Diagnosis: M25.552 L hip pain, s/p L BUCK 24  Insurance/Certification information:  PT Insurance Information: Medicare  Physician Information:   Eugenio  Plan of care signed (Y/N):    Visit# / total visits:   Pain level: 3/10       Time In:    1116  Time Out: 1148    Progress Note: []  Yes  [x]  No  Next due by: Visit #10      Subjective:  Notes overall improvement.Discomfort with sitting. Presents with straight cane this date. Notes increase use of straight cane.     Objective: There ex completed per flowsheet to facilitate strength and mobility for improved ease with ADLs and improved gait. Verbal cues provided for proper form and sequence of there ex. Increased overall pain noted this date. Difficulty with prolonged positions. Held several exercises due to increase pain.     Observation: .    Test measurements:      Exercises: ( post-lateral approach- No resisted abd 6 weeks)  Exercise/Equipment Resistance/Repetitions Other comments   NUSTEP  Add if tolerated   Counter ex 15x Heel/toe raises, hip abd, ext, flex, marches   Squat Matrix 5x 3 positions    Step up  B UE support, forward   STS 10x 4\" booster         Bridging HELD    SLR (L) 10x With assistance   Supine hip abd 10x               Gait cane 100'              [x] Provided verbal/tactile cueing for activities related to strengthening, flexibility, endurance, ROM. (95164)  [] Provided verbal/tactile cueing for activities related to improving balance, coordination, kinesthetic sense, posture, motor skill, proprioception. (07070)    Therapeutic Activities:     [] Therapeutic activities, direct (one-on-one) patient contact (use of dynamic activities to improve functional performance).

## 2024-10-10 ENCOUNTER — CARE COORDINATION (OUTPATIENT)
Dept: CARE COORDINATION | Age: 74
End: 2024-10-10

## 2024-10-10 DIAGNOSIS — Z96.642 STATUS POST LEFT HIP REPLACEMENT: Primary | ICD-10-CM

## 2024-10-10 NOTE — CARE COORDINATION
Care Transitions Note    Follow Up Call     Patient Current Location:  Home: [facility] [address] [place] [address]    [facility] Care Coordinator contacted the patient by telephone. Verified name and  as identifiers.    Additional needs identified to be addressed with provider   No needs identified                 Method of communication with provider: none.    Care Summary Note: Writer spoke with patient for a follow up call. She states she is doing pretty good. She reports PT is going well, she is usually pretty sore after her PT sessions so she will use ice and Tylenol which works well. She reports some tenderness along her incision when sitting but no pain,redness or swelling. She has her ortho follow up with Dr. Becker tomorrow. She is thankful for today's call and denies having any new needs or concerns at this time.     Plan of care updates since last contact:  Review of patient management of conditions/medications:         Advance Care Planning:   Does patient have an Advance Directive: reviewed and current.    Medication Review:  No changes since last call.     Remote Patient Monitoring:  Offered patient enrollment in the Remote Patient Monitoring (RPM) program for in-home monitoring: Patient is not eligible for RPM program because: patient does not have qualifying diagnosis.    Assessments:  Care Transitions Subsequent and Final Call    Subsequent and Final Calls  Do you have any ongoing symptoms?: No  Have your medications changed?: No  Do you have any questions related to your medications?: No  Do you currently have any active services?: No  Are you currently active with any services?: Outpatient/Community Services  Do you have any needs or concerns that I can assist you with?: No  Identified Barriers: None  Care Transitions Interventions  Other Interventions:              Follow Up Appointment:   Reviewed upcoming appointment(s). and [facility] appointment attended as scheduled   Future Appointments         Provider

## 2024-10-10 NOTE — PROGRESS NOTES
I have reviewed and agree to the content of the note written by the PTA.  Electronically signed by Yusuf Cervantes PT [id number]   Abdomen soft, non-tender and non-distended, no rebound, no guarding and no masses. no hepatosplenomegaly.

## 2024-10-11 ENCOUNTER — HOSPITAL ENCOUNTER (OUTPATIENT)
Dept: GENERAL RADIOLOGY | Age: 74
Discharge: HOME | End: 2024-10-13
Attending: ORTHOPAEDIC SURGERY
Payer: MEDICARE

## 2024-10-11 ENCOUNTER — HOSPITAL ENCOUNTER (OUTPATIENT)
Age: 74
Discharge: HOME | End: 2024-10-11
Payer: MEDICARE

## 2024-10-11 ENCOUNTER — APPOINTMENT (OUTPATIENT)
Dept: GENERAL RADIOLOGY | Age: 74
End: 2024-10-11
Attending: ORTHOPAEDIC SURGERY
Payer: MEDICARE

## 2024-10-11 ENCOUNTER — OFFICE VISIT (OUTPATIENT)
Dept: ORTHOPEDIC SURGERY | Age: 74
End: 2024-10-11
Payer: MEDICARE

## 2024-10-11 ENCOUNTER — HOSPITAL ENCOUNTER (OUTPATIENT)
Dept: PHYSICAL THERAPY | Age: 74
Setting detail: THERAPIES SERIES
Discharge: HOME | End: 2024-10-11
Payer: MEDICARE

## 2024-10-11 VITALS
WEIGHT: 231 LBS | SYSTOLIC BLOOD PRESSURE: 160 MMHG | HEIGHT: 68 IN | DIASTOLIC BLOOD PRESSURE: 90 MMHG | BODY MASS INDEX: 35.01 KG/M2 | HEART RATE: 64 BPM

## 2024-10-11 DIAGNOSIS — Z96.643 HISTORY OF BILATERAL HIP REPLACEMENTS: Primary | ICD-10-CM

## 2024-10-11 DIAGNOSIS — K21.9 GASTROESOPHAGEAL REFLUX DISEASE WITHOUT ESOPHAGITIS: ICD-10-CM

## 2024-10-11 DIAGNOSIS — Z96.642 STATUS POST LEFT HIP REPLACEMENT: ICD-10-CM

## 2024-10-11 DIAGNOSIS — R79.89 ELEVATED TSH: ICD-10-CM

## 2024-10-11 DIAGNOSIS — R94.6 ABNORMAL RESULTS OF THYROID FUNCTION STUDIES: ICD-10-CM

## 2024-10-11 DIAGNOSIS — E78.00 PURE HYPERCHOLESTEROLEMIA: ICD-10-CM

## 2024-10-11 DIAGNOSIS — R73.01 IMPAIRED FASTING GLUCOSE: ICD-10-CM

## 2024-10-11 LAB
ALBUMIN SERPL-MCNC: 3.8 G/DL (ref 3.5–5.2)
ALBUMIN/GLOB SERPL: 0.9 {RATIO} (ref 1–2.5)
ALP SERPL-CCNC: 114 U/L (ref 35–104)
ALT SERPL-CCNC: 15 U/L (ref 5–33)
ANION GAP SERPL CALCULATED.3IONS-SCNC: 10 MMOL/L (ref 9–17)
AST SERPL-CCNC: 16 U/L
BASOPHILS # BLD: 0.07 K/UL (ref 0–0.2)
BASOPHILS NFR BLD: 1 % (ref 0–2)
BILIRUB SERPL-MCNC: 0.3 MG/DL (ref 0.3–1.2)
BUN SERPL-MCNC: 22 MG/DL (ref 8–23)
BUN/CREAT SERPL: 20 (ref 9–20)
CALCIUM SERPL-MCNC: 9.7 MG/DL (ref 8.6–10.4)
CHLORIDE SERPL-SCNC: 107 MMOL/L (ref 98–107)
CHOLEST SERPL-MCNC: 204 MG/DL (ref 0–199)
CHOLESTEROL/HDL RATIO: 4
CO2 SERPL-SCNC: 27 MMOL/L (ref 20–31)
CREAT SERPL-MCNC: 1.1 MG/DL (ref 0.5–0.9)
EOSINOPHIL # BLD: 0.65 K/UL (ref 0–0.44)
EOSINOPHILS RELATIVE PERCENT: 7 % (ref 1–4)
ERYTHROCYTE [DISTWIDTH] IN BLOOD BY AUTOMATED COUNT: 14.1 % (ref 11.8–14.4)
EST. AVERAGE GLUCOSE BLD GHB EST-MCNC: 120 MG/DL
GFR, ESTIMATED: 53 ML/MIN/1.73M2
GLUCOSE SERPL-MCNC: 103 MG/DL (ref 70–99)
HBA1C MFR BLD: 5.8 % (ref 4–6)
HCT VFR BLD AUTO: 36 % (ref 36.3–47.1)
HDLC SERPL-MCNC: 55 MG/DL
HGB BLD-MCNC: 11.4 G/DL (ref 11.9–15.1)
IMM GRANULOCYTES # BLD AUTO: 0.03 K/UL (ref 0–0.3)
IMM GRANULOCYTES NFR BLD: 0 %
LDLC SERPL CALC-MCNC: 122 MG/DL (ref 0–100)
LYMPHOCYTES NFR BLD: 2.96 K/UL (ref 1.1–3.7)
LYMPHOCYTES RELATIVE PERCENT: 32 % (ref 24–43)
MCH RBC QN AUTO: 26.8 PG (ref 25.2–33.5)
MCHC RBC AUTO-ENTMCNC: 31.7 G/DL (ref 25.2–33.5)
MCV RBC AUTO: 84.7 FL (ref 82.6–102.9)
MONOCYTES NFR BLD: 0.67 K/UL (ref 0.1–1.2)
MONOCYTES NFR BLD: 7 % (ref 3–12)
NEUTROPHILS NFR BLD: 53 % (ref 36–65)
NEUTS SEG NFR BLD: 4.85 K/UL (ref 1.5–8.1)
NRBC BLD-RTO: 0 PER 100 WBC
PLATELET # BLD AUTO: 502 K/UL (ref 138–453)
PMV BLD AUTO: 8.6 FL (ref 8.1–13.5)
POTASSIUM SERPL-SCNC: 4.4 MMOL/L (ref 3.7–5.3)
PROT SERPL-MCNC: 8 G/DL (ref 6.4–8.3)
RBC # BLD AUTO: 4.25 M/UL (ref 3.95–5.11)
SODIUM SERPL-SCNC: 144 MMOL/L (ref 135–144)
TRIGL SERPL-MCNC: 132 MG/DL
TSH SERPL DL<=0.05 MIU/L-ACNC: 2.28 UIU/ML (ref 0.3–5)
VLDLC SERPL CALC-MCNC: 26 MG/DL
WBC OTHER # BLD: 9.2 K/UL (ref 3.5–11.3)

## 2024-10-11 PROCEDURE — 36415 COLL VENOUS BLD VENIPUNCTURE: CPT

## 2024-10-11 PROCEDURE — 84443 ASSAY THYROID STIM HORMONE: CPT

## 2024-10-11 PROCEDURE — 80053 COMPREHEN METABOLIC PANEL: CPT

## 2024-10-11 PROCEDURE — 99024 POSTOP FOLLOW-UP VISIT: CPT | Performed by: ORTHOPAEDIC SURGERY

## 2024-10-11 PROCEDURE — 80061 LIPID PANEL: CPT

## 2024-10-11 PROCEDURE — 83036 HEMOGLOBIN GLYCOSYLATED A1C: CPT

## 2024-10-11 PROCEDURE — 97110 THERAPEUTIC EXERCISES: CPT

## 2024-10-11 PROCEDURE — 73502 X-RAY EXAM HIP UNI 2-3 VIEWS: CPT

## 2024-10-11 PROCEDURE — 99212 OFFICE O/P EST SF 10 MIN: CPT | Performed by: ORTHOPAEDIC SURGERY

## 2024-10-11 PROCEDURE — 85025 COMPLETE CBC W/AUTO DIFF WBC: CPT

## 2024-10-11 NOTE — FLOWSHEET NOTE
Physical Therapy Daily Treatment Note    Date:  10/11/2024    Patient Name:  Megha Guillen    :  1950  MRN: [id number]  Restrictions/Precautions:   R BUCK 2023  Medical/Treatment Diagnosis Information:   Diagnosis: M25.552 L hip pain, s/p L BUCK 24  Treatment Diagnosis: M25.552 L hip pain, s/p L BUCK 24  Insurance/Certification information:  PT Insurance Information: Medicare  Physician Information:   Eugenio  Plan of care signed (Y/N):    Visit# / total visits:   Pain level: 2/10       Time In:     10:26  Time Out:    10:56    Progress Note: []  Yes  [x]  No  Next due by: Visit #10      Subjective:  Pt reports pain in L hip has improved. She has had a busy morning as she had her follow up with Dr Becker prior to therapy session. States she is a little bit fatigued. L lower leg pain has really improved and she has noticed she is able to sit for longer length of time with less discomfort. States  is very pleased with her progress.     Objective: There ex completed per flowsheet to facilitate strength and mobility for improved ease with ADLs and improved gait. Verbal cues provided for proper form and sequence of there ex. Minimal rest breaks required during session and pt tolerated ex progressions well.    Observation: .Amb with standard cane with mild antalgic gait pattern on L.    Test measurements:      Exercises: ( post-lateral approach- No resisted abd 6 weeks)  Exercise/Equipment Resistance/Repetitions Other comments   NUSTEP  Add if tolerated   Counter ex 15x Heel/toe raises, hip abd, ext, flex, marches   Squat Matrix 5x 3 positions    Step up  B UE support, forward   Step tap (L) 10x4\"    STS 10x 4\" booster    Standing HS curls 10x    Bridging 10x    SLR (L) 10x With assistance   Supine hip abd 10x    Heel slides 10x         Gait cane 100'              [x] Provided verbal/tactile cueing for activities related to strengthening, flexibility, endurance, ROM. (50229)  [] Provided verbal/tactile  Soolantra Counseling: I discussed with the patients the risks of topial Soolantra. This is a medicine which decreases the number of mites and inflammation in the skin. You experience burning, stinging, eye irritation or allergic reactions.  Please call our office if you develop any problems from using this medication.

## 2024-10-11 NOTE — PROGRESS NOTES
Patient ID: Megha Guillen is a 74 y.o. female    Chief Compliant:  Chief Complaint   Patient presents with    Follow-up     Left gideon        Diagnostic imaging:  AP pelvis AP lateral left hip status post bilateral total hip arthroplasty components very acceptable      Assessment and Plan:  1. History of bilateral hip replacements            Follow up 4 weeks    HPI:  This is a 74 y.o. female who presents to the clinic today for ambulating well with cane 2 weeks post total hip arthroplasty.         Review of Systems   All other systems reviewed and are negative.      Past History:    Current Outpatient Medications:     aspirin 81 MG EC tablet, Take 1 tablet by mouth in the morning and at bedtime 2 po bid for 1 month then dc, Disp: 90 tablet, Rfl: 0    hydroCHLOROthiazide (HYDRODIURIL) 25 MG tablet, TAKE 1 TABLET BY MOUTH DAILY, Disp: 90 tablet, Rfl: 3    losartan (COZAAR) 100 MG tablet, Take 1 tablet by mouth daily, Disp: , Rfl:     albuterol sulfate HFA (PROVENTIL;VENTOLIN;PROAIR) 108 (90 Base) MCG/ACT inhaler, Inhale 2 puffs into the lungs every 6 hours as needed for Wheezing or Shortness of Breath, Disp: 1 each, Rfl: 3    Handicap Placard MISC, by Does not apply route Expires 25, Disp: 1 each, Rfl: 0  Allergies   Allergen Reactions    Minocycline Nausea Only and Other (See Comments)     Dizzy    Oxycodone Nausea Only     Social History     Socioeconomic History    Marital status:      Spouse name: Not on file    Number of children: Not on file    Years of education: Not on file    Highest education level: Not on file   Occupational History    Not on file   Tobacco Use    Smoking status: Former     Current packs/day: 0.00     Average packs/day: 0.3 packs/day for 1 year (0.3 ttl pk-yrs)     Types: Cigarettes     Start date: 1997     Quit date: 1998     Years since quittin.7    Smokeless tobacco: Never    Tobacco comments:     Smoked for about 1 year   Vaping Use    Vaping status: Never Used

## 2024-10-14 ENCOUNTER — HOSPITAL ENCOUNTER (OUTPATIENT)
Dept: PHYSICAL THERAPY | Age: 74
Setting detail: THERAPIES SERIES
Discharge: HOME | End: 2024-10-14
Payer: MEDICARE

## 2024-10-14 PROCEDURE — 97110 THERAPEUTIC EXERCISES: CPT | Performed by: PHYSICAL THERAPY ASSISTANT

## 2024-10-14 NOTE — PROGRESS NOTES
I have reviewed and agree to the content of the note written by the PTA.  Electronically signed by Yusuf Cervantes PT [id number]

## 2024-10-14 NOTE — FLOWSHEET NOTE
Physical Therapy Daily Treatment Note    Date:  10/14/2024    Patient Name:  Megha Guillen    :  1950  MRN: [id number]  Restrictions/Precautions:   R BUCK 2023  Medical/Treatment Diagnosis Information:   Diagnosis: M25.552 L hip pain, s/p L BUCK 24  Treatment Diagnosis: M25.552 L hip pain, s/p L BUCK 24  Insurance/Certification information:  PT Insurance Information: Medicare  Physician Information:   Eugenio  Plan of care signed (Y/N):    Visit# / total visits:   Pain level: 0/10       Time In:     1050  Time Out:    1128  Progress Note: []  Yes  [x]  No  Next due by: Visit #10      Subjective:  Pain this date rated 0/10. Notes tenderness around incision sight. Feels a lump underneath. Difficult to sit.     Objective: There ex completed per flowsheet to facilitate strength and mobility for improved ease with ADLs and improved gait. Verbal cues provided for proper form and sequence of there ex. Advanced and progressed several exercises to improve strength and stability. Min fatigue and discomfort noted.     Observation: .Amb with standard cane with mild antalgic gait pattern on L.    Test measurements:      Exercises: ( post-lateral approach- No resisted abd 6 weeks)  Exercise/Equipment Resistance/Repetitions Other comments   NUSTEP Attempted, held due to pain Add if tolerated   Counter ex 20x Heel/toe raises, hip abd, ext, flex, marches   Squat Matrix 5x 3 positions    Step up  B UE support, forward   Step tap (L) 10x4\"    STS 10x 4\" booster    Standing HS curls 10x    Bridging 10x    SLR (L) 10x With assistance   Supine hip abd 10x    Heel slides 10x         Gait cane               [x] Provided verbal/tactile cueing for activities related to strengthening, flexibility, endurance, ROM. (89640)  [] Provided verbal/tactile cueing for activities related to improving balance, coordination, kinesthetic sense, posture, motor skill, proprioception. (82184)    Therapeutic Activities:     [] Therapeutic

## 2024-10-15 ENCOUNTER — OFFICE VISIT (OUTPATIENT)
Dept: FAMILY MEDICINE CLINIC | Age: 74
End: 2024-10-15
Payer: MEDICARE

## 2024-10-15 VITALS
BODY MASS INDEX: 34.95 KG/M2 | HEIGHT: 68 IN | SYSTOLIC BLOOD PRESSURE: 132 MMHG | WEIGHT: 230.6 LBS | HEART RATE: 78 BPM | OXYGEN SATURATION: 97 % | DIASTOLIC BLOOD PRESSURE: 74 MMHG

## 2024-10-15 DIAGNOSIS — E78.00 PURE HYPERCHOLESTEROLEMIA: ICD-10-CM

## 2024-10-15 DIAGNOSIS — R79.89 ELEVATED TSH: ICD-10-CM

## 2024-10-15 DIAGNOSIS — I10 PRIMARY HYPERTENSION: Primary | ICD-10-CM

## 2024-10-15 DIAGNOSIS — G47.8 UNREFRESHED BY SLEEP: ICD-10-CM

## 2024-10-15 DIAGNOSIS — M70.62 TROCHANTERIC BURSITIS, LEFT HIP: ICD-10-CM

## 2024-10-15 DIAGNOSIS — D12.4 ADENOMATOUS POLYP OF DESCENDING COLON: ICD-10-CM

## 2024-10-15 DIAGNOSIS — M17.12 PRIMARY OSTEOARTHRITIS OF LEFT KNEE: ICD-10-CM

## 2024-10-15 DIAGNOSIS — Z96.642 HISTORY OF TOTAL LEFT HIP REPLACEMENT: ICD-10-CM

## 2024-10-15 DIAGNOSIS — K21.9 GASTROESOPHAGEAL REFLUX DISEASE WITHOUT ESOPHAGITIS: ICD-10-CM

## 2024-10-15 DIAGNOSIS — E66.812 CLASS 2 OBESITY DUE TO EXCESS CALORIES WITHOUT SERIOUS COMORBIDITY WITH BODY MASS INDEX (BMI) OF 37.0 TO 37.9 IN ADULT: ICD-10-CM

## 2024-10-15 DIAGNOSIS — J45.20 MILD INTERMITTENT ASTHMA WITHOUT COMPLICATION: ICD-10-CM

## 2024-10-15 DIAGNOSIS — R73.01 IMPAIRED FASTING GLUCOSE: ICD-10-CM

## 2024-10-15 DIAGNOSIS — E66.09 CLASS 2 OBESITY DUE TO EXCESS CALORIES WITHOUT SERIOUS COMORBIDITY WITH BODY MASS INDEX (BMI) OF 37.0 TO 37.9 IN ADULT: ICD-10-CM

## 2024-10-15 DIAGNOSIS — Z79.890 POSTMENOPAUSAL HRT (HORMONE REPLACEMENT THERAPY): ICD-10-CM

## 2024-10-15 PROCEDURE — 99213 OFFICE O/P EST LOW 20 MIN: CPT | Performed by: FAMILY MEDICINE

## 2024-10-15 ASSESSMENT — ENCOUNTER SYMPTOMS
GASTROINTESTINAL NEGATIVE: 1
EYES NEGATIVE: 1
WHEEZING: 1
ALLERGIC/IMMUNOLOGIC NEGATIVE: 1
BACK PAIN: 0

## 2024-10-15 NOTE — PROGRESS NOTES
Subjective:      Patient ID: Megha Guillen is a 74 y.o. female.    Hypertension  Associated symptoms include neck pain.   Gastroesophageal Reflux  She complains of wheezing.   Hyperlipidemia    Other  Associated symptoms include neck pain. Pertinent negatives include no arthralgias (nothing particularly problematic at present in joints.  rare groin/hip discomfort. ).   Back Pain    Groin Pain  Pertinent negatives include no back pain.   Neck Pain     Wheezing   Associated symptoms include neck pain.   Skin Problem       Routine  follow up on chronic medical conditions colon polyps, htn, hyperlipidemia, gerd, obesity, insomnia, oa , refills, and review of updated labs.    She had right and left tka per Dr. Green at [facility].  She is a Jehovah-witness and wanted to pursue the Bloodless Care Program they have at [facility], where she had her prior knee replacement done. Both knees doing well at present.   She is back to work after retiring from .  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      right hip replacement through Dr. Becker here in December 2023.  Hip doing well at present.   Left hip replaced 9/27/24.  Still recovering.  Lateral hip and thigh still sore.    She lost wt. Down to 235, but gained wt. Back.  Up and down by report.   230 at present.  Bp doing better at present.   Lost her spouse to pancreatic cancer may 2021. Lots of responsibilities with renters and property.   Compliant with medications.  Sleep doing well, up to 5-6 hours/night. No gerd symptoms to report.  Hot flashes periodically, ongoing.  No asthma to report.  Some cramps in feet and legs at night.      Her lower back pain has resolved with therapy for the most part.  She has stretches and exercises to use for exacerbations, which seems to be working consistently at present.     Mild wheezing she attributes to weather changes.

## 2024-10-15 NOTE — PATIENT INSTRUCTIONS
Hospital Outpatient Visit on 10/11/2024   Component Date Value Ref Range Status    TSH 10/11/2024 2.28  0.30 - 5.00 uIU/mL Final    Cholesterol, Total 10/11/2024 204 (H)  0 - 199 mg/dL Final    Comment:    Cholesterol Guidelines:      <200  Desirable   200-240  Borderline      >240  Undesirable         HDL 10/11/2024 55  >40 mg/dL Final    Comment:    HDL Guidelines:    <40     Undesirable   40-59    Borderline    >59     Desirable         LDL Cholesterol 10/11/2024 122 (H)  0 - 100 mg/dL Final    Comment:    LDL Guidelines:     <100    Desirable   100-129   Near to/above Desirable   130-159   Borderline      >159   Undesirable     Direct (measured) LDL and calculated LDL are not interchangeable tests.      Chol/HDL Ratio 10/11/2024 4.0   Final    Triglycerides 10/11/2024 132  <150 mg/dL Final    Comment:    Triglyceride Guidelines:     <150   Desirable   150-199  Borderline   200-499  High     >499   Very high   Based on AHA Guidelines for fasting triglyceride, October 2012.         VLDL 10/11/2024 26  mg/dL Final    Sodium 10/11/2024 144  135 - 144 mmol/L Final    Potassium 10/11/2024 4.4  3.7 - 5.3 mmol/L Final    Chloride 10/11/2024 107  98 - 107 mmol/L Final    CO2 10/11/2024 27  20 - 31 mmol/L Final    Anion Gap 10/11/2024 10  9 - 17 mmol/L Final    Glucose 10/11/2024 103 (H)  70 - 99 mg/dL Final    BUN 10/11/2024 22  8 - 23 mg/dL Final    Creatinine 10/11/2024 1.1 (H)  0.5 - 0.9 mg/dL Final    Est, Glom Filt Rate 10/11/2024 53 (L)  >60 mL/min/1.73m2 Final    Comment:       These results are not intended for use in patients <18 years of age.        eGFR results are calculated without a race factor using the 2021 CKD-EPI equation.  Careful clinical correlation is recommended, particularly when comparing to results   calculated using previous equations.  The CKD-EPI equation is less accurate in patients with extremes of muscle mass, extra-renal   metabolism of creatine, excessive creatine ingestion, or following

## 2024-10-16 ENCOUNTER — HOSPITAL ENCOUNTER (OUTPATIENT)
Dept: PHYSICAL THERAPY | Age: 74
Setting detail: THERAPIES SERIES
Discharge: HOME | End: 2024-10-16
Payer: MEDICARE

## 2024-10-16 NOTE — FLOWSHEET NOTE
Outpatient Physical Therapy    [x] [place]  Phone: [phone]  Fax: [phone]      [] [place]  Phone: [phone]  Fax: [phone]    Physical Therapy  Cancellation/No-show Note  Patient Name:  Megha Guillen  :  1950   Date:  10/16/2024  Cancelled visits to date: 1  No-shows to date: 0    For today's appointment patient:  [x]  Cancelled  []  Rescheduled appointment  []  No-show     Reason given by patient:  [x]  Patient ill  []  Conflicting appointment  []  No transportation    []  Conflict with work  []  No reason given  []  Other:     Comments:      Electronically signed by: JOHANNE RIGGS PTA

## 2024-10-18 ENCOUNTER — HOSPITAL ENCOUNTER (OUTPATIENT)
Dept: PHYSICAL THERAPY | Age: 74
Setting detail: THERAPIES SERIES
Discharge: HOME | End: 2024-10-18
Payer: MEDICARE

## 2024-10-18 ENCOUNTER — CARE COORDINATION (OUTPATIENT)
Dept: CARE COORDINATION | Age: 74
End: 2024-10-18

## 2024-10-18 NOTE — CARE COORDINATION
Care Transitions Note    Follow Up Call     Attempted to reach patient for transitions of care follow up.  Unable to reach patient.      Outreach Attempts:# 1   Unable to leave message.     Care Summary Note: 1st attempt     Follow Up Appointment:   Future Appointments         Provider Specialty Dept Phone    10/21/2024 10:45 AM Demetrius Stroud PTA Physical Therapy [phone]    10/23/2024 11:00 AM Sangita Gallegos PTA Physical Therapy [phone]    10/25/2024 11:30 AM Demetrius Stroud PTA Physical Therapy [phone]    10/28/2024 11:15 AM Yusuf Cervantes PT Physical Therapy [phone]    10/30/2024 11:00 AM Sangita Gallegos PTA Physical Therapy [phone]    11/1/2024 11:00 AM Sangita Gallegos PTA Physical Therapy [phone]    11/8/2024 11:15 AM Rakesh Becker MD Orthopedic Surgery [phone]    4/15/2025 10:40 AM Ron Padgett MD Family Medicine [phone]            Plan for follow-up call in 2-5 days based on severity of symptoms and risk factors. Plan for next call: -how is incision healing? How is pain? PT going okay?  follow-up appointment-review notes from ortho follow up.      Linda Zhao LPN

## 2024-10-18 NOTE — FLOWSHEET NOTE
Outpatient Physical Therapy    [x] [place]  Phone: [phone]  Fax: [phone]      [] [place]  Phone: [phone]  Fax: [phone]    Physical Therapy  Cancellation/No-show Note  Patient Name:  Megha Guillen  :  1950   Date:  10/18/2024  Cancelled visits to date: 2  No-shows to date: 0    For today's appointment patient:  [x]  Cancelled  []  Rescheduled appointment  []  No-show     Reason given by patient:  [x]  Patient ill  []  Conflicting appointment  []  No transportation    []  Conflict with work  []  No reason given  []  Other:     Comments:      Electronically signed by: Demetrius Stroud PTA

## 2024-10-21 ENCOUNTER — HOSPITAL ENCOUNTER (OUTPATIENT)
Dept: PHYSICAL THERAPY | Age: 74
Setting detail: THERAPIES SERIES
Discharge: HOME | End: 2024-10-21
Payer: MEDICARE

## 2024-10-21 PROCEDURE — 97110 THERAPEUTIC EXERCISES: CPT | Performed by: PHYSICAL THERAPY ASSISTANT

## 2024-10-21 NOTE — FLOWSHEET NOTE
Physical Therapy Daily Treatment Note    Date:  10/21/2024    Patient Name:  Megha Guillen    :  1950  MRN: [id number]  Restrictions/Precautions:   R BUCK 2023  Medical/Treatment Diagnosis Information:   Diagnosis: M25.552 L hip pain, s/p L BUCK 24  Treatment Diagnosis: M25.552 L hip pain, s/p L BUCK 24  Insurance/Certification information:  PT Insurance Information: Medicare  Physician Information:   Eugenio  Plan of care signed (Y/N):  Y  Visit# / total visits:   Pain level: 0/10       Time In:     1050  Time Out:   1130    Progress Note: []  Yes  [x]  No  Next due by: Visit #10      Subjective:  Pain this date rated 0/10, achiness in posterior hip noted.Increased ease with sitting this date.     Objective: There ex completed per flowsheet to facilitate strength and mobility for improved ease with ADLs and improved gait. Verbal cues provided for proper form and sequence of there ex. Advanced and progressed several exercises to improve strength and stability. Min fatigue and discomfort noted.     Observation    Test measurements:      Exercises: ( post-lateral approach- No resisted abd 6 weeks)  Exercise/Equipment Resistance/Repetitions Other comments   NUSTEP Attempted, held due to pain Add if tolerated   Counter ex 20x Heel/toe raises, hip abd, ext, flex, marches   Lunge 10x ea F, L   Squat Matrix 10x 3 positions    Step up 10x 4'' F,L   Step tap (L) 10x4\"    STS 10x 4\" booster    Standing HS curls 10x    Bridging 10x    SLR (L) 10x With assistance   Supine hip abd 20x    Heel slides 20x              LAQ 15x    HS Curls 15xGR          Gait cane               [x] Provided verbal/tactile cueing for activities related to strengthening, flexibility, endurance, ROM. (48441)  [] Provided verbal/tactile cueing for activities related to improving balance, coordination, kinesthetic sense, posture, motor skill, proprioception. (68427)    Therapeutic Activities:     [] Therapeutic activities, direct

## 2024-10-23 ENCOUNTER — HOSPITAL ENCOUNTER (OUTPATIENT)
Dept: PHYSICAL THERAPY | Age: 74
Setting detail: THERAPIES SERIES
Discharge: HOME | End: 2024-10-23
Payer: MEDICARE

## 2024-10-23 ENCOUNTER — CARE COORDINATION (OUTPATIENT)
Dept: CARE COORDINATION | Age: 74
End: 2024-10-23

## 2024-10-23 PROCEDURE — 97110 THERAPEUTIC EXERCISES: CPT

## 2024-10-23 NOTE — FLOWSHEET NOTE
Continue per plan of care [] Alter current plan (see comments)  [] Plan of care initiated [] Hold pending MD visit [] Discharge    Plan for Next Session:  Progress there ex per pt tolerance.     Electronically signed by:  Sangita Gallegos PTA

## 2024-10-23 NOTE — CARE COORDINATION
Care Transitions Note    Follow Up Call     Attempted to reach patient for transitions of care follow up.  Unable to reach patient.      Outreach Attempts:   Multiple attempts to contact patient at phone numbers on file.   HIPAA compliant voicemail left for family, EC.   Unable to leave message. Unable to leave message on patient's phone    Care Summary Note: 2nd attempt-will end care transitions if no return call received.     Follow Up Appointment:   Future Appointments         Provider Specialty Dept Phone    10/25/2024 11:30 AM Demetrius Stroud PTA Physical Therapy [phone]    10/28/2024 11:15 AM Yusuf Cervantes PT Physical Therapy [phone]    10/30/2024 11:00 AM Sangita Gallegos PTA Physical Therapy [phone]    11/1/2024 11:00 AM Sangita Gallegos PTA Physical Therapy [phone]    11/8/2024 11:15 AM Rakesh Becker MD Orthopedic Surgery [phone]    4/15/2025 10:40 AM Ron Padgett MD Family Medicine [phone]            No further follow-up call indicated based on severity of symptoms and risk factors.     Vanessa Inman LPN

## 2024-10-25 ENCOUNTER — HOSPITAL ENCOUNTER (OUTPATIENT)
Dept: PHYSICAL THERAPY | Age: 74
Setting detail: THERAPIES SERIES
Discharge: HOME | End: 2024-10-25
Payer: MEDICARE

## 2024-10-25 PROCEDURE — 97110 THERAPEUTIC EXERCISES: CPT | Performed by: PHYSICAL THERAPY ASSISTANT

## 2024-10-25 NOTE — FLOWSHEET NOTE
Physical Therapy Daily Treatment Note    Date:  10/25/2024    Patient Name:  Megha Guillen    :  1950  MRN: [id number]  Restrictions/Precautions:   R BUCK 2023  Medical/Treatment Diagnosis Information:   Diagnosis: M25.552 L hip pain, s/p L BUCK 24  Treatment Diagnosis: M25.552 L hip pain, s/p L BUCK 24  Insurance/Certification information:  PT Insurance Information: Medicare  Physician Information:   Eugenio  Plan of care signed (Y/N):  Y  Visit# / total visits:   Pain level: 0/10       Time In:    1133  Time Out: 1214    Progress Note: []  Yes  [x]  No  Next due by: Visit #10      Subjective: States walking is going well with cane. Has some sharp pains if moves wrong but overall doing well.    Objective: There ex completed per flowsheet to facilitate strength and mobility for improved ease with ADLs and improved gait. Verbal cues provided for proper form and sequence of there ex. Advanced and progressed several exercises to improve strength and stability. Min fatigue and discomfort noted.Min difficulty with increased step height noted this date.     Observation    Test measurements:      Exercises: ( post-lateral approach- No resisted abd 6 weeks)  Exercise/Equipment Resistance/Repetitions Other comments   NUSTEP  Add if tolerated   Counter ex 20x Heel/toe raises, hip abd, ext, flex, marches   Lunge 10x ea F, L   Squat Matrix 10x 3 positions    Step up 10x6'' F, L   Step tap (L) 10x6\"    STS 10x    Standing HS curls 10x GR   Bridging 10    SLR (L) 10x With assistance   Supine hip abd 20x    Heel slides 20x              LAQ 15x    HS Curls 15x GR          Gait cane               [x] Provided verbal/tactile cueing for activities related to strengthening, flexibility, endurance, ROM. (82104)  [] Provided verbal/tactile cueing for activities related to improving balance, coordination, kinesthetic sense, posture, motor skill, proprioception. (07592)    Therapeutic Activities:     [] Therapeutic

## 2024-10-28 ENCOUNTER — HOSPITAL ENCOUNTER (OUTPATIENT)
Dept: PHYSICAL THERAPY | Age: 74
Setting detail: THERAPIES SERIES
Discharge: HOME | End: 2024-10-28
Payer: MEDICARE

## 2024-10-28 DIAGNOSIS — Z96.642 S/P TOTAL LEFT HIP ARTHROPLASTY: Primary | ICD-10-CM

## 2024-10-28 PROCEDURE — 97110 THERAPEUTIC EXERCISES: CPT | Performed by: PHYSICAL THERAPIST

## 2024-10-28 NOTE — FLOWSHEET NOTE
training, meal preparation, safety procedures, and instructions in use of assistive technology devices/adaptive equipment, direct one-on-one contact. (18907)    Home Exercise Program:   Pt has handout from previous [facility]. Verbally reviewed these ex  [x] Reviewed/Progressed HEP activities related to strengthening, flexibility, endurance, ROM. (74504)  [] Reviewed/Progressed HEP activities related to improving balance, coordination, kinesthetic sense, posture, motor skill, proprioception.  (85041)    Manual Treatments:    [] Provided manual therapy to mobilize soft tissue/joints for the purpose of modulating pain, promoting relaxation,  increasing ROM, reducing/eliminating soft tissue swelling/inflammation/restriction, improving soft tissue extensibility. (50257)    Service Based Modalities:      Timed Code Treatment Minutes:   There ex 38'    Total Treatment Minutes:   38'    Treatment/Activity Tolerance:  [x] Patient tolerated treatment well [] Patient limited by fatique  [] Patient limited by pain  [] Patient limited by other medical complications  [] Other:     Prognosis: [x] Good [] Fair  [] Poor    Patient Requires Follow-up: [x] Yes  [] No      Goals:  Short Term Goals  Time Frame for Short Term Goals: 1 week  Short Term Goal 1: Start HEP - met    Long Term Goals  Time Frame for Long Term Goals : 4-6 weeks  Long Term Goal 1: L hip pain controlled at 2/10 to allow regular ADL- met  Long Term Goal 2: Gait with cane or no device for over 600 ft for return to community activity- met  Long Term Goal 3: Able to step up/down 6-8\" steps, inprder to properly navigate multi-level home  Long Term Goal 4: TUG 16\" or less for gait efficiency      Plan:   [x] Continue per plan of care [] Alter current plan (see comments)  [] Plan of care initiated [] Hold pending MD visit [] Discharge    Plan for Next Session:  Progress there ex per pt tolerance.     Electronically signed by:  Yusuf Cervantes PT

## 2024-10-30 ENCOUNTER — HOSPITAL ENCOUNTER (OUTPATIENT)
Dept: PHYSICAL THERAPY | Age: 74
Setting detail: THERAPIES SERIES
Discharge: HOME | End: 2024-10-30
Payer: MEDICARE

## 2024-10-30 PROCEDURE — 97110 THERAPEUTIC EXERCISES: CPT

## 2024-10-30 NOTE — FLOWSHEET NOTE
Physical Therapy Daily Treatment Note    Date:  10/30/2024    Patient Name:  Megha Guillen    :  1950  MRN: [id number]  Restrictions/Precautions:   R BUCK 2023  Medical/Treatment Diagnosis Information:   Diagnosis: M25.552 L hip pain, s/p L BUCK 24  Treatment Diagnosis: M25.552 L hip pain, s/p L BUCK 24  Insurance/Certification information:  PT Insurance Information: Medicare  Physician Information:   Eugenio  Plan of care signed (Y/N):  Y  Visit# / total visits:   Pain level: 0/10       Time In: 10:58    Time Out: 11:36    Progress Note: []  Yes  [x]  No  Next due by: Visit #10      Subjective: Has been getting better. Has an ache still in groin. Does not use cane at home    Objective: There ex completed per flowsheet to facilitate strength and mobility for improved ease with ADLs and improved gait. Verbal cues provided for proper form and sequence of there ex. Advanced and progressed several exercises to improve strength and stability.  Discharge next week?      Observation:    Test measurements:    Min L Jimenezenberg anika with gait.    Exercises: (post-lateral approach- No resisted abd 6 weeks)  Exercise/Equipment Resistance/Repetitions Other comments   NUSTEP 5'    Counter ex 20x Heel/toe raises, hip abd, ext, flex, marches   Lunge 10x ea F, L   Squat Matrix 10x 3 positions    Step up 10x6'' F, L   Step tap (L) 10x6\"    STS 10x    Standing HS curls 20x    Bridging 10, x2 Narrow, wide   SLR (L) 10x With assistance   Supine hip abd 20x    Heel slides 20x              LAQ 15x    HS Curls 15x GR          Gait cane               [x] Provided verbal/tactile cueing for activities related to strengthening, flexibility, endurance, ROM. (80375)  [] Provided verbal/tactile cueing for activities related to improving balance, coordination, kinesthetic sense, posture, motor skill, proprioception. (69672)    Therapeutic Activities:     [] Therapeutic activities, direct (one-on-one) patient contact (use of

## 2024-10-31 NOTE — PROGRESS NOTES
I have reviewed and agree to the content of the note written by the PTA.  Electronically signed by Yusuf Cervantes PT [id number]

## 2024-11-01 ENCOUNTER — IMMUNIZATION (OUTPATIENT)
Dept: LAB | Age: 74
End: 2024-11-01
Payer: MEDICARE

## 2024-11-01 ENCOUNTER — HOSPITAL ENCOUNTER (OUTPATIENT)
Dept: PHYSICAL THERAPY | Age: 74
Setting detail: THERAPIES SERIES
Discharge: HOME | End: 2024-11-01
Payer: MEDICARE

## 2024-11-01 PROCEDURE — 97110 THERAPEUTIC EXERCISES: CPT

## 2024-11-01 PROCEDURE — 90653 IIV ADJUVANT VACCINE IM: CPT | Performed by: FAMILY MEDICINE

## 2024-11-01 NOTE — FLOWSHEET NOTE
Physical Therapy Daily Treatment Note    Date:  2024    Patient Name:  Megha Guillen    :  1950  MRN: [id number]  Restrictions/Precautions:   R BUCK 2023  Medical/Treatment Diagnosis Information:   Diagnosis: M25.552 L hip pain, s/p L BUCK 24  Treatment Diagnosis: M25.552 L hip pain, s/p L BUCK 24  Insurance/Certification information:  PT Insurance Information: Medicare  Physician Information:   Eugenio  Plan of care signed (Y/N):  Y  Visit# / total visits:   Pain level: 0/10       Time In: 1056    Time Out: 11:35    Progress Note: [x]  Yes  []  No  Next due by: Visit #10      Subjective: Back is a little achy. Uses cane more as precaution in community, does not use at home. 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     Objective: There ex completed per flowsheet to facilitate strength and mobility for improved ease with ADLs and improved gait. Verbal cues provided for proper form and sequence of there ex. Advanced and progressed several exercises to improve strength and stability.  Discharge next week?      Observation:    Test measurements:    Min L Trandelenberg lurch with gait.      Exercises: (post-lateral approach- No resisted abd 6 weeks)  Exercise/Equipment Resistance/Repetitions Other comments   NUSTEP 5'    Counter ex 20x Heel/toe raises, hip abd, ext, flex, marches   Lunge 10x ea F, L   Squat Matrix 10x 3 positions    Step up 10x6'' F, L   Step tap (L) 10x6\"    STS 10x    Standing HS curls 20x    Bridging 10, x2 Narrow, wide   SLR (L) 10x With assistance   Supine hip abd 20x    Heel slides 20x              LAQ 15x    HS Curls 15x GR          Gait cane               [x] Provided verbal/tactile cueing for activities related to strengthening, flexibility, endurance, ROM. (13761)  [] Provided verbal/tactile cueing for activities related to improving balance, coordination, kinesthetic sense, posture, motor skill, proprioception. (26513)    Therapeutic Activities:     []

## 2024-11-04 ENCOUNTER — HOSPITAL ENCOUNTER (OUTPATIENT)
Dept: PHYSICAL THERAPY | Age: 74
Setting detail: THERAPIES SERIES
Discharge: HOME | End: 2024-11-04
Payer: MEDICARE

## 2024-11-04 PROCEDURE — 97110 THERAPEUTIC EXERCISES: CPT | Performed by: PHYSICAL THERAPIST

## 2024-11-04 NOTE — FLOWSHEET NOTE
Physical Therapy Daily Treatment Note    Date:  2024    Patient Name:  Megha Guillen    :  1950  MRN: [id number]  Restrictions/Precautions:   R BUCK 2023  Medical/Treatment Diagnosis Information:   Diagnosis: M25.552 L hip pain, s/p L BUCK 24  Treatment Diagnosis: M25.552 L hip pain, s/p L BUCK 24  Insurance/Certification information:  PT Insurance Information: Medicare  Physician Information:   Eugenio  Plan of care signed (Y/N):  Y  Visit# / total visits:  of 2nd POC, Total 13  Pain level: 0/10       Time In: 11:15 Time Out: 11:40    Progress Note: [x]  Yes  []  No  Next due by: Visit #10      Subjective: Is back to driving. Doing regular housekeeping.    Objective:   Observation:  Test measurements:    Min L Trandelenberg lurch with gait.  No balance loss with multi-direction lunges.      Exercises: (post-lateral approach- No resisted abd 6 weeks)  Exercise/Equipment Resistance/Repetitions Other comments   NUSTEP 5'    Counter ex 20x Heel/toe raises, hip abd, ext, flex, marches   Lunge 10x ea F, L   Squat Matrix 10x 3 positions    Step up 10x6'' F, L   Step tap (L) 10x6\"    STS 10x    Standing HS curls 20x    Bridging  Narrow, wide   SLR (L)  With assistance   Supine hip abd     Heel slides          Lateral step  30 ft x2    HS Curls 15x GR                    [x] Provided verbal/tactile cueing for activities related to strengthening, flexibility, endurance, ROM. (08315)  [] Provided verbal/tactile cueing for activities related to improving balance, coordination, kinesthetic sense, posture, motor skill, proprioception. (92344)    Therapeutic Activities:     [] Therapeutic activities, direct (one-on-one) patient contact (use of dynamic activities to improve functional performance). (48821)    Gait:   [] Provided training and instruction to the patient for ambulation re-education. (92631)    Self-Care/ADL's  [] Self-care/home management training and compensatory training, meal preparation,

## 2024-11-04 NOTE — PLAN OF CARE
[facility]  Rehabilitation and Sports Medicine    [] [place]  Phone: [phone]  Fax: [phone]      [] [place]  Phone: [phone]  Fax: [phone]    Physical Therapy Progress Note  Date: 2024        Patient Name:  Megha Guillen    :  1950  MRN: [id number]  Restrictions/Precautions:      Medical/Treatment Diagnosis Information:   Diagnosis: M25.552 L hip pain, s/p L BUCK 24  Treatment Diagnosis: M25.552 L hip pain, s/p L BUCK 24     Insurance/Certification information:   Medicare   Physician Information:    Eugenio   Plan of care signed (Y/N): y   Visit# / total visits:  13  Pain level: 0/10         Plan of Care/Treatment to date:  [x] Therapeutic Exercise    [] Modalities:  [x] Therapeutic Activity     [] Ultrasound  [] Electrical Stimulation  [x] Gait Training      [] Cervical Traction    [] Lumbar Traction  [] Neuromuscular Re-education  [] Cold/hotpack [] Iontophoresis  [x] Instruction in HEP      Other:  [] Manual Therapy       []    [] Aquatic Therapy       []                           Subjective:    Is back to driving. Doing regular housekeeping.          Objective:   Min L Trandelenberg lurch with gait.  No balance loss with multi-direction lunges         Plan:    D/c soon       Goals:   Short Term Goals  Time Frame for Short Term Goals: 1 week  Short Term Goal 1: Start HEP - met     Long Term Goals  Time Frame for Long Term Goals : 4-6 weeks  Long Term Goal 1: L hip pain controlled at 2/10 to allow regular ADL- met  Long Term Goal 2: Gait with cane or no device for over 600 ft for return to community activity- met  Long Term Goal 3: Able to step up/down 6-8\" steps, inprder to properly navigate multi-level home - met  Long Term Goal 4: TUG 16\" or less for gait efficiency - met           Current Frequency/Duration:  # Days per week: [] 1 day # Weeks: [] 1 week [x] 4 weeks      [x] 2 days   [] 2 weeks [] 5 weeks      [] 3 days   [] 3 weeks [] 6 weeks     Rehab

## 2024-11-06 ENCOUNTER — HOSPITAL ENCOUNTER (OUTPATIENT)
Dept: PHYSICAL THERAPY | Age: 74
Setting detail: THERAPIES SERIES
Discharge: HOME | End: 2024-11-06
Payer: MEDICARE

## 2024-11-06 PROCEDURE — 97110 THERAPEUTIC EXERCISES: CPT | Performed by: PHYSICAL THERAPY ASSISTANT

## 2024-11-06 NOTE — PROGRESS NOTES
I have reviewed and agree to the content of the note written by the PTA.  Electronically signed by Yusuf Cervantes PT [id number]

## 2024-11-06 NOTE — FLOWSHEET NOTE
re-education. (10075)    Self-Care/ADL's  [] Self-care/home management training and compensatory training, meal preparation, safety procedures, and instructions in use of assistive technology devices/adaptive equipment, direct one-on-one contact. (42676)    Home Exercise Program:   Pt has handout from previous R BUCK. Verbally reviewed these ex  [x] Reviewed/Progressed HEP activities related to strengthening, flexibility, endurance, ROM. (30873)  [] Reviewed/Progressed HEP activities related to improving balance, coordination, kinesthetic sense, posture, motor skill, proprioception.  (60660)    Manual Treatments:    [] Provided manual therapy to mobilize soft tissue/joints for the purpose of modulating pain, promoting relaxation,  increasing ROM, reducing/eliminating soft tissue swelling/inflammation/restriction, improving soft tissue extensibility. (04324)    Service Based Modalities:      Timed Code Treatment Minutes:   There ex 43'    Total Treatment Minutes:   43'    Treatment/Activity Tolerance:  [x] Patient tolerated treatment well [] Patient limited by fatique  [] Patient limited by pain  [] Patient limited by other medical complications  [] Other:     Prognosis: [x] Good [] Fair  [] Poor    Patient Requires Follow-up: [x] Yes  [] No      Goals:  Short Term Goals  Time Frame for Short Term Goals: 1 week  Short Term Goal 1: Start HEP - met    Long Term Goals  Time Frame for Long Term Goals : 4-6 weeks  Long Term Goal 1: L hip pain controlled at 2/10 to allow regular ADL- met  Long Term Goal 2: Gait with cane or no device for over 600 ft for return to community activity- met  Long Term Goal 3: Able to step up/down 6-8\" steps, inprder to properly navigate multi-level home - met  Long Term Goal 4: TUG 16\" or less for gait efficiency - met      Plan:   [] Continue per plan of care [] Alter current plan (see comments)  [] Plan of care initiated [] Hold pending MD visit [x] Discharge    Plan for Next Session:  Will

## 2024-11-08 ENCOUNTER — APPOINTMENT (OUTPATIENT)
Dept: PHYSICAL THERAPY | Age: 74
End: 2024-11-08
Payer: MEDICARE

## 2024-11-21 ENCOUNTER — OFFICE VISIT (OUTPATIENT)
Dept: FAMILY MEDICINE CLINIC | Age: 74
End: 2024-11-21

## 2024-11-21 VITALS
BODY MASS INDEX: 36.37 KG/M2 | OXYGEN SATURATION: 98 % | DIASTOLIC BLOOD PRESSURE: 70 MMHG | WEIGHT: 240 LBS | HEART RATE: 52 BPM | SYSTOLIC BLOOD PRESSURE: 124 MMHG | HEIGHT: 68 IN

## 2024-11-21 DIAGNOSIS — R22.9 LOCALIZED SKIN MASS, LUMP, OR SWELLING: Primary | ICD-10-CM

## 2024-11-21 ASSESSMENT — ENCOUNTER SYMPTOMS
VOMITING: 0
ALLERGIC/IMMUNOLOGIC NEGATIVE: 1
DIARRHEA: 0
ABDOMINAL PAIN: 1
NAUSEA: 1
CONSTIPATION: 0
EYES NEGATIVE: 1
RESPIRATORY NEGATIVE: 1

## 2024-11-21 NOTE — PROGRESS NOTES
Subjective:      Patient ID: Megha Guillen is a 74 y.o. female.    HPI  acute visit for a concern for a lump under the skin.  Some pain reported.  Some mild nausea in the last 1.5 weeks.          Past Medical History:   Diagnosis Date    Asthma     Chalazion of right lower eyelid     Colon polyps     Degenerative joint disease of knee, left     Dysmenorrhea     GERD (gastroesophageal reflux disease)     Greater trochanteric bursitis of right hip     History of blood transfusion     Hot flashes     Hyperlipidemia     Hypertension     Impaired fasting glucose     Menopausal symptoms     Obesity     Overactive bladder     Scaphoid fracture of wrist     Right.    Sleep disturbances     Tobacco abuse     Remote and limited.    Uterine fibroid      Past Surgical History:   Procedure Laterality Date    BREAST REDUCTION SURGERY Bilateral 1996    CARDIAC CATHETERIZATION  06/15/2007    No significant coronary artery disease, preserved LV systolic function.      SECTION      COLONOSCOPY  09/10/2008    Sigmoid diverticulosis, history of polyps.    COLONOSCOPY  2007    Multiple polyps of the colon, rectal polyps, sigmoid polyps, and splenic flexure polyps.    COLONOSCOPY  2006    Pedunculated thin polyp near the transverse colon, small polyp at the dentate line versus a hemorrhoid.    COLONOSCOPY  2013    COLONOSCOPY  2014    Dr. Lindsey. adenom. x 2, 5yr follow up    DILATION AND CURETTAGE OF UTERUS       COLONOSCOPY BIOPSY/STOMA N/A 2020    hyperplastic polyp X 3, Dr. Weber    HYSTERECTOMY, TOTAL ABDOMINAL (CERVIX REMOVED)      KNEE ARTHROSCOPY Left 1999    Torn degenerate posterior third medial meniscus.    MOUTH SURGERY Right 2015    OTHER SURGICAL HISTORY Left 2012    Left knee injection for left knee pain.    OTHER SURGICAL HISTORY Right 2006    Bursa injection for greater trochanteric bursitis.    SALPINGO-OOPHORECTOMY Left     TONSILLECTOMY AND ADENOIDECTOMY

## 2024-11-22 ENCOUNTER — HOSPITAL ENCOUNTER (OUTPATIENT)
Dept: GENERAL RADIOLOGY | Age: 74
Discharge: HOME | End: 2024-11-24
Payer: MEDICARE

## 2024-11-22 ENCOUNTER — OFFICE VISIT (OUTPATIENT)
Dept: ORTHOPEDIC SURGERY | Age: 74
End: 2024-11-22
Payer: MEDICARE

## 2024-11-22 VITALS
HEIGHT: 68 IN | WEIGHT: 230 LBS | DIASTOLIC BLOOD PRESSURE: 68 MMHG | HEART RATE: 84 BPM | BODY MASS INDEX: 34.86 KG/M2 | SYSTOLIC BLOOD PRESSURE: 134 MMHG

## 2024-11-22 DIAGNOSIS — Z96.642 S/P TOTAL LEFT HIP ARTHROPLASTY: ICD-10-CM

## 2024-11-22 DIAGNOSIS — Z96.643 HISTORY OF BILATERAL HIP REPLACEMENTS: Primary | ICD-10-CM

## 2024-11-22 PROCEDURE — 99213 OFFICE O/P EST LOW 20 MIN: CPT | Performed by: ORTHOPAEDIC SURGERY

## 2024-11-22 PROCEDURE — 73502 X-RAY EXAM HIP UNI 2-3 VIEWS: CPT

## 2024-11-22 NOTE — PROGRESS NOTES
Patient ID: Megha Guillen is a 74 y.o. female    Chief Compliant:  Chief Complaint   Patient presents with    Hip Pain     S/p left total hip 24        Diagnostic imaging:  AP pelvis AP lateral hip status post bilateral total hip components in good position      Assessment and Plan:  1. History of bilateral hip replacements    Excellent outcome        Follow up 1 year    HPI:  This is a 74 y.o. female who presents to the clinic today for 6-week follow-up total hip arthroplasty almost a year follow-up contralateral total hip arthroplasty    Patient doing fantastic.         Review of Systems   All other systems reviewed and are negative.      Past History:    Current Outpatient Medications:     aspirin 81 MG EC tablet, Take 1 tablet by mouth in the morning and at bedtime 2 po bid for 1 month then dc, Disp: 90 tablet, Rfl: 0    hydroCHLOROthiazide (HYDRODIURIL) 25 MG tablet, TAKE 1 TABLET BY MOUTH DAILY, Disp: 90 tablet, Rfl: 3    losartan (COZAAR) 100 MG tablet, Take 1 tablet by mouth daily, Disp: , Rfl:     albuterol sulfate HFA (PROVENTIL;VENTOLIN;PROAIR) 108 (90 Base) MCG/ACT inhaler, Inhale 2 puffs into the lungs every 6 hours as needed for Wheezing or Shortness of Breath, Disp: 1 each, Rfl: 3    Handicap Placard MISC, by Does not apply route Expires 25, Disp: 1 each, Rfl: 0  Allergies   Allergen Reactions    Minocycline Nausea Only and Other (See Comments)     Dizzy    Oxycodone Nausea Only     Social History     Socioeconomic History    Marital status:      Spouse name: Not on file    Number of children: Not on file    Years of education: Not on file    Highest education level: Not on file   Occupational History    Not on file   Tobacco Use    Smoking status: Former     Current packs/day: 0.00     Average packs/day: 0.3 packs/day for 1 year (0.3 ttl pk-yrs)     Types: Cigarettes     Start date: 1997     Quit date: 1998     Years since quittin.9    Smokeless tobacco: Never

## 2024-11-25 ENCOUNTER — HOSPITAL ENCOUNTER (OUTPATIENT)
Dept: INTERVENTIONAL RADIOLOGY/VASCULAR | Age: 74
Discharge: HOME | End: 2024-11-27
Attending: FAMILY MEDICINE
Payer: MEDICARE

## 2024-11-25 DIAGNOSIS — R22.9 LOCALIZED SKIN MASS, LUMP, OR SWELLING: ICD-10-CM

## 2024-11-25 PROCEDURE — 76705 ECHO EXAM OF ABDOMEN: CPT

## 2024-12-12 RX ORDER — ALBUTEROL SULFATE 90 UG/1
2 INHALANT RESPIRATORY (INHALATION) EVERY 6 HOURS PRN
Qty: 1 EACH | Refills: 3 | Status: SHIPPED | OUTPATIENT
Start: 2024-12-12

## 2024-12-12 NOTE — TELEPHONE ENCOUNTER
Pt calling in for refill, she says she opened her last one 2 weeks ago but with the cold weathers she's been having to use it a little more. Send to pended pharmacy. Please advise.

## 2024-12-12 NOTE — TELEPHONE ENCOUNTER
Megha called requesting a refill of the below medication which has been pended for you:     Requested Prescriptions     Pending Prescriptions Disp Refills    albuterol sulfate HFA (PROVENTIL;VENTOLIN;PROAIR) 108 (90 Base) MCG/ACT inhaler 1 each 3     Sig: Inhale 2 puffs into the lungs every 6 hours as needed for Wheezing or Shortness of Breath       Last Appointment Date: 11/21/2024  Next Appointment Date: 4/15/2025    Allergies   Allergen Reactions    Minocycline Nausea Only and Other (See Comments)     Dizzy    Oxycodone Nausea Only

## 2025-02-05 NOTE — TELEPHONE ENCOUNTER
Megha called requesting a refill of the below medication which has been pended for you:     Requested Prescriptions     Pending Prescriptions Disp Refills    meloxicam (MOBIC) 15 MG tablet 30 tablet 1     Sig: Take 1 tablet by mouth daily Pt stopped prior to surgery, but has been ok'd to restart       Last Appointment Date: 11/21/2024  Next Appointment Date: 4/15/2025    Allergies   Allergen Reactions    Minocycline Nausea Only and Other (See Comments)     Dizzy    Oxycodone Nausea Only     
Pt calling fro refill. Send to pended pharmacy, please advise.   
Unable to assess due to medical condition

## 2025-02-07 RX ORDER — MELOXICAM 15 MG/1
15 TABLET ORAL DAILY
Qty: 30 TABLET | Refills: 1 | Status: SHIPPED | OUTPATIENT
Start: 2025-02-07 | End: 2025-05-08

## 2025-02-21 ENCOUNTER — PREP FOR PROCEDURE (OUTPATIENT)
Dept: SURGERY | Age: 75
End: 2025-02-21

## 2025-02-21 ENCOUNTER — TELEPHONE (OUTPATIENT)
Dept: SURGERY | Age: 75
End: 2025-02-21

## 2025-02-21 DIAGNOSIS — Z80.0 FAMILY HISTORY OF COLON CANCER: ICD-10-CM

## 2025-02-21 DIAGNOSIS — Z86.0102 HISTORY OF HYPERPLASTIC POLYP OF COLON: ICD-10-CM

## 2025-02-21 NOTE — TELEPHONE ENCOUNTER
Instructions reviewed over the phone and mailed to the patient. All questions answered and patient denies any further questions at this time. Patient scheduled for colonoscopy on 4-29-25 at [facility] with Dr. Weber. Instructed patient she can purchase the Miralax/Gatorade bowel prep over the counter at her pharmacy.

## 2025-02-21 NOTE — TELEPHONE ENCOUNTER
H &P Colonoscopy  Patient:Megha Guillen                 :1950  (yes) patient known to Dr. Weber's practice  PCP: Dr Padgett    CC:Family history of colon cancer and history of colon polyps        HPI:    ROS:  All 12 systems negative except the positives in the HPI.     Colonoscopy  Abd pain: no  Anemia: no  Bloating:no  Diarrhea: no  Constipation: no  Melena: no  Hematochezia:no  Rectal Bleeding:no  Rectal/Anal Pain:no  Pruritus: no  Family history colon Cancer: yes brother at the age of 38  Previous colon cancer: no  Previous Colon Polyp: yes,  hyperplastic polyp  Change in bowels: no  Decrease caliber of stool: no  Change in color of stool: no    Previous work up date: 2020 Colonoscopy by Dr Weber with findings of hyperplastic polyp x 3       Family History   Problem Relation Age of Onset    Cancer Mother     Stomach Cancer Mother     Cervical Cancer Mother     Stroke Father     Cancer Father         Throat.    Breast Cancer Father 70        father cancer right breast    Lung Cancer Sister     No Known Problems Sister     No Known Problems Sister     No Known Problems Sister     Cancer Brother         Lymphoma.    Colon Cancer Brother     No Known Problems Maternal Aunt     Stroke Maternal Grandmother     Heart Attack Maternal Grandfather         Myocardial infarction.    Asthma Daughter     Migraines Son      Social History     Socioeconomic History    Marital status:      Spouse name: Not on file    Number of children: Not on file    Years of education: Not on file    Highest education level: Not on file   Occupational History    Not on file   Tobacco Use    Smoking status: Former     Current packs/day: 0.00     Average packs/day: 0.3 packs/day for 1 year (0.3 ttl pk-yrs)     Types: Cigarettes     Start date: 1997     Quit date: 1998     Years since quittin.1    Smokeless tobacco: Never    Tobacco comments:     Smoked for about 1 year   Vaping Use    Vaping status: Never Used

## 2025-03-07 RX ORDER — MELOXICAM 15 MG/1
15 TABLET ORAL DAILY
Qty: 90 TABLET | Refills: 0 | Status: SHIPPED | OUTPATIENT
Start: 2025-03-07

## 2025-03-07 NOTE — TELEPHONE ENCOUNTER
Megha called requesting a refill of the below medication which has been pended for you:     Requested Prescriptions     Pending Prescriptions Disp Refills    meloxicam (MOBIC) 15 MG tablet [Pharmacy Med Name: Meloxicam 15 MG Oral Tablet] 90 tablet 0     Sig: TAKE 1 TABLET BY MOUTH DAILY       Last Appointment Date: 11/21/2024  Next Appointment Date: 4/15/2025    Allergies   Allergen Reactions    Minocycline Nausea Only and Other (See Comments)     Dizzy    Oxycodone Nausea Only

## 2025-04-09 ENCOUNTER — HOSPITAL ENCOUNTER (OUTPATIENT)
Age: 75
Discharge: HOME | End: 2025-04-09
Payer: MEDICARE

## 2025-04-09 DIAGNOSIS — Z01.818 PREOP TESTING: ICD-10-CM

## 2025-04-09 DIAGNOSIS — I10 PRIMARY HYPERTENSION: ICD-10-CM

## 2025-04-09 DIAGNOSIS — R79.89 ELEVATED TSH: ICD-10-CM

## 2025-04-09 DIAGNOSIS — R73.01 IMPAIRED FASTING GLUCOSE: ICD-10-CM

## 2025-04-09 DIAGNOSIS — E78.00 PURE HYPERCHOLESTEROLEMIA: ICD-10-CM

## 2025-04-09 LAB
ALBUMIN SERPL-MCNC: 4 G/DL (ref 3.5–5.2)
ALBUMIN/GLOB SERPL: 1 {RATIO} (ref 1–2.5)
ALP SERPL-CCNC: 107 U/L (ref 35–104)
ALT SERPL-CCNC: 17 U/L (ref 10–35)
ANION GAP SERPL CALCULATED.3IONS-SCNC: 9 MMOL/L (ref 9–16)
AST SERPL-CCNC: 20 U/L (ref 10–35)
BASOPHILS # BLD: 0.04 K/UL (ref 0–0.2)
BASOPHILS NFR BLD: 1 % (ref 0–2)
BILIRUB SERPL-MCNC: 0.3 MG/DL (ref 0–1.2)
BUN SERPL-MCNC: 29 MG/DL (ref 8–23)
BUN/CREAT SERPL: 22 (ref 9–20)
CALCIUM SERPL-MCNC: 9.8 MG/DL (ref 8.6–10.4)
CHLORIDE SERPL-SCNC: 107 MMOL/L (ref 98–107)
CHOLEST SERPL-MCNC: 205 MG/DL (ref 0–199)
CHOLESTEROL/HDL RATIO: 4.1
CO2 SERPL-SCNC: 28 MMOL/L (ref 20–31)
CREAT SERPL-MCNC: 1.3 MG/DL (ref 0.6–0.9)
EOSINOPHIL # BLD: 0.21 K/UL (ref 0–0.44)
EOSINOPHILS RELATIVE PERCENT: 3 % (ref 1–4)
ERYTHROCYTE [DISTWIDTH] IN BLOOD BY AUTOMATED COUNT: 14.1 % (ref 11.8–14.4)
EST. AVERAGE GLUCOSE BLD GHB EST-MCNC: 120 MG/DL
GFR, ESTIMATED: 43 ML/MIN/1.73M2
GLUCOSE SERPL-MCNC: 86 MG/DL (ref 74–99)
HBA1C MFR BLD: 5.8 % (ref 4–6)
HCT VFR BLD AUTO: 38.4 % (ref 36.3–47.1)
HDLC SERPL-MCNC: 50 MG/DL
HGB BLD-MCNC: 12.2 G/DL (ref 11.9–15.1)
IMM GRANULOCYTES # BLD AUTO: <0.03 K/UL (ref 0–0.3)
IMM GRANULOCYTES NFR BLD: 0 %
LDLC SERPL CALC-MCNC: 115 MG/DL (ref 0–100)
LYMPHOCYTES NFR BLD: 2.73 K/UL (ref 1.1–3.7)
LYMPHOCYTES RELATIVE PERCENT: 38 % (ref 24–43)
MCH RBC QN AUTO: 26.1 PG (ref 25.2–33.5)
MCHC RBC AUTO-ENTMCNC: 31.8 G/DL (ref 25.2–33.5)
MCV RBC AUTO: 82.2 FL (ref 82.6–102.9)
MONOCYTES NFR BLD: 0.68 K/UL (ref 0.1–1.2)
MONOCYTES NFR BLD: 10 % (ref 3–12)
NEUTROPHILS NFR BLD: 48 % (ref 36–65)
NEUTS SEG NFR BLD: 3.43 K/UL (ref 1.5–8.1)
NRBC BLD-RTO: 0 PER 100 WBC
PLATELET # BLD AUTO: 347 K/UL (ref 138–453)
PMV BLD AUTO: 9.3 FL (ref 8.1–13.5)
POTASSIUM SERPL-SCNC: 4.7 MMOL/L (ref 3.7–5.3)
PROT SERPL-MCNC: 7.9 G/DL (ref 6.6–8.7)
RBC # BLD AUTO: 4.67 M/UL (ref 3.95–5.11)
RBC # BLD: ABNORMAL 10*6/UL
SODIUM SERPL-SCNC: 144 MMOL/L (ref 136–145)
TRIGL SERPL-MCNC: 198 MG/DL
TSH SERPL DL<=0.05 MIU/L-ACNC: 2.85 UIU/ML (ref 0.27–4.2)
VLDLC SERPL CALC-MCNC: 40 MG/DL (ref 1–30)
WBC OTHER # BLD: 7.1 K/UL (ref 3.5–11.3)

## 2025-04-09 PROCEDURE — 36415 COLL VENOUS BLD VENIPUNCTURE: CPT

## 2025-04-09 PROCEDURE — 85025 COMPLETE CBC W/AUTO DIFF WBC: CPT

## 2025-04-09 PROCEDURE — 80061 LIPID PANEL: CPT

## 2025-04-09 PROCEDURE — 84443 ASSAY THYROID STIM HORMONE: CPT

## 2025-04-09 PROCEDURE — 80053 COMPREHEN METABOLIC PANEL: CPT

## 2025-04-09 PROCEDURE — 83036 HEMOGLOBIN GLYCOSYLATED A1C: CPT

## 2025-04-10 ENCOUNTER — RESULTS FOLLOW-UP (OUTPATIENT)
Dept: FAMILY MEDICINE CLINIC | Age: 75
End: 2025-04-10

## 2025-04-15 ENCOUNTER — OFFICE VISIT (OUTPATIENT)
Dept: FAMILY MEDICINE CLINIC | Age: 75
End: 2025-04-15
Payer: MEDICARE

## 2025-04-15 VITALS
OXYGEN SATURATION: 94 % | DIASTOLIC BLOOD PRESSURE: 78 MMHG | SYSTOLIC BLOOD PRESSURE: 126 MMHG | WEIGHT: 241 LBS | HEIGHT: 68 IN | BODY MASS INDEX: 36.53 KG/M2 | RESPIRATION RATE: 16 BRPM | HEART RATE: 65 BPM

## 2025-04-15 DIAGNOSIS — R73.03 PREDIABETES: ICD-10-CM

## 2025-04-15 DIAGNOSIS — R73.01 IMPAIRED FASTING GLUCOSE: ICD-10-CM

## 2025-04-15 DIAGNOSIS — D12.4 ADENOMATOUS POLYP OF DESCENDING COLON: ICD-10-CM

## 2025-04-15 DIAGNOSIS — M17.12 PRIMARY OSTEOARTHRITIS OF LEFT KNEE: ICD-10-CM

## 2025-04-15 DIAGNOSIS — R79.89 ELEVATED TSH: ICD-10-CM

## 2025-04-15 DIAGNOSIS — M70.62 TROCHANTERIC BURSITIS, LEFT HIP: ICD-10-CM

## 2025-04-15 DIAGNOSIS — Z96.642 HISTORY OF TOTAL LEFT HIP REPLACEMENT: ICD-10-CM

## 2025-04-15 DIAGNOSIS — E66.812 CLASS 2 OBESITY DUE TO EXCESS CALORIES WITHOUT SERIOUS COMORBIDITY WITH BODY MASS INDEX (BMI) OF 37.0 TO 37.9 IN ADULT: ICD-10-CM

## 2025-04-15 DIAGNOSIS — Z79.890 POSTMENOPAUSAL HRT (HORMONE REPLACEMENT THERAPY): ICD-10-CM

## 2025-04-15 DIAGNOSIS — J45.20 MILD INTERMITTENT ASTHMA WITHOUT COMPLICATION: ICD-10-CM

## 2025-04-15 DIAGNOSIS — M16.11 ARTHRITIS OF RIGHT HIP: ICD-10-CM

## 2025-04-15 DIAGNOSIS — I10 PRIMARY HYPERTENSION: Primary | ICD-10-CM

## 2025-04-15 DIAGNOSIS — K21.9 GASTROESOPHAGEAL REFLUX DISEASE WITHOUT ESOPHAGITIS: ICD-10-CM

## 2025-04-15 DIAGNOSIS — G47.8 UNREFRESHED BY SLEEP: ICD-10-CM

## 2025-04-15 DIAGNOSIS — E78.00 PURE HYPERCHOLESTEROLEMIA: ICD-10-CM

## 2025-04-15 DIAGNOSIS — Z12.31 VISIT FOR SCREENING MAMMOGRAM: ICD-10-CM

## 2025-04-15 DIAGNOSIS — E66.09 CLASS 2 OBESITY DUE TO EXCESS CALORIES WITHOUT SERIOUS COMORBIDITY WITH BODY MASS INDEX (BMI) OF 37.0 TO 37.9 IN ADULT: ICD-10-CM

## 2025-04-15 PROCEDURE — 99213 OFFICE O/P EST LOW 20 MIN: CPT

## 2025-04-15 SDOH — ECONOMIC STABILITY: FOOD INSECURITY: WITHIN THE PAST 12 MONTHS, THE FOOD YOU BOUGHT JUST DIDN'T LAST AND YOU DIDN'T HAVE MONEY TO GET MORE.: NEVER TRUE

## 2025-04-15 SDOH — ECONOMIC STABILITY: FOOD INSECURITY: WITHIN THE PAST 12 MONTHS, YOU WORRIED THAT YOUR FOOD WOULD RUN OUT BEFORE YOU GOT MONEY TO BUY MORE.: NEVER TRUE

## 2025-04-15 ASSESSMENT — ENCOUNTER SYMPTOMS
EYES NEGATIVE: 1
ALLERGIC/IMMUNOLOGIC NEGATIVE: 1
WHEEZING: 1
BACK PAIN: 0
SHORTNESS OF BREATH: 1
GASTROINTESTINAL NEGATIVE: 1

## 2025-04-15 ASSESSMENT — PATIENT HEALTH QUESTIONNAIRE - PHQ9
SUM OF ALL RESPONSES TO PHQ QUESTIONS 1-9: 0
1. LITTLE INTEREST OR PLEASURE IN DOING THINGS: NOT AT ALL
2. FEELING DOWN, DEPRESSED OR HOPELESS: NOT AT ALL
SUM OF ALL RESPONSES TO PHQ QUESTIONS 1-9: 0

## 2025-04-15 NOTE — PROGRESS NOTES
Subjective:      Patient ID: Megha Guillen is a 74 y.o. female.    Hypertension  Associated symptoms include neck pain and shortness of breath.   Gastroesophageal Reflux  She complains of wheezing.   Hyperlipidemia  Associated symptoms include shortness of breath.   Other  Associated symptoms include neck pain. Pertinent negatives include no arthralgias (nothing particularly problematic at present in joints.  rare groin/hip discomfort. ).   Back Pain    Groin Pain  Pertinent negatives include no back pain.   Neck Pain     Wheezing   Associated symptoms include neck pain and shortness of breath.   Skin Problem  Associated symptoms include shortness of breath.   Shortness of Breath  Associated symptoms include neck pain and wheezing.      Routine  follow up on chronic medical conditions colon polyps, htn, hyperlipidemia, gerd, obesity, insomnia, oa , refills, and review of updated labs.    She had right and left tka per Dr. Green at [facility].  She is a Jehovah-witness and wanted to pursue the Bloodless Care Program they have at [facility], where she had her prior knee replacement done. Both knees doing well at present.   She is back to work after retiring from .  She was working at [employer] as a  part time, but had to take some time off for husbands health and now [place] more so not working.      right hip replacement through Dr. Becker here in December 2023.  Hip doing well at present.   Left hip replaced 9/27/24.  Still recovering.  Lateral hip and thigh still sore.    She lost wt. Down to 235, but gained wt. Back.  Up and down by report.   230 at present.  Bp doing better at present.   Lost her spouse to pancreatic cancer may 2021. Lots of responsibilities with renters and property.   Compliant with medications.  Sleep doing well, up to 5-6 hours/night. No gerd symptoms to report.  Hot flashes periodically, ongoing.  No asthma to report.  Some cramps in feet and legs at night.      Her lower

## 2025-04-15 NOTE — PATIENT INSTRUCTIONS
Hospital Outpatient Visit on 04/09/2025   Component Date Value Ref Range Status    TSH 04/09/2025 2.85  0.27 - 4.20 uIU/mL Final    Hemoglobin A1C 04/09/2025 5.8  4.0 - 6.0 % Final    Estimated Avg Glucose 04/09/2025 120  mg/dL Final    Comment: The ADA and AACC recommend providing the estimated average glucose result to permit better   patient understanding of their HBA1c result.      WBC 04/09/2025 7.1  3.5 - 11.3 k/uL Final    RBC 04/09/2025 4.67  3.95 - 5.11 m/uL Final    Hemoglobin 04/09/2025 12.2  11.9 - 15.1 g/dL Final    Hematocrit 04/09/2025 38.4  36.3 - 47.1 % Final    MCV 04/09/2025 82.2 (L)  82.6 - 102.9 fL Final    MCH 04/09/2025 26.1  25.2 - 33.5 pg Final    MCHC 04/09/2025 31.8  25.2 - 33.5 g/dL Final    RDW 04/09/2025 14.1  11.8 - 14.4 % Final    Platelets 04/09/2025 347  138 - 453 k/uL Final    MPV 04/09/2025 9.3  8.1 - 13.5 fL Final    NRBC Automated 04/09/2025 0.0  0.0 per 100 WBC Final    Neutrophils % 04/09/2025 48  36 - 65 % Final    Lymphocytes % 04/09/2025 38  24 - 43 % Final    Monocytes % 04/09/2025 10  3 - 12 % Final    Eosinophils % 04/09/2025 3  1 - 4 % Final    Basophils % 04/09/2025 1  0 - 2 % Final    Immature Granulocytes % 04/09/2025 0  0 % Final    Neutrophils Absolute 04/09/2025 3.43  1.50 - 8.10 k/uL Final    Lymphocytes Absolute 04/09/2025 2.73  1.10 - 3.70 k/uL Final    Monocytes Absolute 04/09/2025 0.68  0.10 - 1.20 k/uL Final    Eosinophils Absolute 04/09/2025 0.21  0.00 - 0.44 k/uL Final    Basophils Absolute 04/09/2025 0.04  0.00 - 0.20 k/uL Final    Immature Granulocytes Absolute 04/09/2025 <0.03  0.00 - 0.30 k/uL Final    RBC Morphology 04/09/2025 MICROCYTOSIS PRESENT   Final    Sodium 04/09/2025 144  136 - 145 mmol/L Final    Potassium 04/09/2025 4.7  3.7 - 5.3 mmol/L Final    Chloride 04/09/2025 107  98 - 107 mmol/L Final    CO2 04/09/2025 28  20 - 31 mmol/L Final    Anion Gap 04/09/2025 9  9 - 16 mmol/L Final    Glucose 04/09/2025 86  74 - 99 mg/dL Final    BUN

## 2025-04-16 ENCOUNTER — TELEMEDICINE (OUTPATIENT)
Dept: FAMILY MEDICINE CLINIC | Age: 75
End: 2025-04-16

## 2025-04-16 DIAGNOSIS — Z00.00 MEDICARE ANNUAL WELLNESS VISIT, SUBSEQUENT: Primary | ICD-10-CM

## 2025-04-16 ASSESSMENT — PATIENT HEALTH QUESTIONNAIRE - PHQ9
SUM OF ALL RESPONSES TO PHQ QUESTIONS 1-9: 0
SUM OF ALL RESPONSES TO PHQ QUESTIONS 1-9: 0
1. LITTLE INTEREST OR PLEASURE IN DOING THINGS: NOT AT ALL
SUM OF ALL RESPONSES TO PHQ QUESTIONS 1-9: 0
SUM OF ALL RESPONSES TO PHQ QUESTIONS 1-9: 0
2. FEELING DOWN, DEPRESSED OR HOPELESS: NOT AT ALL

## 2025-04-16 NOTE — PROGRESS NOTES
Medicare Annual Wellness Visit    Megha Guillen is here for Medicare AWV    Assessment & Plan        No follow-ups on file.     Subjective       Patient's complete Health Risk Assessment and screening values have been reviewed and are found in Flowsheets. The following problems were reviewed today and where indicated follow up appointments were made and/or referrals ordered.    Positive Risk Factor Screenings with Interventions:             General HRA Questions:  Select all that apply: (!) New or Increased Pain    Interventions - Pain:  Pain in hip that started this morning, patient reports she may have slept too long on her side. She will schedule OV if pain persists or worsens.   See AVS for additional education material      Inactivity:  On average, how many days per week do you engage in moderate to strenuous exercise (like a brisk walk)?: 0 days (!) Abnormal  On average, how many minutes do you engage in exercise at this level?: 0 min    Interventions:  See AVS for additional education material     Abnormal BMI (obese):  There is no height or weight on file to calculate BMI. (!) Abnormal    Interventions:  See AVS for additional education material                           Objective    Patient-Reported Vitals  Patient-Reported Weight: 241 lb  Patient-Reported Height: 5'8\"                 Allergies   Allergen Reactions    Minocycline Nausea Only and Other (See Comments)     Dizzy    Oxycodone Nausea Only     Prior to Visit Medications    Medication Sig Taking? Authorizing Provider   Handicap Placard MISC by Does not apply route Does not  Yes Ron Padgett MD   meloxicam (MOBIC) 15 MG tablet TAKE 1 TABLET BY MOUTH DAILY Yes Ron Padgett MD   albuterol sulfate HFA (PROVENTIL;VENTOLIN;PROAIR) 108 (90 Base) MCG/ACT inhaler Inhale 2 puffs into the lungs every 6 hours as needed for Wheezing or Shortness of Breath Yes Ron Padgett MD   aspirin 81 MG EC tablet Take 1 tablet by mouth in the morning and at

## 2025-04-16 NOTE — PATIENT INSTRUCTIONS
stop and talk to your doctor.  Where can you learn more?  Go to [email or web address] and enter P600 to learn more about \"Learning About Being Active as an Older Adult.\"  Current as of: July 31, 2024  Content Version: 14.4  © 6818-4420 [employer].   Care instructions adapted under license by [employer]. If you have questions about a medical condition or this instruction, always ask your healthcare professional. [employer] disclaims any warranty or liability for your use of this information.         Starting a Weight-Loss Plan: Care Instructions  Overview    It can be a challenge to lose weight. But your doctor can help you make a weight-loss plan that meets your needs.  You don't have to make a lot of big changes at once. A better idea might be to focus on small changes and stick with them. When those changes become habit, you can add a few more changes.  Some people find it helpful to take an exercise or nutrition class. If you have questions, ask your doctor about seeing a registered dietitian or an exercise specialist. You might also think about joining a weight-loss support group.  If you're not ready to make changes right now, try to pick a date in the future. Then make an appointment with your doctor to talk about when and how you'll get started with a plan.  Follow-up care is a key part of your treatment and safety. Be sure to make and go to all appointments, and call your doctor if you are having problems. It's also a good idea to know your test results and keep a list of the medicines you take.  How can you care for yourself as you start a weight-loss plan?  Set realistic goals. Many people expect to lose much more weight than is likely. A weight loss of 5% to 10% of your body weight may be enough to improve your health.  Get family and friends involved to provide support. Talk to them about why you are trying to lose weight, and ask them to help. They can help by

## 2025-04-24 NOTE — H&P
H &P Colonoscopy  Patient:Megha Guillen                 :1950  (yes) patient known to Dr. Weber's practice  PCP: Dr Padgett     CC:Family history of colon cancer and history of colon polyps           HPI:     ROS:  All 12 systems negative except the positives in the HPI.      Colonoscopy  Abd pain: no  Anemia: no  Bloating:no  Diarrhea: no  Constipation: no  Melena: no  Hematochezia:no  Rectal Bleeding:no  Rectal/Anal Pain:no  Pruritus: no  Family history colon Cancer: yes brother at the age of 38  Previous colon cancer: no  Previous Colon Polyp: yes2020 hyperplastic polyp  Change in bowels: no  Decrease caliber of stool: no  Change in color of stool: no     Previous work up date: 2020 Colonoscopy by Dr Weber with findings of hyperplastic polyp x 3         Past Medical History:   Diagnosis Date    Asthma     Chalazion of right lower eyelid     Colon polyps     Degenerative joint disease of knee, left     Dysmenorrhea     GERD (gastroesophageal reflux disease)     Greater trochanteric bursitis of right hip     History of blood transfusion     Hot flashes     Hyperlipidemia     Hypertension     Impaired fasting glucose     Menopausal symptoms     Obesity     Overactive bladder     Scaphoid fracture of wrist     Right.    Sleep disturbances     Tobacco abuse     Remote and limited.    Uterine fibroid        Past Surgical History:   Procedure Laterality Date    BREAST REDUCTION SURGERY Bilateral     CARDIAC CATHETERIZATION  06/15/2007    No significant coronary artery disease, preserved LV systolic function.      SECTION      COLONOSCOPY  09/10/2008    Sigmoid diverticulosis, history of polyps.    COLONOSCOPY  2007    Multiple polyps of the colon, rectal polyps, sigmoid polyps, and splenic flexure polyps.    COLONOSCOPY  2006    Pedunculated thin polyp near the transverse colon, small polyp at the dentate line versus a hemorrhoid.    COLONOSCOPY      COLONOSCOPY  2014

## 2025-04-29 ENCOUNTER — ANESTHESIA (OUTPATIENT)
Dept: OPERATING ROOM | Age: 75
End: 2025-04-29
Payer: MEDICARE

## 2025-04-29 ENCOUNTER — HOSPITAL ENCOUNTER (OUTPATIENT)
Age: 75
Setting detail: OUTPATIENT SURGERY
Discharge: HOME | End: 2025-04-29
Attending: SURGERY | Admitting: SURGERY
Payer: MEDICARE

## 2025-04-29 ENCOUNTER — ANESTHESIA EVENT (OUTPATIENT)
Dept: OPERATING ROOM | Age: 75
End: 2025-04-29
Payer: MEDICARE

## 2025-04-29 VITALS
WEIGHT: 237 LBS | BODY MASS INDEX: 35.92 KG/M2 | DIASTOLIC BLOOD PRESSURE: 67 MMHG | SYSTOLIC BLOOD PRESSURE: 124 MMHG | HEIGHT: 68 IN | HEART RATE: 55 BPM | OXYGEN SATURATION: 95 % | RESPIRATION RATE: 16 BRPM | TEMPERATURE: 97 F

## 2025-04-29 DIAGNOSIS — Z80.0 FAMILY HISTORY OF COLON CANCER: ICD-10-CM

## 2025-04-29 DIAGNOSIS — Z86.0102 HISTORY OF HYPERPLASTIC POLYP OF COLON: ICD-10-CM

## 2025-04-29 PROCEDURE — 6360000002 HC RX W HCPCS: Performed by: NURSE ANESTHETIST, CERTIFIED REGISTERED

## 2025-04-29 PROCEDURE — 2709999900 HC NON-CHARGEABLE SUPPLY: Performed by: SURGERY

## 2025-04-29 PROCEDURE — 88305 TISSUE EXAM BY PATHOLOGIST: CPT

## 2025-04-29 PROCEDURE — 7100000011 HC PHASE II RECOVERY - ADDTL 15 MIN: Performed by: SURGERY

## 2025-04-29 PROCEDURE — 3700000000 HC ANESTHESIA ATTENDED CARE: Performed by: SURGERY

## 2025-04-29 PROCEDURE — 7100000010 HC PHASE II RECOVERY - FIRST 15 MIN: Performed by: SURGERY

## 2025-04-29 PROCEDURE — 3609010400 HC COLONOSCOPY POLYPECTOMY HOT BIOPSY: Performed by: SURGERY

## 2025-04-29 PROCEDURE — 2580000003 HC RX 258: Performed by: SURGERY

## 2025-04-29 PROCEDURE — 3700000001 HC ADD 15 MINUTES (ANESTHESIA): Performed by: SURGERY

## 2025-04-29 PROCEDURE — 45384 COLONOSCOPY W/LESION REMOVAL: CPT | Performed by: SURGERY

## 2025-04-29 RX ORDER — PROPOFOL 10 MG/ML
INJECTION, EMULSION INTRAVENOUS
Status: DISCONTINUED | OUTPATIENT
Start: 2025-04-29 | End: 2025-04-29 | Stop reason: SDUPTHER

## 2025-04-29 RX ORDER — SODIUM CHLORIDE 0.9 % (FLUSH) 0.9 %
5-40 SYRINGE (ML) INJECTION PRN
Status: DISCONTINUED | OUTPATIENT
Start: 2025-04-29 | End: 2025-04-29 | Stop reason: HOSPADM

## 2025-04-29 RX ORDER — LIDOCAINE HYDROCHLORIDE 40 MG/ML
INJECTION, SOLUTION RETROBULBAR
Status: DISCONTINUED | OUTPATIENT
Start: 2025-04-29 | End: 2025-04-29 | Stop reason: SDUPTHER

## 2025-04-29 RX ORDER — SODIUM CHLORIDE, SODIUM LACTATE, POTASSIUM CHLORIDE, CALCIUM CHLORIDE 600; 310; 30; 20 MG/100ML; MG/100ML; MG/100ML; MG/100ML
INJECTION, SOLUTION INTRAVENOUS CONTINUOUS
Status: DISCONTINUED | OUTPATIENT
Start: 2025-04-29 | End: 2025-04-29 | Stop reason: HOSPADM

## 2025-04-29 RX ORDER — SODIUM CHLORIDE 9 MG/ML
25 INJECTION, SOLUTION INTRAVENOUS PRN
Status: DISCONTINUED | OUTPATIENT
Start: 2025-04-29 | End: 2025-04-29 | Stop reason: HOSPADM

## 2025-04-29 RX ORDER — SODIUM CHLORIDE 0.9 % (FLUSH) 0.9 %
5-40 SYRINGE (ML) INJECTION EVERY 12 HOURS SCHEDULED
Status: DISCONTINUED | OUTPATIENT
Start: 2025-04-29 | End: 2025-04-29 | Stop reason: HOSPADM

## 2025-04-29 RX ADMIN — PROPOFOL 100 MG: 10 INJECTION, EMULSION INTRAVENOUS at 09:55

## 2025-04-29 RX ADMIN — LIDOCAINE HYDROCHLORIDE 40 MG: 40 INJECTION, SOLUTION RETROBULBAR; TOPICAL at 09:55

## 2025-04-29 RX ADMIN — SODIUM CHLORIDE, SODIUM LACTATE, POTASSIUM CHLORIDE, AND CALCIUM CHLORIDE: .6; .31; .03; .02 INJECTION, SOLUTION INTRAVENOUS at 08:53

## 2025-04-29 RX ADMIN — PROPOFOL 140 MCG/KG/MIN: 10 INJECTION, EMULSION INTRAVENOUS at 09:56

## 2025-04-29 ASSESSMENT — PAIN - FUNCTIONAL ASSESSMENT: PAIN_FUNCTIONAL_ASSESSMENT: 0-10

## 2025-04-29 ASSESSMENT — PAIN SCALES - GENERAL
PAINLEVEL_OUTOF10: 0

## 2025-04-29 NOTE — OP NOTE
COLONOSCOPY PROCEDURE NOTE:      Pre op diagnosis:     74 y.o.female  Last Colonoscopy 1-1-2020 Colonoscopy by Dr Weber with findings of hyperplastic polyp x 3    Yes family history of colon cancer brother at age 38  Yes history of polyps as above  screening    Operative Procedure:    1.  Colonoscopy with hot forceps    Surgeon:    Dr. Yusuf Weber     Anesthesia:    IV sedation per CRNA    EBL:  minimal    Procedure:    Patient was taken to the endoscopy suite and placed in a left lateral decubitus position.  They were given IV sedation as mentioned above.  A digital rectal exam was performed.  There were no masses and anal tone was normal.  The colonoscope was inserted into the rectum and carefully manipulated up through the sigmoid colon, transverse colon, right colon and into the cecum.  The cecum was identified by the ileal cecal valve and transabdominal illumination.  Then the scope was slowly withdrawn.  The scope was retroflexed in the rectum and the dentate line was examined.  The scope was removed.  The patient tolerated the procedure well.  The following findings were noted.        Final Diagnosis:    8 mm polyps at 10, 50, 20 and 15 cm, all removed with hot forceps    Plan:    Await bx  Probably repeat CS in 3 years due more than 3 polyps and strong family hx colon cancer    ADDENDUM:  Endo Review :  5/2/2025    Pathology:    -- Diagnosis --   A.  Colon at 10 cm, biopsy:   - Hyperplastic polyp.     B.  Colon at 50 cm, biopsy:   - Hyperplastic polyp.     C.  Colon at 20 cm, biopsy:   - Hyperplastic polyp.     D.  Colon at 15 cm, biopsy:   - Hyperplastic polyp.     Plan:    1.  repeat CS in 3 years due more than 3 polyps and strong family hx colon cancer, but weigh risks and benefits at age 77.

## 2025-04-29 NOTE — ANESTHESIA PRE PROCEDURE
Department of Anesthesiology  Preprocedure Note       Name:  Megha Guillen   Age:  74 y.o.  :  1950                                          MRN:  [id number]         Date:  2025      Surgeon: Surgeon(s):  Yusuf Weber MD    Procedure: Procedure(s):  Colonoscopy    Medications prior to admission:   Prior to Admission medications    Medication Sig Start Date End Date Taking? Authorizing Provider   meloxicam (MOBIC) 15 MG tablet TAKE 1 TABLET BY MOUTH DAILY 3/7/25  Yes Ron Padgett MD   albuterol sulfate HFA (PROVENTIL;VENTOLIN;PROAIR) 108 (90 Base) MCG/ACT inhaler Inhale 2 puffs into the lungs every 6 hours as needed for Wheezing or Shortness of Breath 24  Yes Ron Padgett MD   hydroCHLOROthiazide (HYDRODIURIL) 25 MG tablet TAKE 1 TABLET BY MOUTH DAILY 9/3/24  Yes Ron Padgett MD   losartan (COZAAR) 100 MG tablet Take 1 tablet by mouth daily 24  Yes Provider, Historical, MD   Handicap Placard MISC by Does not apply route Does not  4/15/25   Ron Padgett MD   aspirin 81 MG EC tablet Take 1 tablet by mouth in the morning and at bedtime 2 po bid for 1 month then dc  Patient not taking: Reported on 2025   Rakesh Becker MD Handicap Placard MISC by Does not apply route Expires 25   Rakesh Becker MD       Current medications:    Current Facility-Administered Medications   Medication Dose Route Frequency Provider Last Rate Last Admin    lactated ringers infusion   IntraVENous Continuous Yusuf Weber MD 75 mL/hr at 25 0853 New Bag at 25 0853    sodium chloride flush 0.9 % injection 5-40 mL  5-40 mL IntraVENous 2 times per day Yusuf Weber MD        sodium chloride flush 0.9 % injection 5-40 mL  5-40 mL IntraVENous PRN Yusuf Weber MD        0.9 % sodium chloride infusion  25 mL IntraVENous PRN Yusuf Weber MD           Allergies:    Allergies   Allergen Reactions    Minocycline Nausea Only and Other (See Comments)     Dizzy    Oxycodone

## 2025-04-29 NOTE — ANESTHESIA POSTPROCEDURE EVALUATION
Department of Anesthesiology  Postprocedure Note    Patient: Megha Guillen  MRN: [id number]  YOB: 1950  Date of evaluation: 4/29/2025    Procedure Summary       Date: 04/29/25 Room / Location: [facility] / [facility]    Anesthesia Start: 0950 Anesthesia Stop: 1018    Procedure: COLONOSCOPY POLYPECTOMY HOT BIOPSY Diagnosis:       History of hyperplastic polyp of colon      Family history of colon cancer      (History of hyperplastic polyp of colon [Z86.0102])      (Family history of colon cancer [Z80.0])    Surgeons: Yusuf Weber MD Responsible Provider: Raul Johnson II, APRN - CRNA    Anesthesia Type: General, TIVA ASA Status: 3            Anesthesia Type: General, TIVA    Shree Phase I: Shree Score: 10    Shree Phase II: Shree Score: 10    Anesthesia Post Evaluation    Patient location during evaluation: bedside  Patient participation: complete - patient participated  Level of consciousness: awake  Pain score: 1  Airway patency: patent  Nausea & Vomiting: no nausea and no vomiting  Cardiovascular status: hemodynamically stable  Respiratory status: spontaneous ventilation  Hydration status: stable  Pain management: satisfactory to patient    No notable events documented.

## 2025-04-29 NOTE — OP NOTE
COLONOSCOPY PROCEDURE NOTE:      Pre op diagnosis:     74 y.o.female  Last Colonoscopy 1-1-2020 Colonoscopy by Dr Weber with findings of hyperplastic polyp x 3    Yes family history of colon cancer brother at age 38  Yes history of polyps as above  screening    Operative Procedure:    1.  Colonoscopy with hot forceps    Surgeon:    Dr. Yusuf Weber     Anesthesia:    IV sedation per CRNA    EBL:  minimal    Procedure:    Patient was taken to the endoscopy suite and placed in a left lateral decubitus position.  They were given IV sedation as mentioned above.  A digital rectal exam was performed.  There were no masses and anal tone was normal.  The colonoscope was inserted into the rectum and carefully manipulated up through the sigmoid colon, transverse colon, right colon and into the cecum.  The cecum was identified by the ileal cecal valve and transabdominal illumination.  Then the scope was slowly withdrawn.  The scope was retroflexed in the rectum and the dentate line was examined.  The scope was removed.  The patient tolerated the procedure well.  The following findings were noted.        Final Diagnosis:    8 mm polyps at 10, 50, 20 and 15 cm, all removed with hot forceps    Plan:    Await bx  Probably repeat CS in 3 years due more than 3 polyps and strong family hx colon cancer

## 2025-04-29 NOTE — DISCHARGE INSTRUCTIONS
Await bx  Probably repeat CS in 3 years due more than 3 polyps and strong family hx colon cancer    DISCHARGE INSTRUCTIONS FOLLOWING COLONOSCOPY    Activity  You have received sedation.  Your judgement and coordination may be impaired.  Do not drive or operate any machinery today.  Do not make personal, medical, legal or business decisions today.  Do not consume alcohol, tranquilizers or sleeping medications today unless otherwise instructed by your physician.  Rest today.  You may resume normal activity tomorrow.    Because air is put into your colon during this procedure, it is normal to pass large amounts of air from your rectum.  Feelings of bloating, gas or cramping may persist for 24 hours.  You may not have a bowel movement for 1-3 days after this procedure.    Diet  Begin with sips of clear liquids and if no nausea, you may progress to your normal diet.    Medications  You may resume your usual medications, unless otherwise instructed.    Follow-Up  As instructed.    CALL YOUR PHYSICIAN if you experience any of the following:  Bleeding from your rectum (a teaspoonful or more)      - If you had a biopsy taken today, a small amount of bleeding may occur.  Severe abdominal pain/cramping and/or distention that is not relieved by passing gas.  Persistent nausea or vomiting.  Signs of infection including fever, chills, redness or swelling @ the IV site.      If you have questions or problems call [phone] ([facility]) or after business hours call [phone] ([facility])

## 2025-04-30 LAB — SURGICAL PATHOLOGY REPORT: NORMAL

## 2025-05-02 ENCOUNTER — HOSPITAL ENCOUNTER (OUTPATIENT)
Dept: MAMMOGRAPHY | Age: 75
Discharge: HOME | End: 2025-05-02
Payer: MEDICARE

## 2025-05-02 ENCOUNTER — RESULTS FOLLOW-UP (OUTPATIENT)
Dept: FAMILY MEDICINE CLINIC | Age: 75
End: 2025-05-02

## 2025-05-02 VITALS — WEIGHT: 240 LBS | BODY MASS INDEX: 36.49 KG/M2

## 2025-05-02 DIAGNOSIS — Z12.31 VISIT FOR SCREENING MAMMOGRAM: ICD-10-CM

## 2025-05-02 PROCEDURE — 77063 BREAST TOMOSYNTHESIS BI: CPT

## 2025-05-12 ENCOUNTER — TELEPHONE (OUTPATIENT)
Dept: SURGERY | Age: 75
End: 2025-05-12

## 2025-05-12 NOTE — TELEPHONE ENCOUNTER
Letter created and mailed to patient with results from recent Colonoscopy at [facility] with Dr. Weber on 4-29-25. Updated history, health maintenance, and recall. Forwarded results to PCP.

## 2025-05-16 RX ORDER — MELOXICAM 15 MG/1
15 TABLET ORAL DAILY
Qty: 90 TABLET | Refills: 3 | Status: SHIPPED | OUTPATIENT
Start: 2025-05-16

## 2025-05-28 ENCOUNTER — INITIAL CONSULT (OUTPATIENT)
Dept: SURGERY | Age: 75
End: 2025-05-28
Payer: MEDICARE

## 2025-05-28 ENCOUNTER — PREP FOR PROCEDURE (OUTPATIENT)
Dept: SURGERY | Age: 75
End: 2025-05-28

## 2025-05-28 VITALS
WEIGHT: 246.8 LBS | TEMPERATURE: 98 F | HEART RATE: 54 BPM | HEIGHT: 68 IN | RESPIRATION RATE: 16 BRPM | BODY MASS INDEX: 37.41 KG/M2 | SYSTOLIC BLOOD PRESSURE: 138 MMHG | DIASTOLIC BLOOD PRESSURE: 80 MMHG

## 2025-05-28 DIAGNOSIS — Z80.0 FAMILY HISTORY OF GASTRIC CANCER: ICD-10-CM

## 2025-05-28 DIAGNOSIS — R10.13 EPIGASTRIC PAIN: ICD-10-CM

## 2025-05-28 DIAGNOSIS — K21.9 GASTROESOPHAGEAL REFLUX DISEASE, UNSPECIFIED WHETHER ESOPHAGITIS PRESENT: Primary | ICD-10-CM

## 2025-05-28 PROCEDURE — 3079F DIAST BP 80-89 MM HG: CPT | Performed by: SURGERY

## 2025-05-28 PROCEDURE — 1159F MED LIST DOCD IN RCRD: CPT | Performed by: SURGERY

## 2025-05-28 PROCEDURE — 99215 OFFICE O/P EST HI 40 MIN: CPT | Performed by: SURGERY

## 2025-05-28 PROCEDURE — 3017F COLORECTAL CA SCREEN DOC REV: CPT | Performed by: SURGERY

## 2025-05-28 PROCEDURE — 1090F PRES/ABSN URINE INCON ASSESS: CPT | Performed by: SURGERY

## 2025-05-28 PROCEDURE — G8427 DOCREV CUR MEDS BY ELIG CLIN: HCPCS | Performed by: SURGERY

## 2025-05-28 PROCEDURE — 1036F TOBACCO NON-USER: CPT | Performed by: SURGERY

## 2025-05-28 PROCEDURE — 99213 OFFICE O/P EST LOW 20 MIN: CPT | Performed by: SURGERY

## 2025-05-28 PROCEDURE — 1123F ACP DISCUSS/DSCN MKR DOCD: CPT | Performed by: SURGERY

## 2025-05-28 PROCEDURE — 1126F AMNT PAIN NOTED NONE PRSNT: CPT | Performed by: SURGERY

## 2025-05-28 PROCEDURE — G8399 PT W/DXA RESULTS DOCUMENT: HCPCS | Performed by: SURGERY

## 2025-05-28 PROCEDURE — 3075F SYST BP GE 130 - 139MM HG: CPT | Performed by: SURGERY

## 2025-05-28 PROCEDURE — G8417 CALC BMI ABV UP PARAM F/U: HCPCS | Performed by: SURGERY

## 2025-05-28 RX ORDER — FAMOTIDINE 20 MG/1
20 TABLET, FILM COATED ORAL NIGHTLY
Qty: 90 TABLET | Refills: 1 | Status: SHIPPED | OUTPATIENT
Start: 2025-05-28

## 2025-05-28 RX ORDER — OMEPRAZOLE 20 MG/1
20 CAPSULE, DELAYED RELEASE ORAL
Qty: 90 CAPSULE | Refills: 1 | Status: SHIPPED | OUTPATIENT
Start: 2025-05-28

## 2025-05-28 NOTE — PATIENT INSTRUCTIONS
EGD is scheduled on Tuesday June 17th, 2025- follow preop instructions given.    Start Prilosec in the morning and Pepcid at night for 3 months. Medications have been sent to the pharmacy.    3 month follow up scheduled with SONJA Walker.

## 2025-05-28 NOTE — PROGRESS NOTES
Reason for evaluation: reflux       Nausea: yes, wakes up in the morning occasionally  Vomiting: no  Heartburn:yes, in the past, but has not had recently  Dysphagia:no  Hematemesis:no  Epigastric pain:yes, intermittently, reports unable to lay on her right side  Anemia: no  Previous work up date:No previous EGD  Current Treatment:Omeprazole OTC in the past, has not taken anything in awhile    
gross motor or sensory deficits,   Msk:  5/5 strength all 4 extremities, no joint tenderness          ASSESS MENT:    Age 75 yo F  Heartburn  Epigastric pain  Nausea  Mother with hx of gastric cancer  No previous EGD  Has been on prilosec intermittently, but has not taken it in a while.      PLAN:    Start on protonix 40 mg q am and pepcid 20 mg q pm  Set up EGD for baseline and high risk with mother with gastric cancer  Follow  up with Mj Leiva in 3 months to see progress and make further recommendations

## 2025-06-07 ENCOUNTER — HOSPITAL ENCOUNTER (EMERGENCY)
Age: 75
Discharge: HOME | End: 2025-06-07
Attending: EMERGENCY MEDICINE
Payer: MEDICARE

## 2025-06-07 ENCOUNTER — APPOINTMENT (OUTPATIENT)
Dept: GENERAL RADIOLOGY | Age: 75
End: 2025-06-07
Attending: EMERGENCY MEDICINE
Payer: MEDICARE

## 2025-06-07 VITALS
SYSTOLIC BLOOD PRESSURE: 160 MMHG | TEMPERATURE: 98.4 F | RESPIRATION RATE: 18 BRPM | HEART RATE: 55 BPM | OXYGEN SATURATION: 97 % | DIASTOLIC BLOOD PRESSURE: 80 MMHG

## 2025-06-07 DIAGNOSIS — M25.551 RIGHT HIP PAIN: Primary | ICD-10-CM

## 2025-06-07 LAB
BACTERIA URNS QL MICRO: ABNORMAL
BILIRUB UR QL STRIP: NEGATIVE
CHARACTER UR: ABNORMAL
CLARITY UR: CLEAR
COLOR UR: YELLOW
EPI CELLS #/AREA URNS HPF: ABNORMAL /HPF (ref 0–5)
GLUCOSE UR STRIP-MCNC: NEGATIVE MG/DL
HGB UR QL STRIP.AUTO: ABNORMAL
KETONES UR STRIP-MCNC: NEGATIVE MG/DL
LEUKOCYTE ESTERASE UR QL STRIP: NEGATIVE
NITRITE UR QL STRIP: NEGATIVE
PH UR STRIP: 6 [PH] (ref 5–6)
PROT UR STRIP-MCNC: NEGATIVE MG/DL
RBC #/AREA URNS HPF: ABNORMAL /HPF (ref 0–4)
SP GR UR STRIP: 1.02 (ref 1.01–1.02)
UROBILINOGEN UR STRIP-ACNC: NORMAL EU/DL (ref 0–1)
WBC #/AREA URNS HPF: ABNORMAL /HPF (ref 0–4)

## 2025-06-07 PROCEDURE — 6370000000 HC RX 637 (ALT 250 FOR IP): Performed by: EMERGENCY MEDICINE

## 2025-06-07 PROCEDURE — 81001 URINALYSIS AUTO W/SCOPE: CPT

## 2025-06-07 PROCEDURE — 99284 EMERGENCY DEPT VISIT MOD MDM: CPT

## 2025-06-07 PROCEDURE — 72100 X-RAY EXAM L-S SPINE 2/3 VWS: CPT

## 2025-06-07 PROCEDURE — 73502 X-RAY EXAM HIP UNI 2-3 VIEWS: CPT

## 2025-06-07 RX ORDER — HYDROCODONE BITARTRATE AND ACETAMINOPHEN 5; 325 MG/1; MG/1
1 TABLET ORAL EVERY 4 HOURS PRN
Qty: 5 TABLET | Refills: 0 | Status: SHIPPED | OUTPATIENT
Start: 2025-06-07 | End: 2025-06-11

## 2025-06-07 RX ORDER — HYDROCODONE BITARTRATE AND ACETAMINOPHEN 5; 325 MG/1; MG/1
1 TABLET ORAL ONCE
Status: COMPLETED | OUTPATIENT
Start: 2025-06-07 | End: 2025-06-07

## 2025-06-07 RX ADMIN — HYDROCODONE BITARTRATE AND ACETAMINOPHEN 1 TABLET: 5; 325 TABLET ORAL at 18:50

## 2025-06-07 ASSESSMENT — PAIN DESCRIPTION - ORIENTATION: ORIENTATION: RIGHT

## 2025-06-07 ASSESSMENT — PAIN - FUNCTIONAL ASSESSMENT: PAIN_FUNCTIONAL_ASSESSMENT: 0-10

## 2025-06-07 ASSESSMENT — PAIN DESCRIPTION - LOCATION: LOCATION: GROIN;HIP

## 2025-06-07 ASSESSMENT — PAIN SCALES - GENERAL: PAINLEVEL_OUTOF10: 10

## 2025-06-07 NOTE — ED PROVIDER NOTES
oxycodone.    PAST MEDICAL HISTORY:    has a past medical history of Asthma, Chalazion of right lower eyelid, Colon polyps, Degenerative joint disease of knee, left, Dysmenorrhea, GERD (gastroesophageal reflux disease), Greater trochanteric bursitis of right hip, History of blood transfusion, Hot flashes, Hyperlipidemia, Hypertension, Impaired fasting glucose, Menopausal symptoms, Obesity, Overactive bladder, Scaphoid fracture of wrist, Sleep disturbances, Tobacco abuse, and Uterine fibroid.    SURGICAL HISTORY:      has a past surgical history that includes  section; Hysterectomy, total abdominal; Knee arthroscopy (Left, 1999); Tonsillectomy and adenoidectomy; Dilation and curettage of uterus; other surgical history (Left, 2012); other surgical history (Right, 2006); Colonoscopy (09/10/2008); Colonoscopy (2007); Colonoscopy (2006); Cardiac catheterization (06/15/2007); Colonoscopy (); Total knee arthroplasty (Left, ); Colonoscopy (2014); Mouth surgery (Right, 2015); Colonoscopy Biopsy/Stoma (N/A, 2020); Salpingo-oophorectomy (Left); Breast reduction surgery (Bilateral, ); Total hip arthroplasty (Right, 2023); Total hip arthroplasty (Left, 2024); and Colonoscopy (N/A, 2025).    FAMILY HISTORY:   She indicated that her mother is . She indicated that her father is . She indicated that one of her four sisters is . She indicated that both of her brothers are . She indicated that her maternal grandmother is . She indicated that her maternal grandfather is . She indicated that her paternal grandmother is . She indicated that her paternal grandfather is . She indicated that her daughter is alive. She indicated that her son is alive. She indicated that the status of her maternal aunt is unknown.     family history includes Asthma in her daughter; Breast Cancer (age of onset: 70)  °F (36.9 °C)   TempSrc: Tympanic   SpO2: 97%     BP: (!) 182/82, Temp: 98.4 °F (36.9 °C), Pulse: 55, Respirations: 18     DISPOSITION NOTE:   ***    FINAL IMPRESSION:   No diagnosis found.    DISPOSITION:       PATIENT REFERRED TO:   No follow-up provider specified.    DISCHARGE MEDICATIONS:     New Prescriptions    No medications on file         Chong Unger MD   Emergency Physician Attending    (Please note that portions of this note were completed with a voice recognition program.  Efforts were made to edit the dictations but occasionally words are mis-transcribed.)

## 2025-06-08 NOTE — ED PROVIDER NOTES
Take 1 tablet by mouth at bedtime, Disp-90 tablet, R-1Normal      meloxicam (MOBIC) 15 MG tablet TAKE 1 TABLET BY MOUTH DAILY, Disp-90 tablet, R-3Please send a replace/new response with 90-Day Supply if appropriate to maximize member benefit. Requesting 1 year supply.Normal      albuterol sulfate HFA (PROVENTIL;VENTOLIN;PROAIR) 108 (90 Base) MCG/ACT inhaler Inhale 2 puffs into the lungs every 6 hours as needed for Wheezing or Shortness of Breath, Disp-1 each, R-3Normal      hydroCHLOROthiazide (HYDRODIURIL) 25 MG tablet TAKE 1 TABLET BY MOUTH DAILY, Disp-90 tablet, R-3Please send a replace/new response with 90-Day Supply if appropriate to maximize member benefit. Requesting 1 year supply.Normal      losartan (COZAAR) 100 MG tablet Take 1 tablet by mouth dailyHistorical Med      !! Handicap Placard [facility] Starting Tue 4/15/2025, Disp-1 each, R-0, PrintDoes not       !! Handicap Placard [facility] Starting 2024, Disp-1 each, R-0, PrintExpires 25       !! - Potential duplicate medications found. Please discuss with provider.          ALLERGIES     is allergic to minocycline and oxycodone.    FAMILY HISTORY     She indicated that her mother is . She indicated that her father is . She indicated that one of her four sisters is . She indicated that both of her brothers are . She indicated that her maternal grandmother is . She indicated that her maternal grandfather is . She indicated that her paternal grandmother is . She indicated that her paternal grandfather is . She indicated that her daughter is alive. She indicated that her son is alive. She indicated that the status of her maternal aunt is unknown.     family history includes Asthma in her daughter; Breast Cancer (age of onset: 70) in her father; Cancer in her brother, father, and mother; Cervical Cancer in her mother; Colon Cancer in her brother; Heart Attack in her maternal grandfather;

## 2025-06-08 NOTE — DISCHARGE INSTRUCTIONS
Take your medication as indicated and prescribed.  For pain use acetaminophen (Tylenol) or ibuprofen (Motrin / Advil), unless prescribed medications that have acetaminophen or ibuprofen (or similar medications) in it.         PLEASE RETURN TO THE EMERGENCY DEPARTMENT IMMEDIATELY for worsening of pain, inability to move your muscle, worsening of pain, tingling or loss of sensation, inability to move your wrist or fingers, or if you develop any concerning symptoms such as: high fever not relieved by acetaminophen (Tylenol) and/or ibuprofen (Motrin / Advil), chills, feeling of your heart fluttering or racing, persistent nausea and/or vomiting, vomiting up blood, blood in your stool, loss of consciousness, numbness, weakness or tingling in the arms or legs or change in color of the extremities, changes in mental status, persistent headache, blurry vision, loss of bladder / bowel control, unable to follow up with your physician, or other any other care or concern.

## 2025-06-17 ENCOUNTER — ANESTHESIA EVENT (OUTPATIENT)
Dept: OPERATING ROOM | Age: 75
End: 2025-06-17
Payer: MEDICARE

## 2025-06-17 ENCOUNTER — ANESTHESIA (OUTPATIENT)
Dept: OPERATING ROOM | Age: 75
End: 2025-06-17
Payer: MEDICARE

## 2025-06-17 ENCOUNTER — HOSPITAL ENCOUNTER (OUTPATIENT)
Age: 75
Setting detail: OUTPATIENT SURGERY
Discharge: HOME | End: 2025-06-17
Attending: SURGERY | Admitting: SURGERY
Payer: MEDICARE

## 2025-06-17 VITALS
RESPIRATION RATE: 16 BRPM | HEART RATE: 53 BPM | TEMPERATURE: 97.4 F | BODY MASS INDEX: 36.07 KG/M2 | DIASTOLIC BLOOD PRESSURE: 66 MMHG | WEIGHT: 238 LBS | SYSTOLIC BLOOD PRESSURE: 142 MMHG | OXYGEN SATURATION: 100 % | HEIGHT: 68 IN

## 2025-06-17 DIAGNOSIS — K21.9 GERD (GASTROESOPHAGEAL REFLUX DISEASE): ICD-10-CM

## 2025-06-17 DIAGNOSIS — Z80.0 FAMILY HISTORY OF GASTRIC CANCER: ICD-10-CM

## 2025-06-17 DIAGNOSIS — R10.13 EPIGASTRIC PAIN: ICD-10-CM

## 2025-06-17 PROCEDURE — 7100000010 HC PHASE II RECOVERY - FIRST 15 MIN: Performed by: SURGERY

## 2025-06-17 PROCEDURE — 2580000003 HC RX 258: Performed by: SURGERY

## 2025-06-17 PROCEDURE — 2709999900 HC NON-CHARGEABLE SUPPLY: Performed by: SURGERY

## 2025-06-17 PROCEDURE — 7100000011 HC PHASE II RECOVERY - ADDTL 15 MIN: Performed by: SURGERY

## 2025-06-17 PROCEDURE — 88305 TISSUE EXAM BY PATHOLOGIST: CPT

## 2025-06-17 PROCEDURE — 6360000002 HC RX W HCPCS

## 2025-06-17 PROCEDURE — 3609012400 HC EGD TRANSORAL BIOPSY SINGLE/MULTIPLE: Performed by: SURGERY

## 2025-06-17 PROCEDURE — 3700000001 HC ADD 15 MINUTES (ANESTHESIA): Performed by: SURGERY

## 2025-06-17 PROCEDURE — 3700000000 HC ANESTHESIA ATTENDED CARE: Performed by: SURGERY

## 2025-06-17 RX ORDER — SODIUM CHLORIDE 9 MG/ML
25 INJECTION, SOLUTION INTRAVENOUS PRN
Status: DISCONTINUED | OUTPATIENT
Start: 2025-06-17 | End: 2025-06-17 | Stop reason: HOSPADM

## 2025-06-17 RX ORDER — LIDOCAINE HYDROCHLORIDE 40 MG/ML
INJECTION, SOLUTION RETROBULBAR
Status: DISCONTINUED | OUTPATIENT
Start: 2025-06-17 | End: 2025-06-17 | Stop reason: SDUPTHER

## 2025-06-17 RX ORDER — SODIUM CHLORIDE, SODIUM LACTATE, POTASSIUM CHLORIDE, CALCIUM CHLORIDE 600; 310; 30; 20 MG/100ML; MG/100ML; MG/100ML; MG/100ML
INJECTION, SOLUTION INTRAVENOUS CONTINUOUS
Status: DISCONTINUED | OUTPATIENT
Start: 2025-06-17 | End: 2025-06-17 | Stop reason: HOSPADM

## 2025-06-17 RX ORDER — GLYCOPYRROLATE 0.2 MG/ML
INJECTION INTRAMUSCULAR; INTRAVENOUS
Status: DISCONTINUED | OUTPATIENT
Start: 2025-06-17 | End: 2025-06-17 | Stop reason: SDUPTHER

## 2025-06-17 RX ORDER — SODIUM CHLORIDE 0.9 % (FLUSH) 0.9 %
5-40 SYRINGE (ML) INJECTION EVERY 12 HOURS SCHEDULED
Status: DISCONTINUED | OUTPATIENT
Start: 2025-06-17 | End: 2025-06-17 | Stop reason: HOSPADM

## 2025-06-17 RX ORDER — PROPOFOL 10 MG/ML
INJECTION, EMULSION INTRAVENOUS
Status: DISCONTINUED | OUTPATIENT
Start: 2025-06-17 | End: 2025-06-17 | Stop reason: SDUPTHER

## 2025-06-17 RX ORDER — SODIUM CHLORIDE 0.9 % (FLUSH) 0.9 %
5-40 SYRINGE (ML) INJECTION PRN
Status: DISCONTINUED | OUTPATIENT
Start: 2025-06-17 | End: 2025-06-17 | Stop reason: HOSPADM

## 2025-06-17 RX ADMIN — LIDOCAINE HYDROCHLORIDE 80 MG: 40 INJECTION, SOLUTION RETROBULBAR; TOPICAL at 08:52

## 2025-06-17 RX ADMIN — PROPOFOL 150 MCG/KG/MIN: 10 INJECTION, EMULSION INTRAVENOUS at 08:53

## 2025-06-17 RX ADMIN — PROPOFOL 160 MG: 10 INJECTION, EMULSION INTRAVENOUS at 08:52

## 2025-06-17 RX ADMIN — GLYCOPYRROLATE 0.1 MG: 0.2 INJECTION INTRAMUSCULAR; INTRAVENOUS at 08:52

## 2025-06-17 RX ADMIN — SODIUM CHLORIDE, POTASSIUM CHLORIDE, SODIUM LACTATE AND CALCIUM CHLORIDE: 600; 310; 30; 20 INJECTION, SOLUTION INTRAVENOUS at 08:01

## 2025-06-17 ASSESSMENT — PAIN - FUNCTIONAL ASSESSMENT
PAIN_FUNCTIONAL_ASSESSMENT: ADULT NONVERBAL PAIN SCALE (NPVS)
PAIN_FUNCTIONAL_ASSESSMENT: 0-10

## 2025-06-17 ASSESSMENT — PAIN DESCRIPTION - DESCRIPTORS: DESCRIPTORS: ACHING

## 2025-06-17 ASSESSMENT — PAIN SCALES - GENERAL: PAINLEVEL_OUTOF10: 0

## 2025-06-17 NOTE — ANESTHESIA POSTPROCEDURE EVALUATION
Department of Anesthesiology  Postprocedure Note    Patient: Megha Guillen  MRN: [id number]  YOB: 1950  Date of evaluation: 6/17/2025    Procedure Summary       Date: 06/17/25 Room / Location: [facility] / [facility]    Anesthesia Start: 0845 Anesthesia Stop: 0905    Procedure: ESOPHAGOGASTRODUODENOSCOPY BIOPSY Diagnosis:       Epigastric pain      GERD (gastroesophageal reflux disease)      Family history of gastric cancer      (Epigastric pain [R10.13])      (GERD (gastroesophageal reflux disease) [K21.9])      (Family history of gastric cancer [Z80.0])    Surgeons: Yusuf Weber MD Responsible Provider: Rakesh Schmid APRN - CRNA    Anesthesia Type: General, TIVA ASA Status: 3            Anesthesia Type: General, TIVA    Shree Phase I: Shree Score: 10    Shree Phase II: Shree Score: 6    Anesthesia Post Evaluation    Patient location during evaluation: bedside  Patient participation: complete - patient participated  Level of consciousness: sleepy but conscious  Airway patency: patent  Nausea & Vomiting: no nausea  Cardiovascular status: hemodynamically stable  Respiratory status: room air  Hydration status: euvolemic  Pain management: satisfactory to patient    No notable events documented.

## 2025-06-17 NOTE — ANESTHESIA PRE PROCEDURE
Department of Anesthesiology  Preprocedure Note       Name:  Megha Guillen   Age:  74 y.o.  :  1950                                          MRN:  [id number]         Date:  2025      Surgeon: Surgeon(s):  Yusuf Weber MD    Procedure: Procedure(s):  Esophagogastroduodenoscopy    Medications prior to admission:   Prior to Admission medications    Medication Sig Start Date End Date Taking? Authorizing Provider   omeprazole (PRILOSEC) 20 MG delayed release capsule Take 1 capsule by mouth every morning (before breakfast) 25   Yusuf Wbeer MD   famotidine (PEPCID) 20 MG tablet Take 1 tablet by mouth at bedtime 25   Yusuf Weber MD   meloxicam (MOBIC) 15 MG tablet TAKE 1 TABLET BY MOUTH DAILY 25   Ron Padgett MD Handicap Placard MISC by Does not apply route Does not  4/15/25   Ron Padgett MD   albuterol sulfate HFA (PROVENTIL;VENTOLIN;PROAIR) 108 (90 Base) MCG/ACT inhaler Inhale 2 puffs into the lungs every 6 hours as needed for Wheezing or Shortness of Breath 24   Ron Padgett MD   hydroCHLOROthiazide (HYDRODIURIL) 25 MG tablet TAKE 1 TABLET BY MOUTH DAILY 9/3/24   Ron Padgett MD   losartan (COZAAR) 100 MG tablet Take 1 tablet by mouth daily 24   ProviderEdi MD   Handicap Placard MISC by Does not apply route Expires 25   Rakesh Becker MD       Current medications:    No current facility-administered medications for this encounter.     Current Outpatient Medications   Medication Sig Dispense Refill    omeprazole (PRILOSEC) 20 MG delayed release capsule Take 1 capsule by mouth every morning (before breakfast) 90 capsule 1    famotidine (PEPCID) 20 MG tablet Take 1 tablet by mouth at bedtime 90 tablet 1    meloxicam (MOBIC) 15 MG tablet TAKE 1 TABLET BY MOUTH DAILY 90 tablet 3    Handicap Placard MISC by Does not apply route Does not  1 each 0    albuterol sulfate HFA (PROVENTIL;VENTOLIN;PROAIR) 108 (90 Base) MCG/ACT inhaler Inhale 2

## 2025-06-17 NOTE — OP NOTE
EGD PROCEDURE NOTE:      Pre op diagnosis:       Age 73 yo F  Heartburn  Epigastric pain  Nausea  Mother with hx of gastric cancer  No previous EGD  Has been on prilosec intermittently, but has not taken it in a while.       Operative Procedure:    1.  EGD with cold biopsy    Surgeon:    Dr. Yusuf Weber     Anesthesia:    IV sedation per CRNA    EBL:  minimal      Procedure:    Patient was taken to the endoscopy suite and placed in a left lateral decubitus position.  They were given IV sedation as mentioned above.  The gastric scope was passed through the mouth piece and down the esophagus, stomach and into the second portion of the duodenum.  It was withdrawn slowly and cold biopsies were taken in the antrum,body of the stomach and the distal esophagus.  The scope was retroflexed in the stomach and GE junction was examined.  The following findings were noted.      Final Diagnosis:    GE junction at 40 cm  Mild gastritis in the body  Mild distal esopahgitis  Duodenum normal    Plan:    Await bx  Then make further recommendations, we will  try PPI daily for 3 months then follow up with Mj Leiva to see if symptoms resolved, and wean off meds or stop them if no symptoms.

## 2025-06-17 NOTE — H&P
Megha Guillen is a 74 y.o. female      CC:    Epigastric pain/heartburn  Nausea  Mother had gastric cancer    HISTORY OF PRESENT ILLNESS:    Pt is a 75 yo F with epigastric pain, heartburn and nausea last 7 months.  Has tried prilosec OTC in the past and helps some but has not taken for awhile.  She just had a CS 2 weeks ago.       Reason for evaluation: reflux                   Nausea: yes, wakes up in the morning occasionally  Vomiting: no  Heartburn:yes, in the past, but has not had recently  Dysphagia:no  Hematemesis:no  Epigastric pain:yes, intermittently, reports unable to lay on her right side  Anemia: no  Previous work up date:No previous EGD  Current Treatment:Omeprazole OTC in the past, has not taken anything in awhile       Review of Systems:    General:  Fever: Negative  Weight Change:Negative  Night Sweats: Negative    Eye:  Blurry Vision:Negative  Double Vision: Negative    Ent:  Headaches: Negative  Sore throat: Negative    Allergy/Immunology:  Hives: Negative  Latex allergy: Negative    Hematology/Lymphatic:  Bleeding Problems: Negative  Blood Clots: Negative  Swollen Lymph Nodes: Negative    Lungs:  Cough: Negative  SOB: Negative  Wheezing:Negative    Cardiovascular:  Chest Pain: Negative  Palpitations:Negative    GI:   Decreased Appetite: Negative  Heartburn: Negative  Dysphagia: Negative  Nausea/Vomiting: Negative  Abdominal Pain: Negative  Change in Bowels:Negative  Constipation: Negative  Diarrhea: Negative  Rectal Bleeding: Negative    :   Dysuria: Negative  Increase Urinary Frequency/Urgency: Negative    Neuro:  Seizures: Negative  Confusion: Negative        PAST MEDICAL HISTORY:      Family History   Problem Relation Age of Onset    Cancer Mother     Stomach Cancer Mother     Cervical Cancer Mother     Stroke Father     Cancer Father         Throat.    Breast Cancer Father 70        father cancer right breast    Left Breast Cancer Father     Lung Cancer Sister     No Known Problems Sister

## 2025-06-17 NOTE — OP NOTE
EGD PROCEDURE NOTE:      Pre op diagnosis:       Age 75 yo F  Heartburn  Epigastric pain  Nausea  Mother with hx of gastric cancer  No previous EGD  Has been on prilosec intermittently, but has not taken it in a while.       Operative Procedure:    1.  EGD with cold biopsy    Surgeon:    Dr. Yusuf Weber     Anesthesia:    IV sedation per CRNA    EBL:  minimal      Procedure:    Patient was taken to the endoscopy suite and placed in a left lateral decubitus position.  They were given IV sedation as mentioned above.  The gastric scope was passed through the mouth piece and down the esophagus, stomach and into the second portion of the duodenum.  It was withdrawn slowly and cold biopsies were taken in the antrum,body of the stomach and the distal esophagus.  The scope was retroflexed in the stomach and GE junction was examined.  The following findings were noted.      Final Diagnosis:    GE junction at 40 cm  Mild gastritis in the body  Mild distal esopahgitis  Duodenum normal    Plan:    Await bx  Then make further recommendations, we will  try PPI daily for 3 months then follow up with Mj Leiva to see if symptoms resolved, and wean off meds or stop them if no symptoms.    ADDENDUM:  Endo Review :  6/20/2025    Pathology:    -- Diagnosis --   A. GASTRIC ANTRUM, BIOPSY:  Mild chronic inactive gastritis. No   Helicobacter organisms identified by H&E stain.      B. GASTRIC BODY #1, BIOPSY:  Mild chronic inactive gastritis.      C. GASTRIC BODY #2, BIOPSY:  Unremarkable gastric mucosa.      D. GASTRIC BODY #3, BIOPSY:  Unremarkable gastric mucosa.      E. DISTAL ESOPHAGUS, BIOPSY:  Unremarkable squamous mucosa. Negative   for intestinal metaplasia.       Plan:    1.  PPI daily for 3 months then follow up with Mj Leiva to see if symptoms resolved, and wean off meds or stop them if no symptoms.

## 2025-06-17 NOTE — DISCHARGE INSTRUCTIONS
1.  Dr. Weber's office will call you in 7 - 10 days with results and further recommendations.    DISCHARGE INSTRUCTIONS FOLLOWING EGD    Activity  You have received sedation.  Your judgement and coordination may be impaired.  Do not drive or operate any machinery today.  Do not make personal, medical, legal or business decisions today.  Do not consume alcohol, tranquilizers or sleeping medications today unless otherwise instructed by your physician.  Rest today.  You may resume normal activity tomorrow.    Because air is put into your stomach during this procedure, it is normal to pass large amounts of air/belch.  Feelings of  bloating, gas or cramping may persist for 24 hours.    Diet  Begin with sips of clear liquids and if no nausea, you may progress to your normal diet.    Medications  You may resume your usual medications, unless otherwise instructed.    Follow-Up  As instructed.    CALL YOUR PHYSICIAN if you experience any of the following:  Chest pain not relieved by belching.  Abdominal pain/cramping and/or distention that is not relieved by passing gas.  Persistent nausea & vomiting.  Signs of infection including fever, chills, redness or swelling @ the IV site.    If you have questions/problems, call [phone] ([facility]) or after regular business hours call [phone] ([facility])

## 2025-06-19 LAB — SURGICAL PATHOLOGY REPORT: NORMAL

## 2025-06-30 ENCOUNTER — TELEPHONE (OUTPATIENT)
Dept: SURGERY | Age: 75
End: 2025-06-30

## 2025-06-30 NOTE — TELEPHONE ENCOUNTER
Letter created and mailed to patient with results from recent EGD at [facility] with Dr. Weber on 6/17/2025. Updated history, health maintenance, and recall. Forwarded results to PCP.

## 2025-07-03 ENCOUNTER — TELEPHONE (OUTPATIENT)
Dept: SURGERY | Age: 75
End: 2025-07-03

## 2025-07-18 RX ORDER — HYDROCHLOROTHIAZIDE 25 MG/1
25 TABLET ORAL DAILY
Qty: 90 TABLET | Refills: 3 | Status: SHIPPED | OUTPATIENT
Start: 2025-07-18

## 2025-07-18 NOTE — TELEPHONE ENCOUNTER
Megha called requesting a refill of the below medication which has been pended for you:     Requested Prescriptions     Pending Prescriptions Disp Refills    hydroCHLOROthiazide (HYDRODIURIL) 25 MG tablet [Pharmacy Med Name: hydroCHLOROthiazide 25 MG Oral Tablet] 90 tablet 3     Sig: TAKE 1 TABLET BY MOUTH DAILY       Last Appointment Date: 4/16/2025  Next Appointment Date: 10/15/2025

## 2025-08-26 ENCOUNTER — OFFICE VISIT (OUTPATIENT)
Dept: SURGERY | Age: 75
End: 2025-08-26
Payer: MEDICARE

## 2025-08-26 VITALS
HEART RATE: 64 BPM | DIASTOLIC BLOOD PRESSURE: 88 MMHG | SYSTOLIC BLOOD PRESSURE: 120 MMHG | OXYGEN SATURATION: 97 % | TEMPERATURE: 98 F | HEIGHT: 68 IN | BODY MASS INDEX: 36.19 KG/M2

## 2025-08-26 DIAGNOSIS — K21.9 GASTROESOPHAGEAL REFLUX DISEASE, UNSPECIFIED WHETHER ESOPHAGITIS PRESENT: Primary | ICD-10-CM

## 2025-08-26 PROCEDURE — 1123F ACP DISCUSS/DSCN MKR DOCD: CPT | Performed by: PHYSICIAN ASSISTANT

## 2025-08-26 PROCEDURE — G8417 CALC BMI ABV UP PARAM F/U: HCPCS | Performed by: PHYSICIAN ASSISTANT

## 2025-08-26 PROCEDURE — 3017F COLORECTAL CA SCREEN DOC REV: CPT | Performed by: PHYSICIAN ASSISTANT

## 2025-08-26 PROCEDURE — 3079F DIAST BP 80-89 MM HG: CPT | Performed by: PHYSICIAN ASSISTANT

## 2025-08-26 PROCEDURE — 1090F PRES/ABSN URINE INCON ASSESS: CPT | Performed by: PHYSICIAN ASSISTANT

## 2025-08-26 PROCEDURE — 1159F MED LIST DOCD IN RCRD: CPT | Performed by: PHYSICIAN ASSISTANT

## 2025-08-26 PROCEDURE — 3074F SYST BP LT 130 MM HG: CPT | Performed by: PHYSICIAN ASSISTANT

## 2025-08-26 PROCEDURE — G8427 DOCREV CUR MEDS BY ELIG CLIN: HCPCS | Performed by: PHYSICIAN ASSISTANT

## 2025-08-26 PROCEDURE — G8399 PT W/DXA RESULTS DOCUMENT: HCPCS | Performed by: PHYSICIAN ASSISTANT

## 2025-08-26 PROCEDURE — 99213 OFFICE O/P EST LOW 20 MIN: CPT | Performed by: PHYSICIAN ASSISTANT

## 2025-08-26 PROCEDURE — 1036F TOBACCO NON-USER: CPT | Performed by: PHYSICIAN ASSISTANT

## 2025-08-26 PROCEDURE — 99212 OFFICE O/P EST SF 10 MIN: CPT | Performed by: PHYSICIAN ASSISTANT

## (undated) DEVICE — DRESSING HYDROFIBER AQUACEL AG ADVANTAGE 3.5X14 IN

## (undated) DEVICE — BLADE ES L6IN ELASTOMERIC COAT EXT DURABLE BEND UPTO 90DEG

## (undated) DEVICE — GLOVE SURG SZ 8 L12IN THK91MIL BRN LTX FREE POLYCHLOROPRENE

## (undated) DEVICE — INTENDED FOR TISSUE SEPARATION, AND OTHER PROCEDURES THAT REQUIRE A SHARP SURGICAL BLADE TO PUNCTURE OR CUT.: Brand: BARD-PARKER ® CARBON RIB-BACK BLADES

## (undated) DEVICE — 1200CC GUARDIAN II: Brand: GUARDIAN

## (undated) DEVICE — STRYKER PERFORMANCE SERIES SAGITTAL BLADE: Brand: STRYKER PERFORMANCE SERIES

## (undated) DEVICE — CONNECTOR TBNG AUX H2O JET DISP FOR OLY 160/180 SER

## (undated) DEVICE — 3M™ WARMING BLANKET, UPPER BODY, 10 PER CASE, 42268: Brand: BAIR HUGGER™

## (undated) DEVICE — BITE BLOCK W/VELCRO STRAP

## (undated) DEVICE — GLOVE SURG SZ 6 THK91MIL LTX FREE SYN POLYISOPRENE ANTI

## (undated) DEVICE — 450 ML BOTTLE OF 0.05% CHLORHEXIDINE GLUCONATE IN 99.95% STERILE WATER FOR IRRIGATION, USP AND APPLICATOR.: Brand: IRRISEPT ANTIMICROBIAL WOUND LAVAGE

## (undated) DEVICE — FORCEPS BX L240CM JAW DIA2.4MM ORNG L CAP W/ NDL DISP RAD

## (undated) DEVICE — SKIN AFFIX SURG ADHESIVE 72/CS 0.55ML: Brand: MEDLINE

## (undated) DEVICE — Device

## (undated) DEVICE — 60 ML SYRINGE REGULAR TIP: Brand: MONOJECT

## (undated) DEVICE — SUTURE ABSRB BRAID COAT UD CP NO 2 27IN VCRL J195H

## (undated) DEVICE — TOWEL,OR,DSP,ST,BLUE,STD,4/PK,20PK/CS: Brand: MEDLINE

## (undated) DEVICE — 3M™ IOBAN™ 2 ANTIMICROBIAL INCISE DRAPE 6650EZ: Brand: IOBAN™ 2

## (undated) DEVICE — ZIPPERED TOGA WITH PEEL AWAY FACE SHIELD: Brand: T7

## (undated) DEVICE — POUCH INSTR W6.75XL11.5IN FRST 2 PKT ADH FOR ORTH AND

## (undated) DEVICE — CO2 CANNULA,SSOFT,ADLT,7O2,4CO2,FEMALE: Brand: MEDLINE

## (undated) DEVICE — SOLUTION IRRIG 3000ML 0.9% SOD CHL USP UROMATIC PLAS CONT

## (undated) DEVICE — Z INACTIVE NO ACTIVE SUPPLIER APPLICATOR MEDICATED 26 CC TINT HI-LITE ORNG STRL CHLORAPREP

## (undated) DEVICE — SOLUTION IRRIG 1000ML 0.9% SOD CHL USP POUR PLAS BTL

## (undated) DEVICE — GLOVE ORANGE PI 7   MSG9070

## (undated) DEVICE — MERCY DEFIANCE ENDO KIT: Brand: MEDLINE INDUSTRIES, INC.

## (undated) DEVICE — COVER LT HNDL BLU PLAS

## (undated) DEVICE — COVER,MAYO STAND,STERILE: Brand: MEDLINE

## (undated) DEVICE — SOLUTION IV IRRIG POUR BRL 0.9% SODIUM CHL 2F7124

## (undated) DEVICE — SUTURE VICRYL + SZ 2-0 L27IN ABSRB UD CP-1 1/2 CIR REV CUT VCP266H

## (undated) DEVICE — 9165 UNIVERSAL PATIENT PLATE: Brand: 3M™

## (undated) DEVICE — GLOVE ORANGE PI 7 1/2   MSG9075

## (undated) DEVICE — SUTURE VICRYL SZ 1 L27IN ABSRB UD L36MM CP-1 1/2 CIR REV CUT J268H

## (undated) DEVICE — PACK SURG PROC REMINDER N WVN DISPOSABLE BEAC TIME OUT

## (undated) DEVICE — TUBE IRRIG HNDPC HI FLO TP INTRPULS W/SUCTION TUBE

## (undated) DEVICE — DRAPE,U/ SHT,SPLIT,PLAS,STERIL: Brand: MEDLINE

## (undated) DEVICE — PAD, GROUNDING, UNIVERSAL, SPLIT, 9': Brand: MEDLINE

## (undated) DEVICE — 4-PORT MANIFOLD: Brand: NEPTUNE 2

## (undated) DEVICE — GARMENT,MEDLINE,DVT,INT,CALF,MED, GEN2: Brand: MEDLINE

## (undated) DEVICE — SUTURE VICRYL ABSRB BRAID COAT UD CP NO 2 27IN  J195H

## (undated) DEVICE — SUTURE VCRL SZ 2-0 L27IN ABSRB UD L36MM CP-1 1/2 CIR REV J266H

## (undated) DEVICE — GLOVE SURG SZ 65 THK91MIL LTX FREE SYN POLYISOPRENE

## (undated) DEVICE — 3M™ COBAN™ NL STERILE NON-LATEX SELF-ADHERENT WRAP, 2086S, 6 IN X 5 YD (15 CM X 4,5 M), 12 ROLLS/CASE: Brand: 3M™ COBAN™

## (undated) DEVICE — MDHZ TOTAL HIP: Brand: MEDLINE INDUSTRIES, INC.

## (undated) DEVICE — FORCEPS BX L240CM JAW DIA2.2MM RAD JAW 4 HOT DISP

## (undated) DEVICE — Z DISCONTINUED USE 2275686 GLOVE SURG SZ 8 L12IN FNGR THK13MIL WHT ISOLEX POLYISOPRENE

## (undated) DEVICE — CANNULA NSL AD L2IN ETCO2 SAMP SFT CRUSH RESIST FEM AIRLFE

## (undated) DEVICE — GLOVE ORANGE PI 8   MSG9080